# Patient Record
Sex: MALE | Race: WHITE | Employment: OTHER | URBAN - METROPOLITAN AREA
[De-identification: names, ages, dates, MRNs, and addresses within clinical notes are randomized per-mention and may not be internally consistent; named-entity substitution may affect disease eponyms.]

---

## 2017-01-03 ENCOUNTER — HOSPITAL ENCOUNTER (OUTPATIENT)
Dept: PREADMISSION TESTING | Age: 67
Discharge: HOME | End: 2017-01-03
Payer: COMMERCIAL

## 2017-01-03 VITALS
HEART RATE: 74 BPM | DIASTOLIC BLOOD PRESSURE: 88 MMHG | TEMPERATURE: 98.6 F | OXYGEN SATURATION: 97 % | SYSTOLIC BLOOD PRESSURE: 147 MMHG | BODY MASS INDEX: 32.34 KG/M2 | HEIGHT: 71 IN | WEIGHT: 231 LBS

## 2017-01-03 LAB
ATRIAL RATE: 75 BPM
CALCULATED P AXIS, ECG09: 10 DEGREES
CALCULATED R AXIS, ECG10: 18 DEGREES
CALCULATED T AXIS, ECG11: 38 DEGREES
DIAGNOSIS, 93000: NORMAL
P-R INTERVAL, ECG05: 154 MS
Q-T INTERVAL, ECG07: 346 MS
QRS DURATION, ECG06: 72 MS
QTC CALCULATION (BEZET), ECG08: 386 MS
VENTRICULAR RATE, ECG03: 75 BPM

## 2017-01-03 PROCEDURE — 93005 ELECTROCARDIOGRAM TRACING: CPT

## 2017-01-03 RX ORDER — NAPROXEN SODIUM 220 MG
220 TABLET ORAL 2 TIMES DAILY WITH MEALS
COMMUNITY
End: 2017-04-02

## 2017-01-03 RX ORDER — LORATADINE 10 MG/1
10 TABLET ORAL DAILY
COMMUNITY
End: 2018-05-22

## 2017-01-03 RX ORDER — PHENOL/SODIUM PHENOLATE
20 AEROSOL, SPRAY (ML) MUCOUS MEMBRANE DAILY
COMMUNITY
End: 2019-07-31

## 2017-01-03 NOTE — ADVANCED PRACTICE NURSE
Preoperative instructions reviewed with patient. Patient given 2-6 packs of CHG wipes. Instructions on use of CHG wipes. Patient given SSI infection FAQS sheet, as well as a  MRSA/MSSA treatment instruction sheet  With an explanation to patient that they will be notified if treatment instructions need to be initiated.

## 2017-01-04 PROBLEM — S83.231A COMPLEX TEAR OF MEDIAL MENISCUS OF RIGHT KNEE AS CURRENT INJURY: Status: ACTIVE | Noted: 2017-01-04

## 2017-01-05 ENCOUNTER — ANESTHESIA (OUTPATIENT)
Dept: SURGERY | Age: 67
End: 2017-01-05
Payer: COMMERCIAL

## 2017-01-05 ENCOUNTER — ANESTHESIA EVENT (OUTPATIENT)
Dept: SURGERY | Age: 67
End: 2017-01-05
Payer: COMMERCIAL

## 2017-01-05 PROCEDURE — 74011000250 HC RX REV CODE- 250

## 2017-01-05 PROCEDURE — 77030010509 HC AIRWY LMA MSK TELE -A: Performed by: ANESTHESIOLOGY

## 2017-01-05 PROCEDURE — 77030013079 HC BLNKT BAIR HGGR 3M -A: Performed by: ANESTHESIOLOGY

## 2017-01-05 PROCEDURE — 74011250636 HC RX REV CODE- 250/636: Performed by: PHYSICIAN ASSISTANT

## 2017-01-05 PROCEDURE — 74011250636 HC RX REV CODE- 250/636

## 2017-01-05 RX ORDER — DEXAMETHASONE SODIUM PHOSPHATE 4 MG/ML
INJECTION, SOLUTION INTRA-ARTICULAR; INTRALESIONAL; INTRAMUSCULAR; INTRAVENOUS; SOFT TISSUE AS NEEDED
Status: DISCONTINUED | OUTPATIENT
Start: 2017-01-05 | End: 2017-01-05 | Stop reason: HOSPADM

## 2017-01-05 RX ORDER — HYDROMORPHONE HYDROCHLORIDE 1 MG/ML
INJECTION, SOLUTION INTRAMUSCULAR; INTRAVENOUS; SUBCUTANEOUS AS NEEDED
Status: DISCONTINUED | OUTPATIENT
Start: 2017-01-05 | End: 2017-01-05 | Stop reason: HOSPADM

## 2017-01-05 RX ORDER — SODIUM CHLORIDE, SODIUM LACTATE, POTASSIUM CHLORIDE, CALCIUM CHLORIDE 600; 310; 30; 20 MG/100ML; MG/100ML; MG/100ML; MG/100ML
INJECTION, SOLUTION INTRAVENOUS
Status: DISCONTINUED | OUTPATIENT
Start: 2017-01-05 | End: 2017-01-05 | Stop reason: HOSPADM

## 2017-01-05 RX ORDER — FENTANYL CITRATE 50 UG/ML
INJECTION, SOLUTION INTRAMUSCULAR; INTRAVENOUS AS NEEDED
Status: DISCONTINUED | OUTPATIENT
Start: 2017-01-05 | End: 2017-01-05 | Stop reason: HOSPADM

## 2017-01-05 RX ORDER — KETOROLAC TROMETHAMINE 30 MG/ML
INJECTION, SOLUTION INTRAMUSCULAR; INTRAVENOUS AS NEEDED
Status: DISCONTINUED | OUTPATIENT
Start: 2017-01-05 | End: 2017-01-05 | Stop reason: HOSPADM

## 2017-01-05 RX ORDER — MIDAZOLAM HYDROCHLORIDE 1 MG/ML
INJECTION, SOLUTION INTRAMUSCULAR; INTRAVENOUS AS NEEDED
Status: DISCONTINUED | OUTPATIENT
Start: 2017-01-05 | End: 2017-01-05 | Stop reason: HOSPADM

## 2017-01-05 RX ORDER — ONDANSETRON 2 MG/ML
INJECTION INTRAMUSCULAR; INTRAVENOUS AS NEEDED
Status: DISCONTINUED | OUTPATIENT
Start: 2017-01-05 | End: 2017-01-05 | Stop reason: HOSPADM

## 2017-01-05 RX ORDER — PROPOFOL 10 MG/ML
INJECTION, EMULSION INTRAVENOUS AS NEEDED
Status: DISCONTINUED | OUTPATIENT
Start: 2017-01-05 | End: 2017-01-05 | Stop reason: HOSPADM

## 2017-01-05 RX ORDER — LIDOCAINE HYDROCHLORIDE 20 MG/ML
INJECTION, SOLUTION EPIDURAL; INFILTRATION; INTRACAUDAL; PERINEURAL AS NEEDED
Status: DISCONTINUED | OUTPATIENT
Start: 2017-01-05 | End: 2017-01-05 | Stop reason: HOSPADM

## 2017-01-05 RX ADMIN — FENTANYL CITRATE 25 MCG: 50 INJECTION, SOLUTION INTRAMUSCULAR; INTRAVENOUS at 10:03

## 2017-01-05 RX ADMIN — DEXAMETHASONE SODIUM PHOSPHATE 4 MG: 4 INJECTION, SOLUTION INTRA-ARTICULAR; INTRALESIONAL; INTRAMUSCULAR; INTRAVENOUS; SOFT TISSUE at 09:52

## 2017-01-05 RX ADMIN — FENTANYL CITRATE 25 MCG: 50 INJECTION, SOLUTION INTRAMUSCULAR; INTRAVENOUS at 09:52

## 2017-01-05 RX ADMIN — SODIUM CHLORIDE, SODIUM LACTATE, POTASSIUM CHLORIDE, CALCIUM CHLORIDE: 600; 310; 30; 20 INJECTION, SOLUTION INTRAVENOUS at 09:38

## 2017-01-05 RX ADMIN — PROPOFOL 150 MG: 10 INJECTION, EMULSION INTRAVENOUS at 09:42

## 2017-01-05 RX ADMIN — LIDOCAINE HYDROCHLORIDE 80 MG: 20 INJECTION, SOLUTION EPIDURAL; INFILTRATION; INTRACAUDAL; PERINEURAL at 09:42

## 2017-01-05 RX ADMIN — MIDAZOLAM HYDROCHLORIDE 2 MG: 1 INJECTION, SOLUTION INTRAMUSCULAR; INTRAVENOUS at 09:38

## 2017-01-05 RX ADMIN — KETOROLAC TROMETHAMINE 30 MG: 30 INJECTION, SOLUTION INTRAMUSCULAR; INTRAVENOUS at 10:27

## 2017-01-05 RX ADMIN — ONDANSETRON 4 MG: 2 INJECTION INTRAMUSCULAR; INTRAVENOUS at 10:26

## 2017-01-05 RX ADMIN — CEFAZOLIN 2 G: 1 INJECTION, POWDER, FOR SOLUTION INTRAMUSCULAR; INTRAVENOUS; PARENTERAL at 09:50

## 2017-01-05 RX ADMIN — HYDROMORPHONE HYDROCHLORIDE 0.25 MG: 1 INJECTION, SOLUTION INTRAMUSCULAR; INTRAVENOUS; SUBCUTANEOUS at 10:29

## 2017-01-05 NOTE — ANESTHESIA PREPROCEDURE EVALUATION
Anesthetic History   No history of anesthetic complications            Review of Systems / Medical History  Patient summary reviewed, nursing notes reviewed and pertinent labs reviewed    Pulmonary        Sleep apnea: CPAP    Asthma : well controlled       Neuro/Psych   Within defined limits           Cardiovascular    Hypertension        Dysrhythmias : PVC      Exercise tolerance: >4 METS     GI/Hepatic/Renal     GERD: well controlled      PUD     Endo/Other        Arthritis     Other Findings              Physical Exam    Airway  Mallampati: II  TM Distance: 4 - 6 cm  Neck ROM: normal range of motion   Mouth opening: Normal     Cardiovascular  Regular rate and rhythm,  S1 and S2 normal,  no murmur, click, rub, or gallop             Dental  No notable dental hx       Pulmonary  Breath sounds clear to auscultation               Abdominal  GI exam deferred       Other Findings            Anesthetic Plan    ASA: 2  Anesthesia type: general          Induction: Intravenous  Anesthetic plan and risks discussed with: Patient

## 2017-01-05 NOTE — ANESTHESIA POSTPROCEDURE EVALUATION
Post-Anesthesia Evaluation and Assessment    Patient: Marlen Reece MRN: [id number]  SSN: [id number]    YOB: 1950  Age: 61 y.o. Sex: male       Cardiovascular Function/Vital Signs  Visit Vitals    /83    Pulse 74    Temp 36.9 °C (98.5 °F)    Resp 16    Ht 5' 11\" (1.803 m)    Wt 104.8 kg (231 lb)    SpO2 96%    BMI 32.22 kg/m2       Patient is status post general anesthesia for Procedure(s):  RIGHT KNEE ARTHROSCOPY,MENISECTOMY. Nausea/Vomiting: None    Postoperative hydration reviewed and adequate. Pain:  Pain Scale 1: Numeric (0 - 10) (01/05/17 1120)  Pain Intensity 1: 0 (01/05/17 1120)   Managed    Neurological Status:   Neuro (WDL): Within Defined Limits (01/05/17 1120)  Neuro  LUE Motor Response: Purposeful (01/05/17 1051)  LLE Motor Response: Purposeful (01/05/17 1051)  RUE Motor Response: Purposeful (01/05/17 1051)  RLE Motor Response: Numbness; Purposeful (01/05/17 1051)   At baseline    Mental Status and Level of Consciousness: Arousable    Pulmonary Status:   O2 Device: Room air (01/05/17 1120)   Adequate oxygenation and airway patent    Complications related to anesthesia: None    Post-anesthesia assessment completed.  No concerns    Signed By: Juventino Lainez MD     January 5, 2017

## 2017-03-03 ENCOUNTER — OFFICE VISIT (OUTPATIENT)
Dept: FAMILY MEDICINE CLINIC | Age: 67
End: 2017-03-03

## 2017-03-03 VITALS
TEMPERATURE: 98.9 F | WEIGHT: 242 LBS | HEART RATE: 69 BPM | RESPIRATION RATE: 16 BRPM | DIASTOLIC BLOOD PRESSURE: 95 MMHG | HEIGHT: 71 IN | SYSTOLIC BLOOD PRESSURE: 156 MMHG | BODY MASS INDEX: 33.88 KG/M2 | OXYGEN SATURATION: 96 %

## 2017-03-03 DIAGNOSIS — K21.9 GASTROESOPHAGEAL REFLUX DISEASE WITHOUT ESOPHAGITIS: Chronic | ICD-10-CM

## 2017-03-03 DIAGNOSIS — M51.36 DDD (DEGENERATIVE DISC DISEASE), LUMBAR: Chronic | ICD-10-CM

## 2017-03-03 DIAGNOSIS — G47.33 OSA (OBSTRUCTIVE SLEEP APNEA): Chronic | ICD-10-CM

## 2017-03-03 DIAGNOSIS — M17.0 PRIMARY OSTEOARTHRITIS OF BOTH KNEES: Chronic | ICD-10-CM

## 2017-03-03 DIAGNOSIS — I10 HTN (HYPERTENSION), BENIGN: Primary | Chronic | ICD-10-CM

## 2017-03-03 DIAGNOSIS — E78.00 PURE HYPERCHOLESTEROLEMIA: Chronic | ICD-10-CM

## 2017-03-03 RX ORDER — PRAVASTATIN SODIUM 20 MG/1
20 TABLET ORAL
Qty: 30 TAB | Refills: 3 | Status: SHIPPED | OUTPATIENT
Start: 2017-03-03 | End: 2017-07-08 | Stop reason: SDUPTHER

## 2017-03-03 RX ORDER — METOPROLOL SUCCINATE 50 MG/1
50 TABLET, EXTENDED RELEASE ORAL DAILY
Qty: 30 TAB | Refills: 11 | Status: SHIPPED | OUTPATIENT
Start: 2017-03-03 | End: 2018-01-19 | Stop reason: SDUPTHER

## 2017-03-03 NOTE — MR AVS SNAPSHOT
Visit Information Date & Time Provider Department Dept. Phone Encounter #  
 3/3/2017  8:00 AM Leatha Benitez  [facility] [phone] [id number] Follow-up Instructions Return in about 6 months (around 9/3/2017) for fasting. Upcoming Health Maintenance Date Due DTaP/Tdap/Td series (2 - Td) 9/2/2021 COLONOSCOPY 12/24/2025 Allergies as of 3/3/2017  Review Complete On: 3/3/2017 By: Maki Draper LPN No Known Allergies Current Immunizations  Reviewed on 11/19/2015 Name Date Influenza Vaccine 10/4/2016, 11/7/2015, 9/13/2013 Influenza Vaccine (Quad) PF 10/17/2014 Influenza Vaccine Split 1/1/2006 Pneumococcal Polysaccharide (PPSV-23) 11/19/2015 TD Vaccine 6/30/1999 TDAP Vaccine 9/2/2011 Zoster Vaccine, Live 12/10/2013 Not reviewed this visit You Were Diagnosed With   
  
 Codes Comments HTN (hypertension), benign    -  Primary ICD-10-CM: I10 
ICD-9-CM: 401.1 Pure hypercholesterolemia     ICD-10-CM: E78.00 ICD-9-CM: 272.0 Gastroesophageal reflux disease without esophagitis     ICD-10-CM: K21.9 ICD-9-CM: 530.81 DDD (degenerative disc disease), lumbar     ICD-10-CM: M51.36 
ICD-9-CM: 722.52   
 RACHEL (obstructive sleep apnea)     ICD-10-CM: G47.33 
ICD-9-CM: 327.23 Primary osteoarthritis of both knees     ICD-10-CM: M17.0 ICD-9-CM: 715.16 Vitals BP  
  
  
  
  
  
 (!) 156/95 (BP 1 Location: Right arm, BP Patient Position: Sitting) Vitals History BMI and BSA Data Body Mass Index Body Surface Area 33.75 kg/m 2 2.35 m 2 [facility] [address] [phone] Your Updated Medication List  
  
   
This list is accurate as of: 3/3/17  8:25 AM.  Always use your most recent med list.  
  
  
  
  
 acyclovir 5 % ointment Commonly known as:  ZOVIRAX Apply  to affected area every four (4) hours (while awake). For future infections. * albuterol 90 mcg/actuation inhaler Commonly known as:  PROVENTIL HFA Take 2 Puffs by inhalation every six (6) hours as needed for Wheezing. * albuterol 2.5 mg /3 mL (0.083 %) nebulizer solution Commonly known as:  PROVENTIL VENTOLIN  
3 mL by Nebulization route every six (6) hours. As needed for wheezing. ALEVE 220 mg tablet Generic drug:  naproxen sodium Take 220 mg by mouth two (2) times daily (with meals). CLARITIN 10 mg tablet Generic drug:  loratadine Take 10 mg by mouth daily. fenofibrate 160 mg tablet Commonly known as:  LOFIBRA TAKE 1 TABLET BY MOUTH DAILY  
  
 fluticasone 50 mcg/actuation nasal spray Commonly known as:  Jose J Metro 2 Sprays by Both Nostrils route daily. Glucosamine HCl 1,500 mg Tab Take 1,500 mg by mouth two (2) times a day. meloxicam 15 mg tablet Commonly known as:  MOBIC  
TAKE ONE TABLET BY MOUTH DAILY WITH MEALS  Indications: OSTEOARTHRITIS METAMUCIL (SUGAR) Powd Generic drug:  psyllium seed-sucrose Take 1 Dose by mouth two (2) times a day. metoprolol succinate 50 mg XL tablet Commonly known as:  TOPROL-XL Take 1 Tab by mouth daily. Indications: hypertension MUCINEX 1,200 mg Ta12 ER tablet Generic drug:  guaiFENesin Take 1,200 mg by mouth two (2) times a day. Indications: COUGH Omeprazole delayed release 20 mg tablet Commonly known as:  PRILOSEC D/R Take 20 mg by mouth daily. oxyCODONE-acetaminophen 5-325 mg per tablet Commonly known as:  PERCOCET Take 1 Tab by mouth every four (4) hours as needed for Pain. Max Daily Amount: 6 Tabs. pravastatin 20 mg tablet Commonly known as:  PRAVACHOL Take 1 Tab by mouth nightly. Indications: hypercholesterolemia  
  
 ramipril 10 mg capsule Commonly known as:  ALTACE  
TAKE ONE CAPSULE BY MOUTH TWICE A DAY  
  
 TRIAMCINOLONE ACETONIDE (BULK)  
by Does Not Apply route. VITAMIN B-12 1,000 mcg tablet Generic drug:  cyanocobalamin Take 1 Tab by mouth daily. VITAMIN D 2,000 unit Cap capsule Generic drug:  Cholecalciferol (Vitamin D3) Take  by mouth. WELCHOL 625 mg tablet Generic drug:  colesevelam  
TAKE 3 TABLETS BY MOUTH TWICE A DAY WITH FOOD * Notice: This list has 2 medication(s) that are the same as other medications prescribed for you. Read the directions carefully, and ask your doctor or other care provider to review them with you. Prescriptions Sent to Pharmacy Refills  
 pravastatin (PRAVACHOL) 20 mg tablet 3 Sig: Take 1 Tab by mouth nightly. Indications: hypercholesterolemia Class: Normal  
 Pharmacy: [address] [place] [address] #: [phone] Route: Oral  
 metoprolol succinate (TOPROL-XL) 50 mg XL tablet 11 Sig: Take 1 Tab by mouth daily. Indications: hypertension Class: Normal  
 Pharmacy: [address] [place] [address] #: [phone] Route: Oral  
  
We Performed the Following HEMOGLOBIN [id number] CPT(R)] LIPID PANEL [55226 CPT(R)] METABOLIC PANEL, COMPREHENSIVE [47107 CPT(R)] Follow-up Instructions Return in about 6 months (around 9/3/2017) for fasting. Patient Instructions DASH Diet: Care Instructions Your Care Instructions The DASH diet is an eating plan that can help lower your blood pressure. DASH stands for Dietary Approaches to Stop Hypertension. Hypertension is high blood pressure. The DASH diet focuses on eating foods that are high in calcium, potassium, and magnesium. These nutrients can lower blood pressure. The foods that are highest in these nutrients are fruits, vegetables, low-fat dairy products, nuts, seeds, and legumes.  But taking calcium, potassium, and magnesium supplements instead of eating foods that are high in those nutrients does not have the same effect. The DASH diet also includes whole grains, fish, and poultry. The DASH diet is one of several lifestyle changes your doctor may recommend to lower your high blood pressure. Your doctor may also want you to decrease the amount of sodium in your diet. Lowering sodium while following the DASH diet can lower blood pressure even further than just the DASH diet alone. Follow-up care is a key part of your treatment and safety. Be sure to make and go to all appointments, and call your doctor if you are having problems. It's also a good idea to know your test results and keep a list of the medicines you take. How can you care for yourself at home? Following the DASH diet · Eat 4 to 5 servings of fruit each day. A serving is 1 medium-sized piece of fruit, ½ cup chopped or canned fruit, 1/4 cup dried fruit, or 4 ounces (½ cup) of fruit juice. Choose fruit more often than fruit juice. · Eat 4 to 5 servings of vegetables each day. A serving is 1 cup of lettuce or raw leafy vegetables, ½ cup of chopped or cooked vegetables, or 4 ounces (½ cup) of vegetable juice. Choose vegetables more often than vegetable juice. · Get 2 to 3 servings of low-fat and fat-free dairy each day. A serving is 8 ounces of milk, 1 cup of yogurt, or 1 ½ ounces of cheese. · Eat 6 to 8 servings of grains each day. A serving is 1 slice of bread, 1 ounce of dry cereal, or ½ cup of cooked rice, pasta, or cooked cereal. Try to choose whole-grain products as much as possible. · Limit lean meat, poultry, and fish to 2 servings each day. A serving is 3 ounces, about the size of a deck of cards. · Eat 4 to 5 servings of nuts, seeds, and legumes (cooked dried beans, lentils, and split peas) each week. A serving is 1/3 cup of nuts, 2 tablespoons of seeds, or ½ cup of cooked beans or peas. · Limit fats and oils to 2 to 3 servings each day. A serving is 1 teaspoon of vegetable oil or 2 tablespoons of salad dressing. · Limit sweets and added sugars to 5 servings or less a week. A serving is 1 tablespoon jelly or jam, ½ cup sorbet, or 1 cup of lemonade. · Eat less than 2,300 milligrams (mg) of sodium a day. If you limit your sodium to 1,500 mg a day, you can lower your blood pressure even more. Tips for success · Start small. Do not try to make dramatic changes to your diet all at once. You might feel that you are missing out on your favorite foods and then be more likely to not follow the plan. Make small changes, and stick with them. Once those changes become habit, add a few more changes. · Try some of the following: ¨ Make it a goal to eat a fruit or vegetable at every meal and at snacks. This will make it easy to get the recommended amount of fruits and vegetables each day. ¨ Try yogurt topped with fruit and nuts for a snack or healthy dessert. ¨ Add lettuce, tomato, cucumber, and onion to sandwiches. ¨ Combine a ready-made pizza crust with low-fat mozzarella cheese and lots of vegetable toppings. Try using tomatoes, squash, spinach, broccoli, carrots, cauliflower, and onions. ¨ Have a variety of cut-up vegetables with a low-fat dip as an appetizer instead of chips and dip. ¨ Sprinkle sunflower seeds or chopped almonds over salads. Or try adding chopped walnuts or almonds to cooked vegetables. ¨ Try some vegetarian meals using beans and peas. Add garbanzo or kidney beans to salads. Make burritos and tacos with mashed trent beans or black beans. Where can you learn more? Go to http://kurt-lin.info/. Enter N754 in the search box to learn more about \"DASH Diet: Care Instructions. \" Current as of: March 23, 2016 Content Version: 11.1 © 6006-3857 [employer]. Care instructions adapted under license by [employer] (which disclaims liability or warranty for this information).  If you have questions about a medical condition or this instruction, always ask your healthcare professional. [address] any warranty or liability for your use of this information. Introducing [facility]! Pamela Pineda introduces [employer] patient portal. Now you can access parts of your medical record, email your doctor's office, and request medication refills online. 1. In your internet browser, go to [email or web address] 2. Click on the First Time User? Click Here link in the Sign In box. You will see the New Member Sign Up page. 3. Enter your [employer] Access Code exactly as it appears below. You will not need to use this code after youve completed the sign-up process. If you do not sign up before the expiration date, you must request a new code. · [employer] Access Code: [email or web address] Expires: 6/1/2017  8:25 AM 
 
4. Enter the last four digits of your Social Security Number (xxxx) and Date of Birth (mm/dd/yyyy) as indicated and click Submit. You will be taken to the next sign-up page. 5. Create a [employer] ID. This will be your [employer] login ID and cannot be changed, so think of one that is secure and easy to remember. 6. Create a [employer] password. You can change your password at any time. 7. Enter your Password Reset Question and Answer. This can be used at a later time if you forget your password. 8. Enter your e-mail address. You will receive e-mail notification when new information is available in [address] 9. Click Sign Up. You can now view and download portions of your medical record. 10. Click the Download Summary menu link to download a portable copy of your medical information. If you have questions, please visit the Frequently Asked Questions section of the [employer] website. Remember, [employer] is NOT to be used for urgent needs. For medical emergencies, dial 911. Now available from your iPhone and Android! Please provide this summary of care documentation to your next provider. Your primary care clinician is listed as [address]. If you have any questions after today's visit, please call [phone].

## 2017-03-03 NOTE — PROGRESS NOTES
Progress Note    Patient: Edvin Ramachandran MRN: [id number]  SSN: [id number]    YOB: 1950  Age: 61 y.o. Sex: male        Chief Complaint   Patient presents with    Hypertension    Cholesterol Problem     Fasting    Medication Evaluation     Patient would like a generic form of Welchol         Subjective:     Encounter Diagnoses   Name Primary?  HTN (hypertension), benign: Elevated ×3 will have to increase metoprolol. BP Readings from Last 3 Encounters:   03/03/17 (!) 156/95   01/05/17 146/83   01/03/17 147/88     The patient reports:  taking medications as instructed, no medication side effects noted, no TIA's, no chest pain on exertion, no dyspnea on exertion, no swelling of ankles. Lab Results   Component Value Date/Time    Sodium 140 10/25/2016 09:29 AM    Potassium 4.8 10/25/2016 09:29 AM    Chloride 100 10/25/2016 09:29 AM    CO2 23 10/25/2016 09:29 AM    Anion gap 8 04/02/2010 09:09 AM    Glucose 89 10/25/2016 09:29 AM    BUN 20 10/25/2016 09:29 AM    Creatinine 0.84 10/25/2016 09:29 AM    BUN/Creatinine ratio 24 10/25/2016 09:29 AM    GFR est  10/25/2016 09:29 AM    GFR est non-AA 93 10/25/2016 09:29 AM    Calcium 9.2 10/25/2016 09:29 AM    Bilirubin, total 0.4 10/25/2016 09:29 AM    AST (SGOT) 25 10/25/2016 09:29 AM    Alk. phosphatase 43 10/25/2016 09:29 AM    Protein, total 7.0 10/25/2016 09:29 AM    Albumin 4.5 10/25/2016 09:29 AM    Globulin 3.4 04/02/2010 09:09 AM    A-G Ratio 1.8 10/25/2016 09:29 AM    ALT (SGPT) 37 10/25/2016 09:29 AM     Our goal is to normalize the blood pressure to decrease the risks of strokes and heart attacks. The patient is in agreement with the plan. Yes    Pure hypercholesterolemia:  Cardiovascular risks for him are: LDL goal is under 100  hypertension  hyperlipidemia. Currently he takes WelChol. He wants to switch to something cheaper. We will try him on low-dose pravastatin 20 mg daily.   He will need repeat cholesterol and liver tests 2 months after restarting the new medicine. Lab Results   Component Value Date/Time    Cholesterol, total 158 10/25/2016 09:29 AM    HDL Cholesterol 40 10/25/2016 09:29 AM    LDL, calculated 93 10/25/2016 09:29 AM    Triglyceride 123 10/25/2016 09:29 AM    CHOL/HDL Ratio 3.8 04/02/2010 09:09 AM     Lab Results   Component Value Date/Time    ALT (SGPT) 37 10/25/2016 09:29 AM    AST (SGOT) 25 10/25/2016 09:29 AM    Alk. phosphatase 43 10/25/2016 09:29 AM    Bilirubin, total 0.4 10/25/2016 09:29 AM      Myalgias: No   Fatigue: No   Other side effects: no  Wt Readings from Last 3 Encounters:   03/03/17 242 lb (109.8 kg)   01/05/17 231 lb (104.8 kg)   01/03/17 231 lb (104.8 kg)     The patient is aware of our goal to reduce or eliminate the long term problems (such as strokes and heart attacks) related to poorly controlled hyperlipidemia.  Gastroesophageal reflux disease without esophagitis:  Current control of Symptoms:good  Hiatal Hernia:no  Current Medications:omeprazole  The patient has no history melena or bright red blood in the stools. The patient avoids high dose aspirin tolerates meloxicam.  The patient is aware of diet changes needed, elevating the head of the bed and appropriate use of antacids.  DDD (degenerative disc disease), lumbar:stable sx.  RACHEL (obstructive sleep apnea): Controlled on CPAP. 100% compliant. Feels rested in AM.       Primary osteoarthritis of both knees: He has a complex tear to his right knee cartilage. This was repaired but he still may need a knee replacement because he is having difficulty stepping up with that knee and chronic medial joint space pain. Current and past medical information:    Current Medications after this visit[de-identified]     Current Outpatient Prescriptions   Medication Sig    TRIAMCINOLONE ACETONIDE, BULK, by Does Not Apply route.  pravastatin (PRAVACHOL) 20 mg tablet Take 1 Tab by mouth nightly.  Indications: hypercholesterolemia    metoprolol succinate (TOPROL-XL) 50 mg XL tablet Take 1 Tab by mouth daily. Indications: hypertension    loratadine (CLARITIN) 10 mg tablet Take 10 mg by mouth daily.  guaiFENesin (MUCINEX) 1,200 mg Ta12 ER tablet Take 1,200 mg by mouth two (2) times a day. Indications: COUGH    Omeprazole delayed release (PRILOSEC D/R) 20 mg tablet Take 20 mg by mouth daily.  albuterol (PROVENTIL VENTOLIN) 2.5 mg /3 mL (0.083 %) nebulizer solution 3 mL by Nebulization route every six (6) hours. As needed for wheezing.  WELCHOL 625 mg tablet TAKE 3 TABLETS BY MOUTH TWICE A DAY WITH FOOD    meloxicam (MOBIC) 15 mg tablet TAKE ONE TABLET BY MOUTH DAILY WITH MEALS  Indications: OSTEOARTHRITIS    albuterol (PROVENTIL HFA) 90 mcg/actuation inhaler Take 2 Puffs by inhalation every six (6) hours as needed for Wheezing.  ramipril (ALTACE) 10 mg capsule TAKE ONE CAPSULE BY MOUTH TWICE A DAY    fenofibrate (LOFIBRA) 160 mg tablet TAKE 1 TABLET BY MOUTH DAILY    cyanocobalamin (VITAMIN B-12) 1,000 mcg tablet Take 1 Tab by mouth daily.  acyclovir (ZOVIRAX) 5 % ointment Apply  to affected area every four (4) hours (while awake). For future infections.  fluticasone (FLONASE) 50 mcg/actuation nasal spray 2 Sprays by Both Nostrils route daily.  Glucosamine HCl 1,500 mg Tab Take 1,500 mg by mouth two (2) times a day.  Cholecalciferol, Vitamin D3, (VITAMIN D) 2,000 unit Cap Take  by mouth.  psyllium seed-sucrose (METAMUCIL) Powd Take 1 Dose by mouth two (2) times a day.  oxyCODONE-acetaminophen (PERCOCET) 5-325 mg per tablet Take 1 Tab by mouth every four (4) hours as needed for Pain. Max Daily Amount: 6 Tabs.  naproxen sodium (ALEVE) 220 mg tablet Take 220 mg by mouth two (2) times daily (with meals). No current facility-administered medications for this visit.         Patient Active Problem List    Diagnosis Date Noted    Positive PPD 07/01/2010     Priority: 1 - One    RCAHEL (obstructive sleep apnea) 07/01/2010     Priority: 1 - One    HTN (hypertension), benign 04/01/2010     Priority: 1 - One    High cholesterol 04/01/2010     Priority: 1 - One    GERD (gastroesophageal reflux disease) 04/01/2010     Priority: 1 - One    Complex tear of medial meniscus of right knee as current injury 01/04/2017    Rectus diastasis 06/03/2016    DDD (degenerative disc disease), lumbar 05/16/2016    PVC's (premature ventricular contractions)     B12 deficiency 10/17/2014    Knee pain 08/04/2014    HLD (hyperlipidemia) 03/26/2013    SOB (shortness of breath) 10/11/2012    Vitamin D deficiency 04/27/2012    Colon ulcer, aphthous 04/01/2010       Past Medical History:   Diagnosis Date    Adverse effect of anesthesia     went apneic post surgery at [facility] [place]    Anesthesia complication 3763    APNEA DURING HERNIA REPAIR, POST OPERATIVE INTUBATION AT [facility] [place]    Arrhythmia     FREQUENT PVCS    Arthritis     Asthma     Cancer ([facility] [address]     SKIN- UNKNOWN    Colon ulcer, aphthous 4/1/2010    Finger amputation, traumatic 2003    R 2ND DIGIT    GERD (gastroesophageal reflux disease) 4/1/2010    High cholesterol 4/1/2010    HTN (hypertension), benign 4/1/2010    Hx MRSA infection 2013    CYSTS BL LEGS    Sleep apnea     CPAP       No Known Allergies    Past Surgical History:   Procedure Laterality Date    ABDOMEN SURGERY PROC UNLISTED  2001    hernia    HX APPENDECTOMY  1959    HX ORTHOPAEDIC  1994    left knee    HX OTHER SURGICAL  1990S    CERVICAL- UNKNOWN    HX OTHER SURGICAL      R HAND 2ND DIGIT TRAUMATIC AMPUTATION REPAIR    HX TONSIL AND ADENOIDECTOMY         Social History     Social History    Marital status:      Spouse name: N/A    Number of children: N/A    Years of education: N/A     Social History Main Topics    Smoking status: Former Smoker     Packs/day: 3.00     Years: 35.00     Quit date: 1/3/2002    Smokeless tobacco: Never Used    Alcohol use 1.2 oz/week     2 Shots of liquor, 0 Standard drinks or equivalent per week    Drug use: No    Sexual activity: Yes     Partners: Female     Birth control/ protection: None     Other Topics Concern    None     Social History Narrative       Review of Systems   Constitutional: Negative. Negative for chills, fever, malaise/fatigue and weight loss. HENT: Negative. Negative for hearing loss. Eyes: Negative. Negative for blurred vision and double vision. Respiratory: Negative. Negative for cough, hemoptysis, sputum production and shortness of breath. Cardiovascular: Negative. Negative for chest pain, palpitations and orthopnea. Gastrointestinal: Negative. Negative for abdominal pain, blood in stool, heartburn, nausea and vomiting. Genitourinary: Negative. Negative for dysuria, frequency and urgency. Musculoskeletal: Positive for back pain and joint pain. Negative for myalgias and neck pain. Skin: Negative. Negative for rash. Neurological: Negative. Negative for dizziness, tingling, tremors, weakness and headaches. Endo/Heme/Allergies: Negative. Psychiatric/Behavioral: Negative. Negative for depression. Objective:     Vitals:    03/03/17 0804   BP: (!) 156/95   Pulse: 69   Resp: 16   Temp: 98.9 °F (37.2 °C)   TempSrc: Oral   SpO2: 96%   Weight: 242 lb (109.8 kg)   Height: 5' 11\" (1.803 m)      Body mass index is 33.75 kg/(m^2). Physical Exam   Constitutional: He is oriented to person, place, and time and well-developed, well-nourished, and in no distress. HENT:   Head: Normocephalic and atraumatic. Mouth/Throat: Oropharynx is clear and moist.   Eyes: Right eye exhibits no discharge. Left eye exhibits no discharge. No scleral icterus. Neck: No tracheal deviation present. No thyromegaly present. No bruit. Cardiovascular: Normal rate, regular rhythm and normal heart sounds. Pulmonary/Chest: Effort normal and breath sounds normal.   Abdominal: Soft.    Neurological: He is alert and oriented to person, place, and time. Skin: No rash noted. No erythema. Psychiatric: Mood and affect normal.   Nursing note and vitals reviewed. There are no preventive care reminders to display for this patient. Assessment and orders:       ICD-10-CM ICD-9-CM    1. HTN (hypertension), benign increase metoprolol to 50 mg daily, return 2 weeks for blood pressure check  I10 401.1 LIPID PANEL      METABOLIC PANEL, COMPREHENSIVE      HEMOGLOBIN   2. Pure hypercholesterolemia E78.00 272.0 LIPID PANEL      METABOLIC PANEL, COMPREHENSIVE   3. Gastroesophageal reflux disease without esophagitis   K21.9 530.81 HEMOGLOBIN   4. DDD (degenerative disc disease), lumbar   M51.36 722.52  stable symptoms    5. RACHEL (obstructive sleep apnea)   G47.33 327.23  well-controlled    6. Primary osteoarthritis of both knees M17.0 715.16  will have him OTC lidocaine patches since the Voltaren gel is so expensive          Plan of care:  Discussed diagnoses in detail with patient. Medication risks/benefits/side effects discussed with patient. All of the patient's questions were addressed. The patient understands and agrees with our plan of care. The patient knows to call back if they are unsure of or forget any changes we discussed today or if the symptoms change. The patient received an After-Visit Summary which contains VS, orders, medication list and allergy list. This can be used as a \"mini-medical record\" should they have to seek medical care while out of town. Patient Care Team:  Jovana Mcguire MD as PCP - Sharonda Fung MD (Cardiology)  Dada Knowles MD (Dermatology)  Trupti Centeno MD (Urology)  Heather Malone MD (Gastroenterology)  Samra Reynolds MD (Surgery)  Gabriel Rivers MD (Orthopedic Surgery)  Luis Alberto Kent MD (Orthopedic Surgery)    Follow-up Disposition:  Return in about 6 months (around 9/3/2017) for fasting. No future appointments.     Signed By: Jose R Ruth Moon Gerardo MD     March 3, 2017

## 2017-03-03 NOTE — PROGRESS NOTES
Chief Complaint   Patient presents with    Hypertension    Cholesterol Problem     Fasting     Body mass index is 33.75 kg/(m^2). Reviewed record in preparation for visit and have necessary documentation  Pt did not bring medication to office visit for review  Information was given to pt on Advanced Directives, Living Will  Information was given on Shingles Vaccine  Opportunity was given for questions  Goals that were addressed and/or need to be completed after this appointment include: There are no preventive care reminders to display for this patient.

## 2017-03-03 NOTE — PATIENT INSTRUCTIONS

## 2017-03-04 LAB
ALBUMIN SERPL-MCNC: 4.4 G/DL (ref 3.6–4.8)
ALBUMIN/GLOB SERPL: 1.9 {RATIO} (ref 1.1–2.5)
ALP SERPL-CCNC: 41 IU/L (ref 39–117)
ALT SERPL-CCNC: 29 IU/L (ref 0–44)
AST SERPL-CCNC: 22 IU/L (ref 0–40)
BILIRUB SERPL-MCNC: 0.3 MG/DL (ref 0–1.2)
BUN SERPL-MCNC: 24 MG/DL (ref 8–27)
BUN/CREAT SERPL: 22 (ref 10–22)
CALCIUM SERPL-MCNC: 9.2 MG/DL (ref 8.6–10.2)
CHLORIDE SERPL-SCNC: 99 MMOL/L (ref 96–106)
CHOLEST SERPL-MCNC: 172 MG/DL (ref 100–199)
CO2 SERPL-SCNC: 24 MMOL/L (ref 18–29)
CREAT SERPL-MCNC: 1.08 MG/DL (ref 0.76–1.27)
GLOBULIN SER CALC-MCNC: 2.3 G/DL (ref 1.5–4.5)
GLUCOSE SERPL-MCNC: 87 MG/DL (ref 65–99)
HDLC SERPL-MCNC: 51 MG/DL
HGB BLD-MCNC: 15 G/DL (ref 12.6–17.7)
LDLC SERPL CALC-MCNC: 98 MG/DL (ref 0–99)
POTASSIUM SERPL-SCNC: 4.4 MMOL/L (ref 3.5–5.2)
PROT SERPL-MCNC: 6.7 G/DL (ref 6–8.5)
SODIUM SERPL-SCNC: 141 MMOL/L (ref 134–144)
TRIGL SERPL-MCNC: 114 MG/DL (ref 0–149)
VLDLC SERPL CALC-MCNC: 23 MG/DL (ref 5–40)

## 2017-03-16 ENCOUNTER — TELEPHONE (OUTPATIENT)
Dept: FAMILY MEDICINE CLINIC | Age: 67
End: 2017-03-16

## 2017-03-16 NOTE — TELEPHONE ENCOUNTER
Phone call to patient, no answer. Changed appointment from a nurse visit at [address] [place] [address] to routine check up for bronchitis.

## 2017-03-16 NOTE — TELEPHONE ENCOUNTER
Pt has a nursing appt tomorrow about his BP. He wants to know can he be worked in for his bronchitis or given a Z-Pac for it.

## 2017-03-17 ENCOUNTER — OFFICE VISIT (OUTPATIENT)
Dept: FAMILY MEDICINE CLINIC | Age: 67
End: 2017-03-17

## 2017-03-17 VITALS
WEIGHT: 244.6 LBS | SYSTOLIC BLOOD PRESSURE: 133 MMHG | HEART RATE: 60 BPM | DIASTOLIC BLOOD PRESSURE: 77 MMHG | RESPIRATION RATE: 20 BRPM | HEIGHT: 71 IN | TEMPERATURE: 97.8 F | BODY MASS INDEX: 34.24 KG/M2 | OXYGEN SATURATION: 98 %

## 2017-03-17 DIAGNOSIS — J01.00 ACUTE NON-RECURRENT MAXILLARY SINUSITIS: ICD-10-CM

## 2017-03-17 DIAGNOSIS — R06.2 WHEEZING: ICD-10-CM

## 2017-03-17 DIAGNOSIS — I10 HTN (HYPERTENSION), BENIGN: Primary | Chronic | ICD-10-CM

## 2017-03-17 DIAGNOSIS — J20.9 ACUTE BRONCHITIS, UNSPECIFIED ORGANISM: ICD-10-CM

## 2017-03-17 RX ORDER — CEFPROZIL 500 MG/1
500 TABLET, FILM COATED ORAL 2 TIMES DAILY
Qty: 20 TAB | Refills: 0 | Status: SHIPPED | OUTPATIENT
Start: 2017-03-17 | End: 2017-03-27

## 2017-03-17 NOTE — PATIENT INSTRUCTIONS
Bronchitis: Care Instructions  Your Care Instructions    Bronchitis is inflammation of the bronchial tubes, which carry air to the lungs. The tubes swell and produce mucus, or phlegm. The mucus and inflamed bronchial tubes make you cough. You may have trouble breathing. Most cases of bronchitis are caused by viruses like those that cause colds. Antibiotics usually do not help and they may be harmful. Bronchitis usually develops rapidly and lasts about 2 to 3 weeks in otherwise healthy people. Follow-up care is a key part of your treatment and safety. Be sure to make and go to all appointments, and call your doctor if you are having problems. It's also a good idea to know your test results and keep a list of the medicines you take. How can you care for yourself at home? · Take all medicines exactly as prescribed. Call your doctor if you think you are having a problem with your medicine. · Get some extra rest.  · Take an over-the-counter pain medicine, such as acetaminophen (Tylenol), ibuprofen (Advil, Motrin), or naproxen (Aleve) to reduce fever and relieve body aches. Read and follow all instructions on the label. · Do not take two or more pain medicines at the same time unless the doctor told you to. Many pain medicines have acetaminophen, which is Tylenol. Too much acetaminophen (Tylenol) can be harmful. · Take an over-the-counter cough medicine that contains dextromethorphan to help quiet a dry, hacking cough so that you can sleep. Avoid cough medicines that have more than one active ingredient. Read and follow all instructions on the label. · Breathe moist air from a humidifier, hot shower, or sink filled with hot water. The heat and moisture will thin mucus so you can cough it out. · Do not smoke. Smoking can make bronchitis worse. If you need help quitting, talk to your doctor about stop-smoking programs and medicines. These can increase your chances of quitting for good.   When should you call for help? Call 911 anytime you think you may need emergency care. For example, call if:  · You have severe trouble breathing. Call your doctor now or seek immediate medical care if:  · You have new or worse trouble breathing. · You cough up dark brown or bloody mucus (sputum). · You have a new or higher fever. · You have a new rash. Watch closely for changes in your health, and be sure to contact your doctor if:  · You cough more deeply or more often, especially if you notice more mucus or a change in the color of your mucus. · You are not getting better as expected. Where can you learn more? Go to http://kurt-lin.info/. Enter H333 in the search box to learn more about \"Bronchitis: Care Instructions. \"  Current as of: May 23, 2016  Content Version: 11.1  © 8010-1486 [employer]. Care instructions adapted under license by [employer] (which disclaims liability or warranty for this information). If you have questions about a medical condition or this instruction, always ask your healthcare professional. [place] [address] any warranty or liability for your use of this information. Bronchitis: Care Instructions  Your Care Instructions    Bronchitis is inflammation of the bronchial tubes, which carry air to the lungs. The tubes swell and produce mucus, or phlegm. The mucus and inflamed bronchial tubes make you cough. You may have trouble breathing. Most cases of bronchitis are caused by viruses like those that cause colds. Antibiotics usually do not help and they may be harmful. Bronchitis usually develops rapidly and lasts about 2 to 3 weeks in otherwise healthy people. Follow-up care is a key part of your treatment and safety. Be sure to make and go to all appointments, and call your doctor if you are having problems. It's also a good idea to know your test results and keep a list of the medicines you take.   How can you care for yourself at home? · Take all medicines exactly as prescribed. Call your doctor if you think you are having a problem with your medicine. · Get some extra rest.  · Take an over-the-counter pain medicine, such as acetaminophen (Tylenol), ibuprofen (Advil, Motrin), or naproxen (Aleve) to reduce fever and relieve body aches. Read and follow all instructions on the label. · Do not take two or more pain medicines at the same time unless the doctor told you to. Many pain medicines have acetaminophen, which is Tylenol. Too much acetaminophen (Tylenol) can be harmful. · Take an over-the-counter cough medicine that contains dextromethorphan to help quiet a dry, hacking cough so that you can sleep. Avoid cough medicines that have more than one active ingredient. Read and follow all instructions on the label. · Breathe moist air from a humidifier, hot shower, or sink filled with hot water. The heat and moisture will thin mucus so you can cough it out. · Do not smoke. Smoking can make bronchitis worse. If you need help quitting, talk to your doctor about stop-smoking programs and medicines. These can increase your chances of quitting for good. When should you call for help? Call 911 anytime you think you may need emergency care. For example, call if:  · You have severe trouble breathing. Call your doctor now or seek immediate medical care if:  · You have new or worse trouble breathing. · You cough up dark brown or bloody mucus (sputum). · You have a new or higher fever. · You have a new rash. Watch closely for changes in your health, and be sure to contact your doctor if:  · You cough more deeply or more often, especially if you notice more mucus or a change in the color of your mucus. · You are not getting better as expected. Where can you learn more? Go to http://kurt-lin.info/. Enter H333 in the search box to learn more about \"Bronchitis: Care Instructions. \"  Current as of: May 23, 2016  Content Version: 11.1  © 4578-2487 [employer]. Care instructions adapted under license by [employer] (which disclaims liability or warranty for this information). If you have questions about a medical condition or this instruction, always ask your healthcare professional. [address] any warranty or liability for your use of this information.

## 2017-03-17 NOTE — MR AVS SNAPSHOT
Visit Information Date & Time Provider Department Dept. Phone Encounter #  
 3/17/2017  8:00 AM Aleyda No MD [address] [facility] [id number] Follow-up Instructions Return if symptoms worsen or fail to improve. Your Appointments 9/5/2017  8:00 AM  
ROUTINE CARE with Aleyda No MD  
[facility] Appt Note: 6 mo [id number]  
 [address] [facility] [address] [address]  
[place] [address] [place] [id number] Upcoming Health Maintenance Date Due DTaP/Tdap/Td series (2 - Td) 9/2/2021 COLONOSCOPY 12/24/2025 Allergies as of 3/17/2017  Review Complete On: 3/17/2017 By: Herb Asher LPN No Known Allergies Current Immunizations  Reviewed on 11/19/2015 Name Date Influenza Vaccine 10/4/2016, 11/7/2015, 9/13/2013 Influenza Vaccine (Quad) PF 10/17/2014 Influenza Vaccine Split 1/1/2006 Pneumococcal Polysaccharide (PPSV-23) 11/19/2015 TD Vaccine 6/30/1999 TDAP Vaccine 9/2/2011 Zoster Vaccine, Live 12/10/2013 Not reviewed this visit You Were Diagnosed With   
  
 Codes Comments HTN (hypertension), benign    -  Primary ICD-10-CM: I10 
ICD-9-CM: 401.1 Acute non-recurrent maxillary sinusitis     ICD-10-CM: J01.00 ICD-9-CM: 461.0 Acute bronchitis, unspecified organism     ICD-10-CM: J20.9 ICD-9-CM: 466.0 Wheezing     ICD-10-CM: R06.2 ICD-9-CM: 786.07 Vitals BP Pulse Temp Resp Height(growth percentile) Weight(growth percentile) 133/77 (BP 1 Location: Left arm, BP Patient Position: Sitting) 60 97.8 °F (36.6 °C) (Oral) 20 5' 11\" (1.803 m) 244 lb 9.6 oz (110.9 kg) SpO2 BMI Smoking Status 98% 34.11 kg/m2 Former Smoker Vitals History BMI and BSA Data Body Mass Index Body Surface Area  
 34.11 kg/m 2 2.36 m 2 Preferred Pharmacy Pharmacy Name Phone [facility] [place] [address] [facility] [phone] Your Updated Medication List  
  
   
This list is accurate as of: 3/17/17  8:20 AM.  Always use your most recent med list.  
  
  
  
  
 acyclovir 5 % ointment Commonly known as:  ZOVIRAX Apply  to affected area every four (4) hours (while awake). For future infections. * albuterol 90 mcg/actuation inhaler Commonly known as:  PROVENTIL HFA Take 2 Puffs by inhalation every six (6) hours as needed for Wheezing. * albuterol 2.5 mg /3 mL (0.083 %) nebulizer solution Commonly known as:  PROVENTIL VENTOLIN  
3 mL by Nebulization route every six (6) hours. As needed for wheezing. ALEVE 220 mg tablet Generic drug:  naproxen sodium Take 220 mg by mouth two (2) times daily (with meals). cefPROZIL 500 mg tablet Commonly known as:  CEFZIL Take 1 Tab by mouth two (2) times a day for 10 days. CLARITIN 10 mg tablet Generic drug:  loratadine Take 10 mg by mouth daily. fenofibrate 160 mg tablet Commonly known as:  LOFIBRA TAKE 1 TABLET BY MOUTH DAILY  
  
 fluticasone 50 mcg/actuation nasal spray Commonly known as:  [place] 2 Sprays by Both Nostrils route daily. Glucosamine HCl 1,500 mg Tab Take 1,500 mg by mouth two (2) times a day. meloxicam 15 mg tablet Commonly known as:  MOBIC  
TAKE ONE TABLET BY MOUTH DAILY WITH MEALS  Indications: OSTEOARTHRITIS METAMUCIL (SUGAR) Powd Generic drug:  psyllium seed-sucrose Take 1 Dose by mouth two (2) times a day. metoprolol succinate 50 mg XL tablet Commonly known as:  TOPROL-XL Take 1 Tab by mouth daily. Indications: hypertension MUCINEX 1,200 mg Ta12 ER tablet Generic drug:  guaiFENesin Take 1,200 mg by mouth two (2) times a day. Indications: COUGH Omeprazole delayed release 20 mg tablet Commonly known as:  PRILOSEC D/R Take 20 mg by mouth daily. oxyCODONE-acetaminophen 5-325 mg per tablet Commonly known as:  PERCOCET Take 1 Tab by mouth every four (4) hours as needed for Pain. Max Daily Amount: 6 Tabs. pravastatin 20 mg tablet Commonly known as:  PRAVACHOL Take 1 Tab by mouth nightly. Indications: hypercholesterolemia  
  
 ramipril 10 mg capsule Commonly known as:  ALTACE  
TAKE ONE CAPSULE BY MOUTH TWICE A DAY * TRIAMCINOLONE ACETONIDE (BULK)  
by Does Not Apply route. * triamcinolone acetonide 0.1 % topical cream  
Commonly known as:  KENALOG  
APPLY THIN LAYER TO AFFECTED AREA 2 TIMES A DAY  
  
 VITAMIN B-12 1,000 mcg tablet Generic drug:  cyanocobalamin Take 1 Tab by mouth daily. VITAMIN D 2,000 unit Cap capsule Generic drug:  Cholecalciferol (Vitamin D3) Take  by mouth. WELCHOL 625 mg tablet Generic drug:  colesevelam  
TAKE 3 TABLETS BY MOUTH TWICE A DAY WITH FOOD * Notice: This list has 4 medication(s) that are the same as other medications prescribed for you. Read the directions carefully, and ask your doctor or other care provider to review them with you. Prescriptions Sent to Pharmacy Refills  
 cefPROZIL (CEFZIL) 500 mg tablet 0 Sig: Take 1 Tab by mouth two (2) times a day for 10 days. Class: Normal  
 Pharmacy: [address] [place] [address] #: [phone] Route: Oral  
  
Follow-up Instructions Return if symptoms worsen or fail to improve. Patient Instructions Bronchitis: Care Instructions Your Care Instructions Bronchitis is inflammation of the bronchial tubes, which carry air to the lungs. The tubes swell and produce mucus, or phlegm. The mucus and inflamed bronchial tubes make you cough. You may have trouble breathing. Most cases of bronchitis are caused by viruses like those that cause colds. Antibiotics usually do not help and they may be harmful. Bronchitis usually develops rapidly and lasts about 2 to 3 weeks in otherwise healthy people. Follow-up care is a key part of your treatment and safety. Be sure to make and go to all appointments, and call your doctor if you are having problems. It's also a good idea to know your test results and keep a list of the medicines you take. How can you care for yourself at home? · Take all medicines exactly as prescribed. Call your doctor if you think you are having a problem with your medicine. · Get some extra rest. 
· Take an over-the-counter pain medicine, such as acetaminophen (Tylenol), ibuprofen (Advil, Motrin), or naproxen (Aleve) to reduce fever and relieve body aches. Read and follow all instructions on the label. · Do not take two or more pain medicines at the same time unless the doctor told you to. Many pain medicines have acetaminophen, which is Tylenol. Too much acetaminophen (Tylenol) can be harmful. · Take an over-the-counter cough medicine that contains dextromethorphan to help quiet a dry, hacking cough so that you can sleep. Avoid cough medicines that have more than one active ingredient. Read and follow all instructions on the label. · Breathe moist air from a humidifier, hot shower, or sink filled with hot water. The heat and moisture will thin mucus so you can cough it out. · Do not smoke. Smoking can make bronchitis worse. If you need help quitting, talk to your doctor about stop-smoking programs and medicines. These can increase your chances of quitting for good. When should you call for help? Call 911 anytime you think you may need emergency care. For example, call if: 
· You have severe trouble breathing. Call your doctor now or seek immediate medical care if: 
· You have new or worse trouble breathing. · You cough up dark brown or bloody mucus (sputum). · You have a new or higher fever. · You have a new rash.  
Watch closely for changes in your health, and be sure to contact your doctor if: 
· You cough more deeply or more often, especially if you notice more mucus or a change in the color of your mucus. · You are not getting better as expected. Where can you learn more? Go to http://kurt-lin.info/. Enter H333 in the search box to learn more about \"Bronchitis: Care Instructions. \" Current as of: May 23, 2016 Content Version: 11.1 © 2359-2171 [employer]. Care instructions adapted under license by [employer] (which disclaims liability or warranty for this information). If you have questions about a medical condition or this instruction, always ask your healthcare professional. [address] any warranty or liability for your use of this information. Bronchitis: Care Instructions Your Care Instructions Bronchitis is inflammation of the bronchial tubes, which carry air to the lungs. The tubes swell and produce mucus, or phlegm. The mucus and inflamed bronchial tubes make you cough. You may have trouble breathing. Most cases of bronchitis are caused by viruses like those that cause colds. Antibiotics usually do not help and they may be harmful. Bronchitis usually develops rapidly and lasts about 2 to 3 weeks in otherwise healthy people. Follow-up care is a key part of your treatment and safety. Be sure to make and go to all appointments, and call your doctor if you are having problems. It's also a good idea to know your test results and keep a list of the medicines you take. How can you care for yourself at home? · Take all medicines exactly as prescribed. Call your doctor if you think you are having a problem with your medicine. · Get some extra rest. 
· Take an over-the-counter pain medicine, such as acetaminophen (Tylenol), ibuprofen (Advil, Motrin), or naproxen (Aleve) to reduce fever and relieve body aches. Read and follow all instructions on the label. · Do not take two or more pain medicines at the same time unless the doctor told you to. Many pain medicines have acetaminophen, which is Tylenol. Too much acetaminophen (Tylenol) can be harmful. · Take an over-the-counter cough medicine that contains dextromethorphan to help quiet a dry, hacking cough so that you can sleep. Avoid cough medicines that have more than one active ingredient. Read and follow all instructions on the label. · Breathe moist air from a humidifier, hot shower, or sink filled with hot water. The heat and moisture will thin mucus so you can cough it out. · Do not smoke. Smoking can make bronchitis worse. If you need help quitting, talk to your doctor about stop-smoking programs and medicines. These can increase your chances of quitting for good. When should you call for help? Call 911 anytime you think you may need emergency care. For example, call if: 
· You have severe trouble breathing. Call your doctor now or seek immediate medical care if: 
· You have new or worse trouble breathing. · You cough up dark brown or bloody mucus (sputum). · You have a new or higher fever. · You have a new rash. Watch closely for changes in your health, and be sure to contact your doctor if: 
· You cough more deeply or more often, especially if you notice more mucus or a change in the color of your mucus. · You are not getting better as expected. Where can you learn more? Go to http://kurt-lin.info/. Enter H333 in the search box to learn more about \"Bronchitis: Care Instructions. \" Current as of: May 23, 2016 Content Version: 11.1 © 8418-1064 [employer]. Care instructions adapted under license by [employer] (which disclaims liability or warranty for this information).  If you have questions about a medical condition or this instruction, always ask your healthcare professional. [employer] disclaims any warranty or liability for your use of this information. Introducing \[facility]\"" & HEALTH SERVICES! Lynn White introduces Breezy patient portal. Now you can access parts of your medical record, email your doctor's office, and request medication refills online. 1. In your internet browser, go to [email or web address] 2. Click on the First Time User? Click Here link in the Sign In box. You will see the New Member Sign Up page. 3. Enter your Breezy Access Code exactly as it appears below. You will not need to use this code after youve completed the sign-up process. If you do not sign up before the expiration date, you must request a new code. · Breezy Access Code: [email or web address] Expires: 6/1/2017  9:25 AM 
 
4. Enter the last four digits of your Social Security Number (xxxx) and Date of Birth (mm/dd/yyyy) as indicated and click Submit. You will be taken to the next sign-up page. 5. Create a Breezy ID. This will be your Breezy login ID and cannot be changed, so think of one that is secure and easy to remember. 6. Create a Breezy password. You can change your password at any time. 7. Enter your Password Reset Question and Answer. This can be used at a later time if you forget your password. 8. Enter your e-mail address. You will receive e-mail notification when new information is available in [address] 9. Click Sign Up. You can now view and download portions of your medical record. 10. Click the Download Summary menu link to download a portable copy of your medical information. If you have questions, please visit the Frequently Asked Questions section of the Breezy website. Remember, Breezy is NOT to be used for urgent needs. For medical emergencies, dial 911. Now available from your iPhone and Android! Please provide this summary of care documentation to your next provider. Your primary care clinician is listed as [address]. If you have any questions after today's visit, please call [phone].

## 2017-03-17 NOTE — PROGRESS NOTES
Chief Complaint   Patient presents with    Cough     Body mass index is 34.11 kg/(m^2). Reviewed record in preparation for visit and have necessary documentation  Pt did not bring medication to office visit for review  Information was given to pt on Advanced Directives, Living Will  Information was given on Shingles Vaccine  Opportunity was given for questions  Goals that were addressed and/or need to be completed after this appointment include: There are no preventive care reminders to display for this patient.

## 2017-04-17 RX ORDER — FENOFIBRATE 160 MG/1
TABLET ORAL
Qty: 30 TAB | Refills: 0 | Status: SHIPPED | OUTPATIENT
Start: 2017-04-17 | End: 2017-05-19 | Stop reason: SDUPTHER

## 2017-05-19 RX ORDER — FENOFIBRATE 160 MG/1
TABLET ORAL
Qty: 30 TAB | Refills: 5 | Status: SHIPPED | OUTPATIENT
Start: 2017-05-19 | End: 2018-02-02

## 2017-07-10 RX ORDER — PRAVASTATIN SODIUM 20 MG/1
TABLET ORAL
Qty: 30 TAB | Refills: 0 | Status: SHIPPED | OUTPATIENT
Start: 2017-07-10 | End: 2017-09-05 | Stop reason: SDUPTHER

## 2017-08-23 ENCOUNTER — OFFICE VISIT (OUTPATIENT)
Dept: FAMILY MEDICINE CLINIC | Age: 67
End: 2017-08-23

## 2017-08-23 VITALS
WEIGHT: 235.2 LBS | SYSTOLIC BLOOD PRESSURE: 153 MMHG | RESPIRATION RATE: 20 BRPM | OXYGEN SATURATION: 96 % | TEMPERATURE: 99.2 F | HEIGHT: 71 IN | BODY MASS INDEX: 32.93 KG/M2 | DIASTOLIC BLOOD PRESSURE: 86 MMHG | HEART RATE: 70 BPM

## 2017-08-23 DIAGNOSIS — J45.909 ACUTE ASTHMATIC BRONCHITIS: Primary | ICD-10-CM

## 2017-08-23 RX ORDER — AZITHROMYCIN 250 MG/1
TABLET, FILM COATED ORAL
Qty: 6 TAB | Refills: 0 | Status: SHIPPED | OUTPATIENT
Start: 2017-08-23 | End: 2017-08-28

## 2017-08-23 RX ORDER — PREDNISONE 5 MG/1
TABLET ORAL
Qty: 21 TAB | Refills: 0 | Status: SHIPPED | OUTPATIENT
Start: 2017-08-23 | End: 2017-10-09

## 2017-08-23 NOTE — PATIENT INSTRUCTIONS
Bronchitis: Care Instructions  Your Care Instructions    Bronchitis is inflammation of the bronchial tubes, which carry air to the lungs. The tubes swell and produce mucus, or phlegm. The mucus and inflamed bronchial tubes make you cough. You may have trouble breathing. Most cases of bronchitis are caused by viruses like those that cause colds. Antibiotics usually do not help and they may be harmful. Bronchitis usually develops rapidly and lasts about 2 to 3 weeks in otherwise healthy people. Follow-up care is a key part of your treatment and safety. Be sure to make and go to all appointments, and call your doctor if you are having problems. It's also a good idea to know your test results and keep a list of the medicines you take. How can you care for yourself at home? · Take all medicines exactly as prescribed. Call your doctor if you think you are having a problem with your medicine. · Get some extra rest.  · Take an over-the-counter pain medicine, such as acetaminophen (Tylenol), ibuprofen (Advil, Motrin), or naproxen (Aleve) to reduce fever and relieve body aches. Read and follow all instructions on the label. · Do not take two or more pain medicines at the same time unless the doctor told you to. Many pain medicines have acetaminophen, which is Tylenol. Too much acetaminophen (Tylenol) can be harmful. · Take an over-the-counter cough medicine that contains dextromethorphan to help quiet a dry, hacking cough so that you can sleep. Avoid cough medicines that have more than one active ingredient. Read and follow all instructions on the label. · Breathe moist air from a humidifier, hot shower, or sink filled with hot water. The heat and moisture will thin mucus so you can cough it out. · Do not smoke. Smoking can make bronchitis worse. If you need help quitting, talk to your doctor about stop-smoking programs and medicines. These can increase your chances of quitting for good.   When should you call for help? Call 911 anytime you think you may need emergency care. For example, call if:  · You have severe trouble breathing. Call your doctor now or seek immediate medical care if:  · You have new or worse trouble breathing. · You cough up dark brown or bloody mucus (sputum). · You have a new or higher fever. · You have a new rash. Watch closely for changes in your health, and be sure to contact your doctor if:  · You cough more deeply or more often, especially if you notice more mucus or a change in the color of your mucus. · You are not getting better as expected. Where can you learn more? Go to http://kurt-lin.info/. Enter H333 in the search box to learn more about \"Bronchitis: Care Instructions. \"  Current as of: March 25, 2017  Content Version: 11.3  © 1637-9375 [employer]. Care instructions adapted under license by [employer] (which disclaims liability or warranty for this information). If you have questions about a medical condition or this instruction, always ask your healthcare professional. [place] [address] any warranty or liability for your use of this information. Bronchitis: Care Instructions  Your Care Instructions    Bronchitis is inflammation of the bronchial tubes, which carry air to the lungs. The tubes swell and produce mucus, or phlegm. The mucus and inflamed bronchial tubes make you cough. You may have trouble breathing. Most cases of bronchitis are caused by viruses like those that cause colds. Antibiotics usually do not help and they may be harmful. Bronchitis usually develops rapidly and lasts about 2 to 3 weeks in otherwise healthy people. Follow-up care is a key part of your treatment and safety. Be sure to make and go to all appointments, and call your doctor if you are having problems. It's also a good idea to know your test results and keep a list of the medicines you take.   How can you care for yourself at home? · Take all medicines exactly as prescribed. Call your doctor if you think you are having a problem with your medicine. · Get some extra rest.  · Take an over-the-counter pain medicine, such as acetaminophen (Tylenol), ibuprofen (Advil, Motrin), or naproxen (Aleve) to reduce fever and relieve body aches. Read and follow all instructions on the label. · Do not take two or more pain medicines at the same time unless the doctor told you to. Many pain medicines have acetaminophen, which is Tylenol. Too much acetaminophen (Tylenol) can be harmful. · Take an over-the-counter cough medicine that contains dextromethorphan to help quiet a dry, hacking cough so that you can sleep. Avoid cough medicines that have more than one active ingredient. Read and follow all instructions on the label. · Breathe moist air from a humidifier, hot shower, or sink filled with hot water. The heat and moisture will thin mucus so you can cough it out. · Do not smoke. Smoking can make bronchitis worse. If you need help quitting, talk to your doctor about stop-smoking programs and medicines. These can increase your chances of quitting for good. When should you call for help? Call 911 anytime you think you may need emergency care. For example, call if:  · You have severe trouble breathing. Call your doctor now or seek immediate medical care if:  · You have new or worse trouble breathing. · You cough up dark brown or bloody mucus (sputum). · You have a new or higher fever. · You have a new rash. Watch closely for changes in your health, and be sure to contact your doctor if:  · You cough more deeply or more often, especially if you notice more mucus or a change in the color of your mucus. · You are not getting better as expected. Where can you learn more? Go to http://kurt-lin.info/. Enter H333 in the search box to learn more about \"Bronchitis: Care Instructions. \"  Current as of: March 25, 2017  Content Version: 11.3  © 1378-8754 [employer]. Care instructions adapted under license by [employer] (which disclaims liability or warranty for this information). If you have questions about a medical condition or this instruction, always ask your healthcare professional. [address] any warranty or liability for your use of this information.

## 2017-08-23 NOTE — PROGRESS NOTES
Progress Note    Patient: Shiv Khan MRN: [id number]  SSN: [id number]    YOB: 1950  Age: 61 y.o. Sex: male        Chief Complaint   Patient presents with    Cough     Productive Cough -- yellow phlegm         Subjective:     Encounter Diagnoses   Name Primary? 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 He now has fevers cough productive of yellow sputum and wheeze yes that is not controlled with nebulizer treatments. He has had no nausea vomiting diarrhea. Yes       Current and past medical information:    Current Medications after this visit[de-identified]   Current Outpatient Prescriptions   Medication Sig    azithromycin (ZITHROMAX) 250 mg tablet Take 2 tablets today, then take 1 tablet daily    predniSONE (STERAPRED) 5 mg dose pack See administration instruction per 5mg dose pack    pravastatin (PRAVACHOL) 20 mg tablet TAKE 1 TABLET BY MOUTH NIGHTLY    fenofibrate (LOFIBRA) 160 mg tablet TAKE 1 TABLET BY MOUTH DAILY    meloxicam (MOBIC) 15 mg tablet TAKE ONE TABLET BY MOUTH DAILY WITH MEALS    triamcinolone acetonide (KENALOG) 0.1 % topical cream APPLY THIN LAYER TO AFFECTED AREA 2 TIMES A DAY    TRIAMCINOLONE ACETONIDE, BULK, by Does Not Apply route.  metoprolol succinate (TOPROL-XL) 50 mg XL tablet Take 1 Tab by mouth daily. Indications: hypertension    oxyCODONE-acetaminophen (PERCOCET) 5-325 mg per tablet Take 1 Tab by mouth every four (4) hours as needed for Pain. Max Daily Amount: 6 Tabs.  loratadine (CLARITIN) 10 mg tablet Take 10 mg by mouth daily.  guaiFENesin (MUCINEX) 1,200 mg Ta12 ER tablet Take 1,200 mg by mouth two (2) times a day. Indications: COUGH    Omeprazole delayed release (PRILOSEC D/R) 20 mg tablet Take 20 mg by mouth daily.  albuterol (PROVENTIL VENTOLIN) 2.5 mg /3 mL (0.083 %) nebulizer solution 3 mL by Nebulization route every six (6) hours. As needed for wheezing.     WELCHOL 625 mg tablet TAKE 3 TABLETS BY MOUTH TWICE A DAY WITH FOOD    albuterol (PROVENTIL HFA) 90 mcg/actuation inhaler Take 2 Puffs by inhalation every six (6) hours as needed for Wheezing.  ramipril (ALTACE) 10 mg capsule TAKE ONE CAPSULE BY MOUTH TWICE A DAY    cyanocobalamin (VITAMIN B-12) 1,000 mcg tablet Take 1 Tab by mouth daily.  acyclovir (ZOVIRAX) 5 % ointment Apply  to affected area every four (4) hours (while awake). For future infections.  fluticasone (FLONASE) 50 mcg/actuation nasal spray 2 Sprays by Both Nostrils route daily.  Glucosamine HCl 1,500 mg Tab Take 1,500 mg by mouth two (2) times a day.  Cholecalciferol, Vitamin D3, (VITAMIN D) 2,000 unit Cap Take  by mouth.  psyllium seed-sucrose (METAMUCIL) Powd Take 1 Dose by mouth two (2) times a day. No current facility-administered medications for this visit.         Patient Active Problem List    Diagnosis Date Noted    Positive PPD 07/01/2010     Priority: 1 - One    RACHEL (obstructive sleep apnea) 07/01/2010     Priority: 1 - One    HTN (hypertension), benign 04/01/2010     Priority: 1 - One    High cholesterol 04/01/2010     Priority: 1 - One    GERD (gastroesophageal reflux disease) 04/01/2010     Priority: 1 - One    Complex tear of medial meniscus of right knee as current injury 01/04/2017    Rectus diastasis 06/03/2016    DDD (degenerative disc disease), lumbar 05/16/2016    PVC's (premature ventricular contractions)     B12 deficiency 10/17/2014    Knee pain 08/04/2014    HLD (hyperlipidemia) 03/26/2013    SOB (shortness of breath) 10/11/2012    Vitamin D deficiency 04/27/2012    Colon ulcer, aphthous 04/01/2010       Past Medical History:   Diagnosis Date    Adverse effect of anesthesia     [place] [facility] [place]    Anesthesia complication 0706    APNEA DURING HERNIA REPAIR, POST OPERATIVE INTUBATION AT [facility] [place]    Arrhythmia     FREQUENT PVCS    Arthritis     Asthma     Cancer ([facility] [address]     SKIN- UNKNOWN    Colon ulcer, aphthous 4/1/2010    Finger amputation, traumatic 2003    R 2ND DIGIT    GERD (gastroesophageal reflux disease) 4/1/2010    High cholesterol 4/1/2010    HTN (hypertension), benign 4/1/2010    Hx MRSA infection 2013    CYSTS BL LEGS    Sleep apnea     CPAP       No Known Allergies    Past Surgical History:   Procedure Laterality Date    ABDOMEN SURGERY PROC UNLISTED  2001    hernia    HX APPENDECTOMY  1959    HX ORTHOPAEDIC  1994    left knee    HX OTHER SURGICAL  1990S    CERVICAL- UNKNOWN    HX OTHER SURGICAL      R HAND 2ND DIGIT TRAUMATIC AMPUTATION REPAIR    HX TONSIL AND ADENOIDECTOMY         Social History     Social History    Marital status:      Spouse name: N/A    Number of children: N/A    Years of education: N/A     Social History Main Topics    Smoking status: Former Smoker     Packs/day: 3.00     Years: 35.00     Quit date: 1/3/2002    Smokeless tobacco: Never Used    Alcohol use 1.2 oz/week     2 Shots of liquor, 0 Standard drinks or equivalent per week    Drug use: No    Sexual activity: Yes     Partners: Female     Birth control/ protection: None     Other Topics Concern    None     Social History Narrative       Review of Systems   Constitutional: Positive for fever and malaise/fatigue. Negative for chills and weight loss. HENT: Positive for congestion. Negative for hearing loss. Eyes: Negative. Negative for blurred vision and double vision. Respiratory: Positive for cough, sputum production and wheezing. Negative for hemoptysis and shortness of breath. Cardiovascular: Negative. Negative for chest pain, palpitations and orthopnea. Gastrointestinal: Negative. Negative for abdominal pain, blood in stool, heartburn, nausea and vomiting. Genitourinary: Negative. Negative for dysuria, frequency and urgency. Musculoskeletal: Negative. Negative for back pain, myalgias and neck pain. Skin: Negative. Negative for rash.    Neurological: Negative. Negative for dizziness, tingling, tremors, weakness and headaches. Endo/Heme/Allergies: Negative. Psychiatric/Behavioral: Negative. Negative for depression. Objective:     Vitals:    08/23/17 0853   BP: 153/86   Pulse: 70   Resp: 20   Temp: 99.2 °F (37.3 °C)   TempSrc: Oral   SpO2: 96%   Weight: 235 lb 3.2 oz (106.7 kg)   Height: 5' 11\" (1.803 m)      Body mass index is 32.8 kg/(m^2). Physical Exam   Constitutional: He is oriented to person, place, and time and well-developed, well-nourished, and in no distress. HENT:   Head: Normocephalic and atraumatic. Mouth/Throat: Oropharynx is clear and moist.   Eyes: Right eye exhibits no discharge. Left eye exhibits no discharge. No scleral icterus. Neck: No tracheal deviation present. No thyromegaly present. No bruit. Cardiovascular: Normal rate, regular rhythm and normal heart sounds. Pulmonary/Chest:   Respiratory rate 20, wheezing, wet rhonchi, no rales. Abdominal: Soft. Neurological: He is alert and oriented to person, place, and time. Skin: No rash noted. No erythema. Psychiatric: Mood and affect normal.   Nursing note and vitals reviewed. Health Maintenance Due   Topic Date Due    INFLUENZA AGE 9 TO ADULT  08/01/2017         Assessment and orders:       ICD-10-CM ICD-9-CM    1. Acute asthmatic bronchitis J45.909 493.90 Z pack and prednisone 5 mg dose pack were both sent in for him while the computer system was down. He will continue to use his nebulizer at home. I advise no work until Monday. Plan of care:  Discussed diagnoses in detail with patient. Medication risks/benefits/side effects discussed with patient. All of the patient's questions were addressed. The patient understands and agrees with our plan of care. The patient knows to call back if they are unsure of or forget any changes we discussed today or if the symptoms change.      The patient received an After-Visit Summary which contains VS, orders, medication list and allergy list. This can be used as a \"mini-medical record\" should they have to seek medical care while out of town.     Patient Care Team:  Bina Mike MD as PCP - Kenney Yoo MD (Cardiology)  Elton Oglesby MD (Dermatology)  Leah Sheldon MD (Urology)  Janee Benites MD (Gastroenterology)  Roque Amos MD (Surgery)  Lambert Galicia MD (Orthopedic Surgery)  April Waggoner MD (Orthopedic Surgery)    Follow-up Disposition: Not on File    Future Appointments  Date Time Provider Erin Corona   9/5/2017 8:00 AM Bina Mike MD [facility] [address]       Signed By: Bina Mike MD     August 23, 2017

## 2017-08-23 NOTE — PROGRESS NOTES
Chief Complaint   Patient presents with    Cough     Productive Cough -- yellow phlegm     Body mass index is 32.8 kg/(m^2).     Reviewed record in preparation for visit and have necessary documentation  Pt did not bring medication to office visit for review  Information was given to pt on Advanced Directives, Living Will  Information was given on Shingles Vaccine  Opportunity was given for questions  Goals that were addressed and/or need to be completed after this appointment include:     Health Maintenance Due   Topic Date Due    INFLUENZA AGE 9 TO ADULT  08/01/2017

## 2017-09-05 ENCOUNTER — OFFICE VISIT (OUTPATIENT)
Dept: FAMILY MEDICINE CLINIC | Age: 67
End: 2017-09-05

## 2017-09-05 VITALS
DIASTOLIC BLOOD PRESSURE: 82 MMHG | HEIGHT: 71 IN | WEIGHT: 238.2 LBS | BODY MASS INDEX: 33.35 KG/M2 | OXYGEN SATURATION: 96 % | HEART RATE: 67 BPM | RESPIRATION RATE: 16 BRPM | TEMPERATURE: 98.7 F | SYSTOLIC BLOOD PRESSURE: 144 MMHG

## 2017-09-05 DIAGNOSIS — E78.00 HIGH CHOLESTEROL: Chronic | ICD-10-CM

## 2017-09-05 DIAGNOSIS — I49.3 PVC'S (PREMATURE VENTRICULAR CONTRACTIONS): Chronic | ICD-10-CM

## 2017-09-05 DIAGNOSIS — J45.20 ASTHMATIC BRONCHITIS, MILD INTERMITTENT, UNCOMPLICATED: Chronic | ICD-10-CM

## 2017-09-05 DIAGNOSIS — J20.9 ACUTE BRONCHITIS, UNSPECIFIED ORGANISM: ICD-10-CM

## 2017-09-05 DIAGNOSIS — I10 HTN (HYPERTENSION), BENIGN: Primary | Chronic | ICD-10-CM

## 2017-09-05 DIAGNOSIS — G47.33 OSA (OBSTRUCTIVE SLEEP APNEA): Chronic | ICD-10-CM

## 2017-09-05 DIAGNOSIS — E78.00 PURE HYPERCHOLESTEROLEMIA: Chronic | ICD-10-CM

## 2017-09-05 DIAGNOSIS — K21.9 GASTROESOPHAGEAL REFLUX DISEASE WITHOUT ESOPHAGITIS: Chronic | ICD-10-CM

## 2017-09-05 RX ORDER — PRAVASTATIN SODIUM 20 MG/1
TABLET ORAL
Qty: 30 TAB | Refills: 0 | Status: SHIPPED | OUTPATIENT
Start: 2017-09-05 | End: 2017-10-07 | Stop reason: SDUPTHER

## 2017-09-05 RX ORDER — ALBUTEROL SULFATE 0.83 MG/ML
2.5 SOLUTION RESPIRATORY (INHALATION) EVERY 6 HOURS
Qty: 50 EACH | Refills: 3 | Status: SHIPPED | OUTPATIENT
Start: 2017-09-05 | End: 2017-12-29 | Stop reason: SDUPTHER

## 2017-09-05 RX ORDER — RAMIPRIL 10 MG/1
CAPSULE ORAL
Qty: 60 CAP | Refills: 5 | Status: SHIPPED | OUTPATIENT
Start: 2017-09-05 | End: 2018-01-19 | Stop reason: SDUPTHER

## 2017-09-05 NOTE — PROGRESS NOTES
Chief Complaint   Patient presents with    Labs     fasting    Hypertension    Cold Symptoms     Chest congestion     Body mass index is 33.22 kg/(m^2).   Reviewed record in preparation for visit and have necessary documentation  Pt did not bring medication to office visit for review  Information was given to pt on Advanced Directives, Living Will  Information was given on Shingles Vaccine  Opportunity was given for questions  Goals that were addressed and/or need to be completed after this appointment include:     Health Maintenance Due   Topic Date Due    INFLUENZA AGE 9 TO ADULT  08/01/2017

## 2017-09-05 NOTE — PATIENT INSTRUCTIONS
Controlling Your Asthma: Care Instructions  Your Care Instructions    Asthma is a long-term condition that affects your breathing. It causes the airways that lead to the lungs to swell. During an asthma attack, the airways swell and narrow. This makes it hard to breathe. You may wheeze or cough. If you have a bad attack, you may need emergency care. There are two things to do to treat asthma. · Control asthma over the long term. · Treat attacks when they occur. You and your doctor can make an asthma action plan. It tells you what medicines you need to take every day to control asthma symptoms and what to do if you have an asthma attack. Your asthma action plan can help prevent and treat attacks. When you keep your asthma under control, you can prevent severe attacks and lasting damage to your airways. You need to treat your asthma even when you are not having symptoms. Although asthma is a lifelong disease, treatment can help control it and help you stay healthy. Follow-up care is a key part of your treatment and safety. Be sure to make and go to all appointments, and call your doctor if you are having problems. It's also a good idea to know your test results and keep a list of the medicines you take. How can you care for yourself at home? To control asthma over the long term  Medicines  Controller medicines reduce swelling in your lungs. They also prevent asthma attacks. Take your controller medicine exactly as prescribed. Talk to your doctor if you have any problems with your medicine. · Inhaled corticosteroid is a common and effective controller medicine. Using it the right way can prevent or reduce most side effects. · Take your controller medicine every day, not just when you have symptoms. It helps prevent problems before they occur. · Your doctor may prescribe another medicine that you use along with the corticosteroid. This is often a long-acting bronchodilator.  Do not take this medicine by itself. Using a long-acting bronchodilator by itself can increase your risk of a severe or fatal asthma attack. · Do not take inhaled corticosteroids or long-acting bronchodilators to stop an asthma attack that has already started. They don't work fast enough to help. · Talk to your doctor before you use other medicines. Some medicines, such as aspirin, can cause asthma attacks in some people. Education  · Learn what triggers an asthma attack. Avoid these triggers when you can. Common triggers include colds, smoke, air pollution, dust, pollen, mold, pets, cockroaches, stress, and cold air. · Check yourself for asthma symptoms to know which step to follow in your action plan. Watch for things like being short of breath, having chest tightness, coughing, and wheezing. Also notice if symptoms wake you up at night or if you get tired quickly when you exercise. · If you have a peak flow meter, use it to check how well you are breathing. It can help you know when an asthma attack is going to occur. Then you can take medicine to prevent the asthma attack or make it less severe. · Do not smoke or allow others to smoke around you. Avoid smoky places. Smoking makes asthma worse. If you need help quitting, talk to your doctor about stop-smoking programs and medicines. These can increase your chances of quitting for good. · Avoid colds and the flu. Get a pneumococcal vaccine shot. If you have had one before, ask your doctor whether you need a second dose. Get a flu vaccine every year, as soon as it's available. If you must be around people with colds or the flu, wash your hands often. To treat attacks when they occur  Use your asthma action plan when you have an attack. Your quick-relief medicine will stop an asthma attack. It relaxes the muscles that get tight around the airways. If your doctor prescribed corticosteroid pills to use during an attack, take them as directed.  They may take hours to work, but they may shorten the attack and help you breathe better. · Albuterol is an effective quick-relief inhaler. · Take your quick-relief medicine exactly as prescribed. · Always bring your asthma medicine with you when you travel. · You may need to use quick-relief medicine before you exercise. · Call your doctor if you think you are having a problem with your medicine. When should you call for help? Call 911 anytime you think you may need emergency care. For example, call if:  · You are having severe trouble breathing. Call your doctor now or seek immediate medical care if:  · Your symptoms do not get better after you have followed your asthma action plan. · You cough up yellow, dark brown, or bloody mucus (sputum). Watch closely for changes in your health, and be sure to contact your doctor if:  · Your coughing and wheezing get worse. · You need to use your quick-relief medicine on more than 2 days a week (unless it is just for exercise). · You need help figuring out what is triggering your asthma attacks. Where can you learn more? Go to http://kurt-lin.info/. Enter Z218 in the search box to learn more about \"Controlling Your Asthma: Care Instructions. \"  Current as of: March 25, 2017  Content Version: 11.3  © 4039-1856 [employer]. Care instructions adapted under license by "[employer] (which disclaims liability or warranty for this information). If you have questions about a medical condition or this instruction, always ask your healthcare professional. [place] [address] any warranty or liability for your use of this information.

## 2017-09-05 NOTE — PROGRESS NOTES
Progress Note    Patient: Rachael Huizar MRN: [id number]  SSN: [id number]    YOB: 1950  Age: 61 y.o. Sex: male        Chief Complaint   Patient presents with    Labs     fasting    Hypertension    Cold Symptoms     Chest congestion         Subjective:     Encounter Diagnoses   Name Primary?  HTN (hypertension), benign: Immediately after using albuterol nebulizer treatment  BP Readings from Last 3 Encounters:   09/05/17 144/82   08/23/17 153/86   03/17/17 133/77     The patient reports:  taking medications as instructed, no medication side effects noted, no TIA's, no chest pain on exertion, no dyspnea on exertion, no swelling of ankles. Lab Results   Component Value Date/Time    Sodium 141 03/03/2017 09:07 AM    Potassium 4.4 03/03/2017 09:07 AM    Chloride 99 03/03/2017 09:07 AM    CO2 24 03/03/2017 09:07 AM    Anion gap 8 04/02/2010 09:09 AM    Glucose 87 03/03/2017 09:07 AM    BUN 24 03/03/2017 09:07 AM    Creatinine 1.08 03/03/2017 09:07 AM    BUN/Creatinine ratio 22 03/03/2017 09:07 AM    GFR est AA 83 03/03/2017 09:07 AM    GFR est non-AA 72 03/03/2017 09:07 AM    Calcium 9.2 03/03/2017 09:07 AM    Bilirubin, total 0.3 03/03/2017 09:07 AM    AST (SGOT) 22 03/03/2017 09:07 AM    Alk. phosphatase 41 03/03/2017 09:07 AM    Protein, total 6.7 03/03/2017 09:07 AM    Albumin 4.4 03/03/2017 09:07 AM    Globulin 3.4 04/02/2010 09:09 AM    A-G Ratio 1.9 03/03/2017 09:07 AM    ALT (SGPT) 29 03/03/2017 09:07 AM     Our goal is to normalize the blood pressure to decrease the risks of strokes and heart attacks. The patient is in agreement with the plan. Yes    Asthmatic bronchitis, mild intermittent, uncomplicated: He still has a chronic cough productive of clear sputum. I think he has a mixture of asthma and COPD. He has about a 30-pack-year smoking history so he was offered a low-dose CT scan. He wants to get this done after he retires.   Nonetheless he will need an inhaled steroid and his insurance covers [employer].  Acute bronchitis, unspecified organism: The infectious portion of his illness has resolved. He is still using albuterol nebulizer 3 times daily. For this reason he will need inhaled steroid as above.  High cholesterol: He has had extensive cardiac workup. He has PVCs but his stress test was normal.  Cardiovascular risks for him are: LDL goal is under 100  hypertension  hyperlipidemia. Currently he takes Pravachol (pravastatin) , 20 mg  Lab Results   Component Value Date/Time    Cholesterol, total 172 03/03/2017 09:07 AM    HDL Cholesterol 51 03/03/2017 09:07 AM    LDL, calculated 98 03/03/2017 09:07 AM    Triglyceride 114 03/03/2017 09:07 AM    CHOL/HDL Ratio 3.8 04/02/2010 09:09 AM     Lab Results   Component Value Date/Time    ALT (SGPT) 29 03/03/2017 09:07 AM    AST (SGOT) 22 03/03/2017 09:07 AM    Alk. phosphatase 41 03/03/2017 09:07 AM    Bilirubin, total 0.3 03/03/2017 09:07 AM      Myalgias: No   Fatigue: No   Other side effects: no  Wt Readings from Last 3 Encounters:   09/05/17 238 lb 3.2 oz (108 kg)   08/23/17 235 lb 3.2 oz (106.7 kg)   03/17/17 244 lb 9.6 oz (110.9 kg)     The patient is aware of our goal to reduce or eliminate the long term problems (such as strokes and heart attacks) related to poorly controlled hyperlipidemia.  Gastroesophageal reflux disease without esophagitis:  Current control of Symptoms:good  Hiatal Hernia:no  Current Medications:omeprazole  The patient has no history melena or bright red blood in the stools. The patient avoids high dose aspirin and NSAID therapy. The patient is aware of diet changes needed, elevating the head of the bed and appropriate use of antacids.  RACHEL (obstructive sleep apnea): Controlled on CPAP. 100% compliant. Feels rested in AM.         Pure hypercholesterolemia:  Cardiovascular risks for him are: LDL goal is under 100.    Currently he takes Pravachol (pravastatin) , 20 mg  Lab Results   Component Value Date/Time    Cholesterol, total 172 03/03/2017 09:07 AM    HDL Cholesterol 51 03/03/2017 09:07 AM    LDL, calculated 98 03/03/2017 09:07 AM    Triglyceride 114 03/03/2017 09:07 AM    CHOL/HDL Ratio 3.8 04/02/2010 09:09 AM     Lab Results   Component Value Date/Time    ALT (SGPT) 29 03/03/2017 09:07 AM    AST (SGOT) 22 03/03/2017 09:07 AM    Alk. phosphatase 41 03/03/2017 09:07 AM    Bilirubin, total 0.3 03/03/2017 09:07 AM      Myalgias: No   Fatigue: No   Other side effects: no  Wt Readings from Last 3 Encounters:   09/05/17 238 lb 3.2 oz (108 kg)   08/23/17 235 lb 3.2 oz (106.7 kg)   03/17/17 244 lb 9.6 oz (110.9 kg)     The patient is aware of our goal to reduce or eliminate the long term problems (such as strokes and heart attacks) related to poorly controlled hyperlipidemia.  PVC's (premature ventricular contractions): He is noticing his PVCs again. I assume this is coming from his albuterol usage. Current and past medical information:    Current Medications after this visit[de-identified]     Current Outpatient Prescriptions   Medication Sig    pravastatin (PRAVACHOL) 20 mg tablet TAKE 1 TABLET BY MOUTH NIGHTLY    ramipril (ALTACE) 10 mg capsule TAKE ONE CAPSULE BY MOUTH TWICE A DAY    albuterol (PROVENTIL VENTOLIN) 2.5 mg /3 mL (0.083 %) nebulizer solution 3 mL by Nebulization route every six (6) hours. As needed for wheezing.  mometasone (ASMANEX TWISTHALER) 220 mcg (60 doses) inhaler Take 1 Puff by inhalation two (2) times a day.  fenofibrate (LOFIBRA) 160 mg tablet TAKE 1 TABLET BY MOUTH DAILY    meloxicam (MOBIC) 15 mg tablet TAKE ONE TABLET BY MOUTH DAILY WITH MEALS    triamcinolone acetonide (KENALOG) 0.1 % topical cream APPLY THIN LAYER TO AFFECTED AREA 2 TIMES A DAY    TRIAMCINOLONE ACETONIDE, BULK, by Does Not Apply route.  metoprolol succinate (TOPROL-XL) 50 mg XL tablet Take 1 Tab by mouth daily.  Indications: hypertension    oxyCODONE-acetaminophen (PERCOCET) 5-325 mg per tablet Take 1 Tab by mouth every four (4) hours as needed for Pain. Max Daily Amount: 6 Tabs.  loratadine (CLARITIN) 10 mg tablet Take 10 mg by mouth daily.  guaiFENesin (MUCINEX) 1,200 mg Ta12 ER tablet Take 1,200 mg by mouth two (2) times a day. Indications: COUGH    Omeprazole delayed release (PRILOSEC D/R) 20 mg tablet Take 20 mg by mouth daily.  WELCHOL 625 mg tablet TAKE 3 TABLETS BY MOUTH TWICE A DAY WITH FOOD    albuterol (PROVENTIL HFA) 90 mcg/actuation inhaler Take 2 Puffs by inhalation every six (6) hours as needed for Wheezing.  cyanocobalamin (VITAMIN B-12) 1,000 mcg tablet Take 1 Tab by mouth daily.  acyclovir (ZOVIRAX) 5 % ointment Apply  to affected area every four (4) hours (while awake). For future infections.  fluticasone (FLONASE) 50 mcg/actuation nasal spray 2 Sprays by Both Nostrils route daily.  Glucosamine HCl 1,500 mg Tab Take 1,500 mg by mouth two (2) times a day.  Cholecalciferol, Vitamin D3, (VITAMIN D) 2,000 unit Cap Take  by mouth.  psyllium seed-sucrose (METAMUCIL) Powd Take 1 Dose by mouth two (2) times a day.  predniSONE (STERAPRED) 5 mg dose pack See administration instruction per 5mg dose pack     No current facility-administered medications for this visit.         Patient Active Problem List    Diagnosis Date Noted    Positive PPD 07/01/2010     Priority: 1 - One    RACHEL (obstructive sleep apnea) 07/01/2010     Priority: 1 - One    HTN (hypertension), benign 04/01/2010     Priority: 1 - One    High cholesterol 04/01/2010     Priority: 1 - One    GERD (gastroesophageal reflux disease) 04/01/2010     Priority: 1 - One    Complex tear of medial meniscus of right knee as current injury 01/04/2017    Rectus diastasis 06/03/2016    DDD (degenerative disc disease), lumbar 05/16/2016    PVC's (premature ventricular contractions)     B12 deficiency 10/17/2014    Knee pain 08/04/2014    HLD (hyperlipidemia) 03/26/2013    SOB (shortness of breath) 10/11/2012    Vitamin D deficiency 04/27/2012    Colon ulcer, aphthous 04/01/2010       Past Medical History:   Diagnosis Date    Adverse effect of anesthesia     went apneic post surgery at [facility] [place]    Anesthesia complication 4146    APNEA DURING HERNIA REPAIR, POST OPERATIVE INTUBATION AT [facility] [place]    Arrhythmia     FREQUENT PVCS    Arthritis     Asthma     Cancer ([address]     SKIN- UNKNOWN    Colon ulcer, aphthous 4/1/2010    Finger amputation, traumatic 2003    R 2ND DIGIT    GERD (gastroesophageal reflux disease) 4/1/2010    High cholesterol 4/1/2010    HTN (hypertension), benign 4/1/2010    Hx MRSA infection 2013    CYSTS BL LEGS    Sleep apnea     CPAP       No Known Allergies    Past Surgical History:   Procedure Laterality Date    ABDOMEN SURGERY PROC UNLISTED  2001    hernia    HX APPENDECTOMY  1959    HX ORTHOPAEDIC  1994    left knee    HX OTHER SURGICAL  1990S    CERVICAL- UNKNOWN    HX OTHER SURGICAL      R HAND 2ND DIGIT TRAUMATIC AMPUTATION REPAIR    HX TONSIL AND ADENOIDECTOMY         Social History     Social History    Marital status:      Spouse name: N/A    Number of children: N/A    Years of education: N/A     Social History Main Topics    Smoking status: Former Smoker     Packs/day: 3.00     Years: 35.00     Quit date: 1/3/2002    Smokeless tobacco: Never Used    Alcohol use 1.2 oz/week     2 Shots of liquor, 0 Standard drinks or equivalent per week    Drug use: No    Sexual activity: Yes     Partners: Female     Birth control/ protection: None     Other Topics Concern    None     Social History Narrative       Review of Systems   Constitutional: Negative. Negative for chills, fever, malaise/fatigue and weight loss. HENT: Negative. Negative for hearing loss. Eyes: Negative. Negative for blurred vision and double vision. Respiratory: Positive for cough, sputum production and wheezing.  Negative for hemoptysis and shortness of breath. Cardiovascular: Negative. Negative for chest pain, palpitations and orthopnea. Gastrointestinal: Negative. Negative for abdominal pain, blood in stool, heartburn, nausea and vomiting. Genitourinary: Negative. Negative for dysuria, frequency and urgency. Musculoskeletal: Negative. Negative for back pain, myalgias and neck pain. Skin: Negative. Negative for rash. Neurological: Negative. Negative for dizziness, tingling, tremors, weakness and headaches. Endo/Heme/Allergies: Negative. Psychiatric/Behavioral: Negative. Negative for depression. Objective:     Vitals:    09/05/17 0800   BP: 144/82   Pulse: 67   Resp: 16   Temp: 98.7 °F (37.1 °C)   TempSrc: Oral   SpO2: 96%   Weight: 238 lb 3.2 oz (108 kg)   Height: 5' 11\" (1.803 m)      Body mass index is 33.22 kg/(m^2). Physical Exam   Constitutional: He is oriented to person, place, and time and well-developed, well-nourished, and in no distress. HENT:   Head: Normocephalic and atraumatic. Eyes: Right eye exhibits no discharge. Left eye exhibits no discharge. No scleral icterus. Neck: No tracheal deviation present. No thyromegaly present. No bruit. Cardiovascular: Normal rate, regular rhythm and normal heart sounds. Pulmonary/Chest: Effort normal and breath sounds normal.   He has a decreased breath sounds with some end expiratory wheezing. Musculoskeletal: He exhibits tenderness. His knee is always tender. Neurological: He is alert and oriented to person, place, and time. Skin: No rash noted. No erythema. Psychiatric: Mood and affect normal.   Nursing note and vitals reviewed. Health Maintenance Due   Topic Date Due    INFLUENZA AGE 9 TO ADULT  08/01/2017         Assessment and orders:       ICD-10-CM ICD-9-CM    1.  HTN (hypertension), benign-I expect this to get better as his albuterol usage decreases I10 401.1 LIPID PANEL      HEMOGLOBIN      METABOLIC PANEL, COMPREHENSIVE      TSH 3RD GENERATION   2. Asthmatic bronchitis, mild intermittent, uncomplicated  -improved, if he does not continue to improve plan chest x-ray next week. We will postpone flu vaccine until he is feeling better. J45.20 493.90 albuterol (PROVENTIL VENTOLIN) 2.5 mg /3 mL (0.083 %) nebulizer solution      mometasone (ASMANEX TWISTHALER) 220 mcg (60 doses) inhaler   3. Acute bronchitis, unspecified organism J20.9 466.0 albuterol (PROVENTIL VENTOLIN) 2.5 mg /3 mL (0.083 %) nebulizer solution   4. High cholesterol-recheck E78.00 272.0 LIPID PANEL      METABOLIC PANEL, COMPREHENSIVE   5. Gastroesophageal reflux disease without esophagitis-controlled   K21.9 530.81 HEMOGLOBIN   6. RACHEL (obstructive sleep apnea)  -treated   G47.33 327.23    7. Pure hypercholesterolemia  -recheck   E78.00 272.0 LIPID PANEL   8. PVC's (premature ventricular contractions)-no change in symptoms. No syncope or presyncope. No shortness of breath or sweating with the thumps in his chest that he gets. I49.3 427.69          Plan of care:  Discussed diagnoses in detail with patient. Medication risks/benefits/side effects discussed with patient. All of the patient's questions were addressed. The patient understands and agrees with our plan of care. The patient knows to call back if they are unsure of or forget any changes we discussed today or if the symptoms change. The patient received an After-Visit Summary which contains VS, orders, medication list and allergy list. This can be used as a \"mini-medical record\" should they have to seek medical care while out of town.     Patient Care Team:  Oksana Sharif MD as PCP - Giles Acosta MD (Cardiology)  Tatyana Whiting MD (Dermatology)  Lawrence Anne MD (Urology)  Mauricio Murguia MD (Gastroenterology)  Ana Atkinson MD (Surgery)  Jessica Feliciano MD (Orthopedic Surgery)  Candy Esteves MD (Orthopedic Surgery)    Follow-up Disposition:  Return in about 4 months (around 1/5/2018). No future appointments.     Signed By: Andi Wong MD     September 5, 2017

## 2017-09-05 NOTE — MR AVS SNAPSHOT
Visit Information Date & Time Provider Department Dept. Phone Encounter #  
 9/5/2017  8:00 AM Cony Mike MD [address] [place] [address] [id number] Follow-up Instructions Return in about 4 months (around 1/5/2018). Upcoming Health Maintenance Date Due INFLUENZA AGE 9 TO ADULT 8/1/2017 DTaP/Tdap/Td series (2 - Td) 9/2/2021 COLONOSCOPY 12/24/2025 Allergies as of 9/5/2017  Review Complete On: 9/5/2017 By: Lucian Alvarado LPN No Known Allergies Current Immunizations  Reviewed on 11/19/2015 Name Date Influenza Vaccine 10/4/2016, 11/7/2015, 9/13/2013 Influenza Vaccine (Quad) PF 10/17/2014 Influenza Vaccine Split 1/1/2006 Pneumococcal Polysaccharide (PPSV-23) 11/19/2015 TD Vaccine 6/30/1999 TDAP Vaccine 9/2/2011 Zoster Vaccine, Live 12/10/2013 Not reviewed this visit You Were Diagnosed With   
  
 Codes Comments HTN (hypertension), benign    -  Primary ICD-10-CM: I10 
ICD-9-CM: 401.1 Asthmatic bronchitis, mild intermittent, uncomplicated     BUR-79-FT: J45.20 ICD-9-CM: 493.90 Acute bronchitis, unspecified organism     ICD-10-CM: J20.9 ICD-9-CM: 466.0 High cholesterol     ICD-10-CM: E78.00 ICD-9-CM: 272.0 Gastroesophageal reflux disease without esophagitis     ICD-10-CM: K21.9 ICD-9-CM: 530.81   
 RACHEL (obstructive sleep apnea)     ICD-10-CM: G47.33 
ICD-9-CM: 327.23 Pure hypercholesterolemia     ICD-10-CM: E78.00 ICD-9-CM: 272.0 PVC's (premature ventricular contractions)     ICD-10-CM: I49.3 ICD-9-CM: 427.69 Vitals BP Pulse Temp Resp Height(growth percentile) Weight(growth percentile) 144/82 (BP 1 Location: Left arm, BP Patient Position: Sitting) 67 98.7 °F (37.1 °C) (Oral) 16 5' 11\" (1.803 m) 238 lb 3.2 oz (108 kg) SpO2 BMI Smoking Status 96% 33.22 kg/m2 Former Smoker Vitals History BMI and BSA Data Body Mass Index Body Surface Area  
 33.22 kg/m 2 2.33 m 2 Preferred Pharmacy Pharmacy Name Phone [facility] [place] [address] [phone] Your Updated Medication List  
  
   
This list is accurate as of: 9/5/17  8:22 AM.  Always use your most recent med list.  
  
  
  
  
 acyclovir 5 % ointment Commonly known as:  ZOVIRAX Apply  to affected area every four (4) hours (while awake). For future infections. * albuterol 90 mcg/actuation inhaler Commonly known as:  PROVENTIL HFA Take 2 Puffs by inhalation every six (6) hours as needed for Wheezing. * albuterol 2.5 mg /3 mL (0.083 %) nebulizer solution Commonly known as:  PROVENTIL VENTOLIN  
3 mL by Nebulization route every six (6) hours. As needed for wheezing. CLARITIN 10 mg tablet Generic drug:  loratadine Take 10 mg by mouth daily. fenofibrate 160 mg tablet Commonly known as:  LOFIBRA TAKE 1 TABLET BY MOUTH DAILY  
  
 fluticasone 50 mcg/actuation nasal spray Commonly known as:  Beverzelalemn Maker 2 Sprays by Both Nostrils route daily. Glucosamine HCl 1,500 mg Tab Take 1,500 mg by mouth two (2) times a day. meloxicam 15 mg tablet Commonly known as:  MOBIC  
TAKE ONE TABLET BY MOUTH DAILY WITH MEALS  
  
 METAMUCIL (SUGAR) Powd Generic drug:  psyllium seed-sucrose Take 1 Dose by mouth two (2) times a day. metoprolol succinate 50 mg XL tablet Commonly known as:  TOPROL-XL Take 1 Tab by mouth daily. Indications: hypertension  
  
 mometasone 220 mcg (60 doses) inhaler Commonly known as:  Stephenie Locket Take 1 Puff by inhalation two (2) times a day. MUCINEX 1,200 mg Ta12 ER tablet Generic drug:  guaiFENesin Take 1,200 mg by mouth two (2) times a day. Indications: COUGH Omeprazole delayed release 20 mg tablet Commonly known as:  PRILOSEC D/R Take 20 mg by mouth daily. oxyCODONE-acetaminophen 5-325 mg per tablet Commonly known as:  PERCOCET Take 1 Tab by mouth every four (4) hours as needed for Pain. Max Daily Amount: 6 Tabs. pravastatin 20 mg tablet Commonly known as:  PRAVACHOL  
TAKE 1 TABLET BY MOUTH NIGHTLY  
  
 predniSONE 5 mg dose pack Commonly known as:  STERAPRED See administration instruction per 5mg dose pack  
  
 ramipril 10 mg capsule Commonly known as:  ALTACE  
TAKE ONE CAPSULE BY MOUTH TWICE A DAY * TRIAMCINOLONE ACETONIDE (BULK)  
by Does Not Apply route. * triamcinolone acetonide 0.1 % topical cream  
Commonly known as:  KENALOG  
APPLY THIN LAYER TO AFFECTED AREA 2 TIMES A DAY  
  
 VITAMIN B-12 1,000 mcg tablet Generic drug:  cyanocobalamin Take 1 Tab by mouth daily. VITAMIN D 2,000 unit Cap capsule Generic drug:  Cholecalciferol (Vitamin D3) Take  by mouth. WELCHOL 625 mg tablet Generic drug:  colesevelam  
TAKE 3 TABLETS BY MOUTH TWICE A DAY WITH FOOD * Notice: This list has 4 medication(s) that are the same as other medications prescribed for you. Read the directions carefully, and ask your doctor or other care provider to review them with you. Prescriptions Sent to Pharmacy Refills  
 pravastatin (PRAVACHOL) 20 mg tablet 0 Sig: TAKE 1 TABLET BY MOUTH NIGHTLY Class: Normal  
 Pharmacy: [address] [place] [address] Ph #: [phone]  
 ramipril (ALTACE) 10 mg capsule 5 Sig: TAKE ONE CAPSULE BY MOUTH TWICE A DAY Class: Normal  
 Pharmacy: [address] [place] [address] Ph #: [phone]  
 albuterol (PROVENTIL VENTOLIN) 2.5 mg /3 mL (0.083 %) nebulizer solution 3 Sig: 3 mL by Nebulization route every six (6) hours. As needed for wheezing. Class: Normal  
 Pharmacy: [address] [place] [address] Ph #: [phone]  Route: Nebulization  
 mometasone (ASMANEX TWISTHALER) 220 mcg (60 doses) inhaler 3  
 Sig: Take 1 Puff by inhalation two (2) times a day. Class: Normal  
 Pharmacy: [facility] #: [phone] Route: Inhalation We Performed the Following HEMOGLOBIN [id number] CPT(R)] LIPID PANEL [65801 CPT(R)] METABOLIC PANEL, COMPREHENSIVE [96793 CPT(R)] TSH 3RD GENERATION [73592 CPT(R)] Follow-up Instructions Return in about 4 months (around 1/5/2018). Patient Instructions Controlling Your Asthma: Care Instructions Your Care Instructions Asthma is a long-term condition that affects your breathing. It causes the airways that lead to the lungs to swell. During an asthma attack, the airways swell and narrow. This makes it hard to breathe. You may wheeze or cough. If you have a bad attack, you may need emergency care. There are two things to do to treat asthma. · Control asthma over the long term. · Treat attacks when they occur. You and your doctor can make an asthma action plan. It tells you what medicines you need to take every day to control asthma symptoms and what to do if you have an asthma attack. Your asthma action plan can help prevent and treat attacks. When you keep your asthma under control, you can prevent severe attacks and lasting damage to your airways. You need to treat your asthma even when you are not having symptoms. Although asthma is a lifelong disease, treatment can help control it and help you stay healthy. Follow-up care is a key part of your treatment and safety. Be sure to make and go to all appointments, and call your doctor if you are having problems. It's also a good idea to know your test results and keep a list of the medicines you take. How can you care for yourself at home? To control asthma over the long term Medicines Controller medicines reduce swelling in your lungs. They also prevent asthma attacks. Take your controller medicine exactly as prescribed.  Talk to your doctor if you have any problems with your medicine. · Inhaled corticosteroid is a common and effective controller medicine. Using it the right way can prevent or reduce most side effects. · Take your controller medicine every day, not just when you have symptoms. It helps prevent problems before they occur. · Your doctor may prescribe another medicine that you use along with the corticosteroid. This is often a long-acting bronchodilator. Do not take this medicine by itself. Using a long-acting bronchodilator by itself can increase your risk of a severe or fatal asthma attack. · Do not take inhaled corticosteroids or long-acting bronchodilators to stop an asthma attack that has already started. They don't work fast enough to help. · Talk to your doctor before you use other medicines. Some medicines, such as aspirin, can cause asthma attacks in some people. Education · Learn what triggers an asthma attack. Avoid these triggers when you can. Common triggers include colds, smoke, air pollution, dust, pollen, mold, pets, cockroaches, stress, and cold air. · Check yourself for asthma symptoms to know which step to follow in your action plan. Watch for things like being short of breath, having chest tightness, coughing, and wheezing. Also notice if symptoms wake you up at night or if you get tired quickly when you exercise. · If you have a peak flow meter, use it to check how well you are breathing. It can help you know when an asthma attack is going to occur. Then you can take medicine to prevent the asthma attack or make it less severe. · Do not smoke or allow others to smoke around you. Avoid smoky places. Smoking makes asthma worse. If you need help quitting, talk to your doctor about stop-smoking programs and medicines. These can increase your chances of quitting for good. · Avoid colds and the flu. Get a pneumococcal vaccine shot.  If you have had one before, ask your doctor whether you need a second dose. Get a flu vaccine every year, as soon as it's available. If you must be around people with colds or the flu, wash your hands often. To treat attacks when they occur Use your asthma action plan when you have an attack. Your quick-relief medicine will stop an asthma attack. It relaxes the muscles that get tight around the airways. If your doctor prescribed corticosteroid pills to use during an attack, take them as directed. They may take hours to work, but they may shorten the attack and help you breathe better. · Albuterol is an effective quick-relief inhaler. · Take your quick-relief medicine exactly as prescribed. · Always bring your asthma medicine with you when you travel. · You may need to use quick-relief medicine before you exercise. · Call your doctor if you think you are having a problem with your medicine. When should you call for help? Call 911 anytime you think you may need emergency care. For example, call if: 
· You are having severe trouble breathing. Call your doctor now or seek immediate medical care if: 
· Your symptoms do not get better after you have followed your asthma action plan. · You cough up yellow, dark brown, or bloody mucus (sputum). Watch closely for changes in your health, and be sure to contact your doctor if: 
· Your coughing and wheezing get worse. · You need to use your quick-relief medicine on more than 2 days a week (unless it is just for exercise). · You need help figuring out what is triggering your asthma attacks. Where can you learn more? Go to http://kurt-lin.info/. Enter V813 in the search box to learn more about \"Controlling Your Asthma: Care Instructions. \" Current as of: March 25, 2017 Content Version: 11.3 © 1832-9203 [employer].  Care instructions adapted under license by [employer] (which disclaims liability or warranty for this information). If you have questions about a medical condition or this instruction, always ask your healthcare professional. [address] any warranty or liability for your use of this information. Introducing [facility] SERVICES! Amada Zaragoza introduces [employer] patient portal. Now you can access parts of your medical record, email your doctor's office, and request medication refills online. 1. In your internet browser, go to [email or web address] 2. Click on the First Time User? Click Here link in the Sign In box. You will see the New Member Sign Up page. 3. Enter your K[employer]ur Access Code exactly as it appears below. You will not need to use this code after youve completed the sign-up process. If you do not sign up before the expiration date, you must request a new code. · [employer] Access Code: [email or web address] Expires: 12/4/2017  8:22 AM 
 
4. Enter the last four digits of your Social Security Number (xxxx) and Date of Birth (mm/dd/yyyy) as indicated and click Submit. You will be taken to the next sign-up page. 5. Create a Kaptur ID. This will be your K[employer]ur login ID and cannot be changed, so think of one that is secure and easy to remember. 6. Create a K[employer]ur password. You can change your password at any time. 7. Enter your Password Reset Question and Answer. This can be used at a later time if you forget your password. 8. Enter your e-mail address. You will receive e-mail notification when new information is available in [address] 9. Click Sign Up. You can now view and download portions of your medical record. 10. Click the Download Summary menu link to download a portable copy of your medical information. If you have questions, please visit the Frequently Asked Questions section of the [employer] website. Remember, [employer]ur is NOT to be used for urgent needs. For medical emergencies, dial 911. Now available from your iPhone and Android! Please provide this summary of care documentation to your next provider. Your primary care clinician is listed as [address]. If you have any questions after today's visit, please call [phone].

## 2017-09-06 LAB
ALBUMIN SERPL-MCNC: 4.2 G/DL (ref 3.6–4.8)
ALBUMIN/GLOB SERPL: 1.8 {RATIO} (ref 1.2–2.2)
ALP SERPL-CCNC: 38 IU/L (ref 39–117)
ALT SERPL-CCNC: 30 IU/L (ref 0–44)
AST SERPL-CCNC: 19 IU/L (ref 0–40)
BILIRUB SERPL-MCNC: 0.3 MG/DL (ref 0–1.2)
BUN SERPL-MCNC: 16 MG/DL (ref 8–27)
BUN/CREAT SERPL: 15 (ref 10–24)
CALCIUM SERPL-MCNC: 9.4 MG/DL (ref 8.6–10.2)
CHLORIDE SERPL-SCNC: 102 MMOL/L (ref 96–106)
CHOLEST SERPL-MCNC: 177 MG/DL (ref 100–199)
CO2 SERPL-SCNC: 25 MMOL/L (ref 18–29)
CREAT SERPL-MCNC: 1.06 MG/DL (ref 0.76–1.27)
GLOBULIN SER CALC-MCNC: 2.4 G/DL (ref 1.5–4.5)
GLUCOSE SERPL-MCNC: 85 MG/DL (ref 65–99)
HDLC SERPL-MCNC: 53 MG/DL
HGB BLD-MCNC: 14.2 G/DL (ref 12.6–17.7)
LDLC SERPL CALC-MCNC: 102 MG/DL (ref 0–99)
POTASSIUM SERPL-SCNC: 4.7 MMOL/L (ref 3.5–5.2)
PROT SERPL-MCNC: 6.6 G/DL (ref 6–8.5)
SODIUM SERPL-SCNC: 143 MMOL/L (ref 134–144)
TRIGL SERPL-MCNC: 108 MG/DL (ref 0–149)
TSH SERPL DL<=0.005 MIU/L-ACNC: 1.47 UIU/ML (ref 0.45–4.5)
VLDLC SERPL CALC-MCNC: 22 MG/DL (ref 5–40)

## 2017-09-29 ENCOUNTER — TELEPHONE (OUTPATIENT)
Dept: FAMILY MEDICINE CLINIC | Age: 67
End: 2017-09-29

## 2017-09-29 DIAGNOSIS — F17.200 SMOKER: Primary | ICD-10-CM

## 2017-09-29 DIAGNOSIS — R06.02 SOB (SHORTNESS OF BREATH): ICD-10-CM

## 2017-09-29 NOTE — TELEPHONE ENCOUNTER
Patient is calling to get more information on having his lungs checked for the early stages on Cancer. He would like a phone call back.   Thank you,  Rhiannon Wray

## 2017-09-29 NOTE — TELEPHONE ENCOUNTER
Phone call to patient. Per Dr. Elma Duenas: If he would like to have a low dose CT scan I can scheduled that for him at [facility], just let me know if he wants it done. Its about $150 out of pocket. Patient verbalized understanding and states that he would like to have the CT scan on 10/9/17 in the afternoon if possible.

## 2017-10-07 DIAGNOSIS — M51.36 DDD (DEGENERATIVE DISC DISEASE), LUMBAR: ICD-10-CM

## 2017-10-07 RX ORDER — PRAVASTATIN SODIUM 20 MG/1
TABLET ORAL
Qty: 30 TAB | Refills: 0 | Status: SHIPPED | OUTPATIENT
Start: 2017-10-07 | End: 2017-11-15 | Stop reason: SDUPTHER

## 2017-10-07 RX ORDER — MELOXICAM 15 MG/1
TABLET ORAL
Qty: 30 TAB | Refills: 0 | Status: SHIPPED | OUTPATIENT
Start: 2017-10-07 | End: 2017-10-21

## 2017-10-09 ENCOUNTER — HOSPITAL ENCOUNTER (OUTPATIENT)
Dept: CT IMAGING | Age: 67
Discharge: HOME | End: 2017-10-09
Attending: FAMILY MEDICINE
Payer: SELF-PAY

## 2017-10-09 ENCOUNTER — HOSPITAL ENCOUNTER (OUTPATIENT)
Dept: PREADMISSION TESTING | Age: 67
End: 2017-10-09
Payer: COMMERCIAL

## 2017-10-09 ENCOUNTER — HOSPITAL ENCOUNTER (OUTPATIENT)
Dept: PREADMISSION TESTING | Age: 67
Discharge: HOME | End: 2017-10-09
Payer: COMMERCIAL

## 2017-10-09 VITALS
WEIGHT: 240 LBS | HEART RATE: 58 BPM | BODY MASS INDEX: 34.36 KG/M2 | OXYGEN SATURATION: 98 % | RESPIRATION RATE: 16 BRPM | HEIGHT: 70 IN | DIASTOLIC BLOOD PRESSURE: 82 MMHG | SYSTOLIC BLOOD PRESSURE: 145 MMHG

## 2017-10-09 DIAGNOSIS — F17.200 SMOKER: ICD-10-CM

## 2017-10-09 DIAGNOSIS — R06.02 SOB (SHORTNESS OF BREATH): ICD-10-CM

## 2017-10-09 LAB
ABO + RH BLD: NORMAL
ANION GAP SERPL CALC-SCNC: 9 MMOL/L (ref 5–15)
APPEARANCE UR: CLEAR
BACTERIA URNS QL MICRO: NEGATIVE /HPF
BILIRUB UR QL: NEGATIVE
BLOOD GROUP ANTIBODIES SERPL: NORMAL
BUN SERPL-MCNC: 15 MG/DL (ref 6–20)
BUN/CREAT SERPL: 14 (ref 12–20)
CALCIUM SERPL-MCNC: 8.8 MG/DL (ref 8.5–10.1)
CHLORIDE SERPL-SCNC: 106 MMOL/L (ref 97–108)
CO2 SERPL-SCNC: 29 MMOL/L (ref 21–32)
COLOR UR: NORMAL
CREAT SERPL-MCNC: 1.04 MG/DL (ref 0.7–1.3)
EPITH CASTS URNS QL MICRO: NORMAL /LPF
ERYTHROCYTE [DISTWIDTH] IN BLOOD BY AUTOMATED COUNT: 13.4 % (ref 11.5–14.5)
EST. AVERAGE GLUCOSE BLD GHB EST-MCNC: 108 MG/DL
GLUCOSE SERPL-MCNC: 91 MG/DL (ref 65–100)
GLUCOSE UR STRIP.AUTO-MCNC: NEGATIVE MG/DL
HBA1C MFR BLD: 5.4 % (ref 4.2–6.3)
HCT VFR BLD AUTO: 43.6 % (ref 36.6–50.3)
HGB BLD-MCNC: 14.5 G/DL (ref 12.1–17)
HGB UR QL STRIP: NEGATIVE
HYALINE CASTS URNS QL MICRO: NORMAL /LPF (ref 0–5)
INR PPP: 1 (ref 0.9–1.1)
KETONES UR QL STRIP.AUTO: NEGATIVE MG/DL
LEUKOCYTE ESTERASE UR QL STRIP.AUTO: NEGATIVE
MCH RBC QN AUTO: 30.8 PG (ref 26–34)
MCHC RBC AUTO-ENTMCNC: 33.3 G/DL (ref 30–36.5)
MCV RBC AUTO: 92.6 FL (ref 80–99)
NITRITE UR QL STRIP.AUTO: NEGATIVE
PH UR STRIP: 6 [PH] (ref 5–8)
PLATELET # BLD AUTO: 201 K/UL (ref 150–400)
POTASSIUM SERPL-SCNC: 4.9 MMOL/L (ref 3.5–5.1)
PROT UR STRIP-MCNC: NEGATIVE MG/DL
PROTHROMBIN TIME: 10.4 SEC (ref 9–11.1)
RBC # BLD AUTO: 4.71 M/UL (ref 4.1–5.7)
RBC #/AREA URNS HPF: NORMAL /HPF (ref 0–5)
SODIUM SERPL-SCNC: 144 MMOL/L (ref 136–145)
SP GR UR REFRACTOMETRY: 1.01 (ref 1–1.03)
SPECIMEN EXP DATE BLD: NORMAL
UA: UC IF INDICATED,UAUC: NORMAL
UROBILINOGEN UR QL STRIP.AUTO: 0.2 EU/DL (ref 0.2–1)
WBC # BLD AUTO: 6.2 K/UL (ref 4.1–11.1)
WBC URNS QL MICRO: NORMAL /HPF (ref 0–4)

## 2017-10-09 PROCEDURE — 81001 URINALYSIS AUTO W/SCOPE: CPT | Performed by: ORTHOPAEDIC SURGERY

## 2017-10-09 PROCEDURE — 86900 BLOOD TYPING SEROLOGIC ABO: CPT | Performed by: ORTHOPAEDIC SURGERY

## 2017-10-09 PROCEDURE — 85027 COMPLETE CBC AUTOMATED: CPT | Performed by: ORTHOPAEDIC SURGERY

## 2017-10-09 PROCEDURE — 80048 BASIC METABOLIC PNL TOTAL CA: CPT | Performed by: ORTHOPAEDIC SURGERY

## 2017-10-09 PROCEDURE — 85610 PROTHROMBIN TIME: CPT | Performed by: ORTHOPAEDIC SURGERY

## 2017-10-09 PROCEDURE — 93005 ELECTROCARDIOGRAM TRACING: CPT

## 2017-10-09 PROCEDURE — 36415 COLL VENOUS BLD VENIPUNCTURE: CPT | Performed by: ORTHOPAEDIC SURGERY

## 2017-10-09 PROCEDURE — 83036 HEMOGLOBIN GLYCOSYLATED A1C: CPT | Performed by: ORTHOPAEDIC SURGERY

## 2017-10-09 PROCEDURE — G0297 LDCT FOR LUNG CA SCREEN: HCPCS

## 2017-10-09 RX ORDER — NAPROXEN 250 MG/1
250 TABLET ORAL AS NEEDED
COMMUNITY
End: 2017-10-21

## 2017-10-09 NOTE — PROGRESS NOTES
IMPRESSION: No lung nodule identified.     Lung-RADS Category: 1  Management recommendation: Continue annual screening with low dose CT scan in 12  Months  Dr. Quynh Felipe

## 2017-10-10 LAB
ATRIAL RATE: 60 BPM
BACTERIA SPEC CULT: ABNORMAL
BACTERIA SPEC CULT: ABNORMAL
CALCULATED R AXIS, ECG10: 26 DEGREES
CALCULATED T AXIS, ECG11: 41 DEGREES
DIAGNOSIS, 93000: NORMAL
P-R INTERVAL, ECG05: 174 MS
Q-T INTERVAL, ECG07: 386 MS
QRS DURATION, ECG06: 74 MS
QTC CALCULATION (BEZET), ECG08: 386 MS
SERVICE CMNT-IMP: ABNORMAL
VENTRICULAR RATE, ECG03: 60 BPM

## 2017-10-11 RX ORDER — MUPIROCIN 20 MG/G
OINTMENT TOPICAL 2 TIMES DAILY
Qty: 22 G | Refills: 0 | Status: SHIPPED | OUTPATIENT
Start: 2017-10-12 | End: 2017-10-19

## 2017-10-11 NOTE — PERIOP NOTES
FOLLOW UP:    EKG AND MRSA RESULTS FAXED TO DR. HEREDIA'S OFFICE;  LEFT FOR TASIA ABOUT PTS MRSA RESULTS; PAT PHONE NUMBER GIVEN FOR RETURN CALL IF NEEDED.     CHART GIVEN TO NAHOMY MENDOZA NP FOR FOLLOW UP.

## 2017-10-13 PROBLEM — Z22.321 MSSA (METHICILLIN-SUSCEPTIBLE STAPH AUREUS) CARRIER: Status: ACTIVE | Noted: 2017-10-13

## 2017-10-17 PROBLEM — M17.11 PRIMARY OSTEOARTHRITIS OF RIGHT KNEE: Status: ACTIVE | Noted: 2017-10-17

## 2017-10-17 NOTE — H&P
Иван King comes in for evaluation of the right knee. He continues to have severe right knee pain. I have previously discussed that he has advanced degenerative arthritis of the right knee with essentially complete loss of medial joint space and we have previously discussed total knee replacement. He presents today with continued right knee pain and wants to discuss total knee replacement. Objective:   Constitutional:  No acute distress. Well nourished. Well developed. Eyes:  Sclera are nonicteric. Respiratory:  No labored breathing. Cardiovascular:  No marked edema. Skin:  No marked skin ulcers. Neurological:  No marked sensory loss noted. Psychiatric: Alert and oriented x3. Musculoskeletal      The cruciate and collateral ligaments of the right knee are stable. No sign of infection. No effusion. No cellulitis or rash. No evidence of calf pain, no sign of DVT. The extensor mechanism is intact. The neurovascular status is intact. Radiographs:        X-ray Knee Right 4+ Views ([id number])     Result Date: 9/19/2017  Weight Bearing. Views: Standing AP, Kamron Bonner. Notes  Indication(s): Right knee pain. Impression: I ordered and interpreted X-rays of the right knee. The X-rays reveal complete loss of medial joint space with significant lateral and patellofemoral joint space narrowing. On the left there is significant joint space narrowing. No fractures, no sign of metastatic disease.          Assessment:      1. Primary osteoarthritis of right knee    2. Chronic pain of right knee            Plan:   I reminded Mr. Hernandez Courser that he has severe degenerative changes in the right knee. We have previously discussed that he will need total knee replacement and today he would like to discuss the surgery. I explained the hospitalization, post-op and rehabilitation for the procedure. The patient is aware of all complications associated with the surgery, including the chance of infection and blood clots. He understands that he would be on antibiotics and anti-coagulants following surgery to prevent infection and DVT and that he would undergo a course of post-op physical therapy for rehabilitation. PROCEDURE: Right total knee replacement. Date of Surgery Update:  Anthony Richmond was seen and examined. Past Medical History:   Diagnosis Date    Adverse effect of anesthesia     went apneic post surgery at [facility]    Anesthesia complication 3555    APNEA DURING HERNIA REPAIR, [place] O[place]ATIVE INTUB[place] [facility] [place]    Arrhythmia     FREQUENT PVCS    Arthritis     Asthma     HAS BRONCHITIS OFTEN    Cancer ([address]     SKIN- UNKNOWN    Colon ulcer, aphthous 2010    Finger amputation, traumatic 2003    R 2ND DIGIT    GERD (gastroesophageal reflux disease) 2010    High cholesterol 2010    HTN (hypertension), benign 2010    CONTROLLED BY MEDS    Hx MRSA infection     CYSTS BL LEGS    Sleep apnea     CPAP     Prior to Admission Medications   Prescriptions Last Dose Informant Patient Reported? Taking? Omeprazole delayed release (PRILOSEC D/R) 20 mg tablet   Yes No   Sig: Take 20 mg by mouth daily. POTASSIUM CHLORIDE PO   Yes No   Sig: Take  by mouth. TRIAMCINOLONE ACETONIDE, BULK,   Yes No   Sig: by Does Not Apply route. AS NEEDED   albuterol (PROVENTIL HFA) 90 mcg/actuation inhaler   No No   Sig: Take 2 Puffs by inhalation every six (6) hours as needed for Wheezing. albuterol (PROVENTIL VENTOLIN) 2.5 mg /3 mL (0.083 %) nebulizer solution   No No   Sig: 3 mL by Nebulization route every six (6) hours. As needed for wheezing. cyanocobalamin (VITAMIN B-12) 1,000 mcg tablet   Yes No   Sig: Take 1 Tab by mouth daily. fenofibrate (LOFIBRA) 160 mg tablet   No No   Sig: TAKE 1 TABLET BY MOUTH DAILY   fluticasone (FLONASE) 50 mcg/actuation nasal spray   No No   Si Sprays by Both Nostrils route daily. loratadine (CLARITIN) 10 mg tablet   Yes No   Sig: Take 10 mg by mouth daily.    meloxicam (MOBIC) 15 mg tablet   No No   Sig: TAKE ONE TABLET BY MOUTH DAILY WITH A MEAL   metoprolol succinate (TOPROL-XL) 50 mg XL tablet   No No   Sig: Take 1 Tab by mouth daily. Indications: hypertension   mometasone (ASMANEX TWISTHALER) 220 mcg (60 doses) inhaler   No No   Sig: Take 1 Puff by inhalation two (2) times a day. mupirocin (BACTROBAN) 2 % ointment   No No   Sig: by Both Nostrils route two (2) times a day for 7 days. naproxen (NAPROSYN) 250 mg tablet   Yes No   Sig: Take 250 mg by mouth as needed. pravastatin (PRAVACHOL) 20 mg tablet   No No   Sig: TAKE 1 TABLET BY MOUTH NIGHTLY   psyllium seed-sucrose (METAMUCIL) Powd   Yes No   Sig: Take 1 Dose by mouth two (2) times a day. ramipril (ALTACE) 10 mg capsule   No No   Sig: TAKE ONE CAPSULE BY MOUTH TWICE A DAY      Facility-Administered Medications: None      Allergy to:Review of patient's allergies indicates no known allergies. Physical Examination: General appearance - alert, well appearing, and in no distress  History and physical has been reviewed. The patient has been examined.  There have been no significant clinical changes since the completion of the originally dated History and Physical.    Signed By: Alexx Lindsey PA-C     October 19, 2017 7:31 AM

## 2017-10-18 ENCOUNTER — ANESTHESIA EVENT (OUTPATIENT)
Dept: SURGERY | Age: 67
DRG: 470 | End: 2017-10-18
Payer: COMMERCIAL

## 2017-10-19 ENCOUNTER — HOSPITAL ENCOUNTER (INPATIENT)
Age: 67
LOS: 2 days | Discharge: HOME HEALTH | DRG: 470 | End: 2017-10-21
Attending: ORTHOPAEDIC SURGERY | Admitting: ORTHOPAEDIC SURGERY
Payer: COMMERCIAL

## 2017-10-19 ENCOUNTER — ANESTHESIA (OUTPATIENT)
Dept: SURGERY | Age: 67
DRG: 470 | End: 2017-10-19
Payer: COMMERCIAL

## 2017-10-19 PROBLEM — E66.9 OBESITY, CLASS II, BMI 35-39.9: Status: ACTIVE | Noted: 2017-10-19

## 2017-10-19 PROBLEM — J45.909 ASTHMA: Chronic | Status: ACTIVE | Noted: 2017-10-19

## 2017-10-19 PROBLEM — E66.9 OBESITY (BMI 30.0-34.9): Chronic | Status: ACTIVE | Noted: 2017-10-19

## 2017-10-19 PROBLEM — G47.30 SLEEP APNEA: Chronic | Status: ACTIVE | Noted: 2017-10-19

## 2017-10-19 LAB
GLUCOSE BLD STRIP.AUTO-MCNC: 98 MG/DL (ref 65–100)
SERVICE CMNT-IMP: NORMAL

## 2017-10-19 PROCEDURE — 77030020782 HC GWN BAIR PAWS FLX 3M -B

## 2017-10-19 PROCEDURE — 77030034850: Performed by: ORTHOPAEDIC SURGERY

## 2017-10-19 PROCEDURE — 77030000032 HC CUF TRNQT ZIMM -B: Performed by: ORTHOPAEDIC SURGERY

## 2017-10-19 PROCEDURE — 65270000029 HC RM PRIVATE

## 2017-10-19 PROCEDURE — 76010000171 HC OR TIME 2 TO 2.5 HR INTENSV-TIER 1: Performed by: ORTHOPAEDIC SURGERY

## 2017-10-19 PROCEDURE — 74011250637 HC RX REV CODE- 250/637: Performed by: PHYSICIAN ASSISTANT

## 2017-10-19 PROCEDURE — 0SRC0J9 REPLACEMENT OF RIGHT KNEE JOINT WITH SYNTHETIC SUBSTITUTE, CEMENTED, OPEN APPROACH: ICD-10-PCS | Performed by: ORTHOPAEDIC SURGERY

## 2017-10-19 PROCEDURE — 74011000250 HC RX REV CODE- 250: Performed by: PHYSICIAN ASSISTANT

## 2017-10-19 PROCEDURE — 77030011640 HC PAD GRND REM COVD -A: Performed by: ORTHOPAEDIC SURGERY

## 2017-10-19 PROCEDURE — C1713 ANCHOR/SCREW BN/BN,TIS/BN: HCPCS | Performed by: ORTHOPAEDIC SURGERY

## 2017-10-19 PROCEDURE — 74011250636 HC RX REV CODE- 250/636

## 2017-10-19 PROCEDURE — 77030029684 HC NEB SM VOL KT MONA -A

## 2017-10-19 PROCEDURE — 94640 AIRWAY INHALATION TREATMENT: CPT

## 2017-10-19 PROCEDURE — 77030007866 HC KT SPN ANES BBMI -B: Performed by: ANESTHESIOLOGY

## 2017-10-19 PROCEDURE — 74011000258 HC RX REV CODE- 258: Performed by: PHYSICIAN ASSISTANT

## 2017-10-19 PROCEDURE — 74011000250 HC RX REV CODE- 250

## 2017-10-19 PROCEDURE — 74011250636 HC RX REV CODE- 250/636: Performed by: PHYSICIAN ASSISTANT

## 2017-10-19 PROCEDURE — 77030016547 HC BLD SAW SAG1 STRY -B: Performed by: ORTHOPAEDIC SURGERY

## 2017-10-19 PROCEDURE — 77030018846 HC SOL IRR STRL H20 ICUM -A: Performed by: ORTHOPAEDIC SURGERY

## 2017-10-19 PROCEDURE — 77030018836 HC SOL IRR NACL ICUM -A: Performed by: ORTHOPAEDIC SURGERY

## 2017-10-19 PROCEDURE — 76210000016 HC OR PH I REC 1 TO 1.5 HR: Performed by: ORTHOPAEDIC SURGERY

## 2017-10-19 PROCEDURE — 3E0T3BZ INTRODUCTION OF ANESTHETIC AGENT INTO PERIPHERAL NERVES AND PLEXI, PERCUTANEOUS APPROACH: ICD-10-PCS | Performed by: ANESTHESIOLOGY

## 2017-10-19 PROCEDURE — C9290 INJ, BUPIVACAINE LIPOSOME: HCPCS | Performed by: PHYSICIAN ASSISTANT

## 2017-10-19 PROCEDURE — 74011250636 HC RX REV CODE- 250/636: Performed by: ANESTHESIOLOGY

## 2017-10-19 PROCEDURE — 82962 GLUCOSE BLOOD TEST: CPT

## 2017-10-19 PROCEDURE — 74011250636 HC RX REV CODE- 250/636: Performed by: ORTHOPAEDIC SURGERY

## 2017-10-19 PROCEDURE — 77030031139 HC SUT VCRL2 J&J -A: Performed by: ORTHOPAEDIC SURGERY

## 2017-10-19 PROCEDURE — 77030003601 HC NDL NRV BLK BBMI -A

## 2017-10-19 PROCEDURE — C1776 JOINT DEVICE (IMPLANTABLE): HCPCS | Performed by: ORTHOPAEDIC SURGERY

## 2017-10-19 PROCEDURE — 77030012935 HC DRSG AQUACEL BMS -B: Performed by: ORTHOPAEDIC SURGERY

## 2017-10-19 PROCEDURE — 74011250637 HC RX REV CODE- 250/637: Performed by: NURSE PRACTITIONER

## 2017-10-19 PROCEDURE — 77030008467 HC STPLR SKN COVD -B: Performed by: ORTHOPAEDIC SURGERY

## 2017-10-19 PROCEDURE — 94664 DEMO&/EVAL PT USE INHALER: CPT

## 2017-10-19 PROCEDURE — 77030012893

## 2017-10-19 PROCEDURE — 77030014077 HC TOWER MX CEM J&J -C: Performed by: ORTHOPAEDIC SURGERY

## 2017-10-19 PROCEDURE — 64450 NJX AA&/STRD OTHER PN/BRANCH: CPT

## 2017-10-19 PROCEDURE — 76060000035 HC ANESTHESIA 2 TO 2.5 HR: Performed by: ORTHOPAEDIC SURGERY

## 2017-10-19 PROCEDURE — 77030032490 HC SLV COMPR SCD KNE COVD -B: Performed by: ORTHOPAEDIC SURGERY

## 2017-10-19 PROCEDURE — 77030020788: Performed by: ORTHOPAEDIC SURGERY

## 2017-10-19 DEVICE — INSERT TIB RP FEM KNEE CEM: Type: IMPLANTABLE DEVICE | Status: FUNCTIONAL

## 2017-10-19 DEVICE — COMPONENT PAT DIA38MM POLYETH DOME CEM MEDIALIZED ATTUNE: Type: IMPLANTABLE DEVICE | Site: KNEE | Status: FUNCTIONAL

## 2017-10-19 DEVICE — COMPONENT FEM SZ 7 R KNEE POST STBL CEM ATTUNE: Type: IMPLANTABLE DEVICE | Site: KNEE | Status: FUNCTIONAL

## 2017-10-19 DEVICE — CEMENT BNE 40GM FULL DOSE PMMA W/O ANTIBIO HI VISC N RADPQ: Type: IMPLANTABLE DEVICE | Site: KNEE | Status: FUNCTIONAL

## 2017-10-19 DEVICE — IMPLANTABLE DEVICE: Type: IMPLANTABLE DEVICE | Site: KNEE | Status: FUNCTIONAL

## 2017-10-19 RX ORDER — BUDESONIDE 0.5 MG/2ML
500 INHALANT ORAL
Status: DISCONTINUED | OUTPATIENT
Start: 2017-10-19 | End: 2017-10-21 | Stop reason: HOSPADM

## 2017-10-19 RX ORDER — FLUTICASONE PROPIONATE 50 MCG
2 SPRAY, SUSPENSION (ML) NASAL DAILY
Status: DISCONTINUED | OUTPATIENT
Start: 2017-10-20 | End: 2017-10-21 | Stop reason: HOSPADM

## 2017-10-19 RX ORDER — SODIUM CHLORIDE 0.9 % (FLUSH) 0.9 %
5-10 SYRINGE (ML) INJECTION AS NEEDED
Status: DISCONTINUED | OUTPATIENT
Start: 2017-10-19 | End: 2017-10-19 | Stop reason: HOSPADM

## 2017-10-19 RX ORDER — RAMIPRIL 2.5 MG/1
10 CAPSULE ORAL EVERY 12 HOURS
Status: DISCONTINUED | OUTPATIENT
Start: 2017-10-20 | End: 2017-10-19

## 2017-10-19 RX ORDER — METOPROLOL SUCCINATE 50 MG/1
50 TABLET, EXTENDED RELEASE ORAL DAILY
Status: DISCONTINUED | OUTPATIENT
Start: 2017-10-20 | End: 2017-10-21 | Stop reason: HOSPADM

## 2017-10-19 RX ORDER — MORPHINE SULFATE 10 MG/ML
2 INJECTION, SOLUTION INTRAMUSCULAR; INTRAVENOUS
Status: DISCONTINUED | OUTPATIENT
Start: 2017-10-19 | End: 2017-10-19 | Stop reason: HOSPADM

## 2017-10-19 RX ORDER — CELECOXIB 200 MG/1
200 CAPSULE ORAL 2 TIMES DAILY
Status: DISCONTINUED | OUTPATIENT
Start: 2017-10-19 | End: 2017-10-21 | Stop reason: HOSPADM

## 2017-10-19 RX ORDER — POLYETHYLENE GLYCOL 3350 17 G/17G
17 POWDER, FOR SOLUTION ORAL DAILY
Status: DISCONTINUED | OUTPATIENT
Start: 2017-10-20 | End: 2017-10-21 | Stop reason: HOSPADM

## 2017-10-19 RX ORDER — METOPROLOL TARTRATE 5 MG/5ML
INJECTION INTRAVENOUS AS NEEDED
Status: DISCONTINUED | OUTPATIENT
Start: 2017-10-19 | End: 2017-10-19 | Stop reason: HOSPADM

## 2017-10-19 RX ORDER — SODIUM CHLORIDE 0.9 % (FLUSH) 0.9 %
5-10 SYRINGE (ML) INJECTION EVERY 8 HOURS
Status: DISCONTINUED | OUTPATIENT
Start: 2017-10-20 | End: 2017-10-21 | Stop reason: HOSPADM

## 2017-10-19 RX ORDER — FAMOTIDINE 20 MG/1
20 TABLET, FILM COATED ORAL
Status: DISCONTINUED | OUTPATIENT
Start: 2017-10-19 | End: 2017-10-21 | Stop reason: HOSPADM

## 2017-10-19 RX ORDER — SODIUM CHLORIDE 0.9 % (FLUSH) 0.9 %
5-10 SYRINGE (ML) INJECTION AS NEEDED
Status: DISCONTINUED | OUTPATIENT
Start: 2017-10-19 | End: 2017-10-21 | Stop reason: HOSPADM

## 2017-10-19 RX ORDER — FENTANYL CITRATE 50 UG/ML
50 INJECTION, SOLUTION INTRAMUSCULAR; INTRAVENOUS AS NEEDED
Status: DISCONTINUED | OUTPATIENT
Start: 2017-10-19 | End: 2017-10-19 | Stop reason: HOSPADM

## 2017-10-19 RX ORDER — FENTANYL CITRATE 50 UG/ML
25 INJECTION, SOLUTION INTRAMUSCULAR; INTRAVENOUS
Status: DISCONTINUED | OUTPATIENT
Start: 2017-10-19 | End: 2017-10-19 | Stop reason: HOSPADM

## 2017-10-19 RX ORDER — LORATADINE 10 MG/1
10 TABLET ORAL DAILY
Status: DISCONTINUED | OUTPATIENT
Start: 2017-10-20 | End: 2017-10-21 | Stop reason: HOSPADM

## 2017-10-19 RX ORDER — ALBUTEROL SULFATE 90 UG/1
2 AEROSOL, METERED RESPIRATORY (INHALATION)
Status: DISCONTINUED | OUTPATIENT
Start: 2017-10-19 | End: 2017-10-19 | Stop reason: CLARIF

## 2017-10-19 RX ORDER — CEFAZOLIN SODIUM IN 0.9 % NACL 2 G/50 ML
2 INTRAVENOUS SOLUTION, PIGGYBACK (ML) INTRAVENOUS EVERY 8 HOURS
Status: DISCONTINUED | OUTPATIENT
Start: 2017-10-19 | End: 2017-10-19 | Stop reason: HOSPADM

## 2017-10-19 RX ORDER — POTASSIUM CHLORIDE 750 MG/1
10 TABLET, FILM COATED, EXTENDED RELEASE ORAL DAILY
Status: DISCONTINUED | OUTPATIENT
Start: 2017-10-20 | End: 2017-10-19

## 2017-10-19 RX ORDER — HYDROMORPHONE HYDROCHLORIDE 1 MG/ML
0.5 INJECTION, SOLUTION INTRAMUSCULAR; INTRAVENOUS; SUBCUTANEOUS
Status: ACTIVE | OUTPATIENT
Start: 2017-10-19 | End: 2017-10-20

## 2017-10-19 RX ORDER — PROPOFOL 10 MG/ML
INJECTION, EMULSION INTRAVENOUS AS NEEDED
Status: DISCONTINUED | OUTPATIENT
Start: 2017-10-19 | End: 2017-10-19 | Stop reason: HOSPADM

## 2017-10-19 RX ORDER — SODIUM CHLORIDE, SODIUM LACTATE, POTASSIUM CHLORIDE, CALCIUM CHLORIDE 600; 310; 30; 20 MG/100ML; MG/100ML; MG/100ML; MG/100ML
125 INJECTION, SOLUTION INTRAVENOUS CONTINUOUS
Status: DISCONTINUED | OUTPATIENT
Start: 2017-10-19 | End: 2017-10-19 | Stop reason: SDUPTHER

## 2017-10-19 RX ORDER — OXYCODONE AND ACETAMINOPHEN 5; 325 MG/1; MG/1
1 TABLET ORAL AS NEEDED
Status: DISCONTINUED | OUTPATIENT
Start: 2017-10-19 | End: 2017-10-19 | Stop reason: HOSPADM

## 2017-10-19 RX ORDER — DIPHENHYDRAMINE HYDROCHLORIDE 50 MG/ML
12.5 INJECTION, SOLUTION INTRAMUSCULAR; INTRAVENOUS AS NEEDED
Status: DISCONTINUED | OUTPATIENT
Start: 2017-10-19 | End: 2017-10-19 | Stop reason: HOSPADM

## 2017-10-19 RX ORDER — MIDAZOLAM HYDROCHLORIDE 1 MG/ML
0.5 INJECTION, SOLUTION INTRAMUSCULAR; INTRAVENOUS
Status: DISCONTINUED | OUTPATIENT
Start: 2017-10-19 | End: 2017-10-19 | Stop reason: HOSPADM

## 2017-10-19 RX ORDER — DEXAMETHASONE SODIUM PHOSPHATE 10 MG/ML
10 INJECTION INTRAMUSCULAR; INTRAVENOUS ONCE
Status: ACTIVE | OUTPATIENT
Start: 2017-10-20 | End: 2017-10-20

## 2017-10-19 RX ORDER — PANTOPRAZOLE SODIUM 40 MG/1
40 TABLET, DELAYED RELEASE ORAL
Status: DISCONTINUED | OUTPATIENT
Start: 2017-10-20 | End: 2017-10-21 | Stop reason: HOSPADM

## 2017-10-19 RX ORDER — MIDAZOLAM HYDROCHLORIDE 1 MG/ML
1 INJECTION, SOLUTION INTRAMUSCULAR; INTRAVENOUS AS NEEDED
Status: DISCONTINUED | OUTPATIENT
Start: 2017-10-19 | End: 2017-10-19 | Stop reason: HOSPADM

## 2017-10-19 RX ORDER — LIDOCAINE HYDROCHLORIDE 10 MG/ML
0.1 INJECTION, SOLUTION EPIDURAL; INFILTRATION; INTRACAUDAL; PERINEURAL AS NEEDED
Status: DISCONTINUED | OUTPATIENT
Start: 2017-10-19 | End: 2017-10-19 | Stop reason: HOSPADM

## 2017-10-19 RX ORDER — ALBUTEROL SULFATE 0.83 MG/ML
2.5 SOLUTION RESPIRATORY (INHALATION) EVERY 6 HOURS
Status: DISCONTINUED | OUTPATIENT
Start: 2017-10-19 | End: 2017-10-19 | Stop reason: SDUPTHER

## 2017-10-19 RX ORDER — FACIAL-BODY WIPES
10 EACH TOPICAL DAILY PRN
Status: DISCONTINUED | OUTPATIENT
Start: 2017-10-21 | End: 2017-10-21 | Stop reason: HOSPADM

## 2017-10-19 RX ORDER — OXYCODONE HYDROCHLORIDE 5 MG/1
5 TABLET ORAL
Status: DISCONTINUED | OUTPATIENT
Start: 2017-10-19 | End: 2017-10-21 | Stop reason: HOSPADM

## 2017-10-19 RX ORDER — CELECOXIB 200 MG/1
200 CAPSULE ORAL ONCE
Status: COMPLETED | OUTPATIENT
Start: 2017-10-19 | End: 2017-10-19

## 2017-10-19 RX ORDER — ALBUTEROL SULFATE 0.83 MG/ML
2.5 SOLUTION RESPIRATORY (INHALATION)
Status: DISCONTINUED | OUTPATIENT
Start: 2017-10-19 | End: 2017-10-21 | Stop reason: HOSPADM

## 2017-10-19 RX ORDER — WARFARIN 10 MG/1
10 TABLET ORAL ONCE
Status: COMPLETED | OUTPATIENT
Start: 2017-10-19 | End: 2017-10-19

## 2017-10-19 RX ORDER — HYDROMORPHONE HYDROCHLORIDE 1 MG/ML
0.2 INJECTION, SOLUTION INTRAMUSCULAR; INTRAVENOUS; SUBCUTANEOUS
Status: DISCONTINUED | OUTPATIENT
Start: 2017-10-19 | End: 2017-10-19 | Stop reason: HOSPADM

## 2017-10-19 RX ORDER — LISINOPRIL 20 MG/1
20 TABLET ORAL 2 TIMES DAILY
Status: DISCONTINUED | OUTPATIENT
Start: 2017-10-19 | End: 2017-10-21 | Stop reason: HOSPADM

## 2017-10-19 RX ORDER — VANCOMYCIN 2 GRAM/500 ML IN 0.9 % SODIUM CHLORIDE INTRAVENOUS
2000 ONCE
Status: COMPLETED | OUTPATIENT
Start: 2017-10-19 | End: 2017-10-19

## 2017-10-19 RX ORDER — ONDANSETRON 2 MG/ML
INJECTION INTRAMUSCULAR; INTRAVENOUS AS NEEDED
Status: DISCONTINUED | OUTPATIENT
Start: 2017-10-19 | End: 2017-10-19 | Stop reason: HOSPADM

## 2017-10-19 RX ORDER — MIDAZOLAM HYDROCHLORIDE 1 MG/ML
INJECTION, SOLUTION INTRAMUSCULAR; INTRAVENOUS AS NEEDED
Status: DISCONTINUED | OUTPATIENT
Start: 2017-10-19 | End: 2017-10-19 | Stop reason: HOSPADM

## 2017-10-19 RX ORDER — DEXAMETHASONE SODIUM PHOSPHATE 10 MG/ML
10 INJECTION INTRAMUSCULAR; INTRAVENOUS ONCE
Status: COMPLETED | OUTPATIENT
Start: 2017-10-19 | End: 2017-10-19

## 2017-10-19 RX ORDER — ACETAMINOPHEN 325 MG/1
650 TABLET ORAL EVERY 6 HOURS
Status: DISCONTINUED | OUTPATIENT
Start: 2017-10-19 | End: 2017-10-21 | Stop reason: HOSPADM

## 2017-10-19 RX ORDER — ONDANSETRON 2 MG/ML
4 INJECTION INTRAMUSCULAR; INTRAVENOUS AS NEEDED
Status: DISCONTINUED | OUTPATIENT
Start: 2017-10-19 | End: 2017-10-19 | Stop reason: HOSPADM

## 2017-10-19 RX ORDER — PRAVASTATIN SODIUM 20 MG/1
20 TABLET ORAL
Status: DISCONTINUED | OUTPATIENT
Start: 2017-10-20 | End: 2017-10-21 | Stop reason: HOSPADM

## 2017-10-19 RX ORDER — SODIUM CHLORIDE 0.9 % (FLUSH) 0.9 %
5-10 SYRINGE (ML) INJECTION EVERY 8 HOURS
Status: DISCONTINUED | OUTPATIENT
Start: 2017-10-19 | End: 2017-10-19 | Stop reason: HOSPADM

## 2017-10-19 RX ORDER — CEFAZOLIN SODIUM IN 0.9 % NACL 2 G/50 ML
2 INTRAVENOUS SOLUTION, PIGGYBACK (ML) INTRAVENOUS EVERY 8 HOURS
Status: COMPLETED | OUTPATIENT
Start: 2017-10-19 | End: 2017-10-20

## 2017-10-19 RX ORDER — LIDOCAINE HYDROCHLORIDE 20 MG/ML
INJECTION, SOLUTION EPIDURAL; INFILTRATION; INTRACAUDAL; PERINEURAL AS NEEDED
Status: DISCONTINUED | OUTPATIENT
Start: 2017-10-19 | End: 2017-10-19 | Stop reason: HOSPADM

## 2017-10-19 RX ORDER — HYDROXYZINE HYDROCHLORIDE 10 MG/1
10 TABLET, FILM COATED ORAL
Status: DISCONTINUED | OUTPATIENT
Start: 2017-10-19 | End: 2017-10-21 | Stop reason: HOSPADM

## 2017-10-19 RX ORDER — LISINOPRIL 20 MG/1
20 TABLET ORAL 2 TIMES DAILY
Status: DISCONTINUED | OUTPATIENT
Start: 2017-10-19 | End: 2017-10-19

## 2017-10-19 RX ORDER — OXYCODONE HYDROCHLORIDE 5 MG/1
10 TABLET ORAL
Status: DISCONTINUED | OUTPATIENT
Start: 2017-10-19 | End: 2017-10-21 | Stop reason: HOSPADM

## 2017-10-19 RX ORDER — SODIUM CHLORIDE, SODIUM LACTATE, POTASSIUM CHLORIDE, CALCIUM CHLORIDE 600; 310; 30; 20 MG/100ML; MG/100ML; MG/100ML; MG/100ML
125 INJECTION, SOLUTION INTRAVENOUS CONTINUOUS
Status: DISCONTINUED | OUTPATIENT
Start: 2017-10-19 | End: 2017-10-19 | Stop reason: HOSPADM

## 2017-10-19 RX ORDER — SODIUM CHLORIDE 9 MG/ML
125 INJECTION, SOLUTION INTRAVENOUS CONTINUOUS
Status: DISPENSED | OUTPATIENT
Start: 2017-10-19 | End: 2017-10-20

## 2017-10-19 RX ORDER — ONDANSETRON 2 MG/ML
4 INJECTION INTRAMUSCULAR; INTRAVENOUS
Status: ACTIVE | OUTPATIENT
Start: 2017-10-19 | End: 2017-10-20

## 2017-10-19 RX ORDER — SODIUM CHLORIDE 9 MG/ML
25 INJECTION, SOLUTION INTRAVENOUS CONTINUOUS
Status: DISCONTINUED | OUTPATIENT
Start: 2017-10-19 | End: 2017-10-19 | Stop reason: HOSPADM

## 2017-10-19 RX ORDER — AMOXICILLIN 250 MG
1 CAPSULE ORAL 2 TIMES DAILY
Status: DISCONTINUED | OUTPATIENT
Start: 2017-10-19 | End: 2017-10-21 | Stop reason: HOSPADM

## 2017-10-19 RX ORDER — PROPOFOL 10 MG/ML
INJECTION, EMULSION INTRAVENOUS
Status: DISCONTINUED | OUTPATIENT
Start: 2017-10-19 | End: 2017-10-19 | Stop reason: HOSPADM

## 2017-10-19 RX ORDER — BUPIVACAINE HYDROCHLORIDE 7.5 MG/ML
INJECTION, SOLUTION EPIDURAL; RETROBULBAR AS NEEDED
Status: DISCONTINUED | OUTPATIENT
Start: 2017-10-19 | End: 2017-10-19 | Stop reason: HOSPADM

## 2017-10-19 RX ORDER — NALOXONE HYDROCHLORIDE 0.4 MG/ML
0.4 INJECTION, SOLUTION INTRAMUSCULAR; INTRAVENOUS; SUBCUTANEOUS AS NEEDED
Status: DISCONTINUED | OUTPATIENT
Start: 2017-10-19 | End: 2017-10-21 | Stop reason: HOSPADM

## 2017-10-19 RX ADMIN — ACETAMINOPHEN 650 MG: 325 TABLET ORAL at 16:35

## 2017-10-19 RX ADMIN — MIDAZOLAM HYDROCHLORIDE 2 MG: 1 INJECTION, SOLUTION INTRAMUSCULAR; INTRAVENOUS at 09:50

## 2017-10-19 RX ADMIN — BUDESONIDE 500 MCG: 0.5 INHALANT RESPIRATORY (INHALATION) at 21:17

## 2017-10-19 RX ADMIN — SODIUM CHLORIDE, SODIUM LACTATE, POTASSIUM CHLORIDE, AND CALCIUM CHLORIDE 125 ML/HR: 600; 310; 30; 20 INJECTION, SOLUTION INTRAVENOUS at 09:50

## 2017-10-19 RX ADMIN — ACETAMINOPHEN 650 MG: 325 TABLET ORAL at 22:48

## 2017-10-19 RX ADMIN — BUPIVACAINE HYDROCHLORIDE 15 MG: 7.5 INJECTION, SOLUTION EPIDURAL; RETROBULBAR at 11:09

## 2017-10-19 RX ADMIN — CELECOXIB 200 MG: 200 CAPSULE ORAL at 18:17

## 2017-10-19 RX ADMIN — METOPROLOL TARTRATE 1 MG: 5 INJECTION INTRAVENOUS at 10:59

## 2017-10-19 RX ADMIN — CELECOXIB 200 MG: 200 CAPSULE ORAL at 09:20

## 2017-10-19 RX ADMIN — MIDAZOLAM HYDROCHLORIDE 1 MG: 1 INJECTION, SOLUTION INTRAMUSCULAR; INTRAVENOUS at 12:13

## 2017-10-19 RX ADMIN — VANCOMYCIN HYDROCHLORIDE 2000 MG: 10 INJECTION, POWDER, LYOPHILIZED, FOR SOLUTION INTRAVENOUS at 09:57

## 2017-10-19 RX ADMIN — LISINOPRIL 20 MG: 20 TABLET ORAL at 18:17

## 2017-10-19 RX ADMIN — STANDARDIZED SENNA CONCENTRATE AND DOCUSATE SODIUM 1 TABLET: 8.6; 5 TABLET, FILM COATED ORAL at 18:17

## 2017-10-19 RX ADMIN — PROPOFOL 25 MG: 10 INJECTION, EMULSION INTRAVENOUS at 11:05

## 2017-10-19 RX ADMIN — LIDOCAINE HYDROCHLORIDE 40 MG: 20 INJECTION, SOLUTION EPIDURAL; INFILTRATION; INTRACAUDAL; PERINEURAL at 11:05

## 2017-10-19 RX ADMIN — PROPOFOL: 10 INJECTION, EMULSION INTRAVENOUS at 12:31

## 2017-10-19 RX ADMIN — WARFARIN SODIUM 10 MG: 10 TABLET ORAL at 16:35

## 2017-10-19 RX ADMIN — DEXAMETHASONE SODIUM PHOSPHATE 10 MG: 10 INJECTION, SOLUTION INTRAMUSCULAR; INTRAVENOUS at 11:13

## 2017-10-19 RX ADMIN — SODIUM CHLORIDE 125 ML/HR: 900 INJECTION, SOLUTION INTRAVENOUS at 14:14

## 2017-10-19 RX ADMIN — ONDANSETRON 4 MG: 2 INJECTION INTRAMUSCULAR; INTRAVENOUS at 13:08

## 2017-10-19 RX ADMIN — MIDAZOLAM HYDROCHLORIDE 1 MG: 1 INJECTION, SOLUTION INTRAMUSCULAR; INTRAVENOUS at 11:28

## 2017-10-19 RX ADMIN — PROPOFOL 50 MCG/KG/MIN: 10 INJECTION, EMULSION INTRAVENOUS at 11:05

## 2017-10-19 RX ADMIN — SODIUM CHLORIDE, SODIUM LACTATE, POTASSIUM CHLORIDE, AND CALCIUM CHLORIDE: 600; 310; 30; 20 INJECTION, SOLUTION INTRAVENOUS at 11:42

## 2017-10-19 RX ADMIN — TRANEXAMIC ACID 1 G: 100 INJECTION, SOLUTION INTRAVENOUS at 11:22

## 2017-10-19 RX ADMIN — PROPOFOL 25 MG: 10 INJECTION, EMULSION INTRAVENOUS at 11:11

## 2017-10-19 RX ADMIN — CEFAZOLIN 2 G: 1 INJECTION, POWDER, FOR SOLUTION INTRAMUSCULAR; INTRAVENOUS; PARENTERAL at 11:07

## 2017-10-19 RX ADMIN — CEFAZOLIN 2 G: 1 INJECTION, POWDER, FOR SOLUTION INTRAMUSCULAR; INTRAVENOUS; PARENTERAL at 19:35

## 2017-10-19 RX ADMIN — PROPOFOL 25 MG: 10 INJECTION, EMULSION INTRAVENOUS at 11:16

## 2017-10-19 RX ADMIN — SODIUM CHLORIDE, SODIUM LACTATE, POTASSIUM CHLORIDE, AND CALCIUM CHLORIDE: 600; 310; 30; 20 INJECTION, SOLUTION INTRAVENOUS at 13:13

## 2017-10-19 NOTE — BRIEF OP NOTE
BRIEF OPERATIVE NOTE    Date of Procedure: 10/19/2017   Preoperative Diagnosis: DJD RIGHT KNEE   Postoperative Diagnosis: DJD RIGHT KNEE     Procedure(s):  RIGHT TOTAL KNEE REPLACEMENT  Surgeon(s) and Role:     * Gonzalez Ramirez MD - Primary         Assistant Staff:  Physician Assistant: Gabriela Fox PA-C    Surgical Staff:  Circ-1: Lilian Quintero  Circ-Relief: Toby Jc RN  Physician Assistant: Gabriela Fox PA-C  Scrub Tech-1: Cristina Dacosta  Scrub RN-Relief: Toby Jc RN  Surg Asst-1: Eliz Kevin  Event Time In   Incision Start 1134   Incision Close 1325     Anesthesia: Spinal   Estimated Blood Loss: 200 mL  Specimens: * No specimens in log *   Findings: DJD Right knee   Complications: None  Implants:   Implant Name Type Inv.  Item Serial No.  Lot No. LRB No. Used Action   CEMENT BNE SMARTSET HV 40GM -- SMARTSET - SN/A  CEMENT BNE SMARTSET HV 40GM -- SMARTSET N/A [facility] [employer] [id number] Right 2 Implanted   Tibial based fixed bearing   NA [employer] [id number] Right 1 Implanted   PAT JUAN DOME MEDIAL 38MM -- ATTUNE - SNA  PAT JUAN DOME MEDIAL 38MM -- ATTUNE NA [facility] [employer] [id number] Right 1 Implanted   FEM PS SZ 7 RT JUAN -- ATTUNE - SN A  FEM PS SZ 7 RT JUAN -- ATTUNE N A [facility] [employer] [id number] Right 1 Implanted   INSERT TIB FB PS SZ 7 8MM -- ATTUNE - SNA   INSERT TIB FB PS SZ 7 8MM -- ATTUNE NA [facility] [employer] [id number] Right 1 Implanted

## 2017-10-19 NOTE — IP AVS SNAPSHOT
[address] 
[phone] Patient: Andrew Miguel MRN: [id number] LTP:[id number]/[id number] You are allergic to the following No active allergies Recent Documentation Height Weight BMI Smoking Status 1.778 m 108.9 kg 34.44 kg/m2 Former Smoker Emergency Contacts Name Discharge Info Relation Home Work Mobile [place] [facility] DISCHARGE CAREGIVER [3] Spouse [3] [phone] About your hospitalization You were admitted on:  October 19, 2017 [facility] You [facility] discharged on:  October 21, 2017 Unit phone number:  [phone] Why you were hospitalized Your primary diagnosis was:  Primary Osteoarthritis Of Right Knee Your diagnoses also included:  Sleep Apnea, Obesity (Bmi 30.0-34.9), Htn (Hypertension), Benign, Asthma Providers Seen During Your Hospitalizations Provider Role Specialty Primary office phone Judith Roque MD Attending Provider Orthopedic Surgery [phone] Your Primary Care Physician (PCP) Primary Care Physician Office Phone Office Fax True Nigel [phone] Follow-up Information Follow up With Details Comments Contact Info Frankie Hamilton MD   [address] [place] [id number] 
[phone] [facility]  [address], please call office if you ahve not heard from staff member by 12 noon first full day after dischar ge [address] 
[phone] Current Discharge Medication List  
  
START taking these medications Dose & Instructions Dispensing Information Comments Morning Noon Evening Bedtime  
 celecoxib 200 mg capsule Commonly known as:  CeleBREX Your last dose was:     
   
Your next dose is:    
   
   
 Dose:  200 mg  
 Take 1 Cap by mouth daily for 30 days. Quantity:  30 Cap Refills:  0  
     
   
   
   
  
 oxyCODONE IR 5 mg immediate release tablet Commonly known as:  Luisa Rodarte Your last dose was: Your next dose is:    
   
   
 Dose:  5 mg Take 1 Tab by mouth every four (4) hours as needed for Pain. Max Daily Amount: 30 mg.  
 Quantity:  70 Tab Refills:  0  
     
   
   
   
  
 warfarin 2.5 mg tablet Commonly known as:  COUMADIN Your last dose was: Your next dose is:    
   
   
 Dose:  2.5 mg Take 1 Tab by mouth daily. Quantity:  90 Tab Refills:  1 CONTINUE these medications which have NOT CHANGED Dose & Instructions Dispensing Information Comments Morning Noon Evening Bedtime * albuterol 90 mcg/actuation inhaler Commonly known as:  PROVENTIL HFA Your last dose was: Your next dose is:    
   
   
 Dose:  2 Puff Take 2 Puffs by inhalation every six (6) hours as needed for Wheezing. Quantity:  2 Inhaler Refills:  5  
     
   
   
   
  
 * albuterol 2.5 mg /3 mL (0.083 %) nebulizer solution Commonly known as:  PROVENTIL VENTOLIN Your last dose was: Your next dose is:    
   
   
 Dose:  2.5 mg  
3 mL by Nebulization route every six (6) hours. As needed for wheezing. Quantity:  50 Each Refills:  3 CLARITIN 10 mg tablet Generic drug:  loratadine Your last dose was: Your next dose is:    
   
   
 Dose:  10 mg Take 10 mg by mouth daily. Refills:  0  
     
   
   
   
  
 fenofibrate 160 mg tablet Commonly known as:  LOFIBRA Your last dose was: Your next dose is: TAKE 1 TABLET BY MOUTH DAILY Quantity:  30 Tab Refills:  5  
     
   
   
   
  
 fluticasone 50 mcg/actuation nasal spray Commonly known as:  Olinda Allen Your last dose was: Your next dose is:    
   
   
 Dose:  2 Spray 2 Sprays by Both Nostrils route daily. Quantity:  1 Bottle Refills:  11  
     
   
   
   
  
 METAMUCIL (SUGAR) Powd Generic drug:  psyllium seed-sucrose Your last dose was: Your next dose is:    
   
   
 Dose:  1 Dose Take 1 Dose by mouth two (2) times a day. Refills:  0  
     
   
   
   
  
 metoprolol succinate 50 mg XL tablet Commonly known as:  TOPROL-XL Your last dose was: Your next dose is:    
   
   
 Dose:  50 mg Take 1 Tab by mouth daily. Indications: hypertension Quantity:  30 Tab Refills:  11  
     
   
   
   
  
 mometasone 220 mcg (60 doses) inhaler Commonly known as:  Prudencyuridia Zhang Your last dose was: Your next dose is:    
   
   
 Dose:  1 Puff Take 1 Puff by inhalation two (2) times a day. Quantity:  60 Cap Refills:  3 Omeprazole delayed release 20 mg tablet Commonly known as:  PRILOSEC D/R Your last dose was: Your next dose is:    
   
   
 Dose:  20 mg Take 20 mg by mouth daily. Refills:  0 POTASSIUM CHLORIDE PO Your last dose was: Your next dose is: Take  by mouth. Refills:  0  
     
   
   
   
  
 pravastatin 20 mg tablet Commonly known as:  PRAVACHOL Your last dose was: Your next dose is: TAKE 1 TABLET BY MOUTH NIGHTLY Quantity:  30 Tab Refills:  0  
     
   
   
   
  
 ramipril 10 mg capsule Commonly known as:  ALTACE Your last dose was: Your next dose is: TAKE ONE CAPSULE BY MOUTH TWICE A DAY Quantity:  60 Cap Refills:  5  
     
   
   
   
  
 TRIAMCINOLONE ACETONIDE (BULK) Your last dose was: Your next dose is:    
   
   
 by Does Not Apply route. AS NEEDED Refills:  0  
     
   
   
   
  
 VITAMIN B-12 1,000 mcg tablet Generic drug:  cyanocobalamin Your last dose was: Your next dose is: Dose:  1000 mcg Take 1 Tab by mouth daily. Refills:  0  
     
   
   
   
  
 * Notice: This list has 2 medication(s) that are the same as other medications prescribed for you. Read the directions carefully, and ask your doctor or other care provider to review them with you. STOP taking these medications   
 meloxicam 15 mg tablet Commonly known as:  MOBIC  
   
  
 NAPROSYN 250 mg tablet Generic drug:  naproxen ASK your doctor about these medications Dose & Instructions Dispensing Information Comments Morning Noon Evening Bedtime  
 mupirocin 2 % ointment Commonly known as:  BackupAgent [facility] Ask about: Should I take this medication? Your last dose was: Your next dose is:    
   
   
 by Both Nostrils route two (2) times a day for 7 days. Quantity:  22 g Refills:  0 Where to Get Your Medications Information on where to get these meds will be given to you by the nurse or doctor. ! Ask your nurse or doctor about these medications  
  celecoxib 200 mg capsule  
 oxyCODONE IR 5 mg immediate release tablet  
 warfarin 2.5 mg tablet Discharge Instructions DC Orders All Total Knees Case Management for DC planning to Home HC . - PT 3 times a week for 2 weeks; WBAT. - PT/INR Every Monday/Thursday for 2 weeks, Call Dr. Gino Ceja with results ([phone]). - Remove staples at 2 weeks post-op; 11/2/17 . - Follow up in Office in 2 1/2 weeks. After Hospital Care Plan:  Discharge Instructions Knee Replacement-Dr. Gino Ceja Patient Name: Edvin Ramachandran Date of procedure: 10/19/2017 Procedure: Procedure(s): RIGHT TOTAL KNEE REPLACEMENT Surgeon: Randall Bhardwaj) and Role: 
   * Kavon Freeman MD - Primary PCP: Dominick Jenkins MD 
Date of discharge: No discharge date for patient encounter. Follow up appointments ? Follow up with Dr. Gino Ceja in 2 ½ weeks.   Call [phone] to make an appointment. ? If home health has been arranged for you the agency will contact you to arrange dates/times for visits. Please call them if you do not hear from them within 24 hours after you are discharged When to call your Orthopaedic Surgeon: Call [phone]. If you call after 5pm or on a weekend, the on call physician will be contacted ? Unrelieved pain ? Signs of infection-if your incision is red; continues to have drainage; drainage has a foul odor or if you have a persistent fever over 101 degrees ? Signs of a blood clot in your leg-calf pain, tenderness, redness, swelling of lower leg When to call your Primary Care Physician: 
? Concerns about medical conditions such as diabetes, high blood pressure, asthma, congestive heart failure ? Call if blood sugars are elevated, persistent headache or dizziness, coughing or congestion, constipation or diarrhea, burning with urination, abnormal heart rate When to call 060hnd go to the nearest emergency room ? Acute onset of chest pain, shortness of breath, difficulty breathing Activity ? Weight bearing as tolerated with walker or crutches. Refer to pages 23-31 of your handbook for instructions and pictures ? Complete your Home Exercise Program daily as instructed by your therapist.  Refer to pages 33-41 of your handbook for instructions and pictures ? Get up every one hour and walk (except at night when sleeping) ? Do not drive or operate heavy machinery Incision Care ? The Aquacel (brown, waterproof) surgical dressing is to remain on your knee for 7 days. On the 7th day have someone gently peel the dressing off by carefully lifting the edge and stretching it slightly to break the adhesive seal 
? You will have staples in your knee incision. They will be removed by the home health agency staff ? If your Aquacel dressing comes loose/off before the 7th day, you may replace it with a dry sterile gauze dressing; change it daily.   Once your incision is not draining, you may leave it open to air ? You may take a shower with the Aquacel dressing in place. Once the Aquacel is removed, you may shower and get your incision wet but do not submerge your incision under water in a bath tub, hot tub or swimming pool for 6 weeks after surgery. Preventing blood clots ? Take Coumadin as prescribed by Dr. Hitesh Sosa for one month following surgery ? Wear elastic stockings (TEDS) for 4 weeks. You should remove them for approximately 1 hour daily for showering/sponge bathing Pain management ? Take pain medication as prescribed; decrease the amount you use as your pain lessens ? Avoid alcoholic beverages while taking pain medication ? Please be aware that many medications contain Tylenol. We do not want you to over medicate so please read the information below as a guide. Do not take more than 4 Grams of Tylenol in a 24 hour period. (There are 1000 milligrams in one Gram) o Percocet contains 325 mg of Tylenol per tablet (do not take more than 12 tablets in 24 hours) 
o Lortab contains 500 mg of Tylenol per tablet (do not take more than 8 tablets in 24 hours) o Norco contains 325 mg of Tylenol per tablet (do not take more than 12 tablets in 24 hours). ? You may place an ice bag on your hip for 15-20 minutes after exercising Diet ? Resume usual diet; drink plenty of fluids; eat foods high in fiber ? You may want to take a stool softener (such as Senokot-S or Colace) to prevent constipation while you are taking pain medication. If constipation occurs, take a laxative (such as Dulcolax tablets, Milk of Magnesia, or a suppository) Home Health Care Protocol (to be followed by [address] [place] [address]) Nursing ? Draw a PT/INR per physicians orders. Call results to Dr. Hitesh Sosa at [phone] ? Remove staples per physicians order ? Complete head to toe assessment, vital signs ? Medication reconciliation ? Review pain management ? Manage chronic medical conditions Physical Therapy-per physicians orders Weight bearing status: 
  
  
  
 
Mobility Status: 
  
  
  
  
 
Gait: 
  
  
  
  
 
ADL status overall composite: 
  
  
  
  
  
 
Physical Therapy ? Assessment and evaluation-bed mobility; functional transfers (bed, chair, bathroom, stairs); ambulation with equipment, car transfers, shower transfers, safety and ability to get out of house in the event of an emergency ? Review weight bearing as tolerated, wean from walker or crutches as tolerated ? Discuss pain management ? Review how to do ADLs. Refer to page 42 of patient handbook Home Exercise Program-refer to pages 33-41 of patient handbook for exercises Discharge Instructions Attachments/References MEFS - WARFARIN (COUMADIN, JANTOVEN) - (BY MOUTH) (ENGLISH) MEFS - OXYCODONE, RAPID RELEASE (ETH-OXYDOSE, OXY IR, ROXICODONE) - (BY MOUTH) (ENGLISH) MEFS - CELECOXIB (CELEBREX) - (BY MOUTH) (ENGLISH) Discharge Orders None Introducing [facility] [place] [facility]! Angela Iyer introduces "[employer] patient portal. Now you can access parts of your medical record, email your doctor's office, and request medication refills online. 1. In your internet browser, go to [email or web address] 2. Click on the First Time User? Click Here link in the Sign In box. You will see the New Member Sign Up page. 3. Enter your "[employer] Access Code exactly as it appears below. You will not need to use this code after youve completed the sign-up process. If you do not sign up before the expiration date, you must request a new code. · "[employer] Access Code: [email or web address] Expires: 12/4/2017  8:22 AM 
 
4. Enter the last four digits of your Social Security Number (xxxx) and Date of Birth (mm/dd/yyyy) as indicated and click Submit. You will be taken to the next sign-up page. 5. Create a "[employer] ID.  This will be your "[employer] login ID and cannot be changed, so think of one that is secure and easy to remember. 6. Create a [employer] password. You can change your password at any time. 7. Enter your Password Reset Question and Answer. This can be used at a later time if you forget your password. 8. Enter your e-mail address. You will receive e-mail notification when new information is available in [address] 9. Click Sign Up. You can now view and download portions of your medical record. 10. Click the Download Summary menu link to download a portable copy of your medical information. If you have questions, please visit the Frequently Asked Questions section of the [employer] website. Remember, [employer] is NOT to be used for urgent needs. For medical emergencies, dial 911. Now available from your iPhone and Android! General Information Please provide this summary of care documentation to your next provider. Patient Signature:  ____________________________________________________________ Date:  ____________________________________________________________  
  
Brea Connor Provider Signature:  ____________________________________________________________ Date:  ____________________________________________________________ More Information Warfarin (Coumadin, Jantoven) - (By mouth) Why this medicine is used:  
Prevents and treats blood clots. Contact a nurse or doctor right away if you have: 
· Sudden or severe headache · Red or dark brown urine, or red or black, tarry stools · Bloody vomit or vomit that looks like coffee grounds · Bleeding that does not stop or bruises that do not heal 
· Purplish red, net-like, blotchy spots on the skin Common side effects: · Minor bleeding or bruising © 2017 [address] Information is for End User's use only and may not be sold, redistributed or otherwise used for commercial purposes. Oxycodone, Rapid Release (ETH-Oxydose, Oxy IR, Roxicodone) - (By mouth) Why this medicine is used:  
Treats moderate to severe pain. This medicine is a narcotic pain reliever. Contact a nurse or doctor right away if you have: 
· Fast or slow heart beat, shallow breathing, blue lips, skin or fingernails · Anxiety, restlessness, fever, sweating, muscle spasms, twitching, seeing or hearing things that are not there · Extreme weakness, shallow breathing, slow heartbeat · Severe confusion, lightheadedness, dizziness, fainting · Sweating or cold, clammy skin, seizures · Severe constipation, stomach pain, nausea, vomiting Common side effects: · Mild constipation · Sleepiness, tiredness © 2017 [address] Information is for End User's use only and may not be sold, redistributed or otherwise used for commercial purposes. Celecoxib (CeleBREX) - (By mouth) Why this medicine is used:  
Treats pain. Contact a nurse or doctor right away if you have: · Chest pain, shortness of breath · Numbness or weakness in your arm or leg, or on one side of your body · Severe stomach pain, vomiting blood, bloody or black tarry stools · Swelling in your hands, ankles, or feet; rapid weight gain · Change in how much or how often you urinate Common side effects: 
· Nausea, diarrhea · Mild skin rash · Headache © 2017 [address]  Information is for End User's use only and may not be sold, redistributed or otherwise used for commercial purposes.

## 2017-10-19 NOTE — ANESTHESIA PROCEDURE NOTES
Peripheral Block    Performed by: Laurie Traylor  Authorized by: Laurie Traylor       Pre-procedure: Indications: at surgeon's request and post-op pain management    Preanesthetic Checklist: patient identified, risks and benefits discussed, site marked, timeout performed, anesthesia consent given and patient being monitored      Block Type:   Block Type:   Adductor canal  Laterality:  Right  Monitoring:  Standard ASA monitoring, heart rate, responsive to questions, continuous pulse ox, frequent vital sign checks and oxygen  Injection Technique:  Single shot  Procedures: ultrasound guided    Patient Position: supine  Prep: chlorhexidine    Location:  Mid thigh  Needle Type:  Stimuplex  Needle Localization:  Ultrasound guidance  Medication Injected:  0.5%  ropivacaine  Volume (mL):  20    Assessment:  Number of attempts:  1  Injection Assessment:  Incremental injection every 5 mL, no paresthesia, no intravascular symptoms, negative aspiration for blood and local visualized surrounding nerve on ultrasound  Patient tolerance:  Patient tolerated the procedure well with no immediate complications

## 2017-10-19 NOTE — ANESTHESIA POSTPROCEDURE EVALUATION
Post-Anesthesia Evaluation and Assessment    Patient: Yesi Rosas MRN: [id number]  SSN: [id number]    YOB: 1950  Age: 61 y.o. Sex: male       Cardiovascular Function/Vital Signs  Visit Vitals    /82    Pulse 72    Temp 36.4 °C (97.6 °F)    Resp 18    Ht 5' 10\" (1.778 m)    Wt 108.9 kg (240 lb)    SpO2 99%    BMI 34.44 kg/m2       Patient is status post MAC, regional, spinal anesthesia for Procedure(s):  RIGHT TOTAL KNEE REPLACEMENT. Nausea/Vomiting: None    Postoperative hydration reviewed and adequate. Pain:  Pain Scale 1: Numeric (0 - 10) (10/19/17 1333)  Pain Intensity 1: 0 (10/19/17 1333)   Managed    Neurological Status:   Neuro (WDL):  (spinal) (10/19/17 1333)   At baseline    Mental Status and Level of Consciousness: Arousable    Pulmonary Status:   O2 Device: Nasal cannula (10/19/17 1338)   Adequate oxygenation and airway patent    Complications related to anesthesia: None    Post-anesthesia assessment completed.  No concerns    Signed By: Bina Faye DO     October 19, 2017

## 2017-10-19 NOTE — ANESTHESIA PROCEDURE NOTES
Spinal Block    Performed by: Zen Quiros  Authorized by: Zen Quiros     Pre-procedure:   Indications: at surgeon's request and primary anesthetic  Preanesthetic Checklist: patient identified, risks and benefits discussed, anesthesia consent, site marked, patient being monitored and timeout performed      Spinal Block:   Patient Position:  Seated        Location:  L3-4  Technique:  Single shot  Local:  Lidocaine 1%      Needle:   Needle Type:  Pencan  Needle Gauge:  25 G  Attempts:  1      Events: CSF confirmed, no blood with aspiration and no paresthesia        Assessment:  Insertion:  Uncomplicated  Patient tolerance:  Patient tolerated the procedure well with no immediate complications

## 2017-10-19 NOTE — PERIOP NOTES
TRANSFER - OUT REPORT:    Verbal report given to Liza(name) on Kamilah Ferreira  being transferred to 56(unit) for routine post - op       Report consisted of patients Situation, Background, Assessment and   Recommendations(SBAR). Time Pre op antibiotic WYWHR:2008, 1107  Anesthesia Stop time: 6621  Bradford Present on Transfer to floor:yes  Order for Bradford on Chart:yes    Information from the following report(s) Procedure Summary and Accordion was reviewed with the receiving nurse. Opportunity for questions and clarification was provided. Is the patient on 02? NO    Is the patient on a monitor? NO    Is the nurse transporting with the patient? NO    Surgical Waiting Area notified of patient's transfer from PACU? YES      The following personal items collected during your admission accompanied patient upon transfer:   Dental Appliance:    Vision: Visual Aid: Glasses  Hearing Aid:    Jewelry: Jewelry: None  Clothing: Clothing:  (clothing to PACU)  Other Valuables:  Other Valuables: Eyeglasses  Valuables sent to safe:    1 bag of clothing and glasses returned to pt

## 2017-10-19 NOTE — PROGRESS NOTES
Primary Nurse Neelam 43, RN performed a dual skin assessment on this patient No impairment noted  Caio score is 17

## 2017-10-19 NOTE — IP AVS SNAPSHOT
[address] 
[phone] Patient: Dru Mayo MRN: [id number] Current Discharge Medication List  
  
START taking these medications Dose & Instructions Dispensing Information Comments Morning Noon Evening Bedtime  
 celecoxib 200 mg capsule Commonly known as:  CeleBREX Your last dose was: Your next dose is:    
   
   
 Dose:  200 mg Take 1 Cap by mouth daily for 30 days. Quantity:  30 Cap Refills:  0  
     
   
   
   
  
 oxyCODONE IR 5 mg immediate release tablet Commonly known as:  Ever Ivans Your last dose was: Your next dose is:    
   
   
 Dose:  5 mg Take 1 Tab by mouth every four (4) hours as needed for Pain. Max Daily Amount: 30 mg.  
 Quantity:  70 Tab Refills:  0  
     
   
   
   
  
 warfarin 2.5 mg tablet Commonly known as:  COUMADIN Your last dose was: Your next dose is:    
   
   
 Dose:  2.5 mg Take 1 Tab by mouth daily. Quantity:  90 Tab Refills:  1 CONTINUE these medications which have NOT CHANGED Dose & Instructions Dispensing Information Comments Morning Noon Evening Bedtime * albuterol 90 mcg/actuation inhaler Commonly known as:  PROVENTIL HFA Your last dose was: Your next dose is:    
   
   
 Dose:  2 Puff Take 2 Puffs by inhalation every six (6) hours as needed for Wheezing. Quantity:  2 Inhaler Refills:  5  
     
   
   
   
  
 * albuterol 2.5 mg /3 mL (0.083 %) nebulizer solution Commonly known as:  PROVENTIL VENTOLIN Your last dose was: Your next dose is:    
   
   
 Dose:  2.5 mg  
3 mL by Nebulization route every six (6) hours. As needed for wheezing. Quantity:  50 Each Refills:  3 CLARITIN 10 mg tablet Generic drug:  loratadine Your last dose was: Your next dose is: Dose:  10 mg Take 10 mg by mouth daily. Refills:  0  
     
   
   
   
  
 fenofibrate 160 mg tablet Commonly known as:  LOFIBRA Your last dose was: Your next dose is: TAKE 1 TABLET BY MOUTH DAILY Quantity:  30 Tab Refills:  5  
     
   
   
   
  
 fluticasone 50 mcg/actuation nasal spray Commonly known as:  Anthony Clearwater Your last dose was: Your next dose is:    
   
   
 Dose:  2 Spray 2 Sprays by Both Nostrils route daily. Quantity:  1 Bottle Refills:  11  
     
   
   
   
  
 METAMUCIL (SUGAR) Powd Generic drug:  psyllium seed-sucrose Your last dose was: Your next dose is:    
   
   
 Dose:  1 Dose Take 1 Dose by mouth two (2) times a day. Refills:  0  
     
   
   
   
  
 metoprolol succinate 50 mg XL tablet Commonly known as:  TOPROL-XL Your last dose was: Your next dose is:    
   
   
 Dose:  50 mg Take 1 Tab by mouth daily. Indications: hypertension Quantity:  30 Tab Refills:  11  
     
   
   
   
  
 mometasone 220 mcg (60 doses) inhaler Commonly known as:  Cliffton Norlander Your last dose was: Your next dose is:    
   
   
 Dose:  1 Puff Take 1 Puff by inhalation two (2) times a day. Quantity:  60 Cap Refills:  3 Omeprazole delayed release 20 mg tablet Commonly known as:  PRILOSEC D/R Your last dose was: Your next dose is:    
   
   
 Dose:  20 mg Take 20 mg by mouth daily. Refills:  0 POTASSIUM CHLORIDE PO Your last dose was: Your next dose is: Take  by mouth. Refills:  0  
     
   
   
   
  
 pravastatin 20 mg tablet Commonly known as:  PRAVACHOL Your last dose was: Your next dose is: TAKE 1 TABLET BY MOUTH NIGHTLY Quantity:  30 Tab Refills:  0  
     
   
   
   
  
 ramipril 10 mg capsule Commonly known as:  ALTACE  
   
 Your last dose was: Your next dose is: TAKE ONE CAPSULE BY MOUTH TWICE A DAY Quantity:  60 Cap Refills:  5  
     
   
   
   
  
 TRIAMCINOLONE ACETONIDE (BULK) Your last dose was: Your next dose is:    
   
   
 by Does Not Apply route. AS NEEDED Refills:  0  
     
   
   
   
  
 VITAMIN B-12 1,000 mcg tablet Generic drug:  cyanocobalamin Your last dose was: Your next dose is:    
   
   
 Dose:  1000 mcg Take 1 Tab by mouth daily. Refills:  0  
     
   
   
   
  
 * Notice: This list has 2 medication(s) that are the same as other medications prescribed for you. Read the directions carefully, and ask your doctor or other care provider to review them with you. STOP taking these medications   
 meloxicam 15 mg tablet Commonly known as:  MOBIC  
   
  
 NAPROSYN 250 mg tablet Generic drug:  naproxen ASK your doctor about these medications Dose & Instructions Dispensing Information Comments Morning Noon Evening Bedtime  
 mupirocin 2 % ointment Commonly known as:  Count includes the [facility] Ask about: Should I take this medication? Your last dose was: Your next dose is:    
   
   
 by Both Nostrils route two (2) times a day for 7 days. Quantity:  22 g Refills:  0 Where to Get Your Medications Information on where to get these meds will be given to you by the nurse or doctor. ! Ask your nurse or doctor about these medications  
  celecoxib 200 mg capsule  
 oxyCODONE IR 5 mg immediate release tablet  
 warfarin 2.5 mg tablet

## 2017-10-19 NOTE — ROUTINE PROCESS
Patient: Sherice Eden MRN: [id number]  SSN: [id number]   YOB: 1950  Age: 61 y.o. Sex: male     Patient is status post Procedure(s):  RIGHT TOTAL KNEE REPLACEMENT. Surgeon(s) and Role:     * Norma Baltazar MD - Primary    Local/Dose/Irrigation:  See [facility]                  Peripheral IV 10/19/17 Left Wrist (Active)   Site Assessment Clean, dry, & intact 10/19/2017  9:25 AM   Phlebitis Assessment 0 10/19/2017  9:25 AM   Infiltration Assessment 0 10/19/2017  9:25 AM   Dressing Status Clean, dry, & intact; New 10/19/2017  9:25 AM   Dressing Type Tape;Transparent 10/19/2017  9:25 AM   Hub Color/Line Status Green; Infusing 10/19/2017  9:25 AM   Action Taken Open ports on tubing capped 10/19/2017  9:25 AM   Alcohol Cap Used Yes 10/19/2017  9:25 AM                           Dressing/Packing:  Wound Knee Anterior;Mid;Right-DRESSING TYPE: Cast padding;Elastic bandage (Aquacel) (10/19/17 1100)

## 2017-10-19 NOTE — PROGRESS NOTES
NP ORTHO PROGRESS NOTE  Post Op day: Day of Surgery    Admit date: 10/19/2017  Date of Surgery: 10/19/2017   Procedures: Procedure(s):  RIGHT TOTAL KNEE REPLACEMENT  Admitting Physician: Cristo Hammer MD   Surgeon: Luis Armando Huizar) and Role:     Parul Garrido MD - Primary    Chart/Meds/Labs Reviewed  Current Facility-Administered Medications   Medication Dose Route Frequency    0.9% sodium chloride infusion  125 mL/hr IntraVENous CONTINUOUS    sodium chloride 0.9 % bolus infusion 500 mL  500 mL IntraVENous ONCE PRN    [START ON 10/20/2017] sodium chloride (NS) flush 5-10 mL  5-10 mL IntraVENous Q8H    sodium chloride (NS) flush 5-10 mL  5-10 mL IntraVENous PRN    acetaminophen (TYLENOL) tablet 650 mg  650 mg Oral Q6H    oxyCODONE IR (ROXICODONE) tablet 5 mg  5 mg Oral Q3H PRN    oxyCODONE IR (ROXICODONE) tablet 10 mg  10 mg Oral Q3H PRN    celecoxib (CELEBREX) capsule 200 mg  200 mg Oral BID    HYDROmorphone (PF) (DILAUDID) injection 0.5 mg  0.5 mg IntraVENous Q4H PRN    naloxone (NARCAN) injection 0.4 mg  0.4 mg IntraVENous PRN    ceFAZolin in 0.9% NS (ANCEF) IVPB soln 2 g  2 g IntraVENous Q8H    [START ON 10/20/2017] dexamethasone (PF) (DECADRON) injection 10 mg  10 mg IntraVENous ONCE    ondansetron (ZOFRAN) injection 4 mg  4 mg IntraVENous Q4H PRN    hydrOXYzine HCl (ATARAX) tablet 10 mg  10 mg Oral Q8H PRN    famotidine (PEPCID) tablet 20 mg  20 mg Oral QHS PRN    senna-docusate (PERICOLACE) 8.6-50 mg per tablet 1 Tab  1 Tab Oral BID    [START ON 10/20/2017] polyethylene glycol (MIRALAX) packet 17 g  17 g Oral DAILY    [START ON 10/21/2017] bisacodyl (DULCOLAX) suppository 10 mg  10 mg Rectal DAILY PRN    [START ON 10/20/2017] fluticasone (FLONASE) 50 mcg/actuation nasal spray 2 Spray  2 Spray Both Nostrils DAILY    [START ON 10/20/2017] loratadine (CLARITIN) tablet 10 mg  10 mg Oral DAILY    [START ON 10/20/2017] metoprolol succinate (TOPROL-XL) XL tablet 50 mg  50 mg Oral DAILY    [START ON 10/20/2017] pantoprazole (PROTONIX) tablet 40 mg  40 mg Oral ACB    [START ON 10/20/2017] pravastatin (PRAVACHOL) tablet 20 mg  20 mg Oral QHS    albuterol (PROVENTIL VENTOLIN) nebulizer solution 2.5 mg  2.5 mg Nebulization Q6H PRN    Warfarin - MD/PA to Dose   Other Rx Dosing/Monitoring    budesonide (PULMICORT) 500 mcg/2 ml nebulizer suspension  500 mcg Nebulization BID RT    lisinopril (PRINIVIL, ZESTRIL) tablet 20 mg  20 mg Oral BID       Subjective:    Complaints: Lower legs still a bit numb--getting better, unable to wiggle toes initially  Denies Dizziness, CP, SOB, N/V, Abdominal pain. Able to perform ankle pumps easily. Incisional pain control: well controlled thus far  Pain Control:   Pain Assessment  Pain Scale 1: Numeric (0 - 10)  Pain Intensity 1: 3  Pain Onset 1: post op  Pain Location 1: Knee  Pain Orientation 1: Right  Pain Description 1: Aching  Pain Intervention(s) 1: Medication (see MAR), Cold pack, Distraction    Oral diet: Tolerating diet well    Objective:  General: Alert,Ox4, cooperative, NAD  HEENT: Atraumatic, PERRL, anicteric sclerae  Lungs: Bilateral expansion. Equal excursion. No accessory muscle use. Gastrointestinal:  Soft, non-tender, non-distended, obese  Extremities:  Neurovasc exam WDL. + DP pulses. Sensation intact to light touch. Motor: + DF/PF          Calves non-tender upon palpation or with passive stretch. No significant erythema or swelling of undressed extremities. Ace-wrapped Dressing: clean, dry and intact.   Bradford draining clear guicho urine qs  NS infusing via IV peripherally  Vital Signs:   Visit Vitals    BP (!) 165/102 (BP 1 Location: Right arm, BP Patient Position: At rest)    Pulse 82    Temp 96.5 °F (35.8 °C)    Resp 22    Ht 5' 10\" (1.778 m)    Wt 108.9 kg (240 lb)    SpO2 97%    BMI 34.44 kg/m2    O2 Flow Rate (L/min): 3 l/min O2 Device: Room air   Patient Vitals for the past 24 hrs:   BP Temp Pulse Resp SpO2 Height Weight   10/19/17 1626 (!) 165/102 96.5 °F (35.8 °C) 82 22 97 % - -   10/19/17 1515 (!) 146/96 95.9 °F (35.5 °C) 74 20 92 % - -   10/19/17 1440 - - 73 20 96 % - -   10/19/17 1435 - - 71 17 99 % - -   10/19/17 1430 135/78 - - - - - -   10/19/17 1425 - - 66 11 100 % - -   10/19/17 1420 - - 70 17 96 % - -   10/19/17 1415 (!) 145/91 - 72 18 99 % - -   10/19/17 1410 - - 68 20 98 % - -   10/19/17 1405 - - 72 20 99 % - -   10/19/17 1400 141/82 - 73 19 92 % - -   10/19/17 1355 - - 70 19 98 % - -   10/19/17 1350 - - 71 17 99 % - -   10/19/17 1345 (!) 128/95 - 75 21 97 % - -   10/19/17 1340 135/79 - 75 19 96 % - -   10/19/17 1338 136/78 97.6 °F (36.4 °C) 81 20 98 % - -   10/19/17 1337 146/87 - 73 18 96 % - -   10/19/17 1335 - - 83 23 96 % - -   10/19/17 1330 136/78 - - - - - -   10/19/17 0850 162/74 97.8 °F (36.6 °C) 63 16 99 % 5' 10\" (1.778 m) 108.9 kg (240 lb)     Temp (24hrs), Av °F (36.1 °C), Min:95.9 °F (35.5 °C), Max:97.8 °F (36.6 °C)      Intake/output-last 8 hours:   10/19 07 - 10/19 190  In: 2250 [I.V.:2250]  Out: 770 [Urine:750]    Intake/output- 24 hours:       LAB:   Recent Results (from the past 24 hour(s))   GLUCOSE, POC    Collection Time: 10/19/17  9:14 AM   Result Value Ref Range    Glucose (POC) 98 65 - 100 mg/dL    Performed by Steffany Mancia       Lab Results   Component Value Date/Time    INR 1.0 10/09/2017 12:18 PM     Lab Results   Component Value Date/Time    HGB 14.5 10/09/2017 12:18 PM    HGB 14.2 2017 11:55 AM    HGB 15.0 2017 09:07 AM    HGB 14.8 2016 08:51 AM     No results for input(s): NA, K, CL, BUN, CREA, GLU, CA, MG, PHOS, ALB, TBIL, TBILI, SGOT in the last 72 hours.     No lab exists for component: ALT;3    PT/OT:   Gait:                    PATIENT MOBILITY                         Assessment and Plan    Principal Problem:    Primary osteoarthritis of right knee (10/17/2017)    Active Problems:    Sleep apnea (10/19/2017)      Overview: CPAP      Obesity (BMI 30.0-34.9) (10/19/2017)      HTN (hypertension), benign (4/1/2010)      Overview: CONTROLLED BY MEDS      Asthma (10/19/2017)      Overview: HAS BRONCHITIS OFTEN. On Inhalers. POD#0 Procedure(s):  RIGHT TOTAL KNEE REPLACEMENT  Stable postop. VTE prophylaxis: see above for medication, Mobilization, Ankle pumping exercises, SCDs per order if not able to exercise or mobilize well. Weight bearing:  WBAT  Pain management:  Multi-modal pain plan, see above for medication,  Ice packs & elevation of extremity per orders, active gentle ROM & mobilize frequently for short periods of time. PT or RN to mobilize later tonight. OT evaluate tomorrow. Wound benign. Neurovascularly within expected range postop. Continue to monitor & Continue present plans per orthopedic attending surgeon & interdisciplinary team for joint replacement.         Signed By: Juan Antonio Bustamante NP    RN, MSN, MA, Adult NP-BC

## 2017-10-19 NOTE — ANESTHESIA PREPROCEDURE EVALUATION
Anesthetic History   No history of anesthetic complications       Comments: Hx of respiratory insuffiencey after anesthesia with reintubation     Review of Systems / Medical History  Patient summary reviewed, nursing notes reviewed and pertinent labs reviewed    Pulmonary        Sleep apnea: CPAP    Asthma : well controlled       Neuro/Psych   Within defined limits           Cardiovascular    Hypertension        Dysrhythmias : PVC      Exercise tolerance: >4 METS     GI/Hepatic/Renal     GERD: well controlled      PUD     Endo/Other        Arthritis     Other Findings              Physical Exam    Airway  Mallampati: II  TM Distance: > 6 cm  Neck ROM: normal range of motion   Mouth opening: Normal     Cardiovascular  Regular rate and rhythm,  S1 and S2 normal,  no murmur, click, rub, or gallop             Dental  No notable dental hx       Pulmonary  Breath sounds clear to auscultation               Abdominal  GI exam deferred       Other Findings            Anesthetic Plan    ASA: 2  Anesthesia type: MAC, regional and spinal          Induction: Intravenous  Anesthetic plan and risks discussed with: Patient

## 2017-10-20 LAB
ANION GAP SERPL CALC-SCNC: 7 MMOL/L (ref 5–15)
BUN SERPL-MCNC: 16 MG/DL (ref 6–20)
BUN/CREAT SERPL: 16 (ref 12–20)
CALCIUM SERPL-MCNC: 8.4 MG/DL (ref 8.5–10.1)
CHLORIDE SERPL-SCNC: 105 MMOL/L (ref 97–108)
CO2 SERPL-SCNC: 26 MMOL/L (ref 21–32)
CREAT SERPL-MCNC: 1 MG/DL (ref 0.7–1.3)
GLUCOSE SERPL-MCNC: 125 MG/DL (ref 65–100)
HGB BLD-MCNC: 12.9 G/DL (ref 12.1–17)
INR PPP: 1.3 (ref 0.9–1.1)
POTASSIUM SERPL-SCNC: 4 MMOL/L (ref 3.5–5.1)
PROTHROMBIN TIME: 13.4 SEC (ref 9–11.1)
SODIUM SERPL-SCNC: 138 MMOL/L (ref 136–145)

## 2017-10-20 PROCEDURE — 97165 OT EVAL LOW COMPLEX 30 MIN: CPT

## 2017-10-20 PROCEDURE — 94640 AIRWAY INHALATION TREATMENT: CPT

## 2017-10-20 PROCEDURE — G8987 SELF CARE CURRENT STATUS: HCPCS

## 2017-10-20 PROCEDURE — 74011000250 HC RX REV CODE- 250: Performed by: PHYSICIAN ASSISTANT

## 2017-10-20 PROCEDURE — G8989 SELF CARE D/C STATUS: HCPCS

## 2017-10-20 PROCEDURE — 85018 HEMOGLOBIN: CPT | Performed by: PHYSICIAN ASSISTANT

## 2017-10-20 PROCEDURE — 85610 PROTHROMBIN TIME: CPT | Performed by: PHYSICIAN ASSISTANT

## 2017-10-20 PROCEDURE — 74011250637 HC RX REV CODE- 250/637: Performed by: NURSE PRACTITIONER

## 2017-10-20 PROCEDURE — 74011250637 HC RX REV CODE- 250/637: Performed by: PHYSICIAN ASSISTANT

## 2017-10-20 PROCEDURE — 74011250636 HC RX REV CODE- 250/636: Performed by: PHYSICIAN ASSISTANT

## 2017-10-20 PROCEDURE — G8979 MOBILITY GOAL STATUS: HCPCS

## 2017-10-20 PROCEDURE — 65270000029 HC RM PRIVATE

## 2017-10-20 PROCEDURE — 97110 THERAPEUTIC EXERCISES: CPT

## 2017-10-20 PROCEDURE — 36415 COLL VENOUS BLD VENIPUNCTURE: CPT | Performed by: PHYSICIAN ASSISTANT

## 2017-10-20 PROCEDURE — 80048 BASIC METABOLIC PNL TOTAL CA: CPT | Performed by: PHYSICIAN ASSISTANT

## 2017-10-20 PROCEDURE — 97535 SELF CARE MNGMENT TRAINING: CPT

## 2017-10-20 PROCEDURE — G8988 SELF CARE GOAL STATUS: HCPCS

## 2017-10-20 PROCEDURE — 97161 PT EVAL LOW COMPLEX 20 MIN: CPT

## 2017-10-20 PROCEDURE — G8978 MOBILITY CURRENT STATUS: HCPCS

## 2017-10-20 PROCEDURE — 97116 GAIT TRAINING THERAPY: CPT

## 2017-10-20 RX ORDER — WARFARIN SODIUM 5 MG/1
5 TABLET ORAL
Status: COMPLETED | OUTPATIENT
Start: 2017-10-20 | End: 2017-10-20

## 2017-10-20 RX ORDER — OXYCODONE HYDROCHLORIDE 5 MG/1
5 TABLET ORAL
Qty: 70 TAB | Refills: 0 | Status: SHIPPED | OUTPATIENT
Start: 2017-10-20 | End: 2018-02-02

## 2017-10-20 RX ORDER — CELECOXIB 200 MG/1
200 CAPSULE ORAL DAILY
Qty: 30 CAP | Refills: 0 | Status: SHIPPED | OUTPATIENT
Start: 2017-10-20 | End: 2017-11-19

## 2017-10-20 RX ORDER — WARFARIN 2.5 MG/1
2.5 TABLET ORAL DAILY
Qty: 90 TAB | Refills: 1 | Status: SHIPPED | OUTPATIENT
Start: 2017-10-20 | End: 2018-02-02

## 2017-10-20 RX ADMIN — CEFAZOLIN 2 G: 1 INJECTION, POWDER, FOR SOLUTION INTRAMUSCULAR; INTRAVENOUS; PARENTERAL at 03:22

## 2017-10-20 RX ADMIN — OXYCODONE HYDROCHLORIDE 5 MG: 5 TABLET ORAL at 07:15

## 2017-10-20 RX ADMIN — WARFARIN SODIUM 5 MG: 5 TABLET ORAL at 09:21

## 2017-10-20 RX ADMIN — Medication 10 ML: at 22:32

## 2017-10-20 RX ADMIN — OXYCODONE HYDROCHLORIDE 10 MG: 5 TABLET ORAL at 22:32

## 2017-10-20 RX ADMIN — LISINOPRIL 20 MG: 20 TABLET ORAL at 09:22

## 2017-10-20 RX ADMIN — Medication 10 ML: at 07:15

## 2017-10-20 RX ADMIN — BUDESONIDE 500 MCG: 0.5 INHALANT RESPIRATORY (INHALATION) at 07:53

## 2017-10-20 RX ADMIN — OXYCODONE HYDROCHLORIDE 10 MG: 5 TABLET ORAL at 13:21

## 2017-10-20 RX ADMIN — PRAVASTATIN SODIUM 20 MG: 20 TABLET ORAL at 22:32

## 2017-10-20 RX ADMIN — PANTOPRAZOLE SODIUM 40 MG: 40 TABLET, DELAYED RELEASE ORAL at 07:15

## 2017-10-20 RX ADMIN — STANDARDIZED SENNA CONCENTRATE AND DOCUSATE SODIUM 1 TABLET: 8.6; 5 TABLET, FILM COATED ORAL at 17:29

## 2017-10-20 RX ADMIN — CELECOXIB 200 MG: 200 CAPSULE ORAL at 09:21

## 2017-10-20 RX ADMIN — ACETAMINOPHEN 650 MG: 325 TABLET ORAL at 17:29

## 2017-10-20 RX ADMIN — OXYCODONE HYDROCHLORIDE 10 MG: 5 TABLET ORAL at 17:36

## 2017-10-20 RX ADMIN — OXYCODONE HYDROCHLORIDE 5 MG: 5 TABLET ORAL at 09:21

## 2017-10-20 RX ADMIN — METOPROLOL SUCCINATE 50 MG: 50 TABLET, EXTENDED RELEASE ORAL at 09:23

## 2017-10-20 RX ADMIN — POLYETHYLENE GLYCOL 3350 17 G: 17 POWDER, FOR SOLUTION ORAL at 09:25

## 2017-10-20 RX ADMIN — ACETAMINOPHEN 650 MG: 325 TABLET ORAL at 22:32

## 2017-10-20 RX ADMIN — STANDARDIZED SENNA CONCENTRATE AND DOCUSATE SODIUM 1 TABLET: 8.6; 5 TABLET, FILM COATED ORAL at 09:22

## 2017-10-20 RX ADMIN — CELECOXIB 200 MG: 200 CAPSULE ORAL at 17:29

## 2017-10-20 RX ADMIN — LORATADINE 10 MG: 10 TABLET ORAL at 09:22

## 2017-10-20 RX ADMIN — ACETAMINOPHEN 650 MG: 325 TABLET ORAL at 03:22

## 2017-10-20 RX ADMIN — ACETAMINOPHEN 650 MG: 325 TABLET ORAL at 09:22

## 2017-10-20 RX ADMIN — LISINOPRIL 20 MG: 20 TABLET ORAL at 17:29

## 2017-10-20 NOTE — PROGRESS NOTES
Problem: Mobility Impaired (Adult and Pediatric)  Goal: *Acute Goals and Plan of Care (Insert Text)  Physical Therapy Goals  Initiated 10/20/2017    1. Patient will move from supine to sit and sit to supine , scoot up and down and roll side to side in bed with modified independence within 4 days. 2. Patient will perform sit to stand with modified independence within 4 days. 3. Patient will ambulate with modified independence for 150 feet with the least restrictive device within 4 days. 4. Patient will ascend/descend 4 stairs with bilateral handrail(s) with supervision/set-up within 4 days. 5. Patient will perform home exercise program per protocol with independence within 4 days. 6. Patient will demonstrate AROM 0-90 degrees in operative joint within 4 days. physical Therapy TREATMENT  Patient: Catalina Giles (57 y.o. male)  Date: 10/20/2017  Diagnosis: DJD RIGHT KNEE   Primary osteoarthritis of right knee Primary osteoarthritis of right knee  Procedure(s) (LRB):  RIGHT TOTAL KNEE REPLACEMENT (Right) 1 Day Post-Op  Precautions: Fall, WBAT    ASSESSMENT:  Patient received supine in bed with ice to R knee. Motivated for therapy and sometimes impulsive with walker but open to cues. He needed moderate cues for hand placement with sit<>stand transfers. Patient progressed with ambulation, to 300ft with step through pattern and good use of functional knee flexion throughout gait cycle. Patient cleared 4 stairs with SPC and one railing. He returned to room and completed supine exercises and then transferred to chair. Most limited in flexion. Recommend HHPT. Is clear and safe for d/c home. Education provided regarding need for frequent movement at home (1x/waking hour), compliance with HEP, need for ice and time propped in extension to encourage full ROM, need to seek emergency medical attention with SOB at rest/chest pain, and signs/symptoms of DVT.      May benefit from crutch training in morning if patient prefers this over RW. Progression toward goals:  [x]      Improving appropriately and progressing toward goals  []      Improving slowly and progressing toward goals  []      Not making progress toward goals and plan of care will be adjusted     PLAN:  Patient continues to benefit from skilled intervention to address the above impairments. Continue treatment per established plan of care. Discharge Recommendations:  Home Health  Further Equipment Recommendations for Discharge: Owns RW     SUBJECTIVE:   Patient stated I think I could get up by myself.  when asked to have someone assist him to chair for all meals    OBJECTIVE DATA SUMMARY:   Critical Behavior:  Neurologic State: Alert  Orientation Level: Oriented X4  Cognition: Impulsive, Appropriate for age attention/concentration  Safety/Judgement: Decreased awareness of need for safety, Awareness of environment  Range of Motion:  RLE AROM  R Knee Flexion: 70  R Knee Extension: 5 (lacking)  RLE PROM  R Knee Flexion: 75  R Knee Extension: 0  Functional Mobility Training:  Bed Mobility:  Supine to Sit: Modified independent  Sit to Supine: Modified independent  Scooting: Modified independent  Transfers:  Sit to Stand: Supervision (verbal cues)  Stand to Sit: Supervision (verbal cues for hand placement)  Bed to Chair: Supervision  Balance:  Sitting: Intact  Standing: Intact; With support  Ambulation/Gait Training:  Distance (ft): 300 Feet (ft)  Assistive Device: Walker, rolling;Gait belt  Ambulation - Level of Assistance: Supervision  Gait Abnormalities: Antalgic;Decreased step clearance  Base of Support: Widened  Stance: Right decreased  Speed/Janette: Pace decreased (<100 feet/min)  Step Length: Right shortened;Left shortened  Swing Pattern: Right asymmetrical  Stairs:  Number of Stairs Trained: 4 (x2)  Stairs - Level of Assistance: Contact guard assistance  Rail Use: Right  (cane use in L)  Therapeutic Exercises:     EXERCISE   Sets   Reps   Active Active Assist Passive Self ROM   Comments   Ankle Pumps 1 10 [x]                                        []                                        []                                        []                                           Quad Sets 1 10 [x]                                        []                                        []                                        []                                           Hamstring Sets 1 10 [x]                                        []                                        []                                        []                                           Short Arc Quads 1 10 [x]                                        []                                        []                                        []                                           Knee Extension Stretch 1 2 min   []                                          []                                          [x]                                          []                                           Heel Slides 1 10 [x]                                        []                                        []                                        []                                           Knee Flexion Stretch   []                                        []                                        []                                        []                                        Sitting in knee flexion stretch at end of session     Pain:  Pain Scale 1: Numeric (0 - 10)  Pain Intensity 1: 5  Pain Location 1: Knee  Pain Orientation 1: Right  Pain Description 1: Aching  Pain Intervention(s) 1: Medication (see MAR)  Activity Tolerance:   Good  Please refer to the flowsheet for vital signs taken during this treatment.   After treatment:   [x] Patient left in no apparent distress sitting up in chair  [] Patient left in no apparent distress in bed  [x] Call bell left within reach  [x] Nursing notified  [] Caregiver present  [] Bed alarm activated    COMMUNICATION/COLLABORATION:   The patients plan of care was discussed with: Registered Nurse    Haroldo Summers PT, DPT   Time Calculation: 38 mins

## 2017-10-20 NOTE — OP NOTES
[address] [facility] [place] [address]   OP NOTE       Name:  Dmitry Bansal   MR#:  [id number]   :  1950   Account #:  [de-identified]    Surgery Date:  10/19/2017   Date of Adm:  10/19/2017       PREOPERATIVE DIAGNOSIS: Advanced degenerative arthritis of   right knee. POSTOPERATIVE DIAGNOSIS: Advanced degenerative arthritis of   right knee. PROCEDURES PERFORMED: Right total knee replacement. ANESTHESIA: Spinal.    ESTIMATED BLOOD LOSS: 200 mL. DRAINS: None. COMPLICATIONS: None. SPECIMENS REMOVED: Tibial and femoral bone fragments. ASSISTANT: Randi Barahona PA-C.    INDICATIONS: The patient has a long history of pain in his right knee. He has advanced degenerative changes and has failed conservative   treatment including a number of injections as well as arthroscopic   debridement. He now presents for the above procedure. DESCRIPTION OF PROCEDURE: On the day of operation, the patient   was taken to the operating room, spinal anesthesia administered. He   was placed in supine position. Right lower extremity was prepped and   draped in the usual fashion. After exsanguination with an Esmarch,   tourniquet inflated to 200 mmHg. A midline longitudinal incision was made over the knee, it was carried   through subcutaneous tissue. A medial parapatellar capsular incision   made. Advanced degenerative changes were noted throughout the   knee, especially of the medial compartment with bone-on-bone   contact. The knee was debrided of osteophytes and soft tissue. A drill   was used to gain access to the femoral canal. Distal femoral cutting   guide was assembled with a 5-degree valgus cut. Distal femoral cut   was made with an oscillating saw. The femur was sized and felt to be a   #7 in the Attune system. Anterior and posterior cuts were made,   chamfer cuts were made, box cut for the posterior stabilized prosthesis   made.  External tibial alignment guide assembled. Tibial plateau cut   was made with an oscillating saw. The flexion and extension gaps   were evaluated and felt to be appropriate. The tibia was sized and felt   to be a #6 in the [employer] system. Drill and punch were used to prepare   the tibial plateau. The patella was prepared with an oscillating saw and   sized for 38 mm. Trial components were put into place. The 8-mm   polyethylene insert was felt to provide the best fit, range of motion, with   proper alignment and proper tracking of the patella. The soft tissues   were infiltrated with Exparel local anesthetic. After the cement   matured, the 8-mm insert was put into place, tourniquet released,   coagulation achieved with electrocautery. The capsule repaired with #1   Vicryl sutures and supplemented with #2 PDS, 2-0 Vicryl was used to   close subcutaneous tissue, staples used to close the skin. Sterile   dressings were applied. The patient was taken to the recovery room in   satisfactory condition. The assistant, Vladimir Howard PA-C, assisted me with positioning,   retraction, implant installation and incision closure. MD NEW Smith / RICKEY   D:  10/19/2017   17:09   T:  10/19/2017   22:56   Job #:  [id number]

## 2017-10-20 NOTE — PROGRESS NOTES
CM noted pt had right total knee replacement with history of arrhythmia, arthritis, asthma, cancer, GERD, high cholesterol, HTN, and sleep apnea. CM met with pt to introduce self and role and offer support. Bedside assessment completed. CURRENT LIVING SITUATION-  Pt lives with his wife in a private residence. Pt was driving prior to this hospital stay and has assistance with transportation. Pt denies use of assistive devices and was independent with ADL's and IADL\"s. Pt's PCP is Dr Gustavo Rajan phone # [phone]. Pt last saw his PCP one month ago. Pt gets his prescriptions filled at [facility]. CM verified with pt his insurance is [employer]. Pt does not have an AMD and declines information. DISCHARGE PLANNING-  Pt denies need for assistance with medications, transportation, and follow up appointments. Disposition plan is to discharge home in care of family, home health, and self. CM offered choice of HH and referral sent to [facility]. Referral sent via [employer]. [facility] accepted pt for [facility].   Information added to AVS.  Case Silva RN CRM

## 2017-10-20 NOTE — PROGRESS NOTES
Bedside and Verbal shift change report given to Armond Lock RN (oncoming nurse) by Jazmin Lundberg RN (offgoing nurse). Report included the following information SBAR, Kardex, OR Summary, Procedure Summary, MAR and Recent Results.

## 2017-10-20 NOTE — PROGRESS NOTES
Care Management Interventions  PCP Verified by CM:  Yes  Palliative Care Criteria Met (RRAT>21 & CHF Dx)?: No  Mode of Transport at Discharge: Self  Transition of Care Consult (CM Consult): [address]: No  MyChart Signup: No  Physical Therapy Consult: Yes  Occupational Therapy Consult: Yes  Speech Therapy Consult: No  Current Support Network: Lives with Spouse, Own Home  Confirm Follow Up Transport: Family  Plan discussed with Pt/Family/Caregiver: Yes  Freedom of Choice Offered: Yes  Discharge Location  Discharge Placement: Home with home health

## 2017-10-20 NOTE — PROGRESS NOTES
Problem: Mobility Impaired (Adult and Pediatric)  Goal: *Acute Goals and Plan of Care (Insert Text)  Physical Therapy Goals  Initiated 10/20/2017    1. Patient will move from supine to sit and sit to supine , scoot up and down and roll side to side in bed with modified independence within 4 days. 2. Patient will perform sit to stand with modified independence within 4 days. 3. Patient will ambulate with modified independence for 150 feet with the least restrictive device within 4 days. 4. Patient will ascend/descend 4 stairs with bilateral handrail(s) with supervision/set-up within 4 days. 5. Patient will perform home exercise program per protocol with independence within 4 days. 6. Patient will demonstrate AROM 0-90 degrees in operative joint within 4 days. physical Therapy knee EVALUATION  Patient: Mary Arora (13 y.o. male)  Date: 10/20/2017  Primary Diagnosis: DJD RIGHT KNEE   Primary osteoarthritis of right knee  Procedure(s) (LRB):  RIGHT TOTAL KNEE REPLACEMENT (Right) 1 Day Post-Op   Precautions:   Fall, WBAT    ASSESSMENT :  Based on the objective data described below, the patient presents with impaired ROM and weakness following R TKR POD 1. Patient received supine in bed, agreeable to therapy. Moving well, requiring no physical assist for mobility, only cues and CGA. Drop in BP sitting to standing (157/78 to 123/97) patient asymptomatic. Immediately with step through gait pattern and mild c/o pain. Limited gait distance due to BP. Returned to chair and performed seated heel slides with increased pain. Anticipate good progress and d/c home with HHPT. Patient will benefit from skilled intervention to address the above impairments.   Patients rehabilitation potential is considered to be Good  Factors which may influence rehabilitation potential include:   []         None noted  []         Mental ability/status  []         Medical condition  []         Home/family situation and support systems  []         Safety awareness  [x]         Pain tolerance/management  []         Other:      PLAN :  Recommendations and Planned Interventions:  [x]           Bed Mobility Training             [x]    Neuromuscular Re-Education  [x]           Transfer Training                   []    Orthotic/Prosthetic Training  [x]           Gait Training                         []    Modalities  [x]           Therapeutic Exercises           [x]    Edema Management/Control  [x]           Therapeutic Activities            [x]    Patient and Family Training/Education  []           Other (comment):    Frequency/Duration: Patient will be followed by physical therapy twice daily to address goals. Discharge Recommendations: Home Health  Further Equipment Recommendations for Discharge: owns RW     SUBJECTIVE:   Patient stated I wanted to get up but they kept saying 'later'.     OBJECTIVE DATA SUMMARY:   HISTORY:    Past Medical History:   Diagnosis Date    Adverse effect of anesthesia     went apneic post surgery at [facility]    Anesthesia complication 0961    APNEA DURING HERNIA REPAIR, POST OPERATIVE INTUBATION AT [facility]    Arrhythmia     FREQUENT PVCS    Arthritis     Asthma     HAS BRONCHITIS OFTEN    Cancer ([facility] [address]     SKIN- UNKNOWN    Colon ulcer, aphthous 4/1/2010    Finger amputation, traumatic 2003    R 2ND DIGIT    GERD (gastroesophageal reflux disease) 4/1/2010    High cholesterol 4/1/2010    HTN (hypertension), benign 4/1/2010    CONTROLLED BY MEDS    Hx MRSA infection 2013    CYSTS BL LEGS    Sleep apnea     CPAP     Past Surgical History:   Procedure Laterality Date    ABDOMEN SURGERY PROC UNLISTED  2001    hernia    HX APPENDECTOMY  1959    HX HEENT      T&A    HX ORTHOPAEDIC  1994    left knee    HX ORTHOPAEDIC  01/06/2017    RT KNEE ARTHROSCOPY WITH MINESCECTOMY    HX OTHER SURGICAL  1990S    CERVICAL- UNKNOWN    HX OTHER SURGICAL      R HAND 2ND DIGIT TRAUMATIC AMPUTATION REPAIR    HX TONSIL AND ADENOIDECTOMY       Prior Level of Function/Home Situation: Patient independent PTA. Personal factors and/or comorbidities impacting plan of care: PMH    Home Situation  Home Environment: Private residence  # Steps to Enter: 4  Rails to Enter: Yes  Hand Rails : Bilateral  One/Two Story Residence: One story  Living Alone: No  Support Systems: Spouse/Significant Other/Partner  Patient Expects to be Discharged to[de-identified] Private residence  Current DME Used/Available at Home: Cane, straight  Tub or Shower Type: Tub/Shower combination    EXAMINATION/PRESENTATION/DECISION MAKING:   Critical Behavior:  Neurologic State: Alert  Orientation Level: Oriented X4  Cognition: Impulsive, Appropriate for age attention/concentration  Safety/Judgement: Decreased awareness of need for safety, Awareness of environment  Hearing: Auditory  Auditory Impairment: None  Range Of Motion:  AROM: Within functional limits  PROM: Within functional limits  Strength:    Strength: Within functional limits  Tone & Sensation:   Tone: Normal  Sensation: Intact  Coordination:  Coordination: Within functional limits  Vision:   Acuity: Within Defined Limits  Corrective Lenses: Glasses  Functional Mobility:  Bed Mobility:  Supine to Sit: Modified independent  Scooting: Modified independent  Transfers:  Sit to Stand: Contact guard assistance  Stand to Sit: Contact guard assistance  Bed to Chair: Supervision  Balance:   Sitting: Intact  Standing: Intact; With support  Ambulation/Gait Training:  Distance (ft): 40 Feet (ft)  Assistive Device: Walker, rolling;Gait belt  Ambulation - Level of Assistance: Contact guard assistance  Gait Abnormalities: Antalgic;Decreased step clearance  Base of Support: Widened  Stance: Right decreased  Speed/Janette: Pace decreased (<100 feet/min)  Step Length: Right shortened;Left shortened  Swing Pattern: Right asymmetrical  Functional Measure:  Tinetti test:    Sitting Balance: 1  Arises: 1  Attempts to Rise: 2  Immediate Standing Balance: 1  Standing Balance: 1  Nudged: 1  Eyes Closed: 0  Turn 360 Degrees - Continuous/Discontinuous: 1  Turn 360 Degrees - Steady/Unsteady: 1  Sitting Down: 1  Balance Score: 10  Indication of Gait: 1  R Step Length/Height: 1  L Step Length/Height: 1  R Foot Clearance: 1  L Foot Clearance: 1  Step Symmetry: 1  Step Continuity: 1  Path: 1  Trunk: 0  Walking Time: 0  Gait Score: 8  Total Score: 18       Tinetti Test and G-code impairment scale:  Percentage of Impairment CH    0%   CI    1-19% CJ    20-39% CK    40-59% CL    60-79% CM    80-99% CN     100%   Tinetti  Score 0-28 28 23-27 17-22 12-16 6-11 1-5 0       Tinetti Tool Score Risk of Falls  <19 = High Fall Risk  19-24 = Moderate Fall Risk  25-28 = Low Fall Risk  Tinetti ME. Performance-Oriented Assessment of Mobility Problems in Elderly Patients. [facility] 66; [id number]. (Scoring Description: PT Bulletin Feb. 10, 1993)    Older adults: Verner Nevin et al, 2009; n = 1000 [facility] elderly evaluated with ABC, SADAF, ADL, and IADL)  · Mean SADAF score for males aged 69-68 years = 26.21(3.40)  · Mean SADAF score for females age 69-68 years = 25.16(4.30)  · Mean SADAF score for males over 80 years = 23.29(6.02)  · Mean SADAF score for females over 80 years = 17.20(8.32)         G codes: In compliance with CMSs Claims Based Outcome Reporting, the following G-code set was chosen for this patient based on their primary functional limitation being treated: The outcome measure chosen to determine the severity of the functional limitation was the Tinetti with a score of 18/28 which was correlated with the impairment scale.     ? Mobility - Walking and Moving Around:     - CURRENT STATUS: CJ - 20%-39% impaired, limited or restricted    - GOAL STATUS: CI - 1%-19% impaired, limited or restricted    - D/C STATUS:  ---------------To be determined---------------        Physical Therapy Evaluation Charge Determination   History Examination Presentation Decision-Making   HIGH Complexity :3+ comorbidities / personal factors will impact the outcome/ POC  MEDIUM Complexity : 3 Standardized tests and measures addressing body structure, function, activity limitation and / or participation in recreation  LOW Complexity : Stable, uncomplicated  Other outcome measures Tinetti 18/28  MEDIUM      Based on the above components, the patient evaluation is determined to be of the following complexity level: LOW     Pain:  Pain Scale 1: Numeric (0 - 10)  Pain Intensity 1: 5  Pain Location 1: Knee  Pain Orientation 1: Right  Pain Description 1: Aching  Pain Intervention(s) 1: Medication (see MAR)  Activity Tolerance:   Good  Please refer to the flowsheet for vital signs taken during this treatment. After treatment:   [x]         Patient left in no apparent distress sitting up in chair  []         Patient left in no apparent distress in bed  [x]         Call bell left within reach  [x]         Nursing notified  []         Caregiver present  []         Bed alarm activated    COMMUNICATION/EDUCATION:   The patients plan of care was discussed with: Registered Nurse. [x]         Fall prevention education was provided and the patient/caregiver indicated understanding. [x]         Patient/family have participated as able in goal setting and plan of care. [x]         Patient/family agree to work toward stated goals and plan of care. []         Patient understands intent and goals of therapy, but is neutral about his/her participation. []         Patient is unable to participate in goal setting and plan of care.     Thank you for this referral.  Nadege Russell, PT, DPT   Time Calculation: 21 mins

## 2017-10-20 NOTE — PROGRESS NOTES
Bedside and Verbal shift change report given to Ngoc Monge (oncoming nurse) by Ivania Huerta (offgoing nurse). Report included the following information Kardex, Intake/Output, MAR and [employer].

## 2017-10-20 NOTE — PROGRESS NOTES
TOTAL KNEE PROGRESS NOTE      Subjective:     Post-Operative Day: 1 Status Post right Total Knee Arthroplasty  Systemic or Specific Complaints:No Complaints    Objective:     Patient Vitals for the past 24 hrs:   BP Temp Pulse Resp SpO2 Height Weight   10/20/17 0754 - - - - 94 % - -   10/20/17 0324 132/74 97.5 °F (36.4 °C) 89 17 94 % - -   10/19/17 2119 - - - - 95 % - -   10/19/17 2020 123/72 98.9 °F (37.2 °C) 91 18 95 % - -   10/19/17 1940 144/83 97.4 °F (36.3 °C) 92 20 95 % - -   10/19/17 1820 (!) 161/94 97.9 °F (36.6 °C) (!) 101 22 96 % - -   10/19/17 1626 (!) 165/102 96.5 °F (35.8 °C) 82 22 97 % - -   10/19/17 1515 (!) 146/96 95.9 °F (35.5 °C) 74 20 92 % - -   10/19/17 1440 - - 73 20 96 % - -   10/19/17 1435 - - 71 17 99 % - -   10/19/17 1430 135/78 - - - - - -   10/19/17 1425 - - 66 11 100 % - -   10/19/17 1420 - - 70 17 96 % - -   10/19/17 1415 (!) 145/91 - 72 18 99 % - -   10/19/17 1410 - - 68 20 98 % - -   10/19/17 1405 - - 72 20 99 % - -   10/19/17 1400 141/82 - 73 19 92 % - -   10/19/17 1355 - - 70 19 98 % - -   10/19/17 1350 - - 71 17 99 % - -   10/19/17 1345 (!) 128/95 - 75 21 97 % - -   10/19/17 1340 135/79 - 75 19 96 % - -   10/19/17 1338 136/78 97.6 °F (36.4 °C) 81 20 98 % - -   10/19/17 1337 146/87 - 73 18 96 % - -   10/19/17 1335 - - 83 23 96 % - -   10/19/17 1330 136/78 - - - - - -   10/19/17 0850 162/74 97.8 °F (36.6 °C) 63 16 99 % 5' 10\" (1.778 m) 108.9 kg (240 lb)       General: alert, cooperative, no distress, appears stated age   Wound: Wound clean and dry no evidence of infection. , No Erythema, No Edema, No Drainage and Wound Intact   Motion: Extension: Full Extension   DVT Exam: No evidence of DVT seen on physical exam.  Negative Vaughn's sign. No cords or calf tenderness. No significant calf/ankle edema.      Data Review:    Recent Results (from the past 24 hour(s))   GLUCOSE, POC    Collection Time: 10/19/17  9:14 AM   Result Value Ref Range    Glucose (POC) 98 65 - 100 mg/dL    [facility]    METABOLIC PANEL, BASIC    Collection Time: 10/20/17  3:28 AM   Result Value Ref Range    Sodium 138 136 - 145 mmol/L    Potassium 4.0 3.5 - 5.1 mmol/L    Chloride 105 97 - 108 mmol/L    CO2 26 21 - 32 mmol/L    Anion gap 7 5 - 15 mmol/L    Glucose 125 (H) 65 - 100 mg/dL    BUN 16 6 - 20 MG/DL    Creatinine 1.00 0.70 - 1.30 MG/DL    BUN/Creatinine ratio 16 12 - 20      GFR est AA >60 >60 ml/min/1.73m2    GFR est non-AA >60 >60 ml/min/1.73m2    Calcium 8.4 (L) 8.5 - 10.1 MG/DL   HEMOGLOBIN    Collection Time: 10/20/17  3:28 AM   Result Value Ref Range    HGB 12.9 12.1 - 17.0 g/dL   PROTHROMBIN TIME + INR    Collection Time: 10/20/17  3:28 AM   Result Value Ref Range    INR 1.3 (H) 0.9 - 1.1      Prothrombin time 13.4 (H) 9.0 - 11.1 sec         Assessment:     Status Post right Total Knee Arthroplasty. Doing well postoperatively. . Bandage in post-op ACE. Labs stable. PT progressing. Pain control WNL.      Plan:     Remove post-op ACE  Continues current post-op course  Continue PT per protocol  Plan to discharge to [facility] tomorrow

## 2017-10-20 NOTE — PROGRESS NOTES
Occupational Therapy EVALUATION/discharge  Patient: Edvin Ramachandran (15 y.o. male)  Date: 10/20/2017  Primary Diagnosis: DJD RIGHT KNEE   Primary osteoarthritis of right knee  Procedure(s) (LRB):  RIGHT TOTAL KNEE REPLACEMENT (Right) 1 Day Post-Op   Precautions:   Fall, WBAT    ASSESSMENT:   Pt was cooperative and motivated to participate in therapy session. Pt was impulsive with transitioning to sitting at EOB without assistance, but responded well to cues for safety. Pt demonstrated good balance while getting up from EOB and performing functional mobility task to bathroom. Pt showed decreased awareness of safety precautions while performing toileting and dressing tasks, but was receptive to feedback and cues for ways to complete activities more safely. Pt required additional assistance with donning socks and shorts, with pt stating that reaching down to feet was difficult at baseline. Pt reported that assistance will be available for a week from spouse at home, and that pt would acquire a reacher to help with LB dressing after being educated on use of AE for dressing. Despite decreased safety awareness, pt required no more than CGA for functional mobility tasks without loss of balance or signs of decreased endurance. Pt does not require additional acute OT services at this time for safety and independence with ADLs and functional mobility tasks. Recommendation for discharge to home with family support based upon supports that will be available per pt report. Further skilled acute occupational therapy is not indicated at this time. Discharge Recommendations: None  Further Equipment Recommendations for Discharge: may obtain reacher      SUBJECTIVE:   Patient stated I'm a  but I hope to retire soon.     OBJECTIVE DATA SUMMARY:   HISTORY:   Past Medical History:   Diagnosis Date    Adverse effect of anesthesia     went apneic post surgery at [facility]    Anesthesia complication 0950    APNEA DURING HERNIA REPAIR, POST OPERATIVE INTUBATION AT [facility]    Arrhythmia     FREQUENT PVCS    Arthritis     Asthma     HAS BRONCHITIS OFTEN    Cancer ([facility] [address]     SKIN- UNKNOWN    Colon ulcer, aphthous 4/1/2010    Finger amputation, traumatic 2003    R 2ND DIGIT    GERD (gastroesophageal reflux disease) 4/1/2010    High cholesterol 4/1/2010    HTN (hypertension), benign 4/1/2010    CONTROLLED BY MEDS    Hx MRSA infection 2013    CYSTS BL LEGS    Sleep apnea     CPAP     Past Surgical History:   Procedure Laterality Date    ABDOMEN SURGERY PROC UNLISTED  2001    hernia    HX APPENDECTOMY  1959    HX HEENT      T&A    HX ORTHOPAEDIC  1994    left knee    HX ORTHOPAEDIC  01/06/2017    RT KNEE ARTHROSCOPY WITH MINESCECTOMY    HX OTHER SURGICAL  1990S    CERVICAL- UNKNOWN    HX OTHER SURGICAL      R HAND 2ND DIGIT TRAUMATIC AMPUTATION REPAIR    HX TONSIL AND ADENOIDECTOMY         Prior Level of Function/Home Situation: Pt was independent at home prior to surgery. Expanded or extensive additional review of patient history:     Home Situation  Home Environment: Private residence  # Steps to Enter: 4  Rails to Enter: Yes  Hand Rails : Bilateral  One/Two Story Residence: One story  Living Alone: No  Support Systems: Spouse/Significant Other/Partner  Patient Expects to be Discharged to[de-identified] Private residence  Current DME Used/Available at Home: Cane, straight  Tub or Shower Type: Tub/Shower combination  [x]  Right hand dominant   []  Left hand dominant    EXAMINATION OF PERFORMANCE DEFICITS:  Cognitive/Behavioral Status:  Neurologic State: Alert  Orientation Level: Oriented X4  Cognition: Impulsive; Appropriate for age attention/concentration  Perception: Appears intact  Perseveration: No perseveration noted  Safety/Judgement: Decreased awareness of need for safety; Awareness of environment    Skin: dressing intact over knee    Edema: none noted    Hearing:   Auditory  Auditory Impairment: None    Vision/Perceptual: Acuity: Within Defined Limits    Corrective Lenses: Glasses    Range of Motion:    AROM: Within functional limits  PROM: Within functional limits                      Strength:    Strength: Within functional limits                Coordination:  Coordination: Within functional limits  Fine Motor Skills-Upper: Right Intact; Left Intact    Gross Motor Skills-Upper: Right Intact; Left Intact    Tone & Sensation:    Tone: Normal  Sensation: Intact                      Balance:  Sitting: Intact  Standing: Intact; With support    Functional Mobility and Transfers for ADLs:  Bed Mobility:  Supine to Sit: Stand-by asssistance  Scooting: Stand-by asssistance    Transfers:  Sit to Stand: Supervision  Stand to Sit: Supervision  Bed to Chair: Supervision  Toilet Transfer : Supervision    ADL Assessment:  Feeding: Supervision    Oral Facial Hygiene/Grooming: Supervision    Bathing: Minimum assistance    Upper Body Dressing: Supervision    Lower Body Dressing: Supervision    Toileting: Supervision                ADL Intervention and task modifications:    Pt was encountered supine in bed with HOB elevated. Pt transitioned from supine to sitting EOB with SBA. Pt performed sit to stand with CGA and verbal cues for hand placement. Pt performed functional mobility task to bathroom with RW and CGA. Pt completed toileting task while standing with CGA and use of grab bars and RW. Pt sat on toilet to complete LB dressing with CGA and use of grab bar, RW, and elevated toilet seat. Pt required verbal cues for hand placement away from handles of RW. Pt completed LB dressing with CGA, with pt being instructed on placement of affected LE when completing dressing task. Pt stood up from toilet with RW and CGA and stood to complete grooming task with CGA and RW. Pt returned to room and sat in chair with CGA and verbal cues for hand placement. Pt was left seated in chair with call bell within reach.      Grooming  Grooming Assistance: Supervision/set up  Washing Hands: Supervision/set-up              Upper Body Dressing Assistance  Dressing Assistance: Supervision/set-up  Pullover Shirt: Supervision/set-up    Lower Body Dressing Assistance  Dressing Assistance: Minimum assistance  Underpants: Minimum assistance  Socks: Minimum assistance  Position Performed:  (Sitting on toilet)  Cues: Verbal cues provided  Adaptive Equipment Used: Grab bar;Walker    Toileting  Toileting Assistance: Contact guard assistance  Bladder Hygiene: Supervision/set-up  Clothing Management: Contact guard assistance  Cues: Verbal cues provided  Adaptive Equipment: Walker;Grab bars    Cognitive Retraining  Safety/Judgement: Decreased awareness of need for safety; Awareness of environment    Functional Measure:  Barthel Index:    Bathin  Bladder: 10  Bowels: 10  Groomin  Dressin  Feeding: 10  Mobility: 10  Stairs: 5  Toilet Use: 10  Transfer (Bed to Chair and Back): 10  Total: 80       Barthel and G-code impairment scale:  Percentage of impairment CH  0% CI  1-19% CJ  20-39% CK  40-59% CL  60-79% CM  80-99% CN  100%   Barthel Score 0-100 100 99-80 79-60 59-40 20-39 1-19   0   Barthel Score 0-20 20 17-19 13-16 9-12 5-8 1-4 0      The Barthel ADL Index: Guidelines  1. The index should be used as a record of what a patient does, not as a record of what a patient could do. 2. The main aim is to establish degree of independence from any help, physical or verbal, however minor and for whatever reason. 3. The need for supervision renders the patient not independent. 4. A patient's performance should be established using the best available evidence. Asking the patient, friends/relatives and nurses are the usual sources, but direct observation and common sense are also important. However direct testing is not needed. 5. Usually the patient's performance over the preceding 24-48 hours is important, but occasionally longer periods will be relevant.   6. Middle categories imply that the patient supplies over 50 per cent of the effort. 7. Use of aids to be independent is allowed. Queta Ceballos., Barthel, D.W. (4964). Functional evaluation: the Barthel Index. [address] (14)2. Tammy Buckner jessica DEBRA Barney, Smith Ramos., Carol Ann Yousif., Alessia Dueñass, [facility] (1999). Measuring the change indisability after inpatient rehabilitation; comparison of the responsiveness of the Barthel Index and Functional [place] Measure. Journal of Neurology, Neurosurgery, and Psychiatry, 66(4), 796-877. David Gallagher, NGloriaJ.A, CLARISA Mcmanus, & Ad Pardo M.A. (2004.) Assessment of post-stroke quality of life in cost-effectiveness studies: The usefulness of the Barthel Index and the EuroQoL-5D. Quality of Life Research, 13, 527-96     G codes: In compliance with CMSs Claims Based Outcome Reporting, the following G-code set was chosen for this patient based on their primary functional limitation being treated: The outcome measure chosen to determine the severity of the functional limitation was the Barthel Index with a score of 80/100 which was correlated with the impairment scale. ?  Self Care:     - CURRENT STATUS: CI - 1%-19% impaired, limited or restricted    - GOAL STATUS: CI - 1%-19% impaired, limited or restricted    - D/C STATUS:  CI - 1%-19% impaired, limited or restricted       Occupational Therapy Evaluation Charge Determination   History Examination Decision-Making   LOW Complexity : Brief history review  LOW Complexity : 1-3 performance deficits relating to physical, cognitive , or psychosocial skils that result in activity limitations and / or participation restrictions  LOW Complexity : No comorbidities that affect functional and no verbal or physical assistance needed to complete eval tasks       Based on the above components, the patient evaluation is determined to be of the following complexity level: LOW   Pain:Pain Scale 1: Numeric (0 - 10)  Pain Intensity 1: 5  Pain Location 1: Knee  Pain Orientation 1: Right  Pain Description 1: Aching  Pain Intervention(s) 1: Medication (see MAR)  Activity Tolerance:   Pt tolerated all activities well, with pt taking seated rest break after completing LB dressing before completing next task. Pt reported \"feeling hot\" but denied nausea, lightheadedness, or other symptoms. Please refer to the flowsheet for vital signs taken during this treatment. After treatment:   []  Patient left in no apparent distress sitting up in chair  []  Patient left in no apparent distress in bed  []  Call bell left within reach  []  Nursing notified  []  Caregiver present  []  Bed alarm activated    COMMUNICATION/EDUCATION:   Communication/Collaboration:  []      Home safety education was provided and the patient/caregiver indicated understanding. []      Patient/family have participated as able and agree with findings and recommendations. []      Patient is unable to participate in plan of care at this time. Findings and recommendations were discussed with: Physical Therapist and Registered Nurse    RADHA Smith    Regarding student involvement in patient care:  A student participated in this treatment session. Per CMS Medicare statements and AOTA guidelines I certify that the following was true:  1. I was present and directly observed the entire session. 2. I made all skilled judgments and clinical decisions regarding care. 3. I am the practitioner responsible for assessment, treatment, and documentation.       Delma Garcia OT  Time Calculation: 30 mins

## 2017-10-20 NOTE — DISCHARGE INSTRUCTIONS
DC Orders All Total Knees    Case Management for DC planning to Home HC .   - PT 3 times a week for 2 weeks; WBAT. - PT/INR Every Monday/Thursday for 2 weeks, Call Dr. Franck Davidson with results ([phone]). - Remove staples at 2 weeks post-op; 11/2/17 .   - Follow up in Office in 2 1/2 weeks. After Hospital Care Plan:  Discharge Instructions Knee Replacement-Dr. Franck Davidson    Patient Name: Karyle Schwartz  Date of procedure: 10/19/2017   Procedure: Procedure(s):  RIGHT TOTAL KNEE REPLACEMENT  Surgeon: Georjean Olszewski) and Role:     * Aristides Suarez MD - Primary    PCP: Harmony Amor MD  Date of discharge: No discharge date for patient encounter. Follow up appointments   Follow up with Dr. Franck Davidson in 2 ½ weeks. Call [phone] to make an appointment.  If home health has been arranged for you the agency will contact you to arrange dates/times for visits. Please call them if you do not hear from them within 24 hours after you are discharged    When to call your Orthopaedic Surgeon: Call [phone]. If you call after 5pm or on a weekend, the on call physician will be contacted   Unrelieved pain   Signs of infection-if your incision is red; continues to have drainage; drainage has a foul odor or if you have a persistent fever over 101 degrees   Signs of a blood clot in your leg-calf pain, tenderness, redness, swelling of lower leg    When to call your Primary Care Physician:   Concerns about medical conditions such as diabetes, high blood pressure, asthma, congestive heart failure   Call if blood sugars are elevated, persistent headache or dizziness, coughing or congestion, constipation or diarrhea, burning with urination, abnormal heart rate    When to call [phone] go to the nearest emergency room   Acute onset of chest pain, shortness of breath, difficulty breathing      Activity   Weight bearing as tolerated with walker or crutches.  Refer to pages 23-31 of your handbook for instructions and pictures   Complete your Home Exercise Program daily as instructed by your therapist.  Refer to pages 33-41 of your handbook for instructions and pictures   Get up every one hour and walk (except at night when sleeping)   Do not drive or operate heavy machinery    Incision Care   The Aquacel (brown, waterproof) surgical dressing is to remain on your knee for 7 days. On the 7th day have someone gently peel the dressing off by carefully lifting the edge and stretching it slightly to break the adhesive seal   You will have staples in your knee incision. They will be removed by the home health agency staff   If your Aquacel dressing comes loose/off before the 7th day, you may replace it with a dry sterile gauze dressing; change it daily. Once your incision is not draining, you may leave it open to air   You may take a shower with the Aquacel dressing in place. Once the Aquacel is removed, you may shower and get your incision wet but do not submerge your incision under water in a bath tub, hot tub or swimming pool for 6 weeks after surgery. Preventing blood clots    Take Coumadin as prescribed by Dr. Breanne Matute for one month following surgery   Wear elastic stockings (TEDS) for 4 weeks. You should remove them for approximately 1 hour daily for showering/sponge bathing    Pain management   Take pain medication as prescribed; decrease the amount you use as your pain lessens   Avoid alcoholic beverages while taking pain medication   Please be aware that many medications contain Tylenol. We do not want you to over medicate so please read the information below as a guide. Do not take more than 4 Grams of Tylenol in a 24 hour period.   (There are 1000 milligrams in one Gram)  o Percocet contains 325 mg of Tylenol per tablet (do not take more than 12 tablets in 24 hours)  o Lortab contains 500 mg of Tylenol per tablet (do not take more than 8 tablets in 24 hours)  o Norco contains 325 mg of Tylenol per tablet (do not take more than 12 tablets in 24 hours).  You may place an ice bag on your hip for 15-20 minutes after exercising    Diet   Resume usual diet; drink plenty of fluids; eat foods high in fiber   You may want to take a stool softener (such as Senokot-S or Colace) to prevent constipation while you are taking pain medication. If constipation occurs, take a laxative (such as Dulcolax tablets, Milk of Magnesia, or a suppository)    [facility] Protocol (to be followed by [address] [place] [address])  Nursing   Draw a PT/INR per physicians orders. Call results to Dr. Silver Rutledge at [phone]   Remove staples per physicians order   Complete head to toe assessment, vital signs   Medication reconciliation   Review pain management   Manage chronic medical conditions    Physical Therapy-per physicians orders     Weight bearing status:             Mobility Status:                Gait:                ADL status overall composite:                   Physical Therapy   Assessment and evaluation-bed mobility; functional transfers (bed, chair, bathroom, stairs); ambulation with equipment, car transfers, shower transfers, safety and ability to get out of house in the event of an emergency   Review weight bearing as tolerated, wean from walker or crutches as tolerated   Discuss pain management   Review how to do ADLs.  Refer to page 42 of patient handbook    Home Exercise Program-refer to pages 33-41 of patient handbook for exercises

## 2017-10-21 VITALS
HEART RATE: 66 BPM | OXYGEN SATURATION: 95 % | BODY MASS INDEX: 34.36 KG/M2 | RESPIRATION RATE: 14 BRPM | WEIGHT: 240 LBS | TEMPERATURE: 98.3 F | DIASTOLIC BLOOD PRESSURE: 65 MMHG | SYSTOLIC BLOOD PRESSURE: 111 MMHG | HEIGHT: 70 IN

## 2017-10-21 LAB
HGB BLD-MCNC: 12.9 G/DL (ref 12.1–17)
INR PPP: 2.4 (ref 0.9–1.1)
PROTHROMBIN TIME: 25.2 SEC (ref 9–11.1)

## 2017-10-21 PROCEDURE — 36415 COLL VENOUS BLD VENIPUNCTURE: CPT | Performed by: PHYSICIAN ASSISTANT

## 2017-10-21 PROCEDURE — 74011000250 HC RX REV CODE- 250: Performed by: PHYSICIAN ASSISTANT

## 2017-10-21 PROCEDURE — 94640 AIRWAY INHALATION TREATMENT: CPT

## 2017-10-21 PROCEDURE — 77030012893

## 2017-10-21 PROCEDURE — 85018 HEMOGLOBIN: CPT | Performed by: PHYSICIAN ASSISTANT

## 2017-10-21 PROCEDURE — 85610 PROTHROMBIN TIME: CPT | Performed by: PHYSICIAN ASSISTANT

## 2017-10-21 PROCEDURE — 74011250637 HC RX REV CODE- 250/637: Performed by: PHYSICIAN ASSISTANT

## 2017-10-21 PROCEDURE — 97116 GAIT TRAINING THERAPY: CPT

## 2017-10-21 PROCEDURE — 74011250637 HC RX REV CODE- 250/637: Performed by: NURSE PRACTITIONER

## 2017-10-21 RX ADMIN — PANTOPRAZOLE SODIUM 40 MG: 40 TABLET, DELAYED RELEASE ORAL at 06:34

## 2017-10-21 RX ADMIN — Medication 10 ML: at 06:35

## 2017-10-21 RX ADMIN — OXYCODONE HYDROCHLORIDE 10 MG: 5 TABLET ORAL at 06:34

## 2017-10-21 RX ADMIN — ACETAMINOPHEN 650 MG: 325 TABLET ORAL at 13:18

## 2017-10-21 RX ADMIN — LORATADINE 10 MG: 10 TABLET ORAL at 09:21

## 2017-10-21 RX ADMIN — BUDESONIDE 500 MCG: 0.5 INHALANT RESPIRATORY (INHALATION) at 07:51

## 2017-10-21 RX ADMIN — CELECOXIB 200 MG: 200 CAPSULE ORAL at 09:21

## 2017-10-21 RX ADMIN — OXYCODONE HYDROCHLORIDE 10 MG: 5 TABLET ORAL at 13:18

## 2017-10-21 RX ADMIN — ACETAMINOPHEN 650 MG: 325 TABLET ORAL at 04:00

## 2017-10-21 RX ADMIN — METOPROLOL SUCCINATE 50 MG: 50 TABLET, EXTENDED RELEASE ORAL at 09:21

## 2017-10-21 RX ADMIN — STANDARDIZED SENNA CONCENTRATE AND DOCUSATE SODIUM 1 TABLET: 8.6; 5 TABLET, FILM COATED ORAL at 09:21

## 2017-10-21 RX ADMIN — LISINOPRIL 20 MG: 20 TABLET ORAL at 09:21

## 2017-10-21 NOTE — PROGRESS NOTES
Bedside and Verbal shift change report given to Mike Childress (oncoming nurse) by Nino De Oliveira (offgoing nurse). Report included the following information Kardex, Intake/Output, MAR and Accordion.

## 2017-10-21 NOTE — PROGRESS NOTES
Problem: Mobility Impaired (Adult and Pediatric)  Goal: *Acute Goals and Plan of Care (Insert Text)  Physical Therapy Goals  Initiated 10/20/2017    1. Patient will move from supine to sit and sit to supine , scoot up and down and roll side to side in bed with modified independence within 4 days. 2. Patient will perform sit to stand with modified independence within 4 days. 3. Patient will ambulate with modified independence for 150 feet with the least restrictive device within 4 days. 4. Patient will ascend/descend 4 stairs with bilateral handrail(s) with supervision/set-up within 4 days. 5. Patient will perform home exercise program per protocol with independence within 4 days. 6. Patient will demonstrate AROM 0-90 degrees in operative joint within 4 days. physical Therapy TREATMENT  Patient: Anjelica Alfaro (89 y.o. male)  Date: 10/21/2017  Diagnosis: DJD RIGHT KNEE   Primary osteoarthritis of right knee Primary osteoarthritis of right knee  Procedure(s) (LRB):  RIGHT TOTAL KNEE REPLACEMENT (Right) 2 Days Post-Op  Precautions: Fall, WBAT    ASSESSMENT: Patient willing and motivated to participate. He reports that he is fine with the rolling walker and does not need to try the crutches. Is demonstrating and managing bed mobility and gait well. Nursing present upon return to room for discharge paperwork. Progression toward goals:  [x]      Improving appropriately and progressing toward goals  []      Improving slowly and progressing toward goals  []      Not making progress toward goals and plan of care will be adjusted     PLAN:  Patient continues to benefit from skilled intervention to address the above impairments. Continue treatment per established plan of care. Discharge Recommendations:  Home Health  Further Equipment Recommendations for Discharge:  Has rolling walker. SUBJECTIVE:   Patient stated I've been waiting for you to go home! Libertad Gray    OBJECTIVE DATA SUMMARY:   Critical Behavior:  Neurologic State: Alert  Orientation Level: Oriented X4  Cognition: Appropriate decision making  Safety/Judgement: Insight into deficits                            Functional Mobility Training:     Refer to flow chart (information did not get pulled over into document). Bed Mobility:                    Transfers:                                   Balance:     Ambulation/Gait Training:                                                    Stairs:               Pain:  Pain Scale 1: Numeric (0 - 10)  Pain Intensity 1: 1  Pain Location 1: Knee  Pain Orientation 1: Right  Pain Description 1: Sore  Pain Intervention(s) 1: Ice  Activity Tolerance:   Good    Please refer to the flowsheet for vital signs taken during this treatment.   After treatment:   [x] Patient left in no apparent distress sitting up in chair  [] Patient left in no apparent distress in bed  [x] Call bell left within reach  [x] Nursing notified  [x] Caregiver present  [] Bed alarm activated    COMMUNICATION/COLLABORATION:   The patients plan of care was discussed with: Registered Nurse    Skylar Irvin PT   Time Calculation: 15 mins

## 2017-10-21 NOTE — PROGRESS NOTES
Bedside shift change report given to ERNA Recio (oncoming nurse) by Bonny Ballesteros (offgoing nurse). Report included the following information SBAR.

## 2017-10-21 NOTE — PROGRESS NOTES
[place] [facility] [place]  I have reviewed discharge instructions with the patient. The patient verbalized understanding. Patient was discharged with an adult friend who was to drive him to his preferred pharmacy in [facility] and then to his home.

## 2017-10-23 ENCOUNTER — PATIENT OUTREACH (OUTPATIENT)
Dept: FAMILY MEDICINE CLINIC | Age: 67
End: 2017-10-23

## 2017-10-23 RX ORDER — ACETAMINOPHEN 325 MG/1
650 TABLET ORAL
COMMUNITY
End: 2018-05-22

## 2017-10-23 NOTE — PROGRESS NOTES
[facility] 10/19-10/21/2017 Scheduled total right knee replacement    TRANSITIONS OF CARE NOTE     Please note functional assessment. Patient stated \"Oh, I'm doing just fine\". Patient denies fever, chills, nausea, vomiting, chest pain, sob or inability to take medications. NN discussed red flags with patient who verbalized understanding of when to seek immediate medical attention. Red flags/sepsis: fever or below normal temperature (97f), chills, nausea, vomiting, diarrhea, chest pain, shortness of breath, unable to take medications, unusual sensations, and BFAST. Patient reports he is getting around inside and walking outside with only a cane. Reports some drainage from incision site. States [facility] nurse assessed today and said \"It looked fine\". Follow up appointment scheduled. Patient agrees to call and schedule PCP appointment if needed. HH in placed with [facility]. Med rec completed with patient and is up to date. PT/INR to be checked by [facility]. RRAT score: 3 Low    Past Hospitalizations and/or ED visits last 12 months? 0    Advance Medical Directive on file in EMR? no none    Barriers to care?  none    Support System consists of: spouse    [address], if so what agency? Yes.  [facility]    Medical History:     Past Medical History:   Diagnosis Date    Adverse effect of anesthesia     went apneic post surgery at [facility]    Anesthesia complication 4147    APNEA DURING HERNIA REPAIR[place] [facility]   [place] Arrhythmia     FREQUENT PVCS    Arthritis     Asthma     HAS BRONCHITIS OFTEN    Cancer ([facility] [address]     SKIN- UNKNOWN    Colon ulcer, aphthous 4/1/2010    Finger amputation, traumatic 2003    R 2ND DIGIT    GERD (gastroesophageal reflux disease) 4/1/2010    High cholesterol 4/1/2010    HTN (hypertension), benign 4/1/2010    CONTROLLED BY MEDS    Hx MRSA infection 2013    CYSTS BL LEGS    Sleep apnea     CPAP       This represents Transitions of Care b/c NN spoke with patient and/or caregiver within 1 business days of discharge. Called patient on 10/23/2017 and verified patient with 2 identifiers. Fall Risk Assessment:    Fall Risk Assessment, last 12 mths 3/5/2015   Able to walk? Yes   Fall in past 12 months? Yes   Fall with injury? No   Number of falls in past 12 months 2   Fall Risk Score 2     Patient presenting symptoms (referenced by Terri Castaneda MD): The patient has a long-standing history of pain within the right knee . The patient has severe degenerative arthritis of the right knee and has exhausted conservative therapy at this time including prior intra-articular injections, activity modifications, OTC NSAIDS. The patient has been limited in their ability to perform ADLs, walk short distances or climb steps appropriately. The patient presents for the above-mentioned procedure. The patient has been cleared from a medical and cardiac standpoint. Course of current Hospitalization (referenced by Terri Castaneda MD note dated 10/21/2017): The patient was admitted to the hospital.  The Pt. Underwent a right total knee replacement . Postoperatively, the pt. did well. The pt. Remained stable from a medical standpoint. The patient ambulated, WBAT weightbearing within the hospital with Physical Therapy. The patient continued with this therapy at [facility] with PT. The patient began taking Coumadin pre-operatively for DVT prophylaxis and will continue on this for one month. At the time of discharge, there was no evidence of any DVT noted. The patient had negative Homans signs bilateral lower extremities. At the time of discharge, the patient's right knee incision appeared with staples intact. No signs of infection or inflammation noted.  The patient will follow up in our office in 2-1/2 weeks.     DC med list:        Discharge Medication List as of 10/21/2017  1:09 PM             START taking these medications     Details   oxyCODONE IR (ROXICODONE) 5 mg immediate release tablet Take 1 Tab by mouth every four (4) hours as needed for Pain. Max Daily Amount: 30 mg., Print, Disp-70 Tab, R-0       warfarin (COUMADIN) 2.5 mg tablet Take 1 Tab by mouth daily. , Print, Disp-90 Tab, R-1       celecoxib (CELEBREX) 200 mg capsule Take 1 Cap by mouth daily for 30 days. , Print, Disp-30 Cap, R-0                 CONTINUE these medications which have NOT CHANGED     Details   POTASSIUM CHLORIDE PO Take  by mouth., Historical Med       pravastatin (PRAVACHOL) 20 mg tablet TAKE 1 TABLET BY MOUTH NIGHTLY, Normal, Disp-30 Tab, R-0       ramipril (ALTACE) 10 mg capsule TAKE ONE CAPSULE BY MOUTH TWICE A DAY, Normal, Disp-60 Cap, R-5       fenofibrate (LOFIBRA) 160 mg tablet TAKE 1 TABLET BY MOUTH DAILY, Normal, Disp-30 Tab, R-5       TRIAMCINOLONE ACETONIDE, BULK, by Does Not Apply route. AS NEEDED, Historical Med       metoprolol succinate (TOPROL-XL) 50 mg XL tablet Take 1 Tab by mouth daily. Indications: hypertension, Normal, Disp-30 Tab, R-11       loratadine (CLARITIN) 10 mg tablet Take 10 mg by mouth daily. , Historical Med       Omeprazole delayed release (PRILOSEC D/R) 20 mg tablet Take 20 mg by mouth daily. , Historical Med       cyanocobalamin (VITAMIN B-12) 1,000 mcg tablet Take 1 Tab by mouth daily. , Historical Med       fluticasone (FLONASE) 50 mcg/actuation nasal spray 2 Sprays by Both Nostrils route daily. , Normal, Disp-1 Bottle, R-11       psyllium seed-sucrose (METAMUCIL) Powd 1 Dose, Oral, 2 TIMES DAILY, Until Discontinued, Historical Med       albuterol (PROVENTIL VENTOLIN) 2.5 mg /3 mL (0.083 %) nebulizer solution 3 mL by Nebulization route every six (6) hours.  As needed for wheezing., Normal, Disp-50 Each, R-3       mometasone (ASMANEX TWISTHALER) 220 mcg (60 doses) inhaler Take 1 Puff by inhalation two (2) times a day., Normal, Disp-60 Cap, R-3       albuterol (PROVENTIL HFA) 90 mcg/actuation inhaler Take 2 Puffs by inhalation every six (6) hours as needed for Wheezing., Normal, Disp-2 Inhaler, R-5                STOP taking these medications         mupirocin (BACTROBAN) 2 % ointment Comments:   Reason for Stopping:            meloxicam (MOBIC) 15 mg tablet Comments:   Reason for Stopping:            naproxen (NAPROSYN) 250 mg tablet Comments:   Reason for Stopping:                    Patient Disposition: Home  Followup Care:  Discharge Condition: good  PT/OT per Home Health  Regular Diet  As directed    Lab/Diagnostics at time of Discharge:   Results for Nevin Angeles (MRN [id number]) as of 10/23/2017 16:14   Ref. Range 10/20/2017 03:28 10/21/2017 06:34   HGB Latest Ref Range: 12.1 - 17.0 g/dL 12.9 12.9   INR Latest Ref Range: 0.9 - 1.1   1.3 (H) 2.4 (H)   Prothrombin time Latest Ref Range: 9.0 - 11.1 sec 13.4 (H) 25.2 (H)   Sodium Latest Ref Range: 136 - 145 mmol/L 138    Potassium Latest Ref Range: 3.5 - 5.1 mmol/L 4.0    Chloride Latest Ref Range: 97 - 108 mmol/L 105    CO2 Latest Ref Range: 21 - 32 mmol/L 26    Anion gap Latest Ref Range: 5 - 15 mmol/L 7    Glucose Latest Ref Range: 65 - 100 mg/dL 125 (H)    BUN Latest Ref Range: 6 - 20 MG/DL 16    Creatinine Latest Ref Range: 0.70 - 1.30 MG/DL 1.00    BUN/Creatinine ratio Latest Ref Range: 12 - 20   16    Calcium Latest Ref Range: 8.5 - 10.1 MG/DL 8.4 (L)    GFR est non-AA Latest Ref Range: >60 ml/min/1.73m2 >60    GFR est AA Latest Ref Range: >60 ml/min/1.73m2 >60        Medication Reconciliation completed: yes    Patient Goals:  Goals      Attends follow-up appointments as directed. Surgeon 11/6/2017       Prevent complications post hospitalization.  Supportive resources in place to maintain patient in the community (ie. Home Health, DME equipment, refer to, medication assistant plan, etc.)            Home health set up with [facility]       Understands red flags post discharge.             Red flags/sepsis: fever or below normal temperature (97f), chills, nausea, vomiting, diarrhea, chest pain, shortness of breath, unable to take medications, unusual sensations, and BFAST. NN remains available to patient.

## 2017-10-23 NOTE — DISCHARGE SUMMARY
Discharge Summary    Physician: Valentino Vasquez MD    Physician Assistant: Brent Brunner, PA-C    Admit Diagnosis:  DJD RIGHT KNEE   Primary osteoarthritis of right knee    Final Diagnosis:   1. Advanced degenerative arthritis of right knee . Procedure: Right total knee replacement    Complications: None. History of Present Illness: The patient has a long-standing history of pain within the right knee . The patient has severe degenerative arthritis of the right knee and has exhausted conservative therapy at this time including prior intra-articular injections, activity modifications, OTC NSAIDS. The patient has been limited in their ability to perform ADLs, walk short distances or climb steps appropriately. The patient presents for the above-mentioned procedure. The patient has been cleared from a medical and cardiac standpoint. Past Medical History:  Recorded in the chart. Physical Examination: Also recorded in the chart. The patient is noted for ambulating with an antalgic gait favoring the right side. Examination of the right knee reveals cruciate and collateral ligaments of the right knee are stable. No sign of infection. No effusion. No cellulitis or rash. No evidence of calf pain, no sign of DVT. The extensor mechanism is intact. The neurovascular status is intact . X-rays reveal complete loss of medial joint space with significant lateral and patellofemoral joint space narrowing. On the left there is significant joint space narrowing. No fractures, no sign of metastatic disease. Course in the Hospital:  The patient was admitted to the hospital.  The Pt. Underwent a right total knee replacement . Postoperatively, the pt. did well. The pt. Remained stable from a medical standpoint. The patient ambulated, WBAT weightbearing within the hospital with Physical Therapy. The patient continued with this therapy at [facility] with PT.   The patient began taking Coumadin pre-operatively for DVT prophylaxis and will continue on this for one month. At the time of discharge, there was no evidence of any DVT noted. The patient had negative Homans signs bilateral lower extremities. At the time of discharge, the patient's right knee incision appeared with staples intact. No signs of infection or inflammation noted. The patient will follow up in our office in 2-1/2 weeks. DC med list:  Discharge Medication List as of 10/21/2017  1:09 PM      START taking these medications    Details   oxyCODONE IR (ROXICODONE) 5 mg immediate release tablet Take 1 Tab by mouth every four (4) hours as needed for Pain. Max Daily Amount: 30 mg., Print, Disp-70 Tab, R-0      warfarin (COUMADIN) 2.5 mg tablet Take 1 Tab by mouth daily. , Print, Disp-90 Tab, R-1      celecoxib (CELEBREX) 200 mg capsule Take 1 Cap by mouth daily for 30 days. , Print, Disp-30 Cap, R-0         CONTINUE these medications which have NOT CHANGED    Details   POTASSIUM CHLORIDE PO Take  by mouth., Historical Med      pravastatin (PRAVACHOL) 20 mg tablet TAKE 1 TABLET BY MOUTH NIGHTLY, Normal, Disp-30 Tab, R-0      ramipril (ALTACE) 10 mg capsule TAKE ONE CAPSULE BY MOUTH TWICE A DAY, Normal, Disp-60 Cap, R-5      fenofibrate (LOFIBRA) 160 mg tablet TAKE 1 TABLET BY MOUTH DAILY, Normal, Disp-30 Tab, R-5      TRIAMCINOLONE ACETONIDE, BULK, by Does Not Apply route. AS NEEDED, Historical Med      metoprolol succinate (TOPROL-XL) 50 mg XL tablet Take 1 Tab by mouth daily. Indications: hypertension, Normal, Disp-30 Tab, R-11      loratadine (CLARITIN) 10 mg tablet Take 10 mg by mouth daily. , Historical Med      Omeprazole delayed release (PRILOSEC D/R) 20 mg tablet Take 20 mg by mouth daily. , Historical Med      cyanocobalamin (VITAMIN B-12) 1,000 mcg tablet Take 1 Tab by mouth daily. , Historical Med      fluticasone (FLONASE) 50 mcg/actuation nasal spray 2 Sprays by Both Nostrils route daily. , Normal, Disp-1 Bottle, R-11      psyllium seed-sucrose (METAMUCIL) Powd 1 Dose, Oral, 2 TIMES DAILY, Until Discontinued, Historical Med      albuterol (PROVENTIL VENTOLIN) 2.5 mg /3 mL (0.083 %) nebulizer solution 3 mL by Nebulization route every six (6) hours.  As needed for wheezing., Normal, Disp-50 Each, R-3      mometasone (ASMANEX TWISTHALER) 220 mcg (60 doses) inhaler Take 1 Puff by inhalation two (2) times a day., Normal, Disp-60 Cap, R-3      albuterol (PROVENTIL HFA) 90 mcg/actuation inhaler Take 2 Puffs by inhalation every six (6) hours as needed for Wheezing., Normal, Disp-2 Inhaler, R-5         STOP taking these medications       mupirocin (BACTROBAN) 2 % ointment Comments:   Reason for Stopping:         meloxicam (MOBIC) 15 mg tablet Comments:   Reason for Stopping:         naproxen (NAPROSYN) 250 mg tablet Comments:   Reason for Stopping:               Patient Disposition: Home  Followup Care:  Discharge Condition: good  PT/OT per Home Health  Regular Diet  As directed    Follow-up Information     Follow up With Details Comments [facility], [place]   [address] [place] [address]  [phone]      [facility] [place] [facility]- [facility], please call office if you ahve not heard from staff member by 12 noon first full day after dischar art Amaya PA-C

## 2017-11-15 RX ORDER — PRAVASTATIN SODIUM 20 MG/1
TABLET ORAL
Qty: 30 TAB | Refills: 11 | Status: SHIPPED | OUTPATIENT
Start: 2017-11-15 | End: 2018-01-19 | Stop reason: SDUPTHER

## 2017-12-29 ENCOUNTER — OFFICE VISIT (OUTPATIENT)
Dept: FAMILY MEDICINE CLINIC | Age: 67
End: 2017-12-29

## 2017-12-29 VITALS
RESPIRATION RATE: 20 BRPM | WEIGHT: 247.6 LBS | TEMPERATURE: 98.3 F | HEART RATE: 66 BPM | HEIGHT: 70 IN | OXYGEN SATURATION: 96 % | SYSTOLIC BLOOD PRESSURE: 155 MMHG | DIASTOLIC BLOOD PRESSURE: 94 MMHG | BODY MASS INDEX: 35.45 KG/M2

## 2017-12-29 DIAGNOSIS — E78.00 HIGH CHOLESTEROL: Chronic | ICD-10-CM

## 2017-12-29 DIAGNOSIS — J45.20 MILD INTERMITTENT ASTHMA WITHOUT COMPLICATION: Chronic | ICD-10-CM

## 2017-12-29 DIAGNOSIS — I10 ESSENTIAL HYPERTENSION: Primary | Chronic | ICD-10-CM

## 2017-12-29 DIAGNOSIS — E55.9 VITAMIN D DEFICIENCY: Chronic | ICD-10-CM

## 2017-12-29 DIAGNOSIS — J20.9 ACUTE BRONCHITIS, UNSPECIFIED ORGANISM: ICD-10-CM

## 2017-12-29 DIAGNOSIS — J30.89 CHRONIC NON-SEASONAL ALLERGIC RHINITIS, UNSPECIFIED TRIGGER: ICD-10-CM

## 2017-12-29 DIAGNOSIS — J45.20 ASTHMATIC BRONCHITIS, MILD INTERMITTENT, UNCOMPLICATED: Chronic | ICD-10-CM

## 2017-12-29 DIAGNOSIS — Z96.651 STATUS POST RIGHT KNEE REPLACEMENT: ICD-10-CM

## 2017-12-29 DIAGNOSIS — K21.9 GASTROESOPHAGEAL REFLUX DISEASE WITHOUT ESOPHAGITIS: Chronic | ICD-10-CM

## 2017-12-29 DIAGNOSIS — G47.33 OBSTRUCTIVE SLEEP APNEA SYNDROME: Chronic | ICD-10-CM

## 2017-12-29 RX ORDER — ASPIRIN 325 MG
325 TABLET ORAL DAILY
COMMUNITY
End: 2018-02-09

## 2017-12-29 RX ORDER — FLUTICASONE PROPIONATE 50 MCG
2 SPRAY, SUSPENSION (ML) NASAL DAILY
Qty: 1 BOTTLE | Refills: 11 | Status: SHIPPED | OUTPATIENT
Start: 2017-12-29 | End: 2018-05-22

## 2017-12-29 RX ORDER — ALBUTEROL SULFATE 90 UG/1
2 AEROSOL, METERED RESPIRATORY (INHALATION)
Qty: 2 INHALER | Refills: 5 | Status: SHIPPED | OUTPATIENT
Start: 2017-12-29 | End: 2018-05-22

## 2017-12-29 RX ORDER — ALBUTEROL SULFATE 0.83 MG/ML
2.5 SOLUTION RESPIRATORY (INHALATION) EVERY 6 HOURS
Qty: 50 EACH | Refills: 3 | Status: SHIPPED | OUTPATIENT
Start: 2017-12-29 | End: 2018-05-22

## 2017-12-29 NOTE — PATIENT INSTRUCTIONS
DASH Diet: Care Instructions  Your Care Instructions    The DASH diet is an eating plan that can help lower your blood pressure. DASH stands for Dietary Approaches to Stop Hypertension. Hypertension is high blood pressure. The DASH diet focuses on eating foods that are high in calcium, potassium, and magnesium. These nutrients can lower blood pressure. The foods that are highest in these nutrients are fruits, vegetables, low-fat dairy products, nuts, seeds, and legumes. But taking calcium, potassium, and magnesium supplements instead of eating foods that are high in those nutrients does not have the same effect. The DASH diet also includes whole grains, fish, and poultry. The DASH diet is one of several lifestyle changes your doctor may recommend to lower your high blood pressure. Your doctor may also want you to decrease the amount of sodium in your diet. Lowering sodium while following the DASH diet can lower blood pressure even further than just the DASH diet alone. Follow-up care is a key part of your treatment and safety. Be sure to make and go to all appointments, and call your doctor if you are having problems. It's also a good idea to know your test results and keep a list of the medicines you take. How can you care for yourself at home? Following the DASH diet  · Eat 4 to 5 servings of fruit each day. A serving is 1 medium-sized piece of fruit, ½ cup chopped or canned fruit, 1/4 cup dried fruit, or 4 ounces (½ cup) of fruit juice. Choose fruit more often than fruit juice. · Eat 4 to 5 servings of vegetables each day. A serving is 1 cup of lettuce or raw leafy vegetables, ½ cup of chopped or cooked vegetables, or 4 ounces (½ cup) of vegetable juice. Choose vegetables more often than vegetable juice. · Get 2 to 3 servings of low-fat and fat-free dairy each day. A serving is 8 ounces of milk, 1 cup of yogurt, or 1 ½ ounces of cheese. · Eat 6 to 8 servings of grains each day.  A serving is 1 slice of bread, 1 ounce of dry cereal, or ½ cup of cooked rice, pasta, or cooked cereal. Try to choose whole-grain products as much as possible. · Limit lean meat, poultry, and fish to 2 servings each day. A serving is 3 ounces, about the size of a deck of cards. · Eat 4 to 5 servings of nuts, seeds, and legumes (cooked dried beans, lentils, and split peas) each week. A serving is 1/3 cup of nuts, 2 tablespoons of seeds, or ½ cup of cooked beans or peas. · Limit fats and oils to 2 to 3 servings each day. A serving is 1 teaspoon of vegetable oil or 2 tablespoons of salad dressing. · Limit sweets and added sugars to 5 servings or less a week. A serving is 1 tablespoon jelly or jam, ½ cup sorbet, or 1 cup of lemonade. · Eat less than 2,300 milligrams (mg) of sodium a day. If you limit your sodium to 1,500 mg a day, you can lower your blood pressure even more. Tips for success  · Start small. Do not try to make dramatic changes to your diet all at once. You might feel that you are missing out on your favorite foods and then be more likely to not follow the plan. Make small changes, and stick with them. Once those changes become habit, add a few more changes. · Try some of the following:  ¨ Make it a goal to eat a fruit or vegetable at every meal and at snacks. This will make it easy to get the recommended amount of fruits and vegetables each day. ¨ Try yogurt topped with fruit and nuts for a snack or healthy dessert. ¨ Add lettuce, tomato, cucumber, and onion to sandwiches. ¨ Combine a ready-made pizza crust with low-fat mozzarella cheese and lots of vegetable toppings. Try using tomatoes, squash, spinach, broccoli, carrots, cauliflower, and onions. ¨ Have a variety of cut-up vegetables with a low-fat dip as an appetizer instead of chips and dip. ¨ Sprinkle sunflower seeds or chopped almonds over salads. Or try adding chopped walnuts or almonds to cooked vegetables.   ¨ Try some vegetarian meals using beans and peas. Add garbanzo or kidney beans to salads. Make burritos and tacos with mashed trent beans or black beans. Where can you learn more? Go to http://kurt-lin.info/. Enter Y063 in the search box to learn more about \"DASH Diet: Care Instructions. \"  Current as of: 2016  Content Version: 11.4  © 4571-6131 [employer]. Care instructions adapted under license by [employer] (which disclaims liability or warranty for this information). If you have questions about a medical condition or this instruction, always ask your healthcare professional. [place] [address] any warranty or liability for your use of this information. Venu Ferrari with [facility]  [address] [place] [address], [place] [address]., [address]  [phone]    Monitor blood pressure outside the office several times weekly at different times during the day and evening. Bring the record to me in 3 weeks for review.     Blood Pressure Record     Patient Name:  ______________________ :  ______________________    Date/Time BP Reading Pulse

## 2017-12-29 NOTE — MR AVS SNAPSHOT
Visit Information Date & Time Provider Department Dept. Phone Encounter #  
 12/29/2017  8:00 AM Jovana Mcguire MD [address] [place] [address] [phone] [id number] Follow-up Instructions Return in about 4 months (around 4/29/2018). Upcoming Health Maintenance Date Due  
 GLAUCOMA SCREENING Q2Y 11/7/2017 Pneumococcal 65+ Low/Medium Risk (1 of 2 - PCV13) 11/7/2017 MEDICARE YEARLY EXAM 12/27/2018 DTaP/Tdap/Td series (2 - Td) 9/2/2021 COLONOSCOPY 12/24/2025 Allergies as of 12/29/2017  Review Complete On: 12/29/2017 By: Jodi Broderick No Known Allergies Current Immunizations  Reviewed on 11/19/2015 Name Date Influenza Vaccine 10/4/2016, 11/7/2015, 9/13/2013 Influenza Vaccine (Quad) PF 10/17/2014 Influenza Vaccine Split 1/1/2006 Pneumococcal Polysaccharide (PPSV-23) 11/19/2015 TD Vaccine 6/30/1999 TDAP Vaccine 9/2/2011 Zoster Vaccine, Live 12/10/2013 Not reviewed this visit You Were Diagnosed With   
  
 Codes Comments Essential hypertension    -  Primary ICD-10-CM: I10 
ICD-9-CM: 401.9 High cholesterol     ICD-10-CM: E78.00 ICD-9-CM: 272.0 Gastroesophageal reflux disease without esophagitis     ICD-10-CM: K21.9 ICD-9-CM: 530.81 Obstructive sleep apnea syndrome     ICD-10-CM: G47.33 
ICD-9-CM: 327.23 Mild intermittent asthma without complication     VZL-79-TC: J45.20 ICD-9-CM: 493.90 Vitamin D deficiency     ICD-10-CM: E55.9 ICD-9-CM: 268.9 Asthmatic bronchitis, mild intermittent, uncomplicated     NXK-37-MO: J45.20 ICD-9-CM: 493.90 Chronic non-seasonal allergic rhinitis, unspecified trigger     ICD-10-CM: J30.89 ICD-9-CM: 477.9 Acute bronchitis, unspecified organism     ICD-10-CM: J20.9 ICD-9-CM: 466.0 Vitals BP Pulse Temp Resp Height(growth percentile) Weight(growth percentile)  (!) 155/94 66 98.3 °F (36.8 °C) (Oral) 20 5' 10\" (1.778 m) 247 lb 9.6 oz (112.3 kg) SpO2 BMI Smoking Status 96% 35.53 kg/m2 Former Smoker Vitals History BMI and BSA Data Body Mass Index Body Surface Area 35.53 kg/m 2 2.36 m 2 Preferred Pharmacy Pharmacy Name Phone [address] [place], [place] - [address]  [phone] Your Updated Medication List  
  
   
This list is accurate as of: 12/29/17  8:28 AM.  Always use your most recent med list.  
  
  
  
  
 * albuterol 90 mcg/actuation inhaler Commonly known as:  PROVENTIL HFA Take 2 Puffs by inhalation every six (6) hours as needed for Wheezing. * albuterol 2.5 mg /3 mL (0.083 %) nebulizer solution Commonly known as:  PROVENTIL VENTOLIN  
3 mL by Nebulization route every six (6) hours. As needed for wheezing. aspirin 325 mg tablet Commonly known as:  ASPIRIN Take 325 mg by mouth daily. Take 2 tabs in am  
  
 CLARITIN 10 mg tablet Generic drug:  loratadine Take 10 mg by mouth daily. fenofibrate 160 mg tablet Commonly known as:  LOFIBRA TAKE 1 TABLET BY MOUTH DAILY  
  
 fluticasone 50 mcg/actuation nasal spray Commonly known as:  Xochilt Jumper 2 Sprays by Both Nostrils route daily. METAMUCIL (SUGAR) Powd Generic drug:  psyllium seed-sucrose Take 1 Dose by mouth two (2) times a day. metoprolol succinate 50 mg XL tablet Commonly known as:  TOPROL-XL Take 1 Tab by mouth daily. Indications: hypertension  
  
 mometasone 220 mcg (60 doses) inhaler Commonly known as:  Marchia Brochure Take 1 Puff by inhalation two (2) times a day. mupirocin calcium 2 % nasal ointment Commonly known as:  BACTROBAN  
by Both Nostrils route two (2) times a day. Omeprazole delayed release 20 mg tablet Commonly known as:  PRILOSEC D/R Take 20 mg by mouth daily. oxyCODONE IR 5 mg immediate release tablet Commonly known as:  Mabeline Chapito Take 1 Tab by mouth every four (4) hours as needed for Pain.  Max Daily Amount: 30 mg.  
  
 pneumococcal 13 ricky conj dip 0.5 mL Syrg injection Commonly known as:  PREVNAR 13 (PF)  
0.5 mL by IntraMUSCular route once for 1 dose. To be administered at Pharmacy. Fax confirmation to me at [phone]. Thank You. Indications: PREVENTION OF STREPTOCOCCUS PNEUMONIAE INFECTION  
  
 POTASSIUM CHLORIDE PO Take 595 mg by mouth daily. pravastatin 20 mg tablet Commonly known as:  PRAVACHOL  
TAKE 1 TABLET BY MOUTH NIGHTLY  
  
 ramipril 10 mg capsule Commonly known as:  ALTACE  
TAKE ONE CAPSULE BY MOUTH TWICE A DAY  
  
 TRIAMCINOLONE ACETONIDE (BULK)  
by Does Not Apply route. AS NEEDED  
  
 TYLENOL 325 mg tablet Generic drug:  acetaminophen Take 650 mg by mouth every four (4) hours as needed for Pain. VITAMIN B-12 1,000 mcg tablet Generic drug:  cyanocobalamin Take 1 Tab by mouth daily. warfarin 2.5 mg tablet Commonly known as:  COUMADIN Take 1 Tab by mouth daily. * Notice: This list has 2 medication(s) that are the same as other medications prescribed for you. Read the directions carefully, and ask your doctor or other care provider to review them with you. Prescriptions Printed Refills  
 pneumococcal 13 ricky conj dip (PREVNAR 13, PF,) 0.5 mL syrg injection 0 Si.5 mL by IntraMUSCular route once for 1 dose. To be administered at Pharmacy. Fax confirmation to me at [phone]. Thank You. Indications: PREVENTION OF STREPTOCOCCUS PNEUMONIAE INFECTION Class: Print Route: IntraMUSCular Prescriptions Sent to Pharmacy Refills  
 albuterol (PROVENTIL HFA) 90 mcg/actuation inhaler 5 Sig: Take 2 Puffs by inhalation every six (6) hours as needed for Wheezing. Class: Normal  
 Pharmacy: [address] [place] [address] #: [phone] Route: Inhalation  
 fluticasone (FLONASE) 50 mcg/actuation nasal spray 11 Si Sprays by Both Nostrils route daily.   
 Class: Normal  
 Pharmacy: [address] [facility] [place], [address] [facility] Ph #: [phone] Route: Both Nostrils  
 albuterol (PROVENTIL VENTOLIN) 2.5 mg /3 mL (0.083 %) nebulizer solution 3 Sig: 3 mL by Nebulization route every six (6) hours. As needed for wheezing. Class: Normal  
 Pharmacy: [facility] Ph #: [phone] Route: Nebulization We Performed the Following HEMOGLOBIN V0611226 CPT(R)] LIPID PANEL [13946 CPT(R)] METABOLIC PANEL, COMPREHENSIVE [57747 CPT(R)] T4, FREE [id number] CPT(R)] TSH 3RD GENERATION [52047 CPT(R)] VITAMIN D, 25 HYDROXY [id number] CPT(R)] Follow-up Instructions Return in about 4 months (around 4/29/2018). Patient Instructions DASH Diet: Care Instructions Your Care Instructions The DASH diet is an eating plan that can help lower your blood pressure. DASH stands for Dietary Approaches to Stop Hypertension. Hypertension is high blood pressure. The DASH diet focuses on eating foods that are high in calcium, potassium, and magnesium. These nutrients can lower blood pressure. The foods that are highest in these nutrients are fruits, vegetables, low-fat dairy products, nuts, seeds, and legumes. But taking calcium, potassium, and magnesium supplements instead of eating foods that are high in those nutrients does not have the same effect. The DASH diet also includes whole grains, fish, and poultry. The DASH diet is one of several lifestyle changes your doctor may recommend to lower your high blood pressure. Your doctor may also want you to decrease the amount of sodium in your diet. Lowering sodium while following the DASH diet can lower blood pressure even further than just the DASH diet alone. Follow-up care is a key part of your treatment and safety.  Be sure to make and go to all appointments, and call your doctor if you are having problems. It's also a good idea to know your test results and keep a list of the medicines you take. How can you care for yourself at home? Following the DASH diet · Eat 4 to 5 servings of fruit each day. A serving is 1 medium-sized piece of fruit, ½ cup chopped or canned fruit, 1/4 cup dried fruit, or 4 ounces (½ cup) of fruit juice. Choose fruit more often than fruit juice. · Eat 4 to 5 servings of vegetables each day. A serving is 1 cup of lettuce or raw leafy vegetables, ½ cup of chopped or cooked vegetables, or 4 ounces (½ cup) of vegetable juice. Choose vegetables more often than vegetable juice. · Get 2 to 3 servings of low-fat and fat-free dairy each day. A serving is 8 ounces of milk, 1 cup of yogurt, or 1 ½ ounces of cheese. · Eat 6 to 8 servings of grains each day. A serving is 1 slice of bread, 1 ounce of dry cereal, or ½ cup of cooked rice, pasta, or cooked cereal. Try to choose whole-grain products as much as possible. · Limit lean meat, poultry, and fish to 2 servings each day. A serving is 3 ounces, about the size of a deck of cards. · Eat 4 to 5 servings of nuts, seeds, and legumes (cooked dried beans, lentils, and split peas) each week. A serving is 1/3 cup of nuts, 2 tablespoons of seeds, or ½ cup of cooked beans or peas. · Limit fats and oils to 2 to 3 servings each day. A serving is 1 teaspoon of vegetable oil or 2 tablespoons of salad dressing. · Limit sweets and added sugars to 5 servings or less a week. A serving is 1 tablespoon jelly or jam, ½ cup sorbet, or 1 cup of lemonade. · Eat less than 2,300 milligrams (mg) of sodium a day. If you limit your sodium to 1,500 mg a day, you can lower your blood pressure even more. Tips for success · Start small. Do not try to make dramatic changes to your diet all at once. You might feel that you are missing out on your favorite foods and then be more likely to not follow the plan.  Make small changes, and stick with them. Once those changes become habit, add a few more changes. · Try some of the following: ¨ Make it a goal to eat a fruit or vegetable at every meal and at snacks. This will make it easy to get the recommended amount of fruits and vegetables each day. ¨ Try yogurt topped with fruit and nuts for a snack or healthy dessert. ¨ Add lettuce, tomato, cucumber, and onion to sandwiches. ¨ Combine a ready-made pizza crust with low-fat mozzarella cheese and lots of vegetable toppings. Try using tomatoes, squash, spinach, broccoli, carrots, cauliflower, and onions. ¨ Have a variety of cut-up vegetables with a low-fat dip as an appetizer instead of chips and dip. ¨ Sprinkle sunflower seeds or chopped almonds over salads. Or try adding chopped walnuts or almonds to cooked vegetables. ¨ Try some vegetarian meals using beans and peas. Add garbanzo or kidney beans to salads. Make burritos and tacos with mashed trent beans or black beans. Where can you learn more? Go to http://kurt-lin.info/. Enter B158 in the search box to learn more about \"DASH Diet: Care Instructions. \" Current as of: 2016 Content Version: 11.4 © 3601-8249 [employer]. Care instructions adapted under license by [employer] (which disclaims liability or warranty for this information). If you have questions about a medical condition or this instruction, always ask your healthcare professional. [place] [address] any warranty or liability for your use of this information. [address] Affiliated with [address] [place] [address], [place] [address]., [facility], [address] [place] [address] 
[phone] Monitor blood pressure outside the office several times weekly at different times during the day and evening. Bring the record to me in 3 weeks for review. Blood Pressure Record Patient Name:  ______________________ :  ______________________ Date/Time BP Reading Pulse Introducing [facility]! [employer] introduces [employer] patient portal. Now you can access parts of your medical record, email your doctor's office, and request medication refills online. 1. In your internet browser, go to [email or web address] 2. Click on the First Time User? Click Here link in the Sign In box. You will see the New Member Sign Up page. 3. Enter your [employer] Access Code exactly as it appears below. You will not need to use this code after youve completed the sign-up process. If you do not sign up before the expiration date, you must request a new code. · [employer] Access Code: [email or web address] Expires: 3/29/2018  8:28 AM 
 
4. Enter the last four digits of your Social Security Number (xxxx) and Date of Birth (mm/dd/yyyy) as indicated and click Submit. You will be taken to the next sign-up page. 5. Create a [employer] ID. This will be your [employer] login ID and cannot be changed, so think of one that is secure and easy to remember. 6. Create a [employer] password. You can change your password at any time. 7. Enter your Password Reset Question and Answer. This can be used at a later time if you forget your password. 8. Enter your e-mail address. You will receive e-mail notification when new information is available in [address] 9. Click Sign Up. You can now view and download portions of your medical record. 10. Click the Download Summary menu link to download a portable copy of your medical information. If you have questions, please visit the Frequently Asked Questions section of the [employer] website. Remember, [employer] is NOT to be used for urgent needs. For medical emergencies, dial 911. Now available from your iPhone and Android! Please provide this summary of care documentation to your next provider. Your primary care clinician is listed as [address]. If you have any questions after today's visit, please call [phone].

## 2017-12-29 NOTE — PROGRESS NOTES
Health Maintenance Due   Topic Date Due    Influenza Age 5 to Adult  08/01/2017    GLAUCOMA SCREENING Q2Y  11/07/2017    Pneumococcal 65+ Low/Medium Risk (1 of 2 - PCV13) 11/07/2017

## 2017-12-30 LAB
25(OH)D3+25(OH)D2 SERPL-MCNC: 33.8 NG/ML (ref 30–100)
ALBUMIN SERPL-MCNC: 4.1 G/DL (ref 3.6–4.8)
ALBUMIN/GLOB SERPL: 1.9 {RATIO} (ref 1.2–2.2)
ALP SERPL-CCNC: 65 IU/L (ref 39–117)
ALT SERPL-CCNC: 24 IU/L (ref 0–44)
AST SERPL-CCNC: 17 IU/L (ref 0–40)
BILIRUB SERPL-MCNC: <0.2 MG/DL (ref 0–1.2)
BUN SERPL-MCNC: 14 MG/DL (ref 8–27)
BUN/CREAT SERPL: 17 (ref 10–24)
CALCIUM SERPL-MCNC: 8.8 MG/DL (ref 8.6–10.2)
CHLORIDE SERPL-SCNC: 106 MMOL/L (ref 96–106)
CHOLEST SERPL-MCNC: 129 MG/DL (ref 100–199)
CO2 SERPL-SCNC: 27 MMOL/L (ref 18–29)
CREAT SERPL-MCNC: 0.84 MG/DL (ref 0.76–1.27)
GLOBULIN SER CALC-MCNC: 2.2 G/DL (ref 1.5–4.5)
GLUCOSE SERPL-MCNC: 86 MG/DL (ref 65–99)
HDLC SERPL-MCNC: 37 MG/DL
HGB BLD-MCNC: 14.1 G/DL (ref 13–17.7)
LDLC SERPL CALC-MCNC: 64 MG/DL (ref 0–99)
POTASSIUM SERPL-SCNC: 4.5 MMOL/L (ref 3.5–5.2)
PROT SERPL-MCNC: 6.3 G/DL (ref 6–8.5)
SODIUM SERPL-SCNC: 146 MMOL/L (ref 134–144)
T4 FREE SERPL-MCNC: 1.32 NG/DL (ref 0.82–1.77)
TRIGL SERPL-MCNC: 140 MG/DL (ref 0–149)
TSH SERPL DL<=0.005 MIU/L-ACNC: 1.49 UIU/ML (ref 0.45–4.5)
VLDLC SERPL CALC-MCNC: 28 MG/DL (ref 5–40)

## 2017-12-30 NOTE — PROGRESS NOTES
Progress Note    Patient: Zahra Harden MRN: [id number]  SSN: [id number]    YOB: 1950  Age: 74 y.o. Sex: male        Chief Complaint   Patient presents with    Hypertension    Cholesterol Problem    Labs         Subjective:     Encounter Diagnoses   Name Primary?  Essential hypertension:Elevated today after having been normal last office visit. BP Readings from Last 3 Encounters:   12/29/17 (!) 155/94   10/21/17 111/65   10/09/17 145/82     The patient reports:  taking medications as instructed, no medication side effects noted, no TIA's, no chest pain on exertion, no dyspnea on exertion, no swelling of ankles. Lab Results   Component Value Date/Time    Sodium 138 10/20/2017 03:28 AM    Potassium 4.0 10/20/2017 03:28 AM    Chloride 105 10/20/2017 03:28 AM    CO2 26 10/20/2017 03:28 AM    Anion gap 7 10/20/2017 03:28 AM    Glucose 125 10/20/2017 03:28 AM    BUN 16 10/20/2017 03:28 AM    Creatinine 1.00 10/20/2017 03:28 AM    BUN/Creatinine ratio 16 10/20/2017 03:28 AM    GFR est AA >60 10/20/2017 03:28 AM    GFR est non-AA >60 10/20/2017 03:28 AM    Calcium 8.4 10/20/2017 03:28 AM    Bilirubin, total 0.3 09/05/2017 11:55 AM    AST (SGOT) 19 09/05/2017 11:55 AM    Alk. phosphatase 38 09/05/2017 11:55 AM    Protein, total 6.6 09/05/2017 11:55 AM    Albumin 4.2 09/05/2017 11:55 AM    Globulin 3.4 04/02/2010 09:09 AM    A-G Ratio 1.8 09/05/2017 11:55 AM    ALT (SGPT) 30 09/05/2017 11:55 AM     Our goal is to normalize the blood pressure to decrease the risks of strokes and heart attacks. The patient is in agreement with the plan. Yes    High cholesterol:  Cardiovascular risks for him are: LDL goal is under 100  hypertension  hyperlipidemia.    Currently he takes Pravachol (pravastatin) , 20 mg  Lab Results   Component Value Date/Time    Cholesterol, total 177 09/05/2017 11:55 AM    HDL Cholesterol 53 09/05/2017 11:55 AM    LDL, calculated 102 09/05/2017 11:55 AM    Triglyceride 108 09/05/2017 11:55 AM    CHOL/HDL Ratio 3.8 04/02/2010 09:09 AM     Lab Results   Component Value Date/Time    ALT (SGPT) 30 09/05/2017 11:55 AM    AST (SGOT) 19 09/05/2017 11:55 AM    Alk. phosphatase 38 09/05/2017 11:55 AM    Bilirubin, total 0.3 09/05/2017 11:55 AM      Myalgias: No   Fatigue: No   Other side effects: no  Wt Readings from Last 3 Encounters:   12/29/17 247 lb 9.6 oz (112.3 kg)   10/19/17 240 lb (108.9 kg)   10/09/17 240 lb (108.9 kg)     The patient is aware of our goal to reduce or eliminate the long term problems (such as strokes and heart attacks) related to poorly controlled hyperlipidemia.  Gastroesophageal reflux disease without esophagitis:  Current control of Symptoms:good  Hiatal Hernia:no  Current Medications:omeprazole  The patient has no history melena or bright red blood in the stools. The patient avoids high dose aspirin and NSAID therapy. The patient is aware of diet changes needed, elevating the head of the bed and appropriate use of antacids.  Obstructive sleep apnea syndrome:  Controlled on CPAP. 100% compliant. Feels rested in AM.         Mild intermittent asthma without complication:  asthma, not currently in exacerbation. Asthma symptoms occur: infrequently. Wheezing when present is described as mild and easily relieved with rescue bronchodilator. Current limitations in activity from asthma: none. Frequency of use of quick-relief meds: rare. Medication compliance: Good  Patient does not smoke cigarettes. Monitors Peak Flow at home: no         Vitamin D deficiency:  No sx. Due for testing. Lab Results   Component Value Date/Time    Vitamin D 25-Hydroxy 19 04/02/2010 09:09 AM    VITAMIN D, 25-HYDROXY 35.0 10/25/2016 09:29 AM            Asthmatic bronchitis, mild intermittent, uncomplicated:See above. No recent infection.  Chronic non-seasonal allergic rhinitis, unspecified trigger: He has allergic symptoms pretty much year-round.          Acute bronchitis, unspecified organism: Nonpurulent and he needs renewal of his albuterol prescriptions         Status post right knee replacement: He is done quite well with his knee surgery. The class returned to work soon. His pain is all gone. Current and past medical information:    Current Medications after this visit[de-identified]   Current Outpatient Prescriptions   Medication Sig    aspirin (ASPIRIN) 325 mg tablet Take 325 mg by mouth daily. Take 2 tabs in am    pneumococcal 13 ricky conj dip (PREVNAR 13, PF,) 0.5 mL syrg injection 0.5 mL by IntraMUSCular route once for 1 dose. To be administered at Pharmacy. Fax confirmation to me at [phone]. Thank You. Indications: PREVENTION OF STREPTOCOCCUS PNEUMONIAE INFECTION    albuterol (PROVENTIL HFA) 90 mcg/actuation inhaler Take 2 Puffs by inhalation every six (6) hours as needed for Wheezing.  fluticasone (FLONASE) 50 mcg/actuation nasal spray 2 Sprays by Both Nostrils route daily.  albuterol (PROVENTIL VENTOLIN) 2.5 mg /3 mL (0.083 %) nebulizer solution 3 mL by Nebulization route every six (6) hours. As needed for wheezing.  pravastatin (PRAVACHOL) 20 mg tablet TAKE 1 TABLET BY MOUTH NIGHTLY    acetaminophen (TYLENOL) 325 mg tablet Take 650 mg by mouth every four (4) hours as needed for Pain.  mupirocin calcium (BACTROBAN) 2 % nasal ointment by Both Nostrils route two (2) times a day.  POTASSIUM CHLORIDE PO Take 595 mg by mouth daily.  ramipril (ALTACE) 10 mg capsule TAKE ONE CAPSULE BY MOUTH TWICE A DAY    mometasone (ASMANEX TWISTHALER) 220 mcg (60 doses) inhaler Take 1 Puff by inhalation two (2) times a day.  TRIAMCINOLONE ACETONIDE, BULK, by Does Not Apply route. AS NEEDED    metoprolol succinate (TOPROL-XL) 50 mg XL tablet Take 1 Tab by mouth daily. Indications: hypertension    loratadine (CLARITIN) 10 mg tablet Take 10 mg by mouth daily.  Omeprazole delayed release (PRILOSEC D/R) 20 mg tablet Take 20 mg by mouth daily.     cyanocobalamin (VITAMIN B-12) 1,000 mcg tablet Take 1 Tab by mouth daily.  psyllium seed-sucrose (METAMUCIL) Powd Take 1 Dose by mouth two (2) times a day.  oxyCODONE IR (ROXICODONE) 5 mg immediate release tablet Take 1 Tab by mouth every four (4) hours as needed for Pain. Max Daily Amount: 30 mg.  warfarin (COUMADIN) 2.5 mg tablet Take 1 Tab by mouth daily.  fenofibrate (LOFIBRA) 160 mg tablet TAKE 1 TABLET BY MOUTH DAILY     No current facility-administered medications for this visit.         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 10/13/2017    Complex tear of medial meniscus of right knee as current injury 01/04/2017    Rectus diastasis 06/03/2016    DDD (degenerative disc disease), lumbar 05/16/2016    PVC's (premature ventricular contractions)     B12 deficiency 10/17/2014    Knee pain 08/04/2014    HLD (hyperlipidemia) 03/26/2013    SOB (shortness of breath) 10/11/2012    Vitamin D deficiency 04/27/2012    Colon ulcer, aphthous 04/01/2010    HTN (hypertension), benign 04/01/2010       Past Medical History:   Diagnosis Date    Adverse effect of anesthesia     [place] [facility] [place]hesia complication 1917    AP[place] DURING HERNIA REPA[place] [facility] [place]    Arrhythmia     FREQUENT PVCS    Arthritis     Asthma     HAS BRONCHITIS OFTEN    Cancer ([address]     SKIN- UNKNOWN    Colon ulcer, aphthous 4/1/2010    Finger amputation, traumatic 2003    R 2ND DIGIT    GERD (gastroesophageal reflux disease) 4/1/2010    High cholesterol 4/1/2010    HTN (hypertension), benign 4/1/2010    CONTROLLED BY MEDS    Hx MRSA infection 2013    CYSTS BL LEGS    Sleep apnea     CPAP       No Known Allergies    Past Surgical History:   Procedure Laterality Date    ABDOMEN SURGERY PROC UNLISTED  2001    hernia    HX APPENDECTOMY  1959    HX HEENT      T&A    HX ORTHOPAEDIC  1994    left knee    HX ORTHOPAEDIC  01/06/2017    RT KNEE ARTHROSCOPY WITH MINESCECTOMY    HX OTHER SURGICAL  1990S    CERVICAL- UNKNOWN    HX OTHER SURGICAL      R HAND 2ND DIGIT TRAUMATIC AMPUTATION REPAIR    HX TONSIL AND ADENOIDECTOMY         Social History     Social History    Marital status:      Spouse name: N/A    Number of children: N/A    Years of education: N/A     Social History Main Topics    Smoking status: Former Smoker     Packs/day: 3.00     Years: 35.00     Quit date: 1/3/2002    Smokeless tobacco: Never Used    Alcohol use 1.2 oz/week     2 Shots of liquor, 0 Standard drinks or equivalent per week    Drug use: No    Sexual activity: Yes     Partners: Female     Birth control/ protection: None     Other Topics Concern    None     Social History Narrative       Review of Systems   Constitutional: Negative. Negative for chills, fever, malaise/fatigue and weight loss. HENT: Negative. Negative for hearing loss. Eyes: Negative. Negative for blurred vision and double vision. Respiratory: Positive for cough and wheezing. Negative for hemoptysis, sputum production and shortness of breath. Cardiovascular: Negative. Negative for chest pain, palpitations and orthopnea. Gastrointestinal: Negative. Negative for abdominal pain, blood in stool, heartburn, nausea and vomiting. Genitourinary: Negative. Negative for dysuria, frequency and urgency. Musculoskeletal: Negative. Negative for back pain, myalgias and neck pain. Skin: Negative. Negative for rash. Neurological: Negative. Negative for dizziness, tingling, tremors, weakness and headaches. Endo/Heme/Allergies: Negative. Psychiatric/Behavioral: Negative. Negative for depression. Objective:     Vitals:    12/29/17 0807   BP: (!) 155/94   Pulse: 66   Resp: 20   Temp: 98.3 °F (36.8 °C)   TempSrc: Oral   SpO2: 96%   Weight: 247 lb 9.6 oz (112.3 kg)   Height: 5' 10\" (1.778 m)      Body mass index is 35.53 kg/(m^2). Physical Exam   Constitutional: He is oriented to person, place, and time and well-developed, well-nourished, and in no distress. HENT:   Head: Normocephalic and atraumatic. Mouth/Throat: Oropharynx is clear and moist.   Eyes: Right eye exhibits no discharge. Left eye exhibits no discharge. No scleral icterus. Neck: No tracheal deviation present. No thyromegaly present. No bruit. Cardiovascular: Normal rate, regular rhythm and normal heart sounds. Pulmonary/Chest: Effort normal and breath sounds normal.   Abdominal: Soft. Musculoskeletal: He exhibits no tenderness. Well-healed right knee scar. Neurological: He is alert and oriented to person, place, and time. Skin: No rash noted. No erythema. Psychiatric: Mood and affect normal.   Nursing note and vitals reviewed. Health Maintenance Due   Topic Date Due    GLAUCOMA SCREENING Q2Y  11/07/2017    Pneumococcal 65+ Low/Medium Risk (1 of 2 - PCV13) 11/07/2017         Assessment and orders:   Diagnoses and all orders for this visit:    1. Essential hypertension-Return on his cuff to calibrate it and check at home.  -     LIPID PANEL  -     METABOLIC PANEL, COMPREHENSIVE  -     TSH 3RD GENERATION  -     T4, FREE    2. High cholesterol-Retest  -     LIPID PANEL  -     METABOLIC PANEL, COMPREHENSIVE    3. Gastroesophageal reflux disease without esophagitis-Retest HGB  -     HEMOGLOBIN    4. Obstructive sleep apnea syndrome-Treated    5. Mild intermittent asthma without complication-controlled    6.  Vitamin D deficiency-Retest  -     VITAMIN D, 25 HYDROXY    7. Asthmatic bronchitis, mild intermittent, uncomplicated  -     albuterol (PROVENTIL HFA) 90 mcg/actuation inhaler; Take 2 Puffs by inhalation every six (6) hours as needed for Wheezing.  -     albuterol (PROVENTIL VENTOLIN) 2.5 mg /3 mL (0.083 %) nebulizer solution; 3 mL by Nebulization route every six (6) hours. As needed for wheezing. 8. Chronic non-seasonal allergic rhinitis, unspecified trigger  -     fluticasone (FLONASE) 50 mcg/actuation nasal spray; 2 Sprays by Both Nostrils route daily. 9. Acute bronchitis, unspecified organism  -     albuterol (PROVENTIL VENTOLIN) 2.5 mg /3 mL (0.083 %) nebulizer solution; 3 mL by Nebulization route every six (6) hours. As needed for wheezing. 10. Status post right knee replacement    Other orders  -     pneumococcal 13 ricky conj dip (PREVNAR 13, PF,) 0.5 mL syrg injection; 0.5 mL by IntraMUSCular route once for 1 dose. To be administered at Pharmacy. Fax confirmation to me at [phone]. Thank You. Indications: PREVENTION OF STREPTOCOCCUS PNEUMONIAE INFECTION            Plan of care:  Discussed diagnoses in detail with patient. Medication risks/benefits/side effects discussed with patient. All of the patient's questions were addressed. The patient understands and agrees with our plan of care. The patient knows to call back if they are unsure of or forget any changes we discussed today or if the symptoms change. The patient received an After-Visit Summary which contains VS, orders, medication list and allergy list. This can be used as a \"mini-medical record\" should they have to seek medical care while out of town.     Patient Care Team:  Leatha Benitez MD as PCP - Florence Mcardle, MD (Cardiology)  Mela Bhardwaj MD (Dermatology)  Vin Williamson MD (Urology)  Jose Salcedo MD (Gastroenterology)  Luz Maria Jean MD (Surgery)  Duane Carranza MD (Orthopedic Surgery)  [facility] Albert Kirby MD (Orthopedic Surgery)  Ana Glass NP as Nurse Practitioner (Surgery)  Thea Ferrari RN as Ambulatory Care Navigator    Follow-up Disposition:  Return in about 4 months (around 4/29/2018).     Future Appointments  Date Time Provider Department Center   4/30/2018 8:00 AM Kinjal Alfonso MD [facility] SCHED       Signed By: Kinjal Alfonso MD     December 29, 2017

## 2018-01-19 DIAGNOSIS — M51.36 DDD (DEGENERATIVE DISC DISEASE), LUMBAR: ICD-10-CM

## 2018-01-19 DIAGNOSIS — I10 ESSENTIAL HYPERTENSION: Primary | Chronic | ICD-10-CM

## 2018-01-19 RX ORDER — PRAVASTATIN SODIUM 20 MG/1
TABLET ORAL
Qty: 30 TAB | Refills: 11 | Status: SHIPPED | OUTPATIENT
Start: 2018-01-19 | End: 2019-01-29 | Stop reason: SDUPTHER

## 2018-01-19 RX ORDER — MELOXICAM 15 MG/1
TABLET ORAL
Qty: 30 TAB | Refills: 0 | OUTPATIENT
Start: 2018-01-19

## 2018-01-19 RX ORDER — HYDROCHLOROTHIAZIDE 12.5 MG/1
12.5 TABLET ORAL DAILY
Qty: 30 TAB | Refills: 5 | Status: SHIPPED | OUTPATIENT
Start: 2018-01-19 | End: 2018-07-23 | Stop reason: SDUPTHER

## 2018-01-19 RX ORDER — METOPROLOL SUCCINATE 50 MG/1
50 TABLET, EXTENDED RELEASE ORAL DAILY
Qty: 30 TAB | Refills: 11 | Status: SHIPPED | OUTPATIENT
Start: 2018-01-19 | End: 2019-01-04 | Stop reason: SDUPTHER

## 2018-01-19 RX ORDER — RAMIPRIL 10 MG/1
CAPSULE ORAL
Qty: 60 CAP | Refills: 5 | Status: SHIPPED | OUTPATIENT
Start: 2018-01-19 | End: 2018-09-28 | Stop reason: SDUPTHER

## 2018-01-26 ENCOUNTER — OFFICE VISIT (OUTPATIENT)
Dept: FAMILY MEDICINE CLINIC | Age: 68
End: 2018-01-26

## 2018-01-26 VITALS
TEMPERATURE: 98 F | HEART RATE: 74 BPM | SYSTOLIC BLOOD PRESSURE: 138 MMHG | WEIGHT: 244 LBS | DIASTOLIC BLOOD PRESSURE: 89 MMHG | BODY MASS INDEX: 34.93 KG/M2 | HEIGHT: 70 IN | RESPIRATION RATE: 20 BRPM | OXYGEN SATURATION: 93 %

## 2018-01-26 DIAGNOSIS — I10 ESSENTIAL HYPERTENSION: Primary | Chronic | ICD-10-CM

## 2018-01-26 DIAGNOSIS — E78.00 HIGH CHOLESTEROL: Chronic | ICD-10-CM

## 2018-01-26 DIAGNOSIS — G47.33 OSA (OBSTRUCTIVE SLEEP APNEA): Chronic | ICD-10-CM

## 2018-01-26 NOTE — PROGRESS NOTES
Progress Note    Patient: Catherine Kan MRN: [id number]  SSN: [id number]    YOB: 1950  Age: 74 y.o. Sex: male        Chief Complaint   Patient presents with    Hypertension    Pre-op Exam     left knee replacement          Subjective:     Encounter Diagnoses   Name Primary?  Essential hypertension:his blood pressure is controlled. His cuff from home is reading high. He will have to buy a new cuff. BP Readings from Last 3 Encounters:   01/26/18 138/89   12/29/17 (!) 155/94   10/21/17 111/65     The patient reports:  taking medications as instructed, no medication side effects noted, no TIA's, no chest pain on exertion, no dyspnea on exertion, no swelling of ankles. Lab Results   Component Value Date/Time    Sodium 146 12/29/2017 11:37 AM    Potassium 4.5 12/29/2017 11:37 AM    Chloride 106 12/29/2017 11:37 AM    CO2 27 12/29/2017 11:37 AM    Anion gap 7 10/20/2017 03:28 AM    Glucose 86 12/29/2017 11:37 AM    BUN 14 12/29/2017 11:37 AM    Creatinine 0.84 12/29/2017 11:37 AM    BUN/Creatinine ratio 17 12/29/2017 11:37 AM    GFR est  12/29/2017 11:37 AM    GFR est non-AA 92 12/29/2017 11:37 AM    Calcium 8.8 12/29/2017 11:37 AM    Bilirubin, total <0.2 12/29/2017 11:37 AM    AST (SGOT) 17 12/29/2017 11:37 AM    Alk. phosphatase 65 12/29/2017 11:37 AM    Protein, total 6.3 12/29/2017 11:37 AM    Albumin 4.1 12/29/2017 11:37 AM    Globulin 3.4 04/02/2010 09:09 AM    A-G Ratio 1.9 12/29/2017 11:37 AM    ALT (SGPT) 24 12/29/2017 11:37 AM     Our goal is to normalize the blood pressure to decrease the risks of strokes and heart attacks. The patient is in agreement with the plan. Yes    RCAHEL (obstructive sleep apnea): Controlled on CPAP. 100% compliant. Feels rested in AM.         High cholesterol:  Cardiovascular risks for him are: LDL goal is under 80  hypertension  hyperlipidemia.    Currently he takes pravastatin 20 mg  Lab Results   Component Value Date/Time    Cholesterol, total 129 12/29/2017 11:37 AM    HDL Cholesterol 37 12/29/2017 11:37 AM    LDL, calculated 64 12/29/2017 11:37 AM    Triglyceride 140 12/29/2017 11:37 AM    CHOL/HDL Ratio 3.8 04/02/2010 09:09 AM     Lab Results   Component Value Date/Time    ALT (SGPT) 24 12/29/2017 11:37 AM    AST (SGOT) 17 12/29/2017 11:37 AM    Alk. phosphatase 65 12/29/2017 11:37 AM    Bilirubin, total <0.2 12/29/2017 11:37 AM      Myalgias: No   Fatigue: No   Other side effects: no  Wt Readings from Last 3 Encounters:   01/26/18 244 lb (110.7 kg)   12/29/17 247 lb 9.6 oz (112.3 kg)   10/19/17 240 lb (108.9 kg)     The patient is aware of our goal to reduce or eliminate the long term problems (such as strokes and heart attacks) related to poorly controlled hyperlipidemia. Current and past medical information:    Current Medications after this visit[de-identified]   Current Outpatient Prescriptions   Medication Sig    pravastatin (PRAVACHOL) 20 mg tablet TAKE 1 TABLET BY MOUTH NIGHTLY    metoprolol succinate (TOPROL-XL) 50 mg XL tablet Take 1 Tab by mouth daily. Indications: hypertension    ramipril (ALTACE) 10 mg capsule TAKE ONE CAPSULE BY MOUTH TWICE A DAY    hydroCHLOROthiazide (HYDRODIURIL) 12.5 mg tablet Take 1 Tab by mouth daily. Indications: hypertension    aspirin (ASPIRIN) 325 mg tablet Take 325 mg by mouth daily. Take 2 tabs in am    albuterol (PROVENTIL HFA) 90 mcg/actuation inhaler Take 2 Puffs by inhalation every six (6) hours as needed for Wheezing.  fluticasone (FLONASE) 50 mcg/actuation nasal spray 2 Sprays by Both Nostrils route daily.  albuterol (PROVENTIL VENTOLIN) 2.5 mg /3 mL (0.083 %) nebulizer solution 3 mL by Nebulization route every six (6) hours. As needed for wheezing.  POTASSIUM CHLORIDE PO Take 595 mg by mouth daily.  mometasone (ASMANEX TWISTHALER) 220 mcg (60 doses) inhaler Take 1 Puff by inhalation two (2) times a day.     Omeprazole delayed release (PRILOSEC D/R) 20 mg tablet Take 20 mg by mouth daily.    cyanocobalamin (VITAMIN B-12) 1,000 mcg tablet Take 1 Tab by mouth daily.  acetaminophen (TYLENOL) 325 mg tablet Take 650 mg by mouth every four (4) hours as needed for Pain.  mupirocin calcium (BACTROBAN) 2 % nasal ointment by Both Nostrils route two (2) times a day.  oxyCODONE IR (ROXICODONE) 5 mg immediate release tablet Take 1 Tab by mouth every four (4) hours as needed for Pain. Max Daily Amount: 30 mg.  warfarin (COUMADIN) 2.5 mg tablet Take 1 Tab by mouth daily.  fenofibrate (LOFIBRA) 160 mg tablet TAKE 1 TABLET BY MOUTH DAILY    TRIAMCINOLONE ACETONIDE, BULK, by Does Not Apply route. AS NEEDED    loratadine (CLARITIN) 10 mg tablet Take 10 mg by mouth daily.  psyllium seed-sucrose (METAMUCIL) Powd Take 1 Dose by mouth two (2) times a day. No current facility-administered medications for this visit.         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 10/13/2017    Complex tear of medial meniscus of right knee as current injury 01/04/2017    Rectus diastasis 06/03/2016    DDD (degenerative disc disease), lumbar 05/16/2016    PVC's (premature ventricular contractions)     B12 deficiency 10/17/2014    Knee pain 08/04/2014    HLD (hyperlipidemia) 03/26/2013    SOB (shortness of breath) 10/11/2012    Vitamin D deficiency 04/27/2012    Colon ulcer, aphthous 04/01/2010    HTN (hypertension), benign 04/01/2010       Past Medical History:   Diagnosis Date    Adverse effect of anesthesia     went apneic post surgery at [facility]    Anesthesia complication 9225    APNEA DURING HERNIA REPAIR, POST OPERATIVE INTUBATION AT [facility]    Arrhythmia     FREQUENT PVCS    Arthritis     Asthma     HAS BRONCHITIS OFTEN    Cancer ([address]     SKIN- UNKNOWN    Colon ulcer, aphthous 4/1/2010    Finger amputation, traumatic 2003    R 2ND DIGIT    GERD (gastroesophageal reflux disease) 4/1/2010    High cholesterol 4/1/2010    HTN (hypertension), benign 4/1/2010    CONTROLLED BY MEDS    Hx MRSA infection 2013    CYSTS BL LEGS    Sleep apnea     CPAP       No Known Allergies    Past Surgical History:   Procedure Laterality Date    ABDOMEN SURGERY PROC UNLISTED  2001    hernia    HX APPENDECTOMY  1959    HX HEENT      T&A    HX ORTHOPAEDIC  1994    left knee    HX ORTHOPAEDIC  01/06/2017    RT KNEE ARTHROSCOPY WITH MINESCECTOMY    HX OTHER SURGICAL  1990S    CERVICAL- UNKNOWN    HX OTHER SURGICAL      R HAND 2ND DIGIT TRAUMATIC AMPUTATION REPAIR    HX TONSIL AND ADENOIDECTOMY         Social History     Social History    Marital status:      Spouse name: N/A    Number of children: N/A    Years of education: N/A     Social History Main Topics    Smoking status: Former Smoker     Packs/day: 3.00     Years: 35.00     Quit date: 1/3/2002    Smokeless tobacco: Never Used    Alcohol use 1.2 oz/week     2 Shots of liquor, 0 Standard drinks or equivalent per week    Drug use: No    Sexual activity: Yes     Partners: Female     Birth control/ protection: None     Other Topics Concern    None     Social History Narrative       Review of Systems   Constitutional: Negative. Negative for chills, fever, malaise/fatigue and weight loss. HENT: Negative. Negative for hearing loss. Eyes: Negative. Negative for blurred vision and double vision. Respiratory: Negative. Negative for cough, hemoptysis, sputum production and shortness of breath. Cardiovascular: Negative.   Negative for chest pain, palpitations and orthopnea. Gastrointestinal: Negative. Negative for abdominal pain, blood in stool, heartburn, nausea and vomiting. Genitourinary: Negative. Negative for dysuria, frequency and urgency. Musculoskeletal: Positive for joint pain. Negative for back pain, myalgias and neck pain. He fell on his right knee replacement. He has a bruise and swelling right in the kneecap area. He has been getting it iced at physical therapy. He plans to have his left knee done on 2/7/18. Skin: Negative. Negative for rash. Neurological: Negative. Negative for dizziness, tingling, tremors, weakness and headaches. Endo/Heme/Allergies: Negative. Psychiatric/Behavioral: Negative. Negative for depression. Objective:     Vitals:    01/26/18 1303 01/26/18 1327 01/26/18 1328   BP: (!) 149/97 135/82 138/89   Pulse: 74     Resp: 20     Temp: 98 °F (36.7 °C)     TempSrc: Oral     SpO2: 93%     Weight: 244 lb (110.7 kg)     Height: 5' 10\" (1.778 m)        Body mass index is 35.01 kg/(m^2). Physical Exam   Constitutional: He is oriented to person, place, and time and well-developed, well-nourished, and in no distress. He has his pre-op bloodwork due on February 2. CBC after his last surgery sure hemoglobin to be 14.1. HENT:   Head: Normocephalic and atraumatic. Mouth/Throat: Oropharynx is clear and moist.   Eyes: Right eye exhibits no discharge. Left eye exhibits no discharge. No scleral icterus. Neck: No tracheal deviation present. No thyromegaly present. No bruit. Cardiovascular: Normal rate, regular rhythm and normal heart sounds. Pulmonary/Chest: Effort normal and breath sounds normal.   Abdominal: Soft. Musculoskeletal: He exhibits tenderness. He has mild tenderness, bruising and swelling over his right anterior knee replacement. His range of motion is normal he's walking without difficulty. Neurological: He is alert and oriented to person, place, and time.    Skin: No rash noted. No erythema. Psychiatric: Mood and affect normal.   Nursing note and vitals reviewed. Health Maintenance Due   Topic Date Due    GLAUCOMA SCREENING Q2Y  11/07/2017    Pneumococcal 65+ Low/Medium Risk (1 of 2 - PCV13) 11/07/2017         Assessment and orders:     Encounter Diagnoses     ICD-10-CM ICD-9-CM   1. Essential hypertension I10 401.9   2. RACHEL (obstructive sleep apnea) G47.33 327.23   3. High cholesterol E78.00 272.0     Diagnoses and all orders for this visit:    1. Essential hypertension-Stay on same medications and get a new blood pressure cuff. No changes before surgery. Both arms are reading about the same. 2. RACHEL (obstructive sleep apnea)-controlled    3. High cholesterol-Will need labs on next office visit. Plan of care:  Discussed diagnoses in detail with patient. Medication risks/benefits/side effects discussed with patient. All of the patient's questions were addressed. The patient understands and agrees with our plan of care. The patient knows to call back if they are unsure of or forget any changes we discussed today or if the symptoms change. The patient received an After-Visit Summary which contains VS, orders, medication list and allergy list. This can be used as a \"mini-medical record\" should they have to seek medical care while out of town. Patient Care Team:  Ken Mcmillan MD as PCP - Hannah Bethea MD (Cardiology)  Dahlia Samayoa MD (Dermatology)  Ashutosh Rucker MD (Urology)  Jeet Lou MD (Gastroenterology)  Mauri Burger MD (Surgery)  Kavitha Cantrell MD (Orthopedic Surgery)  Ines Scott MD (Orthopedic Surgery)  Hoy Lombard, NP as Nurse Practitioner (Surgery)  Shantel De León RN as Ambulatory Care Navigator    Follow-up Disposition:  Return in about 3 months (around 4/26/2018).     Future Appointments  Date Time Provider Erin Corona   2/2/2018 10:30 AM [address] [place] [address] EXAM RM 4 [facility] 4/30/2018 8:00 AM [facility]       Signed By: Todd Singh MD     January 26, 2018

## 2018-01-26 NOTE — PATIENT INSTRUCTIONS
Venu Ferrari with [facility]  [address] [place] [address], [facility] [address] [facility], [address] [place] [address]  [phone]    Monitor blood pressure outside the office several times weekly at different times during the day and evening. Bring the record to me in 3 weeks for review.     Blood Pressure Record     Patient Name:  ______________________ :  ______________________    Date/Time BP Reading Pulse

## 2018-01-26 NOTE — MR AVS SNAPSHOT
[address] [facility] [address] 
 
 
 [address] [address] [phone] Patient: Raguel Frankel MRN: [id number] KATIE:[id number] Visit Information Date & Time Provider Department Dept. Phone Encounter #  
 1/26/2018  1:05 PM Brayden Almendarez, [facility] [phone] [id number] Follow-up Instructions Return in about 3 months (around 4/26/2018). Your Appointments 4/30/2018  8:00 AM  
ROUTINE CARE with Brayden Almendarez MD  
[address] [facility]) Appt Note: 4 mnth fu est pt  
 [address] [facility] [address] [address]  
[place] [address] [place] [id number] Upcoming Health Maintenance Date Due  
 GLAUCOMA SCREENING Q2Y 11/7/2017 Pneumococcal 65+ Low/Medium Risk (1 of 2 - PCV13) 11/7/2017 MEDICARE YEARLY EXAM 12/27/2018 DTaP/Tdap/Td series (2 - Td) 9/2/2021 COLONOSCOPY 12/24/2025 Allergies as of 1/26/2018  Review Complete On: 1/26/2018 By: Dave Pichardo No Known Allergies Current Immunizations  Reviewed on 11/19/2015 Name Date Influenza Vaccine 10/4/2016, 11/7/2015, 9/13/2013 Influenza Vaccine (Quad) PF 10/17/2014 Influenza Vaccine Split 1/1/2006 Pneumococcal Polysaccharide (PPSV-23) 11/19/2015 TD Vaccine 6/30/1999 TDAP Vaccine 9/2/2011 Zoster Vaccine, Live 12/10/2013 Not reviewed this visit You Were Diagnosed With   
  
 Codes Comments Essential hypertension    -  Primary ICD-10-CM: I10 
ICD-9-CM: 401.9 RACHEL (obstructive sleep apnea)     ICD-10-CM: G47.33 
ICD-9-CM: 327.23 High cholesterol     ICD-10-CM: E78.00 ICD-9-CM: 272.0 Vitals BP Pulse Temp Resp Height(growth percentile) Weight(growth percentile) (!) 149/97 (BP 1 Location: Left arm, BP Patient Position: Sitting) 74 98 °F (36.7 °C) (Oral) 20 5' 10\" (1.778 m) 244 lb (110.7 kg) SpO2 BMI Smoking Status 93% 35.01 kg/m2 Former Smoker Vitals History BMI and BSA Data Body Mass Index Body Surface Area 35.01 kg/m 2 2.34 m 2 Preferred Pharmacy Pharmacy Name Phone [address] [place] [address] [phone] Your Updated Medication List  
  
   
This list is accurate as of: 1/26/18  1:27 PM.  Always use your most recent med list.  
  
  
  
  
 * albuterol 90 mcg/actuation inhaler Commonly known as:  PROVENTIL HFA Take 2 Puffs by inhalation every six (6) hours as needed for Wheezing. * albuterol 2.5 mg /3 mL (0.083 %) nebulizer solution Commonly known as:  PROVENTIL VENTOLIN  
3 mL by Nebulization route every six (6) hours. As needed for wheezing. aspirin 325 mg tablet Commonly known as:  ASPIRIN Take 325 mg by mouth daily. Take 2 tabs in am  
  
 CLARITIN 10 mg tablet Generic drug:  loratadine Take 10 mg by mouth daily. fenofibrate 160 mg tablet Commonly known as:  LOFIBRA TAKE 1 TABLET BY MOUTH DAILY  
  
 fluticasone 50 mcg/actuation nasal spray Commonly known as:  Marsa Albe 2 Sprays by Both Nostrils route daily. hydroCHLOROthiazide 12.5 mg tablet Commonly known as:  HYDRODIURIL Take 1 Tab by mouth daily. Indications: hypertension METAMUCIL (SUGAR) Powd Generic drug:  psyllium seed-sucrose Take 1 Dose by mouth two (2) times a day. metoprolol succinate 50 mg XL tablet Commonly known as:  TOPROL-XL Take 1 Tab by mouth daily. Indications: hypertension  
  
 mometasone 220 mcg (60 doses) inhaler Commonly known as:  Nia Dave Take 1 Puff by inhalation two (2) times a day. mupirocin calcium 2 % nasal ointment Commonly known as:  BACTROBAN  
by Both Nostrils route two (2) times a day. Omeprazole delayed release 20 mg tablet Commonly known as:  PRILOSEC D/R Take 20 mg by mouth daily. oxyCODONE IR 5 mg immediate release tablet Commonly known as:  Cliffton Boatman Take 1 Tab by mouth every four (4) hours as needed for Pain. Max Daily Amount: 30 mg. POTASSIUM CHLORIDE PO Take 595 mg by mouth daily. pravastatin 20 mg tablet Commonly known as:  PRAVACHOL  
TAKE 1 TABLET BY MOUTH NIGHTLY  
  
 ramipril 10 mg capsule Commonly known as:  ALTACE  
TAKE ONE CAPSULE BY MOUTH TWICE A DAY  
  
 TRIAMCINOLONE ACETONIDE (BULK)  
by Does Not Apply route. AS NEEDED  
  
 TYLENOL 325 mg tablet Generic drug:  acetaminophen Take 650 mg by mouth every four (4) hours as needed for Pain. VITAMIN B-12 1,000 mcg tablet Generic drug:  cyanocobalamin Take 1 Tab by mouth daily. warfarin 2.5 mg tablet Commonly known as:  COUMADIN Take 1 Tab by mouth daily. * Notice: This list has 2 medication(s) that are the same as other medications prescribed for you. Read the directions carefully, and ask your doctor or other care provider to review them with you. Follow-up Instructions Return in about 3 months (around 2018). To-Do List   
 2018 10:30 AM  
  Appointment with [facility] [place] PAT EXAM RM 4 at [address] [place] [address] ([phone]) Patient Instructions Mirza Amos Affiliated with [address] [place] [address], [place] [address]., [place], [address] [place] [address] 
[phone] Monitor blood pressure outside the office several times weekly at different times during the day and evening. Bring the record to me in 3 weeks for review. Blood Pressure Record Patient Name:  ______________________ :  ______________________ Date/Time BP Reading Pulse Introducing [facility]! Nadya Blankenship introduces [employer]anse patient portal. Now you can access parts of your medical record, email your doctor's office, and request medication refills online. 1. In your internet browser, go to [email or web address] [email or web address] 2. Click on the First Time User? Click Here link in the Sign In box. You will see the New Member Sign Up page. 3. Enter your Glanse Access Code exactly as it appears below. You will not need to use this code after youve completed the sign-up process. If you do not sign up before the expiration date, you must request a new code. · Glanse Access Code: [email or web address] Expires: 3/29/2018  8:28 AM 
 
4. Enter the last four digits of your Social Security Number (xxxx) and Date of Birth (mm/dd/yyyy) as indicated and click Submit. You will be taken to the next sign-up page. 5. Create a Glanse ID. This will be your Glanse login ID and cannot be changed, so think of one that is secure and easy to remember. 6. Create a Glanse password. You can change your password at any time. 7. Enter your Password Reset Question and Answer. This can be used at a later time if you forget your password. 8. Enter your e-mail address. You will receive e-mail notification when new information is available in [address] 9. Click Sign Up. You can now view and download portions of your medical record. 10. Click the Download Summary menu link to download a portable copy of your medical information. If you have questions, please visit the Frequently Asked Questions section of the Glanse website. Remember, Glanse is NOT to be used for urgent needs. For medical emergencies, dial 911. Now available from your iPhone and Android! Please provide this summary of care documentation to your next provider. Your primary care clinician is listed as [address]. If you have any questions after today's visit, please call [phone].

## 2018-01-26 NOTE — PROGRESS NOTES
Reviewed record in preparation for visit and have necessary documentation  Pt did not bring medication to office visit for review    Goals that were addressed and/or need to be completed during or after this appointment include   Health Maintenance Due   Topic Date Due    GLAUCOMA SCREENING Q2Y  11/07/2017    Pneumococcal 65+ Low/Medium Risk (1 of 2 - PCV13) 11/07/2017     Pt blood pressure cuff 157/89

## 2018-02-02 ENCOUNTER — HOSPITAL ENCOUNTER (OUTPATIENT)
Dept: PREADMISSION TESTING | Age: 68
Discharge: HOME | End: 2018-02-02
Payer: COMMERCIAL

## 2018-02-02 VITALS
HEART RATE: 71 BPM | HEIGHT: 70 IN | BODY MASS INDEX: 35.07 KG/M2 | DIASTOLIC BLOOD PRESSURE: 75 MMHG | RESPIRATION RATE: 18 BRPM | SYSTOLIC BLOOD PRESSURE: 154 MMHG | WEIGHT: 245 LBS | TEMPERATURE: 98 F

## 2018-02-02 PROBLEM — M17.12 PRIMARY OSTEOARTHRITIS OF LEFT KNEE: Status: ACTIVE | Noted: 2018-02-02

## 2018-02-02 LAB
ANION GAP SERPL CALC-SCNC: 6 MMOL/L (ref 5–15)
APPEARANCE UR: CLEAR
BACTERIA URNS QL MICRO: NEGATIVE /HPF
BILIRUB UR QL: NEGATIVE
BUN SERPL-MCNC: 16 MG/DL (ref 6–20)
BUN/CREAT SERPL: 16 (ref 12–20)
CALCIUM SERPL-MCNC: 8.6 MG/DL (ref 8.5–10.1)
CHLORIDE SERPL-SCNC: 104 MMOL/L (ref 97–108)
CO2 SERPL-SCNC: 31 MMOL/L (ref 21–32)
COLOR UR: NORMAL
CREAT SERPL-MCNC: 0.97 MG/DL (ref 0.7–1.3)
EPITH CASTS URNS QL MICRO: NORMAL /LPF
ERYTHROCYTE [DISTWIDTH] IN BLOOD BY AUTOMATED COUNT: 13.1 % (ref 11.5–14.5)
EST. AVERAGE GLUCOSE BLD GHB EST-MCNC: 103 MG/DL
GLUCOSE SERPL-MCNC: 95 MG/DL (ref 65–100)
GLUCOSE UR STRIP.AUTO-MCNC: NEGATIVE MG/DL
HBA1C MFR BLD: 5.2 % (ref 4.2–6.3)
HCT VFR BLD AUTO: 46.9 % (ref 36.6–50.3)
HGB BLD-MCNC: 15.7 G/DL (ref 12.1–17)
HGB UR QL STRIP: NEGATIVE
HYALINE CASTS URNS QL MICRO: NORMAL /LPF (ref 0–5)
INR PPP: 1 (ref 0.9–1.1)
KETONES UR QL STRIP.AUTO: NEGATIVE MG/DL
LEUKOCYTE ESTERASE UR QL STRIP.AUTO: NEGATIVE
MCH RBC QN AUTO: 30.1 PG (ref 26–34)
MCHC RBC AUTO-ENTMCNC: 33.5 G/DL (ref 30–36.5)
MCV RBC AUTO: 89.8 FL (ref 80–99)
NITRITE UR QL STRIP.AUTO: NEGATIVE
NRBC # BLD: 0 K/UL (ref 0–0.01)
NRBC BLD-RTO: 0 PER 100 WBC
PH UR STRIP: 6.5 [PH] (ref 5–8)
PLATELET # BLD AUTO: 198 K/UL (ref 150–400)
PMV BLD AUTO: 12 FL (ref 8.9–12.9)
POTASSIUM SERPL-SCNC: 4.1 MMOL/L (ref 3.5–5.1)
PROT UR STRIP-MCNC: NEGATIVE MG/DL
PROTHROMBIN TIME: 10.4 SEC (ref 9–11.1)
RBC # BLD AUTO: 5.22 M/UL (ref 4.1–5.7)
RBC #/AREA URNS HPF: NORMAL /HPF (ref 0–5)
SODIUM SERPL-SCNC: 141 MMOL/L (ref 136–145)
SP GR UR REFRACTOMETRY: 1.01 (ref 1–1.03)
UA: UC IF INDICATED,UAUC: NORMAL
UROBILINOGEN UR QL STRIP.AUTO: 0.2 EU/DL (ref 0.2–1)
WBC # BLD AUTO: 6 K/UL (ref 4.1–11.1)
WBC URNS QL MICRO: NORMAL /HPF (ref 0–4)

## 2018-02-02 PROCEDURE — 83036 HEMOGLOBIN GLYCOSYLATED A1C: CPT | Performed by: ORTHOPAEDIC SURGERY

## 2018-02-02 PROCEDURE — 81001 URINALYSIS AUTO W/SCOPE: CPT | Performed by: ORTHOPAEDIC SURGERY

## 2018-02-02 PROCEDURE — 85027 COMPLETE CBC AUTOMATED: CPT | Performed by: ORTHOPAEDIC SURGERY

## 2018-02-02 PROCEDURE — 36415 COLL VENOUS BLD VENIPUNCTURE: CPT | Performed by: ORTHOPAEDIC SURGERY

## 2018-02-02 PROCEDURE — 80048 BASIC METABOLIC PNL TOTAL CA: CPT | Performed by: ORTHOPAEDIC SURGERY

## 2018-02-02 PROCEDURE — 86900 BLOOD TYPING SEROLOGIC ABO: CPT | Performed by: ORTHOPAEDIC SURGERY

## 2018-02-02 PROCEDURE — 85610 PROTHROMBIN TIME: CPT | Performed by: ORTHOPAEDIC SURGERY

## 2018-02-02 NOTE — H&P
Chief Complaint: Follow-up of the Right Knee     Mohinder Canas comes in for follow-up of the right knee. He underwent right total knee replacement on 10/19 and he is doing well with the knee. He is going to physical therapy and is making steady progress.         Review of Systems   1/8/2018  Constitutional: Unexplained: Negative  Genitourinary: Frequent Urination: Negative  HEENT: Vision Loss: Negative  Neurological: Memory Loss: Negative  Integumentary: Rash: Negative  Cardiovascular: Palpatations: Negative  Hematologic: Bruises/Bleeds Easily: Negative  Gastrointestinal: Constipation: Negative  Immunological: Seasonal Allergies: Negative  Musculoskeletal: Joint Pain: Positive      Medical History         Past Medical History:   Diagnosis Date    Hyperlipidemia      Hypertension      Sleep apnea               Surgical History          Past Surgical History:   Procedure Laterality Date    KNEE SURGERY        NO RELEVANT SURGERIES        SPINE SURGERY                Objective:          Vitals:     01/08/18 1122   BP: 120/80         Constitutional:  No acute distress. Well nourished. Well developed. Eyes:  Sclera are nonicteric. Respiratory:  No labored breathing. Cardiovascular:  No marked edema. Skin:  No marked skin ulcers. Neurological:  No marked sensory loss noted. Psychiatric: Alert and oriented x3. Musculoskeletal      On exam the right knee has near full extension and flexes 112 degrees. The knee is stable. He has quite severe diffuse pain throughout the left knee. No effusion. No sign of infection. No ecchymosis or erythema. No cellulitis or rash. No calf pain, no evidence of DVT. I detect no obvious motor or sensory deficits in the lower extremities. The extensor mechanism is intact. The neurovascular status is intact. On the left He lacks 5 degrees full extension and flexes to 10 degrees. He has diffuse tenderness and crepitus to ROM of the knee.   He has only mild discomfort to rotation of the right hip. Imaging/Studies:    I ordered and interpreted x-rays of the bilateral knees. Right side shows total knee replacement in appropriate position without complication. On the left knee, he has essentially bone on bone contact at the medial compartment with significant lateral and patellofemoral changes.         Assessment:      1. History of total right knee replacement    2. Primary osteoarthritis of left knee             Plan:   The patient is progressing well with the right knee. I will have him continue physical therapy for strengthening twice a week for 3 weeks and we will see him for follow-up in 4 weeks.     He continues to have quite severe left knee pain. We have previously discussed left total knee replacement. He has severe pain to the point of it affecting his daily activities. I reviewed with him the procedure and all potential complications associated with total knee replacement. He is trying to decide when he would like to proceed with left knee replacement. I discussed with the patient expectations and potential complications in regards to above mentioned procedure including that of possible infection, DVT, PE.  We did discuss possible failure based on degree of injury as well as potential for extensive post-operative therapy.  Patient expressed understanding and would like to proceed with above mentioned procedure      PROCEDURE: Left total knee replacement. Date of Surgery Update:  Mendel Dick was seen and examined.   Past Medical History:   Diagnosis Date    Adverse effect of anesthesia     [facility]    Anesthesia complication 1819    APNEA DURING HERNIA REPAIR, POST OPERATIVE INTUBATION AT [facility]    Arrhythmia     FREQUENT PVCS    Arthritis     Asthma     HAS BRONCHITIS OFTEN    Cancer ([facility] [address]     SKIN- UNKNOWN    Colon ulcer, aphthous 4/1/2010    Finger amputation, traumatic 2003    R 2ND DIGIT    GERD (gastroesophageal reflux disease) 2010    High cholesterol 2010    HTN (hypertension), benign 2010    CONTROLLED BY MEDS    Hx MRSA infection     CYSTS BL LEGS    Sleep apnea     CPAP     Prior to Admission Medications   Prescriptions Last Dose Informant Patient Reported? Taking? Omeprazole delayed release (PRILOSEC D/R) 20 mg tablet 2018 at 6a  Yes Yes   Sig: Take 20 mg by mouth daily. POTASSIUM CHLORIDE PO   Yes No   Sig: Take 595 mg by mouth daily. TRIAMCINOLONE ACETONIDE, BULK,   Yes No   Sig: by Does Not Apply route. AS NEEDED   acetaminophen (TYLENOL) 325 mg tablet   Yes No   Sig: Take 650 mg by mouth every four (4) hours as needed for Pain. albuterol (PROVENTIL HFA) 90 mcg/actuation inhaler   No No   Sig: Take 2 Puffs by inhalation every six (6) hours as needed for Wheezing. albuterol (PROVENTIL VENTOLIN) 2.5 mg /3 mL (0.083 %) nebulizer solution   No No   Sig: 3 mL by Nebulization route every six (6) hours. As needed for wheezing. aspirin (ASPIRIN) 325 mg tablet   Yes No   Sig: Take 325 mg by mouth daily. Take 2 tabs in am   cyanocobalamin (VITAMIN B-12) 1,000 mcg tablet   Yes No   Sig: Take 1 Tab by mouth daily. fluticasone (FLONASE) 50 mcg/actuation nasal spray 2018 at Unknown time  No Yes   Si Sprays by Both Nostrils route daily. hydroCHLOROthiazide (HYDRODIURIL) 12.5 mg tablet 2018 at 6a  No Yes   Sig: Take 1 Tab by mouth daily. Indications: hypertension   loratadine (CLARITIN) 10 mg tablet   Yes No   Sig: Take 10 mg by mouth daily. metoprolol succinate (TOPROL-XL) 50 mg XL tablet 2018 at 6a  No Yes   Sig: Take 1 Tab by mouth daily. Indications: hypertension   mometasone (ASMANEX TWISTHALER) 220 mcg (60 doses) inhaler   No No   Sig: Take 1 Puff by inhalation two (2) times a day. Patient taking differently: Take 1 Puff by inhalation two (2) times daily as needed.    pravastatin (PRAVACHOL) 20 mg tablet   No No   Sig: TAKE 1 TABLET BY MOUTH NIGHTLY   psyllium seed-sucrose (METAMUCIL) Powd   Yes No   Sig: Take 1 Dose by mouth two (2) times a day. ramipril (ALTACE) 10 mg capsule 2/7/2018 at 6a  No Yes   Sig: TAKE ONE CAPSULE BY MOUTH TWICE A DAY      Facility-Administered Medications: None      Allergy to:Review of patient's allergies indicates no known allergies. Physical Examination: General appearance - alert, well appearing, and in no distress  History and physical has been reviewed. The patient has been examined.  There have been no significant clinical changes since the completion of the originally dated History and Physical.    Signed By: Tae Fair PA-C     February 7, 2018 10:04 AM

## 2018-02-02 NOTE — PERIOP NOTES
NOTICE OF SERVICES FOR DEAF AND HARD OF HEARING  FORM COMPLETED AND PLACED ON CHART. Preoperative instructions reviewed with patient. Patient given six packs of CHG wipes. Instructions reviewed on use of CHG wipes. Patient given SSI infection FAQS sheet, as well as a  MRSA/MSSA treatment instruction sheet  With an explanation to patient that they will be notified if treatment instructions need to be initiated. Patient was given the opportunity to ask questions on the information provided.

## 2018-02-03 LAB
ABO + RH BLD: NORMAL
BACTERIA SPEC CULT: ABNORMAL
BACTERIA SPEC CULT: ABNORMAL
BLOOD GROUP ANTIBODIES SERPL: NORMAL
SERVICE CMNT-IMP: ABNORMAL
SPECIMEN EXP DATE BLD: NORMAL

## 2018-02-05 NOTE — PERIOP NOTES
Left message on voicemail for Janel at 's office and reported + MSSA on nasal swab obtained in PAT and that PAT NP will treat pt.     Chart given to Alin Fields NP.

## 2018-02-05 NOTE — ADVANCED PRACTICE NURSE
Patient was called (identity confirmed with 2 patient identifiers) and informed of positive MSSA. Patient has mupirocin ointment and CHG 4% from 10/2017, at which time he was also MSSA positive, and will use as directed. Written instructions given at time of Preadmission Testing were reinforced with patient. Patient was given an opportunity to have questions answered and contact information if needed.

## 2018-02-07 ENCOUNTER — ANESTHESIA EVENT (OUTPATIENT)
Dept: SURGERY | Age: 68
DRG: 470 | End: 2018-02-07
Payer: COMMERCIAL

## 2018-02-07 ENCOUNTER — HOSPITAL ENCOUNTER (INPATIENT)
Age: 68
LOS: 2 days | Discharge: HOME HEALTH | DRG: 470 | End: 2018-02-09
Attending: ORTHOPAEDIC SURGERY | Admitting: ORTHOPAEDIC SURGERY
Payer: COMMERCIAL

## 2018-02-07 ENCOUNTER — ANESTHESIA (OUTPATIENT)
Dept: SURGERY | Age: 68
DRG: 470 | End: 2018-02-07
Payer: COMMERCIAL

## 2018-02-07 DIAGNOSIS — M17.12 PRIMARY OSTEOARTHRITIS OF LEFT KNEE: Primary | ICD-10-CM

## 2018-02-07 LAB
GLUCOSE BLD STRIP.AUTO-MCNC: 93 MG/DL (ref 65–100)
SERVICE CMNT-IMP: NORMAL

## 2018-02-07 PROCEDURE — 74011000250 HC RX REV CODE- 250

## 2018-02-07 PROCEDURE — 77030029684 HC NEB SM VOL KT MONA -A

## 2018-02-07 PROCEDURE — G8978 MOBILITY CURRENT STATUS: HCPCS

## 2018-02-07 PROCEDURE — 77030020788: Performed by: ORTHOPAEDIC SURGERY

## 2018-02-07 PROCEDURE — 97116 GAIT TRAINING THERAPY: CPT

## 2018-02-07 PROCEDURE — 74011250636 HC RX REV CODE- 250/636: Performed by: ANESTHESIOLOGY

## 2018-02-07 PROCEDURE — 77030012935 HC DRSG AQUACEL BMS -B: Performed by: ORTHOPAEDIC SURGERY

## 2018-02-07 PROCEDURE — 77030020782 HC GWN BAIR PAWS FLX 3M -B

## 2018-02-07 PROCEDURE — 0SRD0J9 REPLACEMENT OF LEFT KNEE JOINT WITH SYNTHETIC SUBSTITUTE, CEMENTED, OPEN APPROACH: ICD-10-PCS | Performed by: ORTHOPAEDIC SURGERY

## 2018-02-07 PROCEDURE — 82962 GLUCOSE BLOOD TEST: CPT

## 2018-02-07 PROCEDURE — 77030016547 HC BLD SAW SAG1 STRY -B: Performed by: ORTHOPAEDIC SURGERY

## 2018-02-07 PROCEDURE — 77030007866 HC KT SPN ANES BBMI -B: Performed by: ANESTHESIOLOGY

## 2018-02-07 PROCEDURE — 77030003601 HC NDL NRV BLK BBMI -A

## 2018-02-07 PROCEDURE — 76010000171 HC OR TIME 2 TO 2.5 HR INTENSV-TIER 1: Performed by: ORTHOPAEDIC SURGERY

## 2018-02-07 PROCEDURE — G8979 MOBILITY GOAL STATUS: HCPCS

## 2018-02-07 PROCEDURE — 74011250636 HC RX REV CODE- 250/636

## 2018-02-07 PROCEDURE — 94640 AIRWAY INHALATION TREATMENT: CPT

## 2018-02-07 PROCEDURE — 77010033678 HC OXYGEN DAILY

## 2018-02-07 PROCEDURE — 74011250637 HC RX REV CODE- 250/637: Performed by: ORTHOPAEDIC SURGERY

## 2018-02-07 PROCEDURE — 77030034850: Performed by: ORTHOPAEDIC SURGERY

## 2018-02-07 PROCEDURE — C9290 INJ, BUPIVACAINE LIPOSOME: HCPCS | Performed by: PHYSICIAN ASSISTANT

## 2018-02-07 PROCEDURE — 74011250636 HC RX REV CODE- 250/636: Performed by: PHYSICIAN ASSISTANT

## 2018-02-07 PROCEDURE — C1713 ANCHOR/SCREW BN/BN,TIS/BN: HCPCS | Performed by: ORTHOPAEDIC SURGERY

## 2018-02-07 PROCEDURE — 77030018822 HC SLV COMPR FT COVD -B

## 2018-02-07 PROCEDURE — 77030031139 HC SUT VCRL2 J&J -A: Performed by: ORTHOPAEDIC SURGERY

## 2018-02-07 PROCEDURE — 77030018836 HC SOL IRR NACL ICUM -A: Performed by: ORTHOPAEDIC SURGERY

## 2018-02-07 PROCEDURE — 76210000016 HC OR PH I REC 1 TO 1.5 HR: Performed by: ORTHOPAEDIC SURGERY

## 2018-02-07 PROCEDURE — 77030000032 HC CUF TRNQT ZIMM -B: Performed by: ORTHOPAEDIC SURGERY

## 2018-02-07 PROCEDURE — 74011000258 HC RX REV CODE- 258: Performed by: PHYSICIAN ASSISTANT

## 2018-02-07 PROCEDURE — 77030008467 HC STPLR SKN COVD -B: Performed by: ORTHOPAEDIC SURGERY

## 2018-02-07 PROCEDURE — 77030032490 HC SLV COMPR SCD KNE COVD -B: Performed by: ORTHOPAEDIC SURGERY

## 2018-02-07 PROCEDURE — C1776 JOINT DEVICE (IMPLANTABLE): HCPCS | Performed by: ORTHOPAEDIC SURGERY

## 2018-02-07 PROCEDURE — 97161 PT EVAL LOW COMPLEX 20 MIN: CPT

## 2018-02-07 PROCEDURE — 74011250637 HC RX REV CODE- 250/637: Performed by: PHYSICIAN ASSISTANT

## 2018-02-07 PROCEDURE — 77030018846 HC SOL IRR STRL H20 ICUM -A: Performed by: ORTHOPAEDIC SURGERY

## 2018-02-07 PROCEDURE — 94664 DEMO&/EVAL PT USE INHALER: CPT

## 2018-02-07 PROCEDURE — 74011000250 HC RX REV CODE- 250: Performed by: PHYSICIAN ASSISTANT

## 2018-02-07 PROCEDURE — 74011000258 HC RX REV CODE- 258

## 2018-02-07 PROCEDURE — 76060000035 HC ANESTHESIA 2 TO 2.5 HR: Performed by: ORTHOPAEDIC SURGERY

## 2018-02-07 PROCEDURE — 77030014077 HC TOWER MX CEM J&J -C: Performed by: ORTHOPAEDIC SURGERY

## 2018-02-07 PROCEDURE — 65270000029 HC RM PRIVATE

## 2018-02-07 PROCEDURE — 77030012893

## 2018-02-07 PROCEDURE — 77030035236 HC SUT PDS STRATFX BARB J&J -B: Performed by: ORTHOPAEDIC SURGERY

## 2018-02-07 PROCEDURE — 77030011640 HC PAD GRND REM COVD -A: Performed by: ORTHOPAEDIC SURGERY

## 2018-02-07 DEVICE — ATTUNE PATELLA MEDIALIZED DOME 38MM CEMENTED AOX
Type: IMPLANTABLE DEVICE | Site: KNEE | Status: FUNCTIONAL
Brand: ATTUNE

## 2018-02-07 DEVICE — ATTUNE KNEE SYSTEM TIBIAL BASE FIXED BEARING SIZE 6 CEMENTED
Type: IMPLANTABLE DEVICE | Site: KNEE | Status: FUNCTIONAL
Brand: ATTUNE

## 2018-02-07 DEVICE — INSERT TIB SZ 7 THK5MM KNEE POST STBL FIX BEAR ATTUNE: Type: IMPLANTABLE DEVICE | Site: KNEE | Status: FUNCTIONAL

## 2018-02-07 DEVICE — CEMENT BNE 40GM FULL DOSE PMMA W/O ANTIBIO HI VISC N RADPQ: Type: IMPLANTABLE DEVICE | Site: KNEE | Status: FUNCTIONAL

## 2018-02-07 DEVICE — ATTUNE KNEE SYSTEM FEMORAL POSTERIOR STABILIZED SIZE 7 LEFT CEMENTED
Type: IMPLANTABLE DEVICE | Site: KNEE | Status: FUNCTIONAL
Brand: ATTUNE

## 2018-02-07 DEVICE — INSERT TIB RP FEM KNEE CEM: Type: IMPLANTABLE DEVICE | Status: FUNCTIONAL

## 2018-02-07 RX ORDER — SODIUM CHLORIDE, SODIUM LACTATE, POTASSIUM CHLORIDE, CALCIUM CHLORIDE 600; 310; 30; 20 MG/100ML; MG/100ML; MG/100ML; MG/100ML
125 INJECTION, SOLUTION INTRAVENOUS CONTINUOUS
Status: DISCONTINUED | OUTPATIENT
Start: 2018-02-07 | End: 2018-02-07 | Stop reason: HOSPADM

## 2018-02-07 RX ORDER — FLUTICASONE PROPIONATE 50 MCG
2 SPRAY, SUSPENSION (ML) NASAL DAILY
Status: DISCONTINUED | OUTPATIENT
Start: 2018-02-08 | End: 2018-02-09 | Stop reason: HOSPADM

## 2018-02-07 RX ORDER — CELECOXIB 200 MG/1
200 CAPSULE ORAL 2 TIMES DAILY
Status: DISCONTINUED | OUTPATIENT
Start: 2018-02-07 | End: 2018-02-09 | Stop reason: HOSPADM

## 2018-02-07 RX ORDER — NALOXONE HYDROCHLORIDE 0.4 MG/ML
0.4 INJECTION, SOLUTION INTRAMUSCULAR; INTRAVENOUS; SUBCUTANEOUS AS NEEDED
Status: DISCONTINUED | OUTPATIENT
Start: 2018-02-07 | End: 2018-02-09 | Stop reason: HOSPADM

## 2018-02-07 RX ORDER — SODIUM CHLORIDE 0.9 % (FLUSH) 0.9 %
5-10 SYRINGE (ML) INJECTION EVERY 8 HOURS
Status: DISCONTINUED | OUTPATIENT
Start: 2018-02-07 | End: 2018-02-07 | Stop reason: HOSPADM

## 2018-02-07 RX ORDER — FENTANYL CITRATE 50 UG/ML
50 INJECTION, SOLUTION INTRAMUSCULAR; INTRAVENOUS AS NEEDED
Status: DISCONTINUED | OUTPATIENT
Start: 2018-02-07 | End: 2018-02-07 | Stop reason: HOSPADM

## 2018-02-07 RX ORDER — FACIAL-BODY WIPES
10 EACH TOPICAL DAILY PRN
Status: DISCONTINUED | OUTPATIENT
Start: 2018-02-09 | End: 2018-02-09 | Stop reason: HOSPADM

## 2018-02-07 RX ORDER — ALBUTEROL SULFATE 0.83 MG/ML
2.5 SOLUTION RESPIRATORY (INHALATION)
Status: DISCONTINUED | OUTPATIENT
Start: 2018-02-07 | End: 2018-02-09 | Stop reason: HOSPADM

## 2018-02-07 RX ORDER — SODIUM CHLORIDE 9 MG/ML
50 INJECTION, SOLUTION INTRAVENOUS CONTINUOUS
Status: DISCONTINUED | OUTPATIENT
Start: 2018-02-07 | End: 2018-02-07 | Stop reason: HOSPADM

## 2018-02-07 RX ORDER — LIDOCAINE HYDROCHLORIDE 20 MG/ML
INJECTION, SOLUTION EPIDURAL; INFILTRATION; INTRACAUDAL; PERINEURAL AS NEEDED
Status: DISCONTINUED | OUTPATIENT
Start: 2018-02-07 | End: 2018-02-07 | Stop reason: HOSPADM

## 2018-02-07 RX ORDER — POLYETHYLENE GLYCOL 3350 17 G/17G
17 POWDER, FOR SOLUTION ORAL DAILY
Status: DISCONTINUED | OUTPATIENT
Start: 2018-02-08 | End: 2018-02-09 | Stop reason: HOSPADM

## 2018-02-07 RX ORDER — BUDESONIDE 0.5 MG/2ML
500 INHALANT ORAL
Status: DISCONTINUED | OUTPATIENT
Start: 2018-02-07 | End: 2018-02-09 | Stop reason: HOSPADM

## 2018-02-07 RX ORDER — MIDAZOLAM HYDROCHLORIDE 1 MG/ML
0.5 INJECTION, SOLUTION INTRAMUSCULAR; INTRAVENOUS
Status: DISCONTINUED | OUTPATIENT
Start: 2018-02-07 | End: 2018-02-07 | Stop reason: HOSPADM

## 2018-02-07 RX ORDER — PHENYLEPHRINE HCL IN 0.9% NACL 0.4MG/10ML
SYRINGE (ML) INTRAVENOUS AS NEEDED
Status: DISCONTINUED | OUTPATIENT
Start: 2018-02-07 | End: 2018-02-07 | Stop reason: HOSPADM

## 2018-02-07 RX ORDER — SODIUM CHLORIDE 0.9 % (FLUSH) 0.9 %
5-10 SYRINGE (ML) INJECTION EVERY 8 HOURS
Status: DISCONTINUED | OUTPATIENT
Start: 2018-02-08 | End: 2018-02-09 | Stop reason: HOSPADM

## 2018-02-07 RX ORDER — AMOXICILLIN 250 MG
1 CAPSULE ORAL 2 TIMES DAILY
Status: DISCONTINUED | OUTPATIENT
Start: 2018-02-07 | End: 2018-02-09 | Stop reason: HOSPADM

## 2018-02-07 RX ORDER — PRAVASTATIN SODIUM 10 MG/1
10 TABLET ORAL
Status: DISCONTINUED | OUTPATIENT
Start: 2018-02-08 | End: 2018-02-09 | Stop reason: HOSPADM

## 2018-02-07 RX ORDER — MIDAZOLAM HYDROCHLORIDE 1 MG/ML
1 INJECTION, SOLUTION INTRAMUSCULAR; INTRAVENOUS AS NEEDED
Status: DISCONTINUED | OUTPATIENT
Start: 2018-02-07 | End: 2018-02-07 | Stop reason: HOSPADM

## 2018-02-07 RX ORDER — CELECOXIB 200 MG/1
200 CAPSULE ORAL ONCE
Status: COMPLETED | OUTPATIENT
Start: 2018-02-07 | End: 2018-02-07

## 2018-02-07 RX ORDER — EPHEDRINE SULFATE 50 MG/ML
INJECTION, SOLUTION INTRAVENOUS AS NEEDED
Status: DISCONTINUED | OUTPATIENT
Start: 2018-02-07 | End: 2018-02-07 | Stop reason: HOSPADM

## 2018-02-07 RX ORDER — SODIUM CHLORIDE 9 MG/ML
125 INJECTION, SOLUTION INTRAVENOUS CONTINUOUS
Status: DISPENSED | OUTPATIENT
Start: 2018-02-07 | End: 2018-02-08

## 2018-02-07 RX ORDER — LORATADINE 10 MG/1
10 TABLET ORAL DAILY
Status: DISCONTINUED | OUTPATIENT
Start: 2018-02-08 | End: 2018-02-07

## 2018-02-07 RX ORDER — PANTOPRAZOLE SODIUM 40 MG/1
40 TABLET, DELAYED RELEASE ORAL DAILY
Status: DISCONTINUED | OUTPATIENT
Start: 2018-02-08 | End: 2018-02-07

## 2018-02-07 RX ORDER — OXYCODONE HYDROCHLORIDE 5 MG/1
10 TABLET ORAL
Status: DISCONTINUED | OUTPATIENT
Start: 2018-02-07 | End: 2018-02-09 | Stop reason: HOSPADM

## 2018-02-07 RX ORDER — MORPHINE SULFATE 10 MG/ML
2 INJECTION, SOLUTION INTRAMUSCULAR; INTRAVENOUS
Status: DISCONTINUED | OUTPATIENT
Start: 2018-02-07 | End: 2018-02-07 | Stop reason: HOSPADM

## 2018-02-07 RX ORDER — HYDROXYZINE HYDROCHLORIDE 10 MG/1
10 TABLET, FILM COATED ORAL
Status: DISCONTINUED | OUTPATIENT
Start: 2018-02-07 | End: 2018-02-09 | Stop reason: HOSPADM

## 2018-02-07 RX ORDER — ONDANSETRON 2 MG/ML
4 INJECTION INTRAMUSCULAR; INTRAVENOUS
Status: ACTIVE | OUTPATIENT
Start: 2018-02-07 | End: 2018-02-08

## 2018-02-07 RX ORDER — PANTOPRAZOLE SODIUM 40 MG/1
40 TABLET, DELAYED RELEASE ORAL
Status: DISCONTINUED | OUTPATIENT
Start: 2018-02-08 | End: 2018-02-09 | Stop reason: HOSPADM

## 2018-02-07 RX ORDER — OXYCODONE HYDROCHLORIDE 5 MG/1
5 TABLET ORAL
Status: DISCONTINUED | OUTPATIENT
Start: 2018-02-07 | End: 2018-02-09 | Stop reason: HOSPADM

## 2018-02-07 RX ORDER — FAMOTIDINE 20 MG/1
20 TABLET, FILM COATED ORAL
Status: DISCONTINUED | OUTPATIENT
Start: 2018-02-07 | End: 2018-02-09 | Stop reason: HOSPADM

## 2018-02-07 RX ORDER — SODIUM CHLORIDE 0.9 % (FLUSH) 0.9 %
5-10 SYRINGE (ML) INJECTION AS NEEDED
Status: DISCONTINUED | OUTPATIENT
Start: 2018-02-07 | End: 2018-02-07 | Stop reason: HOSPADM

## 2018-02-07 RX ORDER — SODIUM CHLORIDE 9 MG/ML
25 INJECTION, SOLUTION INTRAVENOUS CONTINUOUS
Status: DISCONTINUED | OUTPATIENT
Start: 2018-02-07 | End: 2018-02-07 | Stop reason: HOSPADM

## 2018-02-07 RX ORDER — HYDROMORPHONE HYDROCHLORIDE 1 MG/ML
0.2 INJECTION, SOLUTION INTRAMUSCULAR; INTRAVENOUS; SUBCUTANEOUS
Status: DISCONTINUED | OUTPATIENT
Start: 2018-02-07 | End: 2018-02-07 | Stop reason: HOSPADM

## 2018-02-07 RX ORDER — WARFARIN SODIUM 5 MG/1
5 TABLET ORAL
Status: COMPLETED | OUTPATIENT
Start: 2018-02-07 | End: 2018-02-07

## 2018-02-07 RX ORDER — SODIUM CHLORIDE, SODIUM LACTATE, POTASSIUM CHLORIDE, CALCIUM CHLORIDE 600; 310; 30; 20 MG/100ML; MG/100ML; MG/100ML; MG/100ML
75 INJECTION, SOLUTION INTRAVENOUS CONTINUOUS
Status: DISCONTINUED | OUTPATIENT
Start: 2018-02-07 | End: 2018-02-07 | Stop reason: HOSPADM

## 2018-02-07 RX ORDER — POTASSIUM CHLORIDE 750 MG/1
10 TABLET, FILM COATED, EXTENDED RELEASE ORAL DAILY
Status: DISCONTINUED | OUTPATIENT
Start: 2018-02-08 | End: 2018-02-09 | Stop reason: HOSPADM

## 2018-02-07 RX ORDER — CEFAZOLIN SODIUM/WATER 2 G/20 ML
2 SYRINGE (ML) INTRAVENOUS EVERY 8 HOURS
Status: COMPLETED | OUTPATIENT
Start: 2018-02-07 | End: 2018-02-08

## 2018-02-07 RX ORDER — KETAMINE HYDROCHLORIDE 10 MG/ML
INJECTION, SOLUTION INTRAMUSCULAR; INTRAVENOUS AS NEEDED
Status: DISCONTINUED | OUTPATIENT
Start: 2018-02-07 | End: 2018-02-07 | Stop reason: HOSPADM

## 2018-02-07 RX ORDER — ALBUTEROL SULFATE 0.83 MG/ML
2.5 SOLUTION RESPIRATORY (INHALATION)
Status: DISCONTINUED | OUTPATIENT
Start: 2018-02-07 | End: 2018-02-09

## 2018-02-07 RX ORDER — ACETAMINOPHEN 325 MG/1
650 TABLET ORAL EVERY 6 HOURS
Status: DISCONTINUED | OUTPATIENT
Start: 2018-02-07 | End: 2018-02-09 | Stop reason: HOSPADM

## 2018-02-07 RX ORDER — BUPIVACAINE HYDROCHLORIDE 7.5 MG/ML
INJECTION, SOLUTION EPIDURAL; RETROBULBAR AS NEEDED
Status: DISCONTINUED | OUTPATIENT
Start: 2018-02-07 | End: 2018-02-07 | Stop reason: HOSPADM

## 2018-02-07 RX ORDER — DIPHENHYDRAMINE HYDROCHLORIDE 50 MG/ML
12.5 INJECTION, SOLUTION INTRAMUSCULAR; INTRAVENOUS AS NEEDED
Status: DISCONTINUED | OUTPATIENT
Start: 2018-02-07 | End: 2018-02-07 | Stop reason: HOSPADM

## 2018-02-07 RX ORDER — LANOLIN ALCOHOL/MO/W.PET/CERES
1000 CREAM (GRAM) TOPICAL DAILY
Status: DISCONTINUED | OUTPATIENT
Start: 2018-02-08 | End: 2018-02-09 | Stop reason: HOSPADM

## 2018-02-07 RX ORDER — METOPROLOL SUCCINATE 50 MG/1
50 TABLET, EXTENDED RELEASE ORAL DAILY
Status: DISCONTINUED | OUTPATIENT
Start: 2018-02-08 | End: 2018-02-09 | Stop reason: HOSPADM

## 2018-02-07 RX ORDER — CEFAZOLIN SODIUM/WATER 2 G/20 ML
2 SYRINGE (ML) INTRAVENOUS ONCE
Status: COMPLETED | OUTPATIENT
Start: 2018-02-07 | End: 2018-02-07

## 2018-02-07 RX ORDER — LIDOCAINE HYDROCHLORIDE 10 MG/ML
0.1 INJECTION, SOLUTION EPIDURAL; INFILTRATION; INTRACAUDAL; PERINEURAL AS NEEDED
Status: DISCONTINUED | OUTPATIENT
Start: 2018-02-07 | End: 2018-02-07 | Stop reason: HOSPADM

## 2018-02-07 RX ORDER — HYDROCHLOROTHIAZIDE 25 MG/1
12.5 TABLET ORAL DAILY
Status: DISCONTINUED | OUTPATIENT
Start: 2018-02-08 | End: 2018-02-09 | Stop reason: HOSPADM

## 2018-02-07 RX ORDER — RAMIPRIL 2.5 MG/1
10 CAPSULE ORAL EVERY 12 HOURS
Status: DISCONTINUED | OUTPATIENT
Start: 2018-02-08 | End: 2018-02-09 | Stop reason: HOSPADM

## 2018-02-07 RX ORDER — HYDROMORPHONE HYDROCHLORIDE 1 MG/ML
0.5 INJECTION, SOLUTION INTRAMUSCULAR; INTRAVENOUS; SUBCUTANEOUS
Status: ACTIVE | OUTPATIENT
Start: 2018-02-07 | End: 2018-02-08

## 2018-02-07 RX ORDER — PROPOFOL 10 MG/ML
INJECTION, EMULSION INTRAVENOUS
Status: DISCONTINUED | OUTPATIENT
Start: 2018-02-07 | End: 2018-02-07 | Stop reason: HOSPADM

## 2018-02-07 RX ORDER — MIDAZOLAM HYDROCHLORIDE 1 MG/ML
INJECTION, SOLUTION INTRAMUSCULAR; INTRAVENOUS AS NEEDED
Status: DISCONTINUED | OUTPATIENT
Start: 2018-02-07 | End: 2018-02-07 | Stop reason: HOSPADM

## 2018-02-07 RX ORDER — ONDANSETRON 2 MG/ML
4 INJECTION INTRAMUSCULAR; INTRAVENOUS AS NEEDED
Status: DISCONTINUED | OUTPATIENT
Start: 2018-02-07 | End: 2018-02-07 | Stop reason: HOSPADM

## 2018-02-07 RX ORDER — WARFARIN 10 MG/1
10 TABLET ORAL ONCE
Status: COMPLETED | OUTPATIENT
Start: 2018-02-07 | End: 2018-02-07

## 2018-02-07 RX ORDER — HYDROCODONE BITARTRATE AND ACETAMINOPHEN 5; 325 MG/1; MG/1
1 TABLET ORAL AS NEEDED
Status: DISCONTINUED | OUTPATIENT
Start: 2018-02-07 | End: 2018-02-07 | Stop reason: HOSPADM

## 2018-02-07 RX ORDER — PROPOFOL 10 MG/ML
INJECTION, EMULSION INTRAVENOUS AS NEEDED
Status: DISCONTINUED | OUTPATIENT
Start: 2018-02-07 | End: 2018-02-07 | Stop reason: HOSPADM

## 2018-02-07 RX ORDER — LORATADINE 10 MG/1
10 TABLET ORAL
Status: DISCONTINUED | OUTPATIENT
Start: 2018-02-07 | End: 2018-02-09 | Stop reason: HOSPADM

## 2018-02-07 RX ORDER — SODIUM CHLORIDE 0.9 % (FLUSH) 0.9 %
5-10 SYRINGE (ML) INJECTION AS NEEDED
Status: DISCONTINUED | OUTPATIENT
Start: 2018-02-07 | End: 2018-02-09 | Stop reason: HOSPADM

## 2018-02-07 RX ORDER — FENTANYL CITRATE 50 UG/ML
25 INJECTION, SOLUTION INTRAMUSCULAR; INTRAVENOUS
Status: DISCONTINUED | OUTPATIENT
Start: 2018-02-07 | End: 2018-02-07 | Stop reason: HOSPADM

## 2018-02-07 RX ADMIN — KETAMINE HYDROCHLORIDE 10 MG: 10 INJECTION, SOLUTION INTRAMUSCULAR; INTRAVENOUS at 13:14

## 2018-02-07 RX ADMIN — ACETAMINOPHEN 650 MG: 325 TABLET, FILM COATED ORAL at 21:37

## 2018-02-07 RX ADMIN — BUDESONIDE 500 MCG: 0.5 INHALANT RESPIRATORY (INHALATION) at 22:59

## 2018-02-07 RX ADMIN — WARFARIN SODIUM 5 MG: 5 TABLET ORAL at 10:38

## 2018-02-07 RX ADMIN — Medication 2 G: at 19:34

## 2018-02-07 RX ADMIN — Medication 80 MCG: at 12:12

## 2018-02-07 RX ADMIN — SODIUM CHLORIDE 125 ML/HR: 900 INJECTION, SOLUTION INTRAVENOUS at 14:14

## 2018-02-07 RX ADMIN — BUPIVACAINE HYDROCHLORIDE 12 MG: 7.5 INJECTION, SOLUTION EPIDURAL; RETROBULBAR at 11:42

## 2018-02-07 RX ADMIN — MIDAZOLAM HYDROCHLORIDE 1 MG: 1 INJECTION, SOLUTION INTRAMUSCULAR; INTRAVENOUS at 11:28

## 2018-02-07 RX ADMIN — EPHEDRINE SULFATE 5 MG: 50 INJECTION, SOLUTION INTRAVENOUS at 12:41

## 2018-02-07 RX ADMIN — EPHEDRINE SULFATE 5 MG: 50 INJECTION, SOLUTION INTRAVENOUS at 11:57

## 2018-02-07 RX ADMIN — Medication 2 G: at 11:42

## 2018-02-07 RX ADMIN — ALBUTEROL SULFATE 2.5 MG: 2.5 SOLUTION RESPIRATORY (INHALATION) at 23:00

## 2018-02-07 RX ADMIN — SODIUM CHLORIDE, SODIUM LACTATE, POTASSIUM CHLORIDE, AND CALCIUM CHLORIDE: 600; 310; 30; 20 INJECTION, SOLUTION INTRAVENOUS at 13:13

## 2018-02-07 RX ADMIN — PROPOFOL 50 MG: 10 INJECTION, EMULSION INTRAVENOUS at 11:33

## 2018-02-07 RX ADMIN — CELECOXIB 200 MG: 200 CAPSULE ORAL at 10:39

## 2018-02-07 RX ADMIN — OXYCODONE HYDROCHLORIDE 5 MG: 5 TABLET ORAL at 23:43

## 2018-02-07 RX ADMIN — KETAMINE HYDROCHLORIDE 20 MG: 10 INJECTION, SOLUTION INTRAMUSCULAR; INTRAVENOUS at 11:59

## 2018-02-07 RX ADMIN — FENTANYL CITRATE 50 MCG: 50 INJECTION, SOLUTION INTRAMUSCULAR; INTRAVENOUS at 10:45

## 2018-02-07 RX ADMIN — PROPOFOL 20 MG: 10 INJECTION, EMULSION INTRAVENOUS at 11:37

## 2018-02-07 RX ADMIN — CELECOXIB 200 MG: 200 CAPSULE ORAL at 19:34

## 2018-02-07 RX ADMIN — EPHEDRINE SULFATE 5 MG: 50 INJECTION, SOLUTION INTRAVENOUS at 12:12

## 2018-02-07 RX ADMIN — OXYCODONE HYDROCHLORIDE 5 MG: 5 TABLET ORAL at 16:24

## 2018-02-07 RX ADMIN — EPHEDRINE SULFATE 10 MG: 50 INJECTION, SOLUTION INTRAVENOUS at 12:21

## 2018-02-07 RX ADMIN — Medication 80 MCG: at 12:21

## 2018-02-07 RX ADMIN — MIDAZOLAM HYDROCHLORIDE 3 MG: 1 INJECTION, SOLUTION INTRAMUSCULAR; INTRAVENOUS at 10:44

## 2018-02-07 RX ADMIN — OXYCODONE HYDROCHLORIDE 10 MG: 5 TABLET ORAL at 19:33

## 2018-02-07 RX ADMIN — SODIUM CHLORIDE, SODIUM LACTATE, POTASSIUM CHLORIDE, AND CALCIUM CHLORIDE: 600; 310; 30; 20 INJECTION, SOLUTION INTRAVENOUS at 13:14

## 2018-02-07 RX ADMIN — ACETAMINOPHEN 650 MG: 325 TABLET, FILM COATED ORAL at 15:43

## 2018-02-07 RX ADMIN — WARFARIN SODIUM 10 MG: 10 TABLET ORAL at 15:43

## 2018-02-07 RX ADMIN — SODIUM CHLORIDE, SODIUM LACTATE, POTASSIUM CHLORIDE, AND CALCIUM CHLORIDE 125 ML/HR: 600; 310; 30; 20 INJECTION, SOLUTION INTRAVENOUS at 10:21

## 2018-02-07 RX ADMIN — OXYCODONE HYDROCHLORIDE 5 MG: 5 TABLET ORAL at 23:42

## 2018-02-07 RX ADMIN — PROPOFOL 100 MCG/KG/MIN: 10 INJECTION, EMULSION INTRAVENOUS at 11:57

## 2018-02-07 RX ADMIN — LIDOCAINE HYDROCHLORIDE 40 MG: 20 INJECTION, SOLUTION EPIDURAL; INFILTRATION; INTRACAUDAL; PERINEURAL at 11:30

## 2018-02-07 NOTE — ANESTHESIA PROCEDURE NOTES
Peripheral Block    Start time: 2/7/2018 10:41 AM  End time: 2/7/2018 10:45 AM  Performed by: Heather Kam  Authorized by: Heather Kam       Pre-procedure: Indications: at surgeon's request and post-op pain management    Preanesthetic Checklist: patient identified, risks and benefits discussed, site marked, timeout performed and patient being monitored      Block Type:   Block Type:   Adductor canal  Laterality:  Right  Monitoring:  Standard ASA monitoring, continuous pulse ox, frequent vital sign checks, heart rate, responsive to questions and oxygen  Injection Technique:  Single shot  Procedures: ultrasound guided    Patient Position: supine  Prep: DuraPrep    Needle Type:  Stimuplex  Needle Gauge:  22 G  Needle Localization:  Ultrasound guidance  Medication Injected:  0.5%  ropivacaine  Volume (mL):  20    Assessment:  Number of attempts:  1  Injection Assessment:  Incremental injection every 5 mL, local visualized surrounding nerve on ultrasound, negative aspiration for blood, no paresthesia and no intravascular symptoms  Patient tolerance:  Patient tolerated the procedure well with no immediate complications

## 2018-02-07 NOTE — ANESTHESIA PROCEDURE NOTES
Spinal Block    Performed by: Elvis Ronquillo  Authorized by: Elvis Ronquillo     Pre-procedure: Indications: primary anesthetic          Assessment:      Spinal Block Procedure Note    Anesthetic procedure including post-op analgesic plan was discussed and all questions were answered. Pt. agrees to proceed. Patient was assisted to the seated position in the O.R., a \"time-out\" was performed. Monitors (ECG, NIBP & Sp02) and oxygen @ 2LPM via nasal cannula were applied. Mild sedation was administered. The back was prepped at the lumbar region with topical antiseptic and draped. 3mL 1% lidocaine was injected locally at the L3-4 interspace. A 25 G pencil point spinal needle was placed @ the above noted interspace and advanced until CSF was obtained. No blood or paresthesia was noted. Bupivacaine-MPF(hyperbaric) 12mg   was injected into the CSF. Patient tolerated the procedure well. No apparent complications were noted.

## 2018-02-07 NOTE — PERIOP NOTES
1048: LT leg adductor canal nerve block done by Dr Gael Dorado using 20cc of 0.5% ropivicaine with US guidance, with pt monitored, O2 @ 3-4l/m/nc and IV sedation given. Pt tolerated procedure well. VSS post block: 551/54-43-60, SaO2=93-95% on 3-4l/m/nc. See anesthesia note.

## 2018-02-07 NOTE — PROGRESS NOTES
Primary Nurse Magalis Lopez RN and Susan Murphy RN performed a dual skin assessment on this patient No impairment noted  Caio score is 19

## 2018-02-07 NOTE — PROGRESS NOTES
TRANSFER - IN REPORT:    Verbal report received from [facility]) on Ngozi Valle  being received from PACU(unit) for routine post - op      Report consisted of patients Situation, Background, Assessment and   Recommendations(SBAR). Information from the following report(s) SBAR, Kardex, Intake/Output and MAR was reviewed with the receiving nurse. Opportunity for questions and clarification was provided. Assessment completed upon patients arrival to unit and care assumed.

## 2018-02-07 NOTE — PERIOP NOTES
TRANSFER - OUT REPORT:    Verbal report given to Shakira Mariano on Abbey Rodriguez  being transferred to [phone] for routine post - op       Report consisted of patients Situation, Background, Assessment and   Recommendations(SBAR). Time Pre op antibiotic given:1142  Anesthesia Stop time: 9167  Bradford Present on Transfer to floor:yes  Order for Bradford on Chart:yes  Discharge Prescriptions with Chart:no    Information from the following report(s) SBAR, OR Summary, Intake/Output and MAR was reviewed with the receiving nurse. Opportunity for questions and clarification was provided. Is the patient on 02? NO       L/Min 0       Other 0    Is the patient on a monitor? NO    Is the nurse transporting with the patient? NO    Surgical Waiting Area notified of patient's transfer from PACU?  YES      The following personal items collected during your admission accompanied patient upon transfer:   Dental Appliance:    Vision:    Hearing Aid:    Jewelry:    Clothing: Clothing:  (bag of clothes & glasses returned to pt in pacu)  Other Valuables:    Valuables sent to safe:

## 2018-02-07 NOTE — ANESTHESIA PROCEDURE NOTES
Spinal Block    Performed by: Laina Mattson  Authorized by: Laina Mattson     Pre-procedure:   Indications: primary anesthetic

## 2018-02-07 NOTE — ANESTHESIA PREPROCEDURE EVALUATION
Anesthetic History   No history of anesthetic complications            Review of Systems / Medical History  Patient summary reviewed, nursing notes reviewed and pertinent labs reviewed    Pulmonary  Within defined limits      Sleep apnea    Asthma        Neuro/Psych   Within defined limits           Cardiovascular  Within defined limits  Hypertension                   GI/Hepatic/Renal  Within defined limits              Endo/Other  Within defined limits      Obesity     Other Findings              Physical Exam    Airway  Mallampati: II  TM Distance: > 6 cm  Neck ROM: normal range of motion   Mouth opening: Normal     Cardiovascular  Regular rate and rhythm,  S1 and S2 normal,  no murmur, click, rub, or gallop             Dental  No notable dental hx       Pulmonary  Breath sounds clear to auscultation               Abdominal  GI exam deferred       Other Findings            Anesthetic Plan    ASA: 2  Anesthesia type: spinal          Induction: Intravenous  Anesthetic plan and risks discussed with: Patient      GA back-up

## 2018-02-07 NOTE — PROGRESS NOTES
Problem: Mobility Impaired (Adult and Pediatric)  Goal: *Acute Goals and Plan of Care (Insert Text)  Physical Therapy Goals  Initiated 2/7/2018    1. Patient will move from supine to sit and sit to supine, scoot up and down and roll side to side in bed with modified independence within 4 days. 2. Patient will perform sit to stand with modified independence within 4 days. 3. Patient will ambulate with modified independence for 200 feet with the least restrictive device within 4 days. 4. Patient will ascend/descend 3 stairs with single handrail with modified independence within 4 days. 5. Patient will perform home exercise program per protocol with modified independence within 4 days. 6. Patient will demonstrate AROM 0-90 degrees in operative joint within 4 days. physical Therapy EVALUATION  Patient: Catherine Kan (4 y.o. male)  Date: 2/7/2018  Primary Diagnosis: DJD LEFT KNEE  Primary osteoarthritis of left knee  Procedure(s) (LRB):  LEFT TOTAL KNEE ARTHROPLASTY  (Left) Day of Surgery   Precautions:  Fall, WBAT (L TKR)    ASSESSMENT :  Based on the objective data described below, the patient presents with decreased independence with mobility compared to baseline level due to decreased left knee ROM and left LE strength. Patient cleared by nursing for mobility and was agreeable to participation in POD #0 mobility. Patient able to perform ankle pumps bilaterally and SLR bilaterally. Upon sitting, patient complaining of inability to feel his glutes. Patient's BP high throughout session (RN aware; 150s/90s-100s). Patient able to perform bed mobility and achieve EOB sitting with supervision assist. Patient performed sit to stand transfer with minimal assist x 2 secondary to impulsivity and impaired gluteal sensation. Due to patient's impaired gluteal strength, had patient side-step along EOB x 5 ft, L/R twice. Physical assistance required for rolling walker movement/positioning. Noted wide BLAINE during.  Patient returned to sitting and then supine position in bed. Patient reportedly attended pre-operative joint replacement education class prior to previous knee replacement (Fall 2017). This physical therapist reviewed bed exercises and acute care PT expectations. Patient able to correctly perform ankle pumps and heel slides, and has no additional questions at this time. Patient has a RW to utilize at discharge. Will continue to follow in order to progress towards acute care PT goals. Recommend HHPT at discharge in order to continue to optimize independence and safety with mobility. Patient will benefit from skilled intervention to address the above impairments. Patients rehabilitation potential is considered to be Good  Factors which may influence rehabilitation potential include:   []         None noted  []         Mental ability/status  []         Medical condition  []         Home/family situation and support systems  []         Safety awareness  []         Pain tolerance/management  [x]         Other: Impulsiveness       PLAN :  Recommendations and Planned Interventions:  [x]           Bed Mobility Training             []    Neuromuscular Re-Education  [x]           Transfer Training                   []    Orthotic/Prosthetic Training  [x]           Gait Training                         []    Modalities  [x]           Therapeutic Exercises           [x]    Edema Management/Control  [x]           Therapeutic Activities            [x]    Patient and Family Training/Education  []           Other (comment):    Frequency/Duration: Patient will be followed by physical therapy  twice daily to address goals. Discharge Recommendations: Home Health  Further Equipment Recommendations for Discharge: Owns appropriate DME     SUBJECTIVE:   Patient stated I don't remember having therapy the last time I was here.     OBJECTIVE DATA SUMMARY:   HISTORY:    Past Medical History:   Diagnosis Date    Adverse effect of anesthesia went apneic post surgery [facility]    Anesthesia complication 8427    APNEA DURING HERNIA REPAIR, POST OPERATIVE INTUBATION AT [facility] [place]    Arrhythmia     FREQUENT PVCS    Arthritis     Asthma     HAS BRONCHITIS OFTEN    Cancer ([address]     SKIN- UNKNOWN    Colon ulcer, aphthous 4/1/2010    Finger amputation, traumatic 2003    R 2ND DIGIT    GERD (gastroesophageal reflux disease) 4/1/2010    High cholesterol 4/1/2010    HTN (hypertension), benign 4/1/2010    CONTROLLED BY MEDS    Hx MRSA infection 2013    CYSTS BL LEGS    Sleep apnea     CPAP     Past Surgical History:   Procedure Laterality Date    ABDOMEN SURGERY PROC UNLISTED  2001    hernia    HX APPENDECTOMY  1959    HX HEENT      T&A    HX KNEE REPLACEMENT Right 10/2017    HX ORTHOPAEDIC  1994    left knee scope    HX ORTHOPAEDIC  01/06/2017    RT KNEE ARTHROSCOPY WITH MINESCECTOMY    HX OTHER SURGICAL  1990S    CERVICAL- UNKNOWN    HX OTHER SURGICAL      R HAND 2ND DIGIT TRAUMATIC AMPUTATION REPAIR    HX TONSIL AND ADENOIDECTOMY       Prior Level of Function/Home Situation: Independent functional mobility. Attending OP PT for strengthening B LEs as of 2/1/18.  \"Still on the pay roll\" -   Personal factors and/or comorbidities impacting plan of care: Previous post-op arrhythmias, L TKR     Home Situation  Home Environment: Private residence  # Steps to Enter: 3  Rails to Enter: Yes  Hand Rails : Bilateral (> arm's width apart)  Wheelchair Ramp: No  One/Two Story Residence: One story  Living Alone: No  Support Systems: Spouse/Significant Other/Partner, Family member(s), Friends \ neighbors  Patient Expects to be Discharged to[de-identified] Private residence  Current DME Used/Available at Home: Compression garments, Blood pressure cuff, Cane, straight, Walker, rolling    EXAMINATION/PRESENTATION/DECISION MAKING:   Critical Behavior:  Neurologic State: Appropriate for age, Alert  Orientation Level: Appropriate for age, Oriented X4  Cognition: Appropriate decision making, Appropriate for age attention/concentration, Appropriate safety awareness, Follows commands  Safety/Judgement: Awareness of environment, Fall prevention, Insight into deficits  Hearing: Auditory  Auditory Impairment: None  Skin:  Ace bandage donned over L LE  Edema: Trace noted in LEs  Range Of Motion:  AROM: Generally decreased, functional  Strength:    Strength: Within functional limits  Tone & Sensation:   Tone: Normal  Sensation: Impaired (Anesthesia: tingling in B glutes)  Coordination:  Coordination: Within functional limits  Functional Mobility:  Bed Mobility:  Supine to Sit: Supervision; Additional time (VCs to prevent bearing down)  Sit to Supine: Supervision; Additional time (VCs to prevent bearing down)  Scooting: Supervision; Additional time (VCs to prevent bearing down/holding breath)  Transfers:  Sit to Stand: Assist x2;Minimum assistance (Impulsive; VCs for hand placement + pacing)  Stand to Sit: Assist x2;Minimum assistance (For controlled descent into sitting)  Balance:   Sitting: Intact; Without support  Standing: Impaired; With support  Standing - Static: Good;Constant support  Standing - Dynamic : Fair (Impaired sensation in glutes)  Ambulation/Gait Training:  Distance (ft): 5 Feet (ft) (L/R x 2 along edge of bed)  Assistive Device: Gait belt;Walker, rolling  Ambulation - Level of Assistance: Assist x2;Minimal assistance  Gait Description (WDL): Exceptions to WDL  Gait Abnormalities: Antalgic;Decreased step clearance; Step to gait (** Side-stepping only)  Base of Support: Widened;Shift to right  Speed/Janette: Shuffled    Functional Measure:  Tinetti test:    Sitting Balance: 1  Arises: 0  Attempts to Rise: 0  Immediate Standing Balance: 1  Standing Balance: 1  Nudged: 1  Eyes Closed: 0  Turn 360 Degrees - Continuous/Discontinuous: 0  Turn 360 Degrees - Steady/Unsteady: 0  Sitting Down: 1  Balance Score: 5  Indication of Gait: 1  R Step Length/Height: 0  L Step Length/Height: 0  R Foot Clearance: 1  L Foot Clearance: 1  Step Symmetry: 0  Step Continuity: 0  Path: 1  Trunk: 1  Walking Time: 0  Gait Score: 5  Total Score: 10       Tinetti Test and G-code impairment scale:  Percentage of Impairment CH    0%   CI    1-19% CJ    20-39% CK    40-59% CL    60-79% CM    80-99% CN     100%   Tinetti  Score 0-28 28 23-27 17-22 12-16 6-11 1-5 0       Tinetti Tool Score Risk of Falls  <19 = High Fall Risk  19-24 = Moderate Fall Risk  25-28 = Low Fall Risk  Tinetti ME. Performance-Oriented Assessment of Mobility Problems in Elderly Patients. [address]; [id number]. (Scoring Description: PT Bulletin Feb. 10, 1993)    Older adults: Kaleb Astorga et al, 2009; n = 1000 [facility], [place] elderly evaluated with ABC, SADAF, ADL, and IADL)  · Mean SADAF score for males aged 69-68 years = 26.21(3.40)  · Mean SADAF score for females age 69-68 years = 25.16(4.30)  · Mean SADAF score for males over 80 years = 23.29(6.02)  · Mean SADAF score for females over 80 years = 17.20(8.32)     G codes: In compliance with CMSs Claims Based Outcome Reporting, the following G-code set was chosen for this patient based on their primary functional limitation being treated: The outcome measure chosen to determine the severity of the functional limitation was the Tinetti with a score of 10/28 which was correlated with the impairment scale.     ? Mobility - Walking and Moving Around:     - CURRENT STATUS: CL - 60%-79% impaired, limited or restricted    - GOAL STATUS: CI - 1%-19% impaired, limited or restricted    - D/C STATUS:  ---------------To be determined---------------      Physical Therapy Evaluation Charge Determination   History Examination Presentation Decision-Making   HIGH Complexity :3+ comorbidities / personal factors will impact the outcome/ POC  HIGH Complexity : 4+ Standardized tests and measures addressing body structure, function, activity limitation and / or participation in recreation  LOW Complexity : Stable, uncomplicated  Other outcome measures Tinetti  MEDIUM      Based on the above components, the patient evaluation is determined to be of the following complexity level: LOW     Pain:  Pain Scale 1: Numeric (0 - 10)  Pain Intensity 1: 0    Activity Tolerance:   Please refer to the flowsheet for vital signs taken during this treatment. After treatment:   []         Patient left in no apparent distress sitting up in chair  [x]         Patient left in no apparent distress in bed  [x]         Call bell left within reach  [x]         Nursing notified  []         Caregiver present  []         Bed alarm activated    COMMUNICATION/EDUCATION:   The patients plan of care was discussed with: Registered Nurse and Rehabilitation Attendant. [x]         Fall prevention education was provided and the patient/caregiver indicated understanding. [x]         Patient/family have participated as able in goal setting and plan of care. [x]         Patient/family agree to work toward stated goals and plan of care. []         Patient understands intent and goals of therapy, but is neutral about his/her participation. []         Patient is unable to participate in goal setting and plan of care.     Thank you for this referral.  Aram Marshall PT, DPT   Time Calculation: 25 mins

## 2018-02-07 NOTE — IP AVS SNAPSHOT
[address] 
[phone] Patient: Yadira Crane MRN: [id number] KMO:[id number] About your hospitalization You were admitted on:  February 7, 2018 [facility] on:  February 9, 2018 Why you were hospitalized Your primary diagnosis was:  Primary Osteoarthritis Of Left Knee Follow-up Information Follow up With Details Comments Contact Info Socorro Benítez MD   [address] [place] [address] 
[phone] [facility], please call office if you have not heard from staff member by 12 noon first full day after discharge  Phone # [phone] Discharge Orders None A check bettie indicates which time of day the medication should be taken. My Medications START taking these medications Instructions Each Dose to Equal  
 Morning Noon Evening Bedtime  
 celecoxib 200 mg capsule Commonly known as:  CeleBREX Your last dose was: Your next dose is: Take 1 Cap by mouth daily for 30 days. 200 mg  
    
   
   
   
  
 oxyCODONE IR 5 mg immediate release tablet Commonly known as:  Olivier Livings Your last dose was: Your next dose is: Take 1-2 Tabs by mouth every four (4) hours as needed for Pain. Max Daily Amount: 60 mg.  
 5-10 mg  
    
   
   
   
  
 warfarin 2.5 mg tablet Commonly known as:  COUMADIN Your last dose was: Your next dose is: Take 1 Tab by mouth daily. 2.5 mg  
    
   
   
   
  
  
CHANGE how you take these medications Instructions Each Dose to Equal  
 Morning Noon Evening Bedtime  
 mometasone 220 mcg (60 doses) inhaler Commonly known as:  Andrzej Coburn What changed:   
- when to take this 
- reasons to take this Your last dose was: Your next dose is: Take 1 Puff by inhalation two (2) times a day. 1 Puff CONTINUE taking these medications Instructions Each Dose to Equal  
 Morning Noon Evening Bedtime * albuterol 90 mcg/actuation inhaler Commonly known as:  PROVENTIL HFA Your last dose was: Your next dose is: Take 2 Puffs by inhalation every six (6) hours as needed for Wheezing. 2 Puff * albuterol 2.5 mg /3 mL (0.083 %) nebulizer solution Commonly known as:  PROVENTIL VENTOLIN Your last dose was: Your next dose is:    
   
   
 3 mL by Nebulization route every six (6) hours. As needed for wheezing. 2.5 mg  
    
   
   
   
  
 CLARITIN 10 mg tablet Generic drug:  loratadine Your last dose was: Your next dose is: Take 10 mg by mouth daily. 10 mg  
    
   
   
   
  
 fluticasone 50 mcg/actuation nasal spray Commonly known as:  Kim New Your last dose was: Your next dose is: 2 Sprays by Both Nostrils route daily. 2 Spray  
    
   
   
   
  
 hydroCHLOROthiazide 12.5 mg tablet Commonly known as:  HYDRODIURIL Your last dose was: Your next dose is: Take 1 Tab by mouth daily. Indications: hypertension 12.5 mg  
    
   
   
   
  
 METAMUCIL (SUGAR) Powd Generic drug:  psyllium seed-sucrose Your last dose was: Your next dose is: Take 1 Dose by mouth two (2) times a day. 1 Dose  
    
   
   
   
  
 metoprolol succinate 50 mg XL tablet Commonly known as:  TOPROL-XL Your last dose was: Your next dose is: Take 1 Tab by mouth daily. Indications: hypertension 50 mg Omeprazole delayed release 20 mg tablet Commonly known as:  PRILOSEC D/R Your last dose was: Your next dose is: Take 20 mg by mouth daily.   
 20 mg  
    
   
   
   
  
 POTASSIUM CHLORIDE PO Your last dose was: Your next dose is: Take 595 mg by mouth daily. 595 mg  
    
   
   
   
  
 pravastatin 20 mg tablet Commonly known as:  PRAVACHOL Your last dose was: Your next dose is: TAKE 1 TABLET BY MOUTH NIGHTLY  
     
   
   
   
  
 ramipril 10 mg capsule Commonly known as:  ALTACE Your last dose was: Your next dose is: TAKE ONE CAPSULE BY MOUTH TWICE A DAY  
     
   
   
   
  
 TRIAMCINOLONE ACETONIDE (BULK) Your last dose was: Your next dose is:    
   
   
 by Does Not Apply route. AS NEEDED  
     
   
   
   
  
 TYLENOL 325 mg tablet Generic drug:  acetaminophen Your last dose was: Your next dose is: Take 650 mg by mouth every four (4) hours as needed for Pain. 650 mg  
    
   
   
   
  
 VITAMIN B-12 1,000 mcg tablet Generic drug:  cyanocobalamin Your last dose was: Your next dose is: Take 1 Tab by mouth daily. 1000 mcg * Notice: This list has 2 medication(s) that are the same as other medications prescribed for you. Read the directions carefully, and ask your doctor or other care provider to review them with you. STOP taking these medications   
 aspirin 325 mg tablet Commonly known as:  ASPIRIN Where to Get Your Medications Information on where to get these meds will be given to you by the nurse or doctor. ! Ask your nurse or doctor about these medications  
  celecoxib 200 mg capsule  
 oxyCODONE IR 5 mg immediate release tablet  
 warfarin 2.5 mg tablet Discharge Instructions DC Orders All Total Knees Case Management for DC planning to Home HC . - PT 3 times a week for 2 weeks; WBAT. - PT/INR Every Monday/Thursday for 2 weeks, Call Dr. Tatyana Sloan with results ([phone]). - Remove staples at 2 weeks post-op; 2/21/18 . - Follow up in Office in 2 1/2 weeks. COUMADIN INSTRUCTIONS: Please take 2.5 mg mg on Saturday (2/10/18) and please take 2.5 mg on Sunday (2/11/18); re-check INR on Monday for additional dosing instructions. After Hospital Care Plan:  Discharge Instructions Knee Replacement-Dr. Sunny Goldman Patient Name: Mendel Dick Date of procedure: 2/7/2018 Procedure: Procedure(s): LEFT TOTAL KNEE ARTHROPLASTY Surgeon: Ingris Leblanc) and Role: 
   * Neal Nava MD - Primary PCP: Delvin Rodriguez MD 
Date of discharge: No discharge date for patient encounter. Follow up appointments ? Follow up with Dr. Sunny Goldman in 2 ½ weeks. Call [phone] to make an appointment. ? If home health has been arranged for you the agency will contact you to arrange dates/times for visits. Please call them if you do not hear from them within 24 hours after you are discharged When to call your Orthopaedic Surgeon: Call [phone]. If you call after 5pm or on a weekend, the on call physician will be contacted ? Unrelieved pain ? Signs of infection-if your incision is red; continues to have drainage; drainage has a foul odor or if you have a persistent fever over 101 degrees ? Signs of a blood clot in your leg-calf pain, tenderness, redness, swelling of lower leg When to call your Primary Care Physician: 
? Concerns about medical conditions such as diabetes, high blood pressure, asthma, congestive heart failure ? Call if blood sugars are elevated, persistent headache or dizziness, coughing or congestion, constipation or diarrhea, burning with urination, abnormal heart rate When to call 085run go to the nearest emergency room ? Acute onset of chest pain, shortness of breath, difficulty breathing Activity ? Weight bearing as tolerated with walker or crutches. Refer to pages 23-31 of your handbook for instructions and pictures ?  Complete your Home Exercise Program daily as instructed by your therapist.  Refer to pages 33-41 of your handbook for instructions and pictures ? Get up every one hour and walk (except at night when sleeping) ? Do not drive or operate heavy machinery Incision Care ? The Aquacel (brown, waterproof) surgical dressing is to remain on your knee for 7 days. On the 7th day have someone gently peel the dressing off by carefully lifting the edge and stretching it slightly to break the adhesive seal 
? You will have staples in your knee incision. They will be removed by the home health agency staff ? If your Aquacel dressing comes loose/off before the 7th day, you may replace it with a dry sterile gauze dressing; change it daily. Once your incision is not draining, you may leave it open to air ? You may take a shower with the Aquacel dressing in place. Once the Aquacel is removed, you may shower and get your incision wet but do not submerge your incision under water in a bath tub, hot tub or swimming pool for 6 weeks after surgery. Preventing blood clots ? Take Coumadin as prescribed by Dr. Aleksey Pinzon for one month following surgery ? Wear elastic stockings (TEDS) for 4 weeks. You should remove them for approximately 1 hour daily for showering/sponge bathing Pain management ? Take pain medication as prescribed; decrease the amount you use as your pain lessens ? Avoid alcoholic beverages while taking pain medication ? Please be aware that many medications contain Tylenol. We do not want you to over medicate so please read the information below as a guide. Do not take more than 4 Grams of Tylenol in a 24 hour period. (There are 1000 milligrams in one Gram) o Percocet contains 325 mg of Tylenol per tablet (do not take more than 12 tablets in 24 hours) 
o Lortab contains 500 mg of Tylenol per tablet (do not take more than 8 tablets in 24 hours) o Norco contains 325 mg of Tylenol per tablet (do not take more than 12 tablets in 24 hours). ? You may place an ice bag on your hip for 15-20 minutes after exercising Diet ? Resume usual diet; drink plenty of fluids; eat foods high in fiber ? You may want to take a stool softener (such as Senokot-S or Colace) to prevent constipation while you are taking pain medication. If constipation occurs, take a laxative (such as Dulcolax tablets, Milk of Magnesia, or a suppository) Home Health Care Protocol (to be followed by [address] [place] [address] Nursing ? Draw a PT/INR per physicians orders. Call results to Dr. Bri Lees at [phone] ? Remove staples per physicians order ? Complete head to toe assessment, vital signs ? Medication reconciliation ? Review pain management ? Manage chronic medical conditions Physical Therapy-per physicians orders Weight bearing status: 
Precautions at Admission: Fall, WBAT (L TKR) Mobility Status: 
Supine to Sit: Minimum assistance Sit to Stand: Minimum assistance Sit to Supine: Supervision, Additional time (VCs to prevent bearing down) Gait: 
Distance (ft): 180 Feet (ft) Ambulation - Level of Assistance: Contact guard assistance, Assist x2 Assistive Device: Gait belt, Walker, rolling Gait Abnormalities: Antalgic, Decreased step clearance ADL status overall composite: 
  
  
  
  
  
 
Physical Therapy ? Assessment and evaluation-bed mobility; functional transfers (bed, chair, bathroom, stairs); ambulation with equipment, car transfers, shower transfers, safety and ability to get out of house in the event of an emergency ? Review weight bearing as tolerated, wean from walker or crutches as tolerated ? Discuss pain management ? Review how to do ADLs. Refer to page 42 of patient handbook Home Exercise Program-refer to pages 33-41 of patient handbook for exercises Introducing [facility]!    
 [address] introduces [employer] patient portal. Now you can access parts of your medical record, email your doctor's office, and request medication refills online. 1. In your internet browser, go to [email or web address] 2. Click on the First Time User? Click Here link in the Sign In box. You will see the New Member Sign Up page. 3. Enter your [employer] Access Code exactly as it appears below. You will not need to use this code after youve completed the sign-up process. If you do not sign up before the expiration date, you must request a new code. · [employer] Access Code: [email or web address] Expires: 3/29/2018  8:28 AM 
 
4. Enter the last four digits of your Social Security Number (xxxx) and Date of Birth (mm/dd/yyyy) as indicated and click Submit. You will be taken to the next sign-up page. 5. Create a [employer] ID. This will be your [employer] login ID and cannot be changed, so think of one that is secure and easy to remember. 6. Create a [employer] password. You can change your password at any time. 7. Enter your Password Reset Question and Answer. This can be used at a later time if you forget your password. 8. Enter your e-mail address. You will receive e-mail notification when new information is available in [address] 9. Click Sign Up. You can now view and download portions of your medical record. 10. Click the Download Summary menu link to download a portable copy of your medical information. If you have questions, please visit the Frequently Asked Questions section of the [employer] website. Remember, [employer] is NOT to be used for urgent needs. For medical emergencies, dial 911. Now available from your iPhone and Android! Providers Seen During Your Hospitalization Provider Specialty Primary office phone Paco Sosa MD Orthopedic Surgery [phone] Your Primary Care Physician (PCP) Primary Care Physician Office Phone Office Fax Perico Castrojodi [phone] You are allergic to the following Allergen Reactions Chlorhexidine Towelette Rash  
 ? Reaction from using wipes before [address] [facility] Recent Documentation Height Weight BMI Smoking Status 1.778 m 111.1 kg 35.15 kg/m2 Former Smoker Emergency Contacts Name Discharge Info Relation Home Work Mobile [facility] DISCHARGE CAREGIVER [3] Spouse [3] [phone] Patient Belongings The following personal items are in your possession at time of discharge: 
  Dental Appliances: None  Visual Aid: Glasses      Home Medications: None   Jewelry: None  Clothing: At bedside    Other Valuables: None Discharge Instructions Attachments/References MEFS - WARFARIN (COUMADIN, JANTOVEN) - (BY MOUTH) (ENGLISH) MEFS - OXYCODONE, RAPID RELEASE (ETH-OXYDOSE, OXY IR, ROXICODONE) - (BY MOUTH) (ENGLISH) MEFS - CELECOXIB (CELEBREX) - (BY MOUTH) (ENGLISH) Patient Handouts Warfarin (Coumadin, Jantoven) - (By mouth) Why this medicine is used:  
Prevents and treats blood clots. Contact a nurse or doctor right away if you have: 
· Sudden or severe headache · Red or dark brown urine, or red or black, tarry stools · Bloody vomit or vomit that looks like coffee grounds · Bleeding that does not stop or bruises that do not heal 
· Purplish red, net-like, blotchy spots on the skin Common side effects: · Minor bleeding or bruising © 2017 [address]  Information is for End User's use only and may not be sold, redistributed or otherwise used for commercial purposes. Oxycodone, Rapid Release (ETH-Oxydose, Oxy IR, Roxicodone) - (By mouth) Why this medicine is used:  
Treats moderate to severe pain. This medicine is a narcotic pain reliever. Contact a nurse or doctor right away if you have: 
· Fast or slow heart beat, shallow breathing, blue lips, skin or fingernails · Anxiety, restlessness, fever, sweating, muscle spasms, twitching, seeing or hearing things that are not there · Extreme weakness, shallow breathing, slow heartbeat · Severe confusion, lightheadedness, dizziness, fainting · Sweating or cold, clammy skin, seizures · Severe constipation, stomach pain, nausea, vomiting Common side effects: · Mild constipation · Sleepiness, tiredness © 2017 [facility] Information is for End User's use only and may not be sold, redistributed or otherwise used for commercial purposes. Celecoxib (CeleBREX) - (By mouth) Why this medicine is used:  
Treats pain. Contact a nurse or doctor right away if you have: · Chest pain, shortness of breath · Numbness or weakness in your arm or leg, or on one side of your body · Severe stomach pain, vomiting blood, bloody or black tarry stools · Swelling in your hands, ankles, or feet; rapid weight gain · Change in how much or how often you urinate Common side effects: 
· Nausea, diarrhea · Mild skin rash · Headache © 2017 [facility] Information is for End User's use only and may not be sold, redistributed or otherwise used for commercial purposes. Please provide this summary of care documentation to your next provider. Signatures-by signing, you are acknowledging that this After Visit Summary has been reviewed with you and you have received a copy. Patient Signature:  ____________________________________________________________ Date:  ____________________________________________________________  
  
Shea Moritz Provider Signature:  ____________________________________________________________ Date:  ____________________________________________________________

## 2018-02-07 NOTE — BRIEF OP NOTE
BRIEF OPERATIVE NOTE    Date of Procedure: 2/7/2018   Preoperative Diagnosis: DJD LEFT KNEE  Postoperative Diagnosis: DJD LEFT KNEE    Procedure(s):  LEFT TOTAL KNEE ARTHROPLASTY   Surgeon(s) and Role:     * Donald John MD - Primary         Assistant Staff: Physician Assistant: Ale Deleon PA-C      Surgical Staff:  Circ-1: Lenny Mohs, RN  Circ-Relief: Sidra Kingsley RN  Physician Assistant: Ale Deleon PA-C  Scrub Tech-Relief: Janine Nunez  Scrub RN-1: Conrad Guardado  Surg Asst-1: Zenobia Service  Event Time In   Incision Start 1208   Incision Close 1340     Anesthesia: Spinal   Estimated Blood Loss: 200 mL  Specimens: * No specimens in log *   Findings: DJD Left knee   Complications: None. Implants:   Implant Name Type Inv.  Item Serial No.  Lot No. LRB No. Used Action   CEMENT BNE SMARTSET HV 40GM -- SMARTSET - SN/A  CEMENT BNE SMARTSET HV 40GM -- SMARTSET N/A [employer] [id number] Left 2 Implanted   FEM PS SZ 7 LT JUAN -- ATTUNE - SN/A Joint Component FEM PS SZ 7 LT JUAN -- ATTUNE N/A [employer] [id number] Left 1 Implanted   ATTUNE TIBIAL COMPONENT SIZE 6 CEMENTED Joint Component  N/A [employer] [id number] Left 1 Implanted   PAT JUAN DOME MEDIAL 38MM -- ATTUNE - SN/A Joint Component PAT JUAN DOME MEDIAL 38MM -- ATTUNE N/A [employer] [id number] Left 1 Implanted   INSERT TIB FB PS SZ 7 5MM -- ATTUNE - SN/A   INSERT TIB FB PS SZ 7 5MM -- ATTUNE N/A [employer] [id number] Left 1 Implanted

## 2018-02-07 NOTE — PROGRESS NOTES
Bedside and Verbal shift change report given to Sergei Cooper RN (oncoming nurse) by Han Da Silva RN (offgoing nurse). Report included the following information SBAR, Kardex, Intake/Output, MAR and Recent Results.

## 2018-02-08 LAB
ANION GAP SERPL CALC-SCNC: 7 MMOL/L (ref 5–15)
BUN SERPL-MCNC: 15 MG/DL (ref 6–20)
BUN/CREAT SERPL: 15 (ref 12–20)
CALCIUM SERPL-MCNC: 8.2 MG/DL (ref 8.5–10.1)
CHLORIDE SERPL-SCNC: 101 MMOL/L (ref 97–108)
CO2 SERPL-SCNC: 28 MMOL/L (ref 21–32)
CREAT SERPL-MCNC: 1 MG/DL (ref 0.7–1.3)
GLUCOSE SERPL-MCNC: 98 MG/DL (ref 65–100)
HGB BLD-MCNC: 14.6 G/DL (ref 12.1–17)
INR PPP: 1.3 (ref 0.9–1.1)
POTASSIUM SERPL-SCNC: 4 MMOL/L (ref 3.5–5.1)
PROTHROMBIN TIME: 13.2 SEC (ref 9–11.1)
SODIUM SERPL-SCNC: 136 MMOL/L (ref 136–145)

## 2018-02-08 PROCEDURE — 80048 BASIC METABOLIC PNL TOTAL CA: CPT | Performed by: PHYSICIAN ASSISTANT

## 2018-02-08 PROCEDURE — 97110 THERAPEUTIC EXERCISES: CPT

## 2018-02-08 PROCEDURE — 85018 HEMOGLOBIN: CPT | Performed by: PHYSICIAN ASSISTANT

## 2018-02-08 PROCEDURE — 85610 PROTHROMBIN TIME: CPT | Performed by: PHYSICIAN ASSISTANT

## 2018-02-08 PROCEDURE — 36415 COLL VENOUS BLD VENIPUNCTURE: CPT | Performed by: PHYSICIAN ASSISTANT

## 2018-02-08 PROCEDURE — 65270000029 HC RM PRIVATE

## 2018-02-08 PROCEDURE — 97165 OT EVAL LOW COMPLEX 30 MIN: CPT

## 2018-02-08 PROCEDURE — 94640 AIRWAY INHALATION TREATMENT: CPT

## 2018-02-08 PROCEDURE — 74011000250 HC RX REV CODE- 250: Performed by: PHYSICIAN ASSISTANT

## 2018-02-08 PROCEDURE — 97535 SELF CARE MNGMENT TRAINING: CPT

## 2018-02-08 PROCEDURE — 74011250637 HC RX REV CODE- 250/637: Performed by: NURSE PRACTITIONER

## 2018-02-08 PROCEDURE — 74011250637 HC RX REV CODE- 250/637: Performed by: PHYSICIAN ASSISTANT

## 2018-02-08 PROCEDURE — 97116 GAIT TRAINING THERAPY: CPT

## 2018-02-08 PROCEDURE — 74011250636 HC RX REV CODE- 250/636: Performed by: PHYSICIAN ASSISTANT

## 2018-02-08 RX ORDER — CELECOXIB 200 MG/1
200 CAPSULE ORAL DAILY
Qty: 30 CAP | Refills: 0 | Status: SHIPPED | OUTPATIENT
Start: 2018-02-08 | End: 2018-03-10

## 2018-02-08 RX ORDER — WARFARIN 2.5 MG/1
2.5 TABLET ORAL DAILY
Qty: 90 TAB | Refills: 1 | Status: SHIPPED | OUTPATIENT
Start: 2018-02-08 | End: 2018-04-30 | Stop reason: ALTCHOICE

## 2018-02-08 RX ORDER — OXYCODONE HYDROCHLORIDE 5 MG/1
5-10 TABLET ORAL
Qty: 80 TAB | Refills: 0 | Status: SHIPPED | OUTPATIENT
Start: 2018-02-08 | End: 2018-04-30 | Stop reason: ALTCHOICE

## 2018-02-08 RX ORDER — WARFARIN SODIUM 5 MG/1
5 TABLET ORAL
Status: COMPLETED | OUTPATIENT
Start: 2018-02-08 | End: 2018-02-08

## 2018-02-08 RX ADMIN — Medication 10 ML: at 21:04

## 2018-02-08 RX ADMIN — OXYCODONE HYDROCHLORIDE 5 MG: 5 TABLET ORAL at 15:55

## 2018-02-08 RX ADMIN — OXYCODONE HYDROCHLORIDE 10 MG: 5 TABLET ORAL at 03:34

## 2018-02-08 RX ADMIN — Medication 2 G: at 03:34

## 2018-02-08 RX ADMIN — POTASSIUM CHLORIDE 10 MEQ: 750 TABLET, FILM COATED, EXTENDED RELEASE ORAL at 11:38

## 2018-02-08 RX ADMIN — WARFARIN SODIUM 5 MG: 5 TABLET ORAL at 07:36

## 2018-02-08 RX ADMIN — RAMIPRIL 10 MG: 2.5 CAPSULE ORAL at 21:39

## 2018-02-08 RX ADMIN — DOCUSATE SODIUM AND SENNOSIDES 1 TABLET: 8.6; 5 TABLET, FILM COATED ORAL at 09:37

## 2018-02-08 RX ADMIN — RAMIPRIL 10 MG: 2.5 CAPSULE ORAL at 11:40

## 2018-02-08 RX ADMIN — CELECOXIB 200 MG: 200 CAPSULE ORAL at 09:37

## 2018-02-08 RX ADMIN — METOPROLOL SUCCINATE 50 MG: 50 TABLET, EXTENDED RELEASE ORAL at 09:37

## 2018-02-08 RX ADMIN — ALBUTEROL SULFATE 2.5 MG: 2.5 SOLUTION RESPIRATORY (INHALATION) at 08:14

## 2018-02-08 RX ADMIN — ACETAMINOPHEN 650 MG: 325 TABLET, FILM COATED ORAL at 03:34

## 2018-02-08 RX ADMIN — POLYETHYLENE GLYCOL 3350 17 G: 17 POWDER, FOR SOLUTION ORAL at 09:36

## 2018-02-08 RX ADMIN — ACETAMINOPHEN 650 MG: 325 TABLET, FILM COATED ORAL at 09:37

## 2018-02-08 RX ADMIN — OXYCODONE HYDROCHLORIDE 5 MG: 5 TABLET ORAL at 09:42

## 2018-02-08 RX ADMIN — OXYCODONE HYDROCHLORIDE 5 MG: 5 TABLET ORAL at 19:40

## 2018-02-08 RX ADMIN — BUDESONIDE 500 MCG: 0.5 INHALANT RESPIRATORY (INHALATION) at 08:14

## 2018-02-08 RX ADMIN — OXYCODONE HYDROCHLORIDE 10 MG: 5 TABLET ORAL at 06:42

## 2018-02-08 RX ADMIN — Medication 1000 MCG: at 09:37

## 2018-02-08 RX ADMIN — ACETAMINOPHEN 650 MG: 325 TABLET, FILM COATED ORAL at 15:55

## 2018-02-08 RX ADMIN — ALBUTEROL SULFATE 2.5 MG: 2.5 SOLUTION RESPIRATORY (INHALATION) at 14:08

## 2018-02-08 RX ADMIN — DOCUSATE SODIUM AND SENNOSIDES 1 TABLET: 8.6; 5 TABLET, FILM COATED ORAL at 17:27

## 2018-02-08 RX ADMIN — CELECOXIB 200 MG: 200 CAPSULE ORAL at 17:27

## 2018-02-08 RX ADMIN — PANTOPRAZOLE SODIUM 40 MG: 40 TABLET, DELAYED RELEASE ORAL at 06:42

## 2018-02-08 RX ADMIN — SODIUM CHLORIDE 125 ML/HR: 900 INJECTION, SOLUTION INTRAVENOUS at 05:54

## 2018-02-08 RX ADMIN — HYDROCHLOROTHIAZIDE 12.5 MG: 25 TABLET ORAL at 09:37

## 2018-02-08 RX ADMIN — ACETAMINOPHEN 650 MG: 325 TABLET, FILM COATED ORAL at 21:03

## 2018-02-08 RX ADMIN — PRAVASTATIN SODIUM 10 MG: 10 TABLET ORAL at 21:03

## 2018-02-08 RX ADMIN — BENZOCAINE AND MENTHOL 1 LOZENGE: 15; 3.6 LOZENGE ORAL at 10:29

## 2018-02-08 RX ADMIN — OXYCODONE HYDROCHLORIDE 5 MG: 5 TABLET ORAL at 12:48

## 2018-02-08 NOTE — PROGRESS NOTES
CM reviewed chart. CM noted pt had left total knee arthroplasty wit history of arrhythmia, arthritis, asthma, GERD, high cholesterol, HTN, and sleep apnea. CM met with pt to introduce self and role and offer support. Bedside assessment completed. CURRENT LIVING SITUATION-  Pt states he lives with his wife in a private residence. Pt was driving prior to this hospital stay and has assistance with transportation as needed. Pt denies use of assistive devices and was independent with ADL's. DISCHARGE PLANNING-  Pt denies need for assistance with medications, transportation, and follow up appointments. Disposition plan is to discharge home in care of family, home health, and self. CM offered choice of Home Health, pt selected [facility] home health. Referral sent via [employer]. Salima Hernandez RN CRM      Pt is a low risk for readmission      Care Management Interventions  PCP Verified by CM:  Yes  Palliative Care Criteria Met (RRAT>21 & CHF Dx)?: No  Mode of Transport at Discharge: Self (Private car)  Transition of Care Consult (CM Consult): [address]: No  Reason Outside [place]: Out of service area, Patient already serviced by other home care/hospice agency ([facility])  Current Support Network: Lives with Spouse, Own Home  Confirm Follow Up Transport: Family  Plan discussed with Pt/Family/Caregiver: Yes  Freedom of Choice Offered: Yes  Discharge Location  Discharge Placement: Home with home health

## 2018-02-08 NOTE — PROGRESS NOTES
Problem: Mobility Impaired (Adult and Pediatric)  Goal: *Acute Goals and Plan of Care (Insert Text)  Physical Therapy Goals  Initiated 2/7/2018    1. Patient will move from supine to sit and sit to supine, scoot up and down and roll side to side in bed with modified independence within 4 days. 2. Patient will perform sit to stand with modified independence within 4 days. 3. Patient will ambulate with modified independence for 200 feet with the least restrictive device within 4 days. 4. Patient will ascend/descend 3 stairs with single handrail with modified independence within 4 days. 5. Patient will perform home exercise program per protocol with modified independence within 4 days. 6. Patient will demonstrate AROM 0-90 degrees in operative joint within 4 days. physical Therapy TREATMENT  Patient: Lucho Weems (89 y.o. male)  Date: 2/8/2018  Diagnosis: DJD LEFT KNEE  Primary osteoarthritis of left knee Primary osteoarthritis of left knee  Procedure(s) (LRB):  LEFT TOTAL KNEE ARTHROPLASTY  (Left) 1 Day Post-Op  Precautions: Fall, WBAT (L TKR)  Chart, physical therapy assessment, plan of care and goals were reviewed. ASSESSMENT:  Pt was agreeable to therapy. Pt reports feeling too medicated. Pt was able to increase gait tolerance and decrease assist level. pt had variable BP with treatment. Pt seated /76 HR 78, post gait 169/111 HR 80. Pt has received BP medication and pain medicine. Will continue to progress as able. Pt owns rolling walker from previous right TKR in 2017. Pt should be ready for discharge tomorrow. Progression toward goals:  [x]      Improving appropriately and progressing toward goals  []      Improving slowly and progressing toward goals  []      Not making progress toward goals and plan of care will be adjusted     PLAN:  Patient continues to benefit from skilled intervention to address the above impairments. Continue treatment per established plan of care.   Discharge Recommendations:  Home Health  Further Equipment Recommendations for Discharge: Owns rolling walker     SUBJECTIVE:   Patient stated I am sleepy.     OBJECTIVE DATA SUMMARY:   Critical Behavior:  Neurologic State: Alert, Appropriate for age  Orientation Level: Oriented X4  Cognition: Appropriate decision making, Appropriate for age attention/concentration, Appropriate safety awareness, Follows commands  Safety/Judgement: Awareness of environment, Fall prevention, Insight into deficits  Range of Motion:      unable due to ace wrap                      Functional Mobility Training:  Bed Mobility:     Supine to Sit: Minimum assistance               Transfers:  Sit to Stand: Minimum assistance  Stand to Sit: Contact guard assistance                             Balance:  Sitting: Intact  Standing: Impaired  Standing - Static: Good  Standing - Dynamic : Good  Ambulation/Gait Training:  Distance (ft): 180 Feet (ft)  Assistive Device: Gait belt;Walker, rolling  Ambulation - Level of Assistance: Contact guard assistance;Assist x2        Gait Abnormalities: Antalgic;Decreased step clearance        Base of Support: Shift to right;Widened     Speed/Janette: Pace decreased (<100 feet/min)                      Stairs:            Therapeutic Exercises:     EXERCISE   Sets   Reps   Active Active Assist   Passive Self ROM   Comments   Ankle Pumps  10 [x]                                []                                []                                []                                   Quad Sets  10 [x]                                []                                []                                []                                   Hamstring Sets   []                                []                                []                                []                                   Short Arc Quads   []                                []                                []                                [] Knee Extension Stretch     []                                  []                                  []                                  []                                   Heel Slides   []                                []                                []                                []                                   Long Arc Quads   []                                []                                []                                []                                   Knee Flexion Stretch   []                                []                                []                                []                                   Straight Leg Raises   []                                []                                []                                []                                       Pain:  Pain Scale 1: Numeric (0 - 10)  Pain Intensity 1: 4  Pain Location 1: Knee  Pain Orientation 1: Left  Pain Description 1: Aching  Pain Intervention(s) 1: Ice  Activity Tolerance:   Limited   Please refer to the flowsheet for vital signs taken during this treatment.   After treatment:   [x] Patient left in no apparent distress sitting up in chair  [] Patient left in no apparent distress in bed  [x] Call bell left within reach  [x] Nursing notified  [] Caregiver present  [] Bed alarm activated    COMMUNICATION/COLLABORATION:   The patients plan of care was discussed with: Registered Nurse    Junnie Klinefelter, PTA   Time Calculation: 31 mins

## 2018-02-08 NOTE — OP NOTES
[address]  ACUTE CARE OP NOTE    Alejandro Stein  MR#: [id number]  : 1950  ACCOUNT #: [de-identified]   DATE OF SERVICE: 2018    PREOPERATIVE DIAGNOSES:    1. Advanced degenerative arthritis of left knee. 2.  Status post right total knee replacement. POSTOPERATIVE DIAGNOSES:    1. Advanced degenerative arthritis of left knee. 2.  Status post right total knee replacement. PROCEDURE:  Left total knee replacement. ANESTHESIA:  Spinal.    ESTIMATED BLOOD LOSS:  200 mL. DRAIN:  None. COMPLICATIONS:  None. SPECIMENS:  Tibial and femoral bone fragments. SURGEON:  Kathy Dao MD.     ASSISTANT:  Reina Rowan PA-C. IMPLANTS:  Left total knee components as described in the operative report. INDICATIONS:  The patient has previously undergone right total knee replacement. He now presents for left total knee replacement. PROCEDURE:  On day of operation, the patient was taken to the operating room, spinal anesthesia administered, the patient was placed in supine position. Left lower extremity was prepped and draped in the usual fashion. After exsanguination with an Esmarch, tourniquet inflated to 300 mmHg. A midline longitudinal incision was made over the knee, it was carried through subcutaneous tissue. A medial parapatellar capsular incision made. Advanced degenerative changes are noted throughout the knee. The knee was debrided of osteophytes and soft tissue. A drill was used to gain access to the femoral canal.  Distal femoral cutting guide assembled with a 5 degree valgus cut. Distal femoral cut was made with an oscillating saw. The femur was sized and felt to be a #7 in the Attune system. Anterior and posterior cuts were made, chamfer cuts were made. Box cut for the posterior stabilized prosthesis made. The external tibial alignment guide assembled, tibial plateau cut was made with an oscillating saw.   Flexion and extension gaps were evaluated and felt to be appropriate. The patella was prepared with an oscillating saw and sized for 38 mm. The tibia was sized and felt to be a #6 in the Attune system. Drill and punch were used to prepare the tibial plateau. Trial components were put into place. A 5 mm insert gave the best fit, range of motion, with proper alignment and proper tracking of the patella, the ligaments were felt to be properly balanced. Trial components were then removed. The knee was thoroughly irrigated with pulse irrigation as well as antibiotic solution. The components were cemented into place. The 5 mm trial insert was put into place. Soft tissues were infiltrated with Exparel local anesthetic. After the cement matured, the 5 mm insert was put into place. Tourniquet released. Coagulation achieved with electrocautery. Capsule repaired with #1 Vicryl suture supplemented with #2 PDS, 2-0 Vicryl was used to close subcutaneous tissue and staples used to close the skin. Sterile dressings were applied. Patient taken to recovery room in satisfactory condition. MD ANI ReyesG / NAE  D: 02/07/2018 16:43     T: 02/07/2018 19:28  JOB #: [id number]  CC: Dulce Riley MD  CC: Kolton Verdugo PA-C

## 2018-02-08 NOTE — PROGRESS NOTES
Bedside shift change report given to Raul Esposito RN (oncoming nurse) by Vicky Thurston RN (offgoing nurse). Report included the following information SBAR, Kardex and Recent Results.

## 2018-02-08 NOTE — PROGRESS NOTES
Problem: Mobility Impaired (Adult and Pediatric)  Goal: *Acute Goals and Plan of Care (Insert Text)  Physical Therapy Goals  Initiated 2/7/2018    1. Patient will move from supine to sit and sit to supine, scoot up and down and roll side to side in bed with modified independence within 4 days. 2. Patient will perform sit to stand with modified independence within 4 days. 3. Patient will ambulate with modified independence for 200 feet with the least restrictive device within 4 days. 4. Patient will ascend/descend 3 stairs with single handrail with modified independence within 4 days. 5. Patient will perform home exercise program per protocol with modified independence within 4 days. 6. Patient will demonstrate AROM 0-90 degrees in operative joint within 4 days. physical Therapy TREATMENT  Patient: Adilia Hart (23 y.o. male)  Date: 2/8/2018  Diagnosis: DJD LEFT KNEE  Primary osteoarthritis of left knee Primary osteoarthritis of left knee  Procedure(s) (LRB):  LEFT TOTAL KNEE ARTHROPLASTY  (Left) 1 Day Post-Op  Precautions: Fall, WBAT (L TKR)  Chart, physical therapy assessment, plan of care and goals were reviewed. ASSESSMENT:  Pt was agreeable to mobilize. Pt reports pain but was able to increase gait tolerance and perform steps for home entry. Pt may benefit from a second trial on the steps. Pt should be ready for discharge post AM treatment tomorrow. Pt reports brother will pick him up but has a 2 hour drive. Pt has a rolling walker at home and will need HHPT  Progression toward goals:  [x]      Improving appropriately and progressing toward goals  []      Improving slowly and progressing toward goals  []      Not making progress toward goals and plan of care will be adjusted     PLAN:  Patient continues to benefit from skilled intervention to address the above impairments. Continue treatment per established plan of care.   Discharge Recommendations:  Home Health  Further Equipment Recommendations for Discharge: Owns rolling walker     SUBJECTIVE:   Patient stated I can do it.     OBJECTIVE DATA SUMMARY:   Critical Behavior:  Neurologic State: Alert, Appropriate for age  Orientation Level: Oriented X4  Cognition: Appropriate decision making, Appropriate for age attention/concentration, Appropriate safety awareness, Follows commands  Safety/Judgement: Awareness of environment, Fall prevention, Insight into deficits  Range of Motion:                    LLE AROM  L Knee Flexion: 55  L Knee Extension: -9     Functional Mobility Training:  Bed Mobility:     Supine to Sit: Minimum assistance  Sit to Supine: Contact guard assistance            Transfers:  Sit to Stand: Stand-by asssistance  Stand to Sit: Stand-by asssistance                             Balance:  Sitting: Intact  Standing: Impaired  Standing - Static: Good  Standing - Dynamic : Good  Ambulation/Gait Training:  Distance (ft): 180 Feet (ft)  Assistive Device: Gait belt;Walker, rolling  Ambulation - Level of Assistance: Stand-by asssistance        Gait Abnormalities: Antalgic;Decreased step clearance        Base of Support: Shift to right;Widened     Speed/Janette: Pace decreased (<100 feet/min)                      Stairs:  Number of Stairs Trained: 4  Stairs - Level of Assistance: Contact guard assistance  Rail Use: Left  (+cane)  Therapeutic Exercises:     EXERCISE   Sets   Reps   Active Active Assist   Passive Self ROM   Comments   Ankle Pumps  10 x                                []                                []                                []                                   Quad Sets  10 [x]                                []                                []                                []                                   Hamstring Sets   []                                []                                []                                []                                   Short Arc Quads   [x]                                [] []                                []                                   Knee Extension Stretch  2min   []                                  []                                  []                                  []                                   Heel Slides  10 []                                [x]                                []                                []                                   Long Arc Quads   []                                []                                []                                []                                   Knee Flexion Stretch  10 [x]                                []                                []                                []                                   Straight Leg Raises   []                                []                                []                                []                                       Pain:  Pain Scale 1: Numeric (0 - 10)  Pain Intensity 1: 3  Pain Location 1: Knee  Pain Orientation 1: Left  Pain Description 1: Aching  Pain Intervention(s) 1: Ice  Activity Tolerance:   Limited   Please refer to the flowsheet for vital signs taken during this treatment.   After treatment:   [] Patient left in no apparent distress sitting up in chair  [x] Patient left in no apparent distress in bed  [x] Call bell left within reach  [x] Nursing notified  [] Caregiver present  [] Bed alarm activated    COMMUNICATION/COLLABORATION:   The patients plan of care was discussed with: Registered Nurse    Shanae Mas PTA   Time Calculation: 39 mins

## 2018-02-08 NOTE — PROGRESS NOTES
Bedside and Verbal shift change report given to guicho (oncoming nurse) by Debra Gan (offgoing nurse). Report included the following information SBAR, Kardex, Procedure Summary, Intake/Output, MAR and Recent Results.

## 2018-02-08 NOTE — PROGRESS NOTES
Problem: Falls - Risk of  Goal: *Absence of Falls  Document Omar Fall Risk and appropriate interventions in the flowsheet.    Outcome: Progressing Towards Goal  Fall Risk Interventions:  Mobility Interventions: Patient to call before getting OOB, PT Consult for mobility concerns, PT Consult for assist device competence         Medication Interventions: Teach patient to arise slowly, Patient to call before getting OOB    Elimination Interventions: Call light in reach, Patient to call for help with toileting needs    History of Falls Interventions: Consult care management for discharge planning, Door open when patient unattended, Investigate reason for fall

## 2018-02-08 NOTE — PROGRESS NOTES
TOTAL KNEE PROGRESS NOTE      Subjective:     Post-Operative Day: 1 Status Post left Total Knee Arthroplasty  Systemic or Specific Complaints:No Complaints    Objective:     Patient Vitals for the past 24 hrs:   BP Temp Pulse Resp SpO2   02/08/18 0833 (!) 153/103 98 °F (36.7 °C) 80 16 95 %   02/08/18 0815 - - - - 96 %   02/08/18 0351 150/89 - - - -   02/08/18 0346 151/90 98.2 °F (36.8 °C) 84 16 95 %   02/07/18 2322 158/89 98.4 °F (36.9 °C) 79 16 97 %   02/07/18 2300 - - - - 96 %   02/07/18 1932 152/79 97.8 °F (36.6 °C) 72 16 95 %   02/07/18 1800 150/80 98.4 °F (36.9 °C) 75 17 95 %   02/07/18 1719 (!) 144/92 98.3 °F (36.8 °C) 71 16 94 %   02/07/18 1618 134/77 97.5 °F (36.4 °C) 70 16 98 %   02/07/18 1516 152/83 97.5 °F (36.4 °C) 68 16 100 %   02/07/18 1443 - 97.7 °F (36.5 °C) - - 100 %   02/07/18 1440 - - 65 16 100 %   02/07/18 1430 137/82 - 67 18 98 %   02/07/18 1415 137/80 - 65 16 100 %   02/07/18 1405 (!) 145/93 - 69 16 92 %   02/07/18 1400 128/77 - 71 16 94 %   02/07/18 1356 118/72 97.6 °F (36.4 °C) 72 16 95 %   02/07/18 1355 113/69 - 74 20 95 %   02/07/18 1353 118/72 97.6 °F (36.4 °C) 77 15 97 %       General: alert, cooperative, no distress, appears stated age   Wound: Wound clean and dry no evidence of infection. , No Erythema, No Edema, No Drainage and Wound Intact   Motion: Extension: Full Extension   DVT Exam: No evidence of DVT seen on physical exam.  Negative Vaughn's sign. No cords or calf tenderness. No significant calf/ankle edema.      Data Review:    Recent Results (from the past 24 hour(s))   METABOLIC PANEL, BASIC    Collection Time: 02/08/18  3:44 AM   Result Value Ref Range    Sodium 136 136 - 145 mmol/L    Potassium 4.0 3.5 - 5.1 mmol/L    Chloride 101 97 - 108 mmol/L    CO2 28 21 - 32 mmol/L    Anion gap 7 5 - 15 mmol/L    Glucose 98 65 - 100 mg/dL    BUN 15 6 - 20 MG/DL    Creatinine 1.00 0.70 - 1.30 MG/DL    BUN/Creatinine ratio 15 12 - 20      GFR est AA >60 >60 ml/min/1.73m2    GFR est non-AA >60 >60 ml/min/1.73m2    Calcium 8.2 (L) 8.5 - 10.1 MG/DL   HEMOGLOBIN    Collection Time: 02/08/18  3:44 AM   Result Value Ref Range    HGB 14.6 12.1 - 17.0 g/dL   PROTHROMBIN TIME + INR    Collection Time: 02/08/18  3:44 AM   Result Value Ref Range    INR 1.3 (H) 0.9 - 1.1      Prothrombin time 13.2 (H) 9.0 - 11.1 sec         Assessment:     Status Post left Total Knee Arthroplasty. Doing well postoperatively. . Bandage aquacell, clean/dry. Labs stable. PT progressing well. Pain control WNL.      Plan:     Continues current post-op course  Continue PT per protocol  Plan to discharge to Home [facility]. tomorrow, if cleared by PT.

## 2018-02-08 NOTE — PROGRESS NOTES
Occupational Therapy EVALUATION/discharge  Patient: Jigna Sin (23 y.o. male)  Date: 2/8/2018  Primary Diagnosis: DJD LEFT KNEE  Primary osteoarthritis of left knee  Procedure(s) (LRB):  LEFT TOTAL KNEE ARTHROPLASTY  (Left) 1 Day Post-Op   Precautions:   Fall, WBAT (L TKR)    ASSESSMENT:   Based on the objective data described below, the patient presents with ability to complete ADL tasks at min A to SBA at RW level s/p L TKA with hx of R TKA in October 2017. Nursing cleared for therapy and present in room at beginning and end of session. He lives with his wife who he reports will assist with distal LB dressing/bathing. Denies need for dressing aides. Amb at RW level and does report one fall in last few weeks. Provided education on home safety, toileting transfers, and LB dressing tasks with good understanding. Patient preseverating on urination attempting in urinal in front of chair, in bathroom and sitting EOB, small amount voided. Recommend supervision and assist from wife at home. Further skilled acute occupational therapy is not indicated at this time. Discharge Recommendations: Home Health vs None  Further Equipment Recommendations for Discharge: has all needed DME      SUBJECTIVE:   Patient stated You know I just can't go right now.     OBJECTIVE DATA SUMMARY:   HISTORY:   Past Medical History:   Diagnosis Date    Adverse effect of anesthesia     went apneic post surgery at [facility]    Anesthesia complication 2784    APNEA DURING HERNIA REPAIR, POST OPERATIVE INTUBATION AT [facility]    Arrhythmia     FREQUENT PVCS    Arthritis     Asthma     HAS BRONCHITIS OFTEN    Cancer ([facility] [address]     SKIN- UNKNOWN    Colon ulcer, aphthous 4/1/2010    Finger amputation, traumatic 2003    R 2ND DIGIT    GERD (gastroesophageal reflux disease) 4/1/2010    High cholesterol 4/1/2010    HTN (hypertension), benign 4/1/2010    CONTROLLED BY MEDS    Hx MRSA infection 2013    CYSTS BL LEGS    Sleep apnea     CPAP Past Surgical History:   Procedure Laterality Date    ABDOMEN SURGERY PROC UNLISTED  2001    hernia    HX APPENDECTOMY  1959    HX HEENT      T&A    HX KNEE REPLACEMENT Right 10/2017    HX ORTHOPAEDIC  1994    left knee scope    HX ORTHOPAEDIC  01/06/2017    RT KNEE ARTHROSCOPY WITH MINESCECTOMY    HX OTHER SURGICAL  1990S    CERVICAL- UNKNOWN    HX OTHER SURGICAL      R HAND 2ND DIGIT TRAUMATIC AMPUTATION REPAIR    HX TONSIL AND ADENOIDECTOMY         Prior Level of Function/Environment/Context: Home with wife. Cane use following R TKA. Light assist with LB dressing from wife as needed. Home Situation  Home Environment: Private residence  # Steps to Enter: 3  Rails to Enter: Yes  Hand Rails : Bilateral (> arm's width apart)  Wheelchair Ramp: No  One/Two Story Residence: One story  Living Alone: No  Support Systems: Spouse/Significant Other/Partner, Family member(s), Friends \ neighbors  Patient Expects to be Discharged to[de-identified] Private residence  Current DME Used/Available at Home: Compression garments, Blood pressure cuff, Cane, straight, Walker, rolling  Tub or Shower Type: Tub/Shower combination  [x]  Right hand dominant   []  Left hand dominant    EXAMINATION OF PERFORMANCE DEFICITS:  Cognitive/Behavioral Status:  Neurologic State: Alert; Appropriate for age  Orientation Level: Oriented X4  Cognition: Appropriate decision making; Appropriate for age attention/concentration; Appropriate safety awareness; Follows commands             Skin: uppers visible intact    Edema: uppers none    Hearing: Auditory  Auditory Impairment: None    Vision/Perceptual:                           Acuity: Within Defined Limits    Corrective Lenses: Glasses    Range of Motion:  BUE WDL                Strength:  BUE normal                   Coordination:     Fine Motor Skills-Upper: Left Intact; Right Intact    Gross Motor Skills-Upper: Left Intact; Right Intact    Tone & Sensation:  BUE normal Balance:  Sitting: Intact  Standing: Impaired  Standing - Static: Good  Standing - Dynamic : Good    Functional Mobility and Transfers for ADLs:  Bed Mobility:  Supine to Sit: Minimum assistance  Sit to Supine: Contact guard assistance    Transfers:  Sit to Stand: Stand-by asssistance  Stand to Sit: Stand-by asssistance  Toilet Transfer : Stand-by asssistance    ADL Assessment:  Feeding: Independent    Oral Facial Hygiene/Grooming: Contact guard assistance    Bathing: Moderate assistance    Upper Body Dressing: Supervision    Lower Body Dressing: Moderate assistance    Toileting: Moderate assistance                ADL Intervention and task modifications:       Bathing: Patient instructed and indicated understanding when bathing to not submerge wound in water, stand to shower or sponge bathe, and follow dc instructions. Dressing joint: Patient instructed and demonstrated understanding to don/doff RLE first/last.  Patient instructed and demonstrated to don all clothing while sitting prior to standing, doff all clothing to knees while standing, then sit to doff clothing off from knees to feet in order to facilitate fall prevention, pain management, and energy conservation. Home safety: Patient instructed and indicated understanding on home modifications and safety (raise height of ADL objects, appropriate height of chair surfaces, recliner safety, change of floor surfaces, clear pathways) to increase independence and fall prevention. Standing: Patient instructed and demonstrated during ADLs to walk up to sink/counter top/surfaces, step into walker to increase safety of joint and fall prevention with Supervision. Patient educated about knee anatomy verbally and with pictures and educated to avoid rotation of R LE. Instructed to apply concept to ADLs within the home (no twisting of knee during reaching across body, square off while using objects, slide objects along surfaces).   Patient instructed and indicated understanding to increase amount of time standing, observe standing position during ADLs in order to increase even weight bearing through bilateral LEs in order to increase independence with ADLs. Goal to be reached 30 days post - op, per orthopedic surgeon or per PT. Tub/shower transfer: Patient instructed and indicated understanding regarding when it is safe to begin transfer into tub/shower (complete stairs with PT, advance exercises with PT high enough to clear tub/shower height). Patient instructed to use the same technique as used with stairs when entering and exiting tub/shower (\"up with the non-surgical, down with the surgical leg\"). Functional Measure:  Barthel Index:    Bathin  Bladder: 5  Bowels: 10  Groomin  Dressin  Feedin  Mobility: 5  Stairs: 5  Toilet Use: 10  Transfer (Bed to Chair and Back): 10  Total: 60       Barthel and G-code impairment scale:  Percentage of impairment CH  0% CI  1-19% CJ  20-39% CK  40-59% CL  60-79% CM  80-99% CN  100%   Barthel Score 0-100 100 99-80 79-60 59-40 20-39 1-19   0   Barthel Score 0-20 20 17-19 13-16 9-12 5-8 1-4 0      The Barthel ADL Index: Guidelines  1. The index should be used as a record of what a patient does, not as a record of what a patient could do. 2. The main aim is to establish degree of independence from any help, physical or verbal, however minor and for whatever reason. 3. The need for supervision renders the patient not independent. 4. A patient's performance should be established using the best available evidence. Asking the patient, friends/relatives and nurses are the usual sources, but direct observation and common sense are also important. However direct testing is not needed. 5. Usually the patient's performance over the preceding 24-48 hours is important, but occasionally longer periods will be relevant. 6. Middle categories imply that the patient supplies over 50 per cent of the effort.   7. Use of aids to be independent is allowed. Rubbie Huge., Barthel, D.W. (4251). Functional evaluation: the Barthel Index. [address] (14)2. Amaya Bitter jessica [place], J.J.M.F, Aminta Bolaños., Dimas Montanez., Eric Woodward, [address] (1999). Measuring the change indisability after inpatient rehabilitation; comparison of the responsiveness of the Barthel Index and Functional [place] Measure. Journal of Neurology, Neurosurgery, and Psychiatry, 66(4), 903-343. LARRY Womack, CLARISA Mcmanus, & Deo Nance M.A. (2004.) Assessment of post-stroke quality of life in cost-effectiveness studies: The usefulness of the Barthel Index and the EuroQoL-5D. Quality of Life Research, 13, 633-15         G codes: In compliance with CMSs Claims Based Outcome Reporting, the following G-code set was chosen for this patient based on their primary functional limitation being treated: The outcome measure chosen to determine the severity of the functional limitation was the barthel Index with a score of 60/100 which was correlated with the impairment scale. ?  Self Care:     - CURRENT STATUS: CJ - 20%-39% impaired, limited or restricted    - GOAL STATUS: CJ - 20%-39% impaired, limited or restricted    - D/C STATUS:  CJ - 20%-39% impaired, limited or restricted     Occupational Therapy Evaluation Charge Determination   History Examination Decision-Making   LOW Complexity : Brief history review  LOW Complexity : 1-3 performance deficits relating to physical, cognitive , or psychosocial skils that result in activity limitations and / or participation restrictions  LOW Complexity : No comorbidities that affect functional and no verbal or physical assistance needed to complete eval tasks       Based on the above components, the patient evaluation is determined to be of the following complexity level: LOW   Pain:  Pain Scale 1: Numeric (0 - 10)  Pain Intensity 1: 3  Pain Location 1: Knee  Pain Orientation 1: Left  Pain Description 1: Aching  Pain Intervention(s) 1: Ice  Activity Tolerance:   Good for extended standing with attempts for urination. No dizziness. After treatment:   []  Patient left in no apparent distress sitting up in chair  [x]  Patient left in no apparent distress in bed  [x]  Call bell left within reach  [x]  Nursing notified  []  Caregiver present  []  Bed alarm activated    COMMUNICATION/EDUCATION:   Communication/Collaboration:  [x]      Home safety education was provided and the patient/caregiver indicated understanding. [x]      Patient/family have participated as able and agree with findings and recommendations. []      Patient is unable to participate in plan of care at this time.   Findings and recommendations were discussed with: Physical Therapy Assistant, Registered Nurse and Patient    Jordan Taveras OT  Time Calculation: 30 mins

## 2018-02-09 VITALS
WEIGHT: 245 LBS | DIASTOLIC BLOOD PRESSURE: 82 MMHG | HEIGHT: 70 IN | SYSTOLIC BLOOD PRESSURE: 147 MMHG | RESPIRATION RATE: 16 BRPM | HEART RATE: 80 BPM | BODY MASS INDEX: 35.07 KG/M2 | OXYGEN SATURATION: 98 % | TEMPERATURE: 98.2 F

## 2018-02-09 LAB
HGB BLD-MCNC: 13.7 G/DL (ref 12.1–17)
INR PPP: 2.1 (ref 0.9–1.1)
PROTHROMBIN TIME: 22.1 SEC (ref 9–11.1)

## 2018-02-09 PROCEDURE — 94640 AIRWAY INHALATION TREATMENT: CPT

## 2018-02-09 PROCEDURE — 97110 THERAPEUTIC EXERCISES: CPT

## 2018-02-09 PROCEDURE — 85610 PROTHROMBIN TIME: CPT | Performed by: PHYSICIAN ASSISTANT

## 2018-02-09 PROCEDURE — 85018 HEMOGLOBIN: CPT | Performed by: PHYSICIAN ASSISTANT

## 2018-02-09 PROCEDURE — 36415 COLL VENOUS BLD VENIPUNCTURE: CPT | Performed by: PHYSICIAN ASSISTANT

## 2018-02-09 PROCEDURE — 74011250637 HC RX REV CODE- 250/637: Performed by: NURSE PRACTITIONER

## 2018-02-09 PROCEDURE — 97116 GAIT TRAINING THERAPY: CPT

## 2018-02-09 PROCEDURE — 74011250637 HC RX REV CODE- 250/637: Performed by: PHYSICIAN ASSISTANT

## 2018-02-09 PROCEDURE — 74011000250 HC RX REV CODE- 250: Performed by: PHYSICIAN ASSISTANT

## 2018-02-09 RX ORDER — WARFARIN 2 MG/1
2 TABLET ORAL
Status: COMPLETED | OUTPATIENT
Start: 2018-02-09 | End: 2018-02-09

## 2018-02-09 RX ADMIN — METOPROLOL SUCCINATE 50 MG: 50 TABLET, EXTENDED RELEASE ORAL at 09:08

## 2018-02-09 RX ADMIN — PANTOPRAZOLE SODIUM 40 MG: 40 TABLET, DELAYED RELEASE ORAL at 06:56

## 2018-02-09 RX ADMIN — WARFARIN SODIUM 2 MG: 2 TABLET ORAL at 09:08

## 2018-02-09 RX ADMIN — CELECOXIB 200 MG: 200 CAPSULE ORAL at 09:08

## 2018-02-09 RX ADMIN — OXYCODONE HYDROCHLORIDE 5 MG: 5 TABLET ORAL at 06:56

## 2018-02-09 RX ADMIN — ACETAMINOPHEN 650 MG: 325 TABLET, FILM COATED ORAL at 03:07

## 2018-02-09 RX ADMIN — FLUTICASONE PROPIONATE 2 SPRAY: 50 SPRAY, METERED NASAL at 09:07

## 2018-02-09 RX ADMIN — DOCUSATE SODIUM AND SENNOSIDES 1 TABLET: 8.6; 5 TABLET, FILM COATED ORAL at 09:08

## 2018-02-09 RX ADMIN — BUDESONIDE 500 MCG: 0.5 INHALANT RESPIRATORY (INHALATION) at 08:44

## 2018-02-09 RX ADMIN — Medication 1000 MCG: at 09:08

## 2018-02-09 RX ADMIN — OXYCODONE HYDROCHLORIDE 5 MG: 5 TABLET ORAL at 13:25

## 2018-02-09 RX ADMIN — HYDROCHLOROTHIAZIDE 12.5 MG: 25 TABLET ORAL at 09:09

## 2018-02-09 RX ADMIN — OXYCODONE HYDROCHLORIDE 5 MG: 5 TABLET ORAL at 01:26

## 2018-02-09 RX ADMIN — ACETAMINOPHEN 650 MG: 325 TABLET, FILM COATED ORAL at 09:09

## 2018-02-09 RX ADMIN — RAMIPRIL 10 MG: 2.5 CAPSULE ORAL at 09:07

## 2018-02-09 RX ADMIN — OXYCODONE HYDROCHLORIDE 5 MG: 5 TABLET ORAL at 10:15

## 2018-02-09 RX ADMIN — ALBUTEROL SULFATE 2.5 MG: 2.5 SOLUTION RESPIRATORY (INHALATION) at 03:46

## 2018-02-09 RX ADMIN — POTASSIUM CHLORIDE 10 MEQ: 750 TABLET, FILM COATED, EXTENDED RELEASE ORAL at 09:08

## 2018-02-09 NOTE — PROGRESS NOTES
ADULT PROTOCOL: JET AEROSOL ASSESSMENT    Patient  Lucho Weems     74 y.o.   male     2/9/2018  12:53 AM    Breath Sounds Pre Procedure: Right Breath Sounds: Diminished                               Left Breath Sounds: Diminished    Breath Sounds Post Procedure: Right Breath Sounds: Diminished                                 Left Breath Sounds: Diminished    Breathing pattern: Pre procedure Breathing Pattern: Regular          Post procedure Breathing Pattern: Regular    Heart Rate: Pre procedure Pulse: 74           Post procedure Pulse: 85    Resp Rate: Pre procedure Respirations: 18           Post procedure Respirations: 20      Cough: Pre procedure Cough: Non-productive               Post procedure           Oxygen: O2 Device: Room air   21%     Changed: NO    SpO2: Pre procedure SpO2: 96 %   without oxygen              Post procedure SpO2: 95 %  without oxygen    Nebulizer Therapy: Current medications Aerosolized Medications: Albuterol      Changed: YES D/C        Problem List:   Patient Active Problem List   Diagnosis Code    HTN (hypertension) I10    High cholesterol E78.00    GERD (gastroesophageal reflux disease) K21.9    Colon ulcer, aphthous K63.3    Positive PPD R76.11    RACHEL (obstructive sleep apnea) G47.33    Vitamin D deficiency E55.9    SOB (shortness of breath) R06.02    HLD (hyperlipidemia) E78.5    Knee pain M25.569    B12 deficiency E53.8    PVC's (premature ventricular contractions) I49.3    DDD (degenerative disc disease), lumbar M51.36    Rectus diastasis M62.08    Complex tear of medial meniscus of right knee as current injury S83.231A    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 9) E66.9    HTN (hypertension), benign I10    Asthma J45.909    Primary osteoarthritis of left knee M17.12

## 2018-02-09 NOTE — PROGRESS NOTES
TOTAL KNEE PROGRESS NOTE      Subjective:     Post-Operative Day: 2 Status Post left Total Knee Arthroplasty  Systemic or Specific Complaints:No Complaints    Objective:     Patient Vitals for the past 24 hrs:   BP Temp Pulse Resp SpO2   02/09/18 0346 - - - - 95 %   02/09/18 0308 132/78 98.5 °F (36.9 °C) 65 16 95 %   02/08/18 2102 129/80 98.3 °F (36.8 °C) 70 15 99 %   02/08/18 1353 144/78 98.5 °F (36.9 °C) 77 18 98 %       General: alert, cooperative, no distress, appears stated age   Wound: Wound clean and dry no evidence of infection. , No Erythema, No Edema, No Drainage and Wound Intact   Motion: Extension: Full Extension   DVT Exam: No evidence of DVT seen on physical exam.  Negative Vaughn's sign. No cords or calf tenderness. No significant calf/ankle edema. Data Review:    Recent Results (from the past 24 hour(s))   HEMOGLOBIN    Collection Time: 02/09/18  3:14 AM   Result Value Ref Range    HGB 13.7 12.1 - 17.0 g/dL   PROTHROMBIN TIME + INR    Collection Time: 02/09/18  3:14 AM   Result Value Ref Range    INR 2.1 (H) 0.9 - 1.1      Prothrombin time 22.1 (H) 9.0 - 11.1 sec         Assessment:     Status Post left Total Knee Arthroplasty. Doing well postoperatively. . Bandage aquaecll, clean/dry. Labs stable. PT progressing well. Pain control WNL.      Plan:     Continues current post-op course  Continue PT per protocol  Plan to discharge to [facility] today after PT.

## 2018-02-09 NOTE — PROGRESS NOTES
Bedside shift change report given to Ifrah Martinez RN (oncoming nurse) by Darien Schafer (offgoing nurse). Report included the following information SBAR, Kardex, Intake/Output, MAR and Recent Results.

## 2018-02-09 NOTE — PROGRESS NOTES
Problem: Mobility Impaired (Adult and Pediatric)  Goal: *Acute Goals and Plan of Care (Insert Text)  Physical Therapy Goals  Initiated 2/7/2018    1. Patient will move from supine to sit and sit to supine, scoot up and down and roll side to side in bed with modified independence within 4 days. 2. Patient will perform sit to stand with modified independence within 4 days. 3. Patient will ambulate with modified independence for 200 feet with the least restrictive device within 4 days. 4. Patient will ascend/descend 3 stairs with single handrail with modified independence within 4 days. 5. Patient will perform home exercise program per protocol with modified independence within 4 days. 6. Patient will demonstrate AROM 0-90 degrees in operative joint within 4 days. physical Therapy TREATMENT  Patient: Ngozi Valle (78 y.o. male)  Date: 2/9/2018  Diagnosis: DJD LEFT KNEE  Primary osteoarthritis of left knee Primary osteoarthritis of left knee  Procedure(s) (LRB):  LEFT TOTAL KNEE ARTHROPLASTY  (Left) 2 Days Post-Op  Precautions: Fall, WBAT  Chart, physical therapy assessment, plan of care and goals were reviewed. ASSESSMENT:  Patient received supine in bed, agreeable to therapy. Demonstrated ability to perform bed mobility and manage L LE with modified independence. Min cues for performance of HEP. Reviewed as below. Patient with largest limitation in knee flexion. Able to ambulate for 180 ft total, but initially with circumduction of L LE, likely due to pain and poor quad/hip strength at this time. Able to correct with cues, but demonstrated significantly shorter step length. Returned to chair and d/c education provided regarding need for frequent movement at home (1x/waking hour), compliance with HEP, need for ice, and time propped in extension to encourage full ROM. Education provided for [employer] safety with transfers and turns, as patient somewhat impulsive with this.  Patient will benefit from HHPT at d/c.    Patient is cleared for discharge from PT standpoint:  YES [x]     NO []     Progression toward goals:  [x]      Improving appropriately and progressing toward goals  []      Improving slowly and progressing toward goals  []      Not making progress toward goals and plan of care will be adjusted     PLAN:  Patient continues to benefit from skilled intervention to address the above impairments. Continue treatment per established plan of care. Discharge Recommendations:  Home Health  Further Equipment Recommendations for Discharge: Owns RW     SUBJECTIVE:   Patient stated I don't think I need that.  re: gait belt    OBJECTIVE DATA SUMMARY:   Critical Behavior:  Neurologic State: Alert  Orientation Level: Oriented X4  Cognition: Appropriate decision making, Appropriate for age attention/concentration, Appropriate safety awareness, Follows commands  Safety/Judgement: Awareness of environment, Fall prevention, Insight into deficits  Functional Mobility Training:  Bed Mobility:  Supine to Sit: Modified independent  Sit to Supine: Modified independent  Scooting: Modified independent  Transfers:  Sit to Stand: Stand-by asssistance  Stand to Sit: Stand-by asssistance  Balance:  Sitting: Intact  Standing: Intact; With support  Ambulation/Gait Training:  Distance (ft): 180 Feet (ft)  Assistive Device: Walker, rolling;Gait belt  Ambulation - Level of Assistance: Contact guard assistance;Stand-by asssistance  Gait Abnormalities: Antalgic;Circumduction;Decreased step clearance  Base of Support: Shift to right;Widened  Speed/Janette: Slow  Stairs:  Number of Stairs Trained: 4  Stairs - Level of Assistance: Contact guard assistance  Rail Use: Left  (cane R)  Therapeutic Exercises:     EXERCISE   Sets   Reps   Active Active Assist   Passive Self ROM   Comments   Ankle Pumps 1 10 []                                        []                                        []                                        [] Quad Sets 1 10 []                                        []                                        []                                        []                                           Hamstring Sets 1 10 []                                        []                                        []                                        []                                           Short Arc Quads   []                                        []                                        []                                        []                                        unable   Knee Extension Stretch 1 2 min   []                                          []                                          [x]                                          []                                           Heel Slides 2 10 [x]                                        []                                        []                                        []                                           Long Arc Quads   []                                        []                                        []                                        []                                        unable   Knee Flexion Stretch   []                                        []                                        []                                        []                                        Instructed to sit in knee flexion stretch   Straight Leg Raises   []                                        []                                        []                                        []                                        unable     Pain:  Pain Scale 1: Numeric (0 - 10)  Pain Intensity 1: 4  Pain Location 1: Knee  Pain Orientation 1: Left  Pain Description 1: Aching  Pain Intervention(s) 1: Medication (see MAR); Distraction; Exercise  Activity Tolerance:   Good  Please refer to the flowsheet for vital signs taken during this treatment.   After treatment:   [x] Patient left in no apparent distress sitting up in chair  [] Patient left in no apparent distress in bed  [x] Call bell left within reach  [x] Nursing notified  [] Caregiver present  [] Bed alarm activated    COMMUNICATION/COLLABORATION:   The patients plan of care was discussed with: Registered Nurse    Chiki Gonzalez, PT, DPT   Time Calculation: 28 mins

## 2018-02-09 NOTE — DISCHARGE INSTRUCTIONS
DC Orders All Total Knees    Case Management for DC planning to Home HC .   - PT 3 times a week for 2 weeks; WBAT. - PT/INR Every Monday/Thursday for 2 weeks, Call Dr. Melvin Bowers with results ([phone]). - Remove staples at 2 weeks post-op; 2/21/18 .   - Follow up in Office in 2 1/2 weeks. COUMADIN INSTRUCTIONS: Please take 2.5 mg mg on Saturday (2/10/18) and please take 2.5 mg on Sunday (2/11/18); re-check INR on Monday for additional dosing instructions. After Hospital Care Plan:  Discharge Instructions Knee Replacement-Dr. Melvin Bowers    Patient Name: Abbey Rodriguez  Date of procedure: 2/7/2018   Procedure: Procedure(s):  LEFT TOTAL KNEE ARTHROPLASTY   Surgeon: Brandon Carreon) and Role:     * Jose Bautista MD - Primary    PCP: Dano Velazquez MD  Date of discharge: No discharge date for patient encounter. Follow up appointments   Follow up with Dr. Melvin Bowers in 2 ½ weeks. Call [phone] to make an appointment.  If home health has been arranged for you the agency will contact you to arrange dates/times for visits. Please call them if you do not hear from them within 24 hours after you are discharged    When to call your Orthopaedic Surgeon: Call [phone].  If you call after 5pm or on a weekend, the on call physician will be contacted   Unrelieved pain   Signs of infection-if your incision is red; continues to have drainage; drainage has a foul odor or if you have a persistent fever over 101 degrees   Signs of a blood clot in your leg-calf pain, tenderness, redness, swelling of lower leg    When to call your Primary Care Physician:   Concerns about medical conditions such as diabetes, high blood pressure, asthma, congestive heart failure   Call if blood sugars are elevated, persistent headache or dizziness, coughing or congestion, constipation or diarrhea, burning with urination, abnormal heart rate    When to call [id number] go to the nearest emergency room   Acute onset of chest pain, shortness of breath, difficulty breathing      Activity   Weight bearing as tolerated with walker or crutches. Refer to pages 23-31 of your handbook for instructions and pictures   Complete your Home Exercise Program daily as instructed by your therapist.  Refer to pages 33-41 of your handbook for instructions and pictures   Get up every one hour and walk (except at night when sleeping)   Do not drive or operate heavy machinery    Incision Care   The Aquacel (brown, waterproof) surgical dressing is to remain on your knee for 7 days. On the 7th day have someone gently peel the dressing off by carefully lifting the edge and stretching it slightly to break the adhesive seal   You will have staples in your knee incision. They will be removed by the home health agency staff   If your Aquacel dressing comes loose/off before the 7th day, you may replace it with a dry sterile gauze dressing; change it daily. Once your incision is not draining, you may leave it open to air   You may take a shower with the Aquacel dressing in place. Once the Aquacel is removed, you may shower and get your incision wet but do not submerge your incision under water in a bath tub, hot tub or swimming pool for 6 weeks after surgery. Preventing blood clots    Take Coumadin as prescribed by Dr. Emmy Dobson for one month following surgery   Wear elastic stockings (TEDS) for 4 weeks. You should remove them for approximately 1 hour daily for showering/sponge bathing    Pain management   Take pain medication as prescribed; decrease the amount you use as your pain lessens   Avoid alcoholic beverages while taking pain medication   Please be aware that many medications contain Tylenol. We do not want you to over medicate so please read the information below as a guide. Do not take more than 4 Grams of Tylenol in a 24 hour period.   (There are 1000 milligrams in one Gram)  o Percocet contains 325 mg of Tylenol per tablet (do not take more than 12 tablets in 24 hours)  o Lortab contains 500 mg of Tylenol per tablet (do not take more than 8 tablets in 24 hours)  o Norco contains 325 mg of Tylenol per tablet (do not take more than 12 tablets in 24 hours).  You may place an ice bag on your hip for 15-20 minutes after exercising    Diet   Resume usual diet; drink plenty of fluids; eat foods high in fiber   You may want to take a stool softener (such as Senokot-S or Colace) to prevent constipation while you are taking pain medication. If constipation occurs, take a laxative (such as Dulcolax tablets, Milk of Magnesia, or a suppository)    [facility] Protocol (to be followed by [address] [place] [address])  Nursing   Draw a PT/INR per physicians orders. Call results to Dr. Judson Ospina at [phone]  Skylar Sames Remove staples per physicians order   Complete head to toe assessment, vital signs   Medication reconciliation   Review pain management   Manage chronic medical conditions    Physical Therapy-per physicians orders     Weight bearing status:  Precautions at Admission: Fall, WBAT (L TKR)          Mobility Status:  Supine to Sit: Minimum assistance  Sit to Stand: Minimum assistance  Sit to Supine: Supervision, Additional time (VCs to prevent bearing down)       Gait:  Distance (ft): 180 Feet (ft)  Ambulation - Level of Assistance: Contact guard assistance, Assist x2  Assistive Device: Gait belt, Walker, rolling  Gait Abnormalities: Antalgic, Decreased step clearance    ADL status overall composite:                   Physical Therapy   Assessment and evaluation-bed mobility; functional transfers (bed, chair, bathroom, stairs); ambulation with equipment, car transfers, shower transfers, safety and ability to get out of house in the event of an emergency   Review weight bearing as tolerated, wean from walker or crutches as tolerated   Discuss pain management   Review how to do ADLs.  Refer to page 42 of patient handbook    Home Exercise Program-refer to pages 33-41 of patient handbook for exercises

## 2018-02-09 NOTE — PROGRESS NOTES
Bedside and Verbal shift change report given to Anand Garay RN (oncoming nurse) by Perfecto Pierre RN (offgoing nurse). Report included the following information SBAR, Kardex, OR Summary, Procedure Summary, Intake/Output, MAR and Recent Results.

## 2018-02-10 NOTE — PROGRESS NOTES
5533  I have reviewed discharge instructions and medications with the patient. The patient verbalized understanding. Patient discharged home with family member with all personal belongings.

## 2018-02-12 ENCOUNTER — PATIENT OUTREACH (OUTPATIENT)
Dept: FAMILY MEDICINE CLINIC | Age: 68
End: 2018-02-12

## 2018-02-12 NOTE — PROGRESS NOTES
[facility] 2/7-2/9/2018 Scheduled left knee arthroplasty    TRANSITIONS OF CARE NOTE     Please note functional assessment. Patient stated \"I'm doing good. Getting around just fine with a walker\". Patient denies fever, chills, nausea, vomiting, chest pain, sob or inability to take medications. NN discussed red flags with patient who verbalized understanding of when to seek immediate medical attention. Red flags/sepsis: fever or below normal temperature (97f), chills, nausea, vomiting, diarrhea, chest pain, shortness of breath, unable to take medications, unusual sensations, and BFAST. Home health in place with [facility]. Follow up with surgeon 2/26/2018. RRAT score: 17 Medium    Past Hospitalizations and/or ED visits last 12 months? 1    Advance Medical Directive on file in EMR? no     Barriers to care?  none    Support System consists of: spouse    [address], if so what agency? Yes. [facility]    Medical History:     Past Medical History:   Diagnosis Date    Adverse effect of anesthesia     went apneic post surgery at [facility]    Anesthesia complication 1637    APNEA DURING HERNIA REPAIR, POST OPERATIVE INTUBATION AT [facility]    Arrhythmia     FREQUENT PVCS    Arthritis     Asthma     HAS BRONCHITIS OFTEN    Cancer ([address]     SKIN- UNKNOWN    Colon ulcer, aphthous 4/1/2010    Finger amputation, traumatic 2003    R 2ND DIGIT    GERD (gastroesophageal reflux disease) 4/1/2010    High cholesterol 4/1/2010    HTN (hypertension), benign 4/1/2010    CONTROLLED BY MEDS    Hx MRSA infection 2013    CYSTS BL LEGS    Sleep apnea     CPAP       This represents Transitions of Care b/c NN spoke with patient and/or caregiver within 1 business days of discharge. Pt's TCM follow up appt is scheduled with Dr. Ney Mukherjee on 2/26/2018, which is within 17 days of discharge. Called patient on 2/12/2018 and verified patient with 2 identifiers.      Fall Risk Assessment:    Fall Risk Assessment, last 12 mths 3/5/2015 Able to walk? Yes   Fall in past 12 months? Yes   Fall with injury? No   Number of falls in past 12 months 2   Fall Risk Score 2     Patient presenting symptoms (referenced by Shweta Cohen PA-C): The patient has a long-standing history of pain within the left knee . The patient has severe degenerative arthritis of the left knee and has exhausted conservative therapy at this time including prior intra-articular injections, activity modifications, OTC NSAIDS. The patient has been limited in their ability to perform ADLs, walk short distances or climb steps appropriately. The patient presents for the above-mentioned procedure. The patient has been cleared from a medical and cardiac standpoint. Course of current Hospitalization (referenced by Shweta Cohen PA-C note dated 2/9/2018): The patient was admitted to the hospital.  The Pt. Underwent a left total knee replacement . Postoperatively, the pt. did well. The pt. Remained stable from a medical standpoint. The patient ambulated, WBAT weightbearing within the hospital with Physical Therapy. The patient continued with this therapy at [facility] with PT. The patient began taking Coumadin pre-operatively for DVT prophylaxis and will continue on this for one month. At the time of discharge, there was no evidence of any DVT noted. The patient had negative Homans signs bilateral lower extremities. At the time of discharge, the patient's left knee incision appeared with staples intact. No signs of infection or inflammation noted. The patient will follow up in our office in 2-1/2 weeks. Lab/Diagnostics at time of Discharge:   Results for [facility] Card (MRN [id number]) as of 2/12/2018 14:12   Ref.  Range 2/8/2018 03:44 2/9/2018 03:14   HGB Latest Ref Range: 12.1 - 17.0 g/dL 14.6 13.7   INR Latest Ref Range: 0.9 - 1.1   1.3 (H) 2.1 (H)   Prothrombin time Latest Ref Range: 9.0 - 11.1 sec 13.2 (H) 22.1 (H)   Sodium Latest Ref Range: 136 - 145 mmol/L 136 Potassium Latest Ref Range: 3.5 - 5.1 mmol/L 4.0    Chloride Latest Ref Range: 97 - 108 mmol/L 101    CO2 Latest Ref Range: 21 - 32 mmol/L 28    Anion gap Latest Ref Range: 5 - 15 mmol/L 7    Glucose Latest Ref Range: 65 - 100 mg/dL 98    BUN Latest Ref Range: 6 - 20 MG/DL 15    Creatinine Latest Ref Range: 0.70 - 1.30 MG/DL 1.00    BUN/Creatinine ratio Latest Ref Range: 12 - 20   15    Calcium Latest Ref Range: 8.5 - 10.1 MG/DL 8.2 (L)    GFR est non-AA Latest Ref Range: >60 ml/min/1.73m2 >60    GFR est AA Latest Ref Range: >60 ml/min/1.73m2 >60        Medication Reconciliation completed: yes      Patient Goals:  Goals      Attends follow-up appointments as directed. Surgeon 2/26/2018       Prevent complications post hospitalization.  Supportive resources in place to maintain patient in the community (ie. Home Health, DME equipment, refer to, medication assistant plan, etc.)            Home health set up with [facility] red flags post discharge. Red flags/sepsis: fever or below normal temperature (97f), chills, nausea, vomiting, diarrhea, chest pain, shortness of breath, unable to take medications, unusual sensations, and BFAST. NN remains available to patient.

## 2018-02-12 NOTE — DISCHARGE SUMMARY
Discharge Summary    Physician: Shantel Bruno MD    Physician Assistant: Austen Haynes PA-C    Admit Diagnosis:  DJD LEFT KNEE  Primary osteoarthritis of left knee    Final Diagnosis:   1. Advanced degenerative arthritis of left knee . Procedure: Left total knee replacement    Complications: None. History of Present Illness: The patient has a long-standing history of pain within the left knee . The patient has severe degenerative arthritis of the left knee and has exhausted conservative therapy at this time including prior intra-articular injections, activity modifications, OTC NSAIDS. The patient has been limited in their ability to perform ADLs, walk short distances or climb steps appropriately. The patient presents for the above-mentioned procedure. The patient has been cleared from a medical and cardiac standpoint. Past Medical History:  Recorded in the chart. Physical Examination: Also recorded in the chart. The patient is noted for ambulating with an antalgic gait favoring the left side. Examination of the left knee reveals He lacks 5 degrees full extension and flexes to 10 degrees.   He has diffuse tenderness and crepitus to ROM of the knee.  He has only mild discomfort to rotation of the right hip . X-rays reveal  essentially bone on bone contact at the medial compartment with significant lateral and patellofemoral changes. Course in the Hospital:  The patient was admitted to the hospital.  The Pt. Underwent a left total knee replacement . Postoperatively, the pt. did well. The pt. Remained stable from a medical standpoint. The patient ambulated, WBAT weightbearing within the hospital with Physical Therapy. The patient continued with this therapy at [facility] with PT. The patient began taking Coumadin pre-operatively for DVT prophylaxis and will continue on this for one month. At the time of discharge, there was no evidence of any DVT noted.   The patient had negative [address] signs bilateral lower extremities. At the time of discharge, the patient's left knee incision appeared with staples intact. No signs of infection or inflammation noted. The patient will follow up in our office in 2-1/2 weeks. DC med list:  Discharge Medication List as of 2/9/2018  1:10 PM      START taking these medications    Details   oxyCODONE IR (ROXICODONE) 5 mg immediate release tablet Take 1-2 Tabs by mouth every four (4) hours as needed for Pain. Max Daily Amount: 60 mg., Print, Disp-80 Tab, R-0      warfarin (COUMADIN) 2.5 mg tablet Take 1 Tab by mouth daily. , Print, Disp-90 Tab, R-1      celecoxib (CELEBREX) 200 mg capsule Take 1 Cap by mouth daily for 30 days. , Print, Disp-30 Cap, R-0         CONTINUE these medications which have NOT CHANGED    Details   metoprolol succinate (TOPROL-XL) 50 mg XL tablet Take 1 Tab by mouth daily. Indications: hypertension, Normal, Disp-30 Tab, R-11      ramipril (ALTACE) 10 mg capsule TAKE ONE CAPSULE BY MOUTH TWICE A DAY, Normal, Disp-60 Cap, R-5      hydroCHLOROthiazide (HYDRODIURIL) 12.5 mg tablet Take 1 Tab by mouth daily. Indications: hypertension, Normal, Disp-30 Tab, R-5      fluticasone (FLONASE) 50 mcg/actuation nasal spray 2 Sprays by Both Nostrils route daily. , Normal, Disp-1 Bottle, R-11      Omeprazole delayed release (PRILOSEC D/R) 20 mg tablet Take 20 mg by mouth daily. , Historical Med      pravastatin (PRAVACHOL) 20 mg tablet TAKE 1 TABLET BY MOUTH NIGHTLY, Normal, Disp-30 Tab, R-11      albuterol (PROVENTIL HFA) 90 mcg/actuation inhaler Take 2 Puffs by inhalation every six (6) hours as needed for Wheezing., Normal, Disp-2 Inhaler, R-5      albuterol (PROVENTIL VENTOLIN) 2.5 mg /3 mL (0.083 %) nebulizer solution 3 mL by Nebulization route every six (6) hours.  As needed for wheezing., Normal, Disp-50 Each, R-3      acetaminophen (TYLENOL) 325 mg tablet Take 650 mg by mouth every four (4) hours as needed for Pain., Historical Med      POTASSIUM CHLORIDE PO Take 595 mg by mouth daily. , Historical Med      mometasone (ASMANEX TWISTHALER) 220 mcg (60 doses) inhaler Take 1 Puff by inhalation two (2) times a day., Normal, Disp-60 Cap, R-3      TRIAMCINOLONE ACETONIDE, BULK, by Does Not Apply route. AS NEEDED, Historical Med      loratadine (CLARITIN) 10 mg tablet Take 10 mg by mouth daily. , Historical Med      cyanocobalamin (VITAMIN B-12) 1,000 mcg tablet Take 1 Tab by mouth daily. , Historical Med      psyllium seed-sucrose (METAMUCIL) Powd 1 Dose, Oral, 2 TIMES DAILY, Until Discontinued, Historical Med         STOP taking these medications       aspirin (ASPIRIN) 325 mg tablet Comments:   Reason for Stopping:               Patient Disposition: Home  Followup Care:  Discharge Condition: good  PT/OT per Home Health  Regular Diet  As directed    Follow-up Information     Follow up With Details Comments [address], [place]   [address], please call office if you have not heard from staff member by 12 noon first full day after discharge  Phone # [phone]                  Shweta Cohen PA-C

## 2018-02-21 NOTE — ANESTHESIA POSTPROCEDURE EVALUATION
Post-Anesthesia Evaluation and Assessment    Patient: Alberto Gonzalez MRN: [id number]  SSN: [id number]    YOB: 1950  Age: 74 y.o. Sex: male       Cardiovascular Function/Vital Signs  Visit Vitals    /82 (BP 1 Location: Right arm, BP Patient Position: At rest)    Pulse 80    Temp 36.8 °C (98.2 °F)    Resp 16    Ht 5' 10\" (1.778 m)    Wt 111.1 kg (245 lb)    SpO2 98%    BMI 35.15 kg/m2       Patient is status post spinal anesthesia for Procedure(s):  LEFT TOTAL KNEE ARTHROPLASTY . Nausea/Vomiting: None    Postoperative hydration reviewed and adequate. Pain:  Pain Scale 1: Numeric (0 - 10) (02/09/18 1325)  Pain Intensity 1: 6 (02/09/18 1325)   Managed    Neurological Status:   Neuro (WDL): Within Defined Limits (02/07/18 1443)  Neuro  Neurologic State: Alert (02/09/18 7898)  Orientation Level: Oriented X4 (02/09/18 0859)  Cognition: Appropriate safety awareness; Appropriate decision making; Appropriate for age attention/concentration (02/09/18 0859)  Speech: Clear (02/09/18 0859)  LUE Motor Response: Purposeful (02/09/18 0859)  LLE Motor Response: Purposeful (02/09/18 0859)  RUE Motor Response: Purposeful (02/09/18 0859)  RLE Motor Response: Purposeful (02/09/18 0859)   At baseline    Mental Status and Level of Consciousness: Arousable    Pulmonary Status:   O2 Device: Room air (02/09/18 0859)   Adequate oxygenation and airway patent    Complications related to anesthesia: None    Post-anesthesia assessment completed.  No concerns    Signed By: Angelica Tucker MD     February 21, 2018

## 2018-04-30 ENCOUNTER — OFFICE VISIT (OUTPATIENT)
Dept: FAMILY MEDICINE CLINIC | Age: 68
End: 2018-04-30

## 2018-04-30 VITALS
BODY MASS INDEX: 35.45 KG/M2 | DIASTOLIC BLOOD PRESSURE: 77 MMHG | TEMPERATURE: 98.4 F | WEIGHT: 247.6 LBS | SYSTOLIC BLOOD PRESSURE: 122 MMHG | HEART RATE: 66 BPM | HEIGHT: 70 IN | RESPIRATION RATE: 18 BRPM | OXYGEN SATURATION: 96 %

## 2018-04-30 DIAGNOSIS — E78.00 PURE HYPERCHOLESTEROLEMIA: Chronic | ICD-10-CM

## 2018-04-30 DIAGNOSIS — K21.9 GASTROESOPHAGEAL REFLUX DISEASE WITHOUT ESOPHAGITIS: Chronic | ICD-10-CM

## 2018-04-30 DIAGNOSIS — M51.36 DDD (DEGENERATIVE DISC DISEASE), LUMBAR: Chronic | ICD-10-CM

## 2018-04-30 DIAGNOSIS — I10 ESSENTIAL HYPERTENSION: Primary | Chronic | ICD-10-CM

## 2018-04-30 DIAGNOSIS — I49.3 PVCS (PREMATURE VENTRICULAR CONTRACTIONS): ICD-10-CM

## 2018-04-30 DIAGNOSIS — J45.20 MILD INTERMITTENT ASTHMA WITHOUT COMPLICATION: Chronic | ICD-10-CM

## 2018-04-30 DIAGNOSIS — G47.33 OSA (OBSTRUCTIVE SLEEP APNEA): Chronic | ICD-10-CM

## 2018-04-30 DIAGNOSIS — E55.9 VITAMIN D DEFICIENCY: Chronic | ICD-10-CM

## 2018-04-30 DIAGNOSIS — Z96.659 HISTORY OF KNEE REPLACEMENT, UNSPECIFIED LATERALITY: ICD-10-CM

## 2018-04-30 PROBLEM — E66.01 SEVERE OBESITY (BMI 35.0-39.9) WITH COMORBIDITY (HCC): Status: ACTIVE | Noted: 2018-04-30

## 2018-04-30 NOTE — MR AVS SNAPSHOT
Ammone Breeding 
 
 
 2003 [address] [address] 
[phone] Patient: Skyler Eller MRN: [id number] GFT:91/4/8412 Visit Information Date & Time Provider Department Dept. Phone Encounter #  
 4/30/2018  8:00 AM Christian Burrell MD [address] [place] [address] [id number] Follow-up Instructions Return in about 5 months (around 9/30/2018). Upcoming Health Maintenance Date Due  
 GLAUCOMA SCREENING Q2Y 11/7/2017 Pneumococcal 65+ Low/Medium Risk (1 of 2 - PCV13) 11/7/2017 Influenza Age 5 to Adult 8/1/2018 MEDICARE YEARLY EXAM 12/27/2018 DTaP/Tdap/Td series (2 - Td) 9/2/2021 COLONOSCOPY 12/24/2025 Allergies as of 4/30/2018  Review Complete On: 4/30/2018 By: Stalin Francisco Severity Noted Reaction Type Reactions Chlorhexidine [place]  02/07/2018    Rash  
 ? Reaction from using wipes before [facility] Current Immunizations  Reviewed on 11/19/2015 Name Date Influenza Vaccine 10/4/2016, 11/7/2015, 9/13/2013 Influenza Vaccine (Quad) PF 10/17/2014 Influenza Vaccine Split 1/1/2006 Pneumococcal Polysaccharide (PPSV-23) 11/19/2015 TD Vaccine 6/30/1999 TDAP Vaccine 9/2/2011 Zoster Vaccine, Live 12/10/2013 Not reviewed this visit You Were Diagnosed With   
  
 Codes Comments Essential hypertension    -  Primary ICD-10-CM: I10 
ICD-9-CM: 401.9 Gastroesophageal reflux disease without esophagitis     ICD-10-CM: K21.9 ICD-9-CM: 530.81   
 RACHEL (obstructive sleep apnea)     ICD-10-CM: G47.33 
ICD-9-CM: 327.23 Mild intermittent asthma without complication     HFF-87-FS: J45.20 ICD-9-CM: 493.90 Pure hypercholesterolemia     ICD-10-CM: E78.00 ICD-9-CM: 272.0   
 DDD (degenerative disc disease), lumbar     ICD-10-CM: M51.36 
ICD-9-CM: 722.52 Vitamin D deficiency     ICD-10-CM: E55.9 ICD-9-CM: 268.9 History of knee replacement, unspecified laterality     ICD-10-CM: K59.342 ICD-9-CM: V43.65 Vitals BP Pulse Temp Resp Height(growth percentile) Weight(growth percentile) 122/77 (BP 1 Location: Left arm, BP Patient Position: Sitting) 66 98.4 °F (36.9 °C) (Oral) 18 5' 10\" (1.778 m) 247 lb 9.6 oz (112.3 kg) SpO2 BMI Smoking Status 96% 35.53 kg/m2 Former Smoker Vitals History BMI and BSA Data Body Mass Index Body Surface Area 35.53 kg/m 2 2.36 m 2 Preferred Pharmacy Pharmacy Name Phone [phone] Your Updated Medication List  
  
   
This list is accurate as of 4/30/18  8:25 AM.  Always use your most recent med list.  
  
  
  
  
 * albuterol 90 mcg/actuation inhaler Commonly known as:  PROVENTIL HFA Take 2 Puffs by inhalation every six (6) hours as needed for Wheezing. * albuterol 2.5 mg /3 mL (0.083 %) nebulizer solution Commonly known as:  PROVENTIL VENTOLIN  
3 mL by Nebulization route every six (6) hours. As needed for wheezing. CLARITIN 10 mg tablet Generic drug:  loratadine Take 10 mg by mouth daily. fluticasone 50 mcg/actuation nasal spray Commonly known as:  Marine Knack 2 Sprays by Both Nostrils route daily. hydroCHLOROthiazide 12.5 mg tablet Commonly known as:  HYDRODIURIL Take 1 Tab by mouth daily. Indications: hypertension METAMUCIL (SUGAR) Powd Generic drug:  psyllium seed-sucrose Take 1 Dose by mouth two (2) times a day. metoprolol succinate 50 mg XL tablet Commonly known as:  TOPROL-XL Take 1 Tab by mouth daily. Indications: hypertension  
  
 mometasone 220 mcg (60 doses) inhaler Commonly known as:  Lovina Hand Take 1 Puff by inhalation two (2) times a day. Omeprazole delayed release 20 mg tablet Commonly known as:  PRILOSEC D/R Take 20 mg by mouth daily. pneumococcal 13 ricky conj dip 0.5 mL Syrg injection Commonly known as:  PREVNAR-13  
0.5 mL by IntraMUSCular route once for 1 dose. To be administered at Pharmacy. Fax confirmation to me at [phone]. Thank You. Indications: PREVENTION OF STREPTOCOCCUS PNEUMONIAE INFECTION  
  
 POTASSIUM CHLORIDE PO Take 595 mg by mouth daily. pravastatin 20 mg tablet Commonly known as:  PRAVACHOL  
TAKE 1 TABLET BY MOUTH NIGHTLY  
  
 ramipril 10 mg capsule Commonly known as:  ALTACE  
TAKE ONE CAPSULE BY MOUTH TWICE A DAY  
  
 TRIAMCINOLONE ACETONIDE (BULK)  
by Does Not Apply route. AS NEEDED  
  
 TYLENOL 325 mg tablet Generic drug:  acetaminophen Take 650 mg by mouth every four (4) hours as needed for Pain. VITAMIN B-12 1,000 mcg tablet Generic drug:  cyanocobalamin Take 1 Tab by mouth daily. * Notice: This list has 2 medication(s) that are the same as other medications prescribed for you. Read the directions carefully, and ask your doctor or other care provider to review them with you. Prescriptions Printed Refills  
 pneumococcal 13 ricky conj dip (PREVNAR-13) 0.5 mL syrg injection 0 Si.5 mL by IntraMUSCular route once for 1 dose. To be administered at Pharmacy. Fax confirmation to me at [phone]. Thank You. Indications: PREVENTION OF STREPTOCOCCUS PNEUMONIAE INFECTION Class: Print Route: IntraMUSCular We Performed the Following HEMOGLOBIN [id number] CPT(R)] LIPID PANEL [95582 CPT(R)] METABOLIC PANEL, COMPREHENSIVE [46397 CPT(R)] VITAMIN D, 25 HYDROXY [id number] CPT(R)] Follow-up Instructions Return in about 5 months (around 2018). Patient Instructions Learning About Healthy Weight What is a healthy weight? A healthy weight is the weight at which you feel good about yourself and have energy for work and play. It's also one that lowers your risk for health problems. What can you do to stay at a healthy weight? It can be hard to stay at a healthy weight, especially when fast food, vending-machine snacks, and processed foods are so easy to find. And with your busy lifestyle, activity may be low on your list of things to do. But staying at a healthy weight may be easier than you think. Here are some dos and don'ts for staying at a healthy weight: 
Do eat healthy foods The kinds of foods you eat have a big impact on both your weight and your health. Reaching and staying at a healthy weight is not about going on a diet. It's about making healthier food choices every day and changing your diet for good. Healthy eating means eating a variety of foods so that you get all the nutrients you need. Your body needs protein, carbohydrate, and fats for energy. They keep your heart beating, your brain active, and your muscles working. On most days, try to eat from each food group. This means eating a variety of: · Whole grains, such as whole wheat breads and pastas. · Fruits and vegetables. · Dairy products, such as low-fat milk, yogurt, and cheese. · Lean proteins, such as all types of fish, chicken without the skin, and beans. Don't have too much or too little of one thing. All foods, if eaten in moderation, can be part of healthy eating. Even sweets can be okay. If your favorite foods are high in fat, salt, sugar, or calories, limit how often you eat them. Eat smaller servings, or look for healthy substitutes. Do watch what you eat Many people eat more than their bodies need. Part of staying at a healthy weight means learning how much food you really need from day to day and not eating more than that. Even with healthy foods, eating too much can make you gain weight. Having a well-balanced diet means that you eat enough, but not too much, and that your food gives you the nutrients you need to stay healthy.  So listen to your body. Eat when you're hungry. Stop when you feel satisfied. It's a good idea to have healthy snacks ready for when you get hungry. Keep healthy snacks with you at work, in your car, and at home. If you have a healthy snack easily available, you'll be less likely to pick a candy bar or bag of chips from a vending machine instead. Some healthy snacks you might want to keep on hand are fruit, low-fat yogurt, string cheese, low-fat microwave popcorn, raisins and other dried fruit, nuts, whole wheat crackers, pretzels, carrots, celery sticks, and broccoli. Do some physical activity A big part of reaching and staying at a healthy weight is being active. When you're active, you burn calories. This makes it easier to reach and stay at a healthy weight. When you're active on a regular basis, your body burns more calories, even when you're at rest. Being active helps you lose fat and build lean muscle. Try to be active for at least 1 hour every day. This may sound like a lot, but it's okay to be active in smaller blocks of time that add up to 1 hour a day. Any activity that makes your heart beat faster and keeps it there for a while counts. A brisk walk, run, or swim will get your heart beating faster. So will climbing stairs, shooting baskets, or cycling. Even some household chores like vacuuming and mowing the lawn will get your heart rate up. Pick activities that you enjoy-ones that make your heart beat faster, your muscles stronger, and your muscles and joints more flexible. If you find more than one thing you like doing, do them all. You don't have to do the same thing every day. Don't diet Diets don't work. Diets are temporary. Because you give up so much when you diet, you may be hungry and think about food all the time. And after you stop dieting, you also may overeat to make up for what you missed. Most people who diet end up gaining back the pounds they lost-and more. Remember that healthy bodies come in lots of shapes and sizes. Everyone can get healthier by eating better and being more active. Where can you learn more? Go to http://kurt-lin.info/. Enter 449 [id number] in the search box to learn more about \"Learning About Healthy Weight. \" Current as of: October 13, 2016 Content Version: 11.4 © 1098-0630 [employer]. Care instructions adapted under license by [employer] (which disclaims liability or warranty for this information). If you have questions about a medical condition or this instruction, always ask your healthcare professional. [address] any warranty or liability for your use of this information. Introducing [facility]! [employer] introduces Lin[employer]ia patient portal. Now you can access parts of your medical record, email your doctor's office, and request medication refills online. 1. In your internet browser, go to [email or web address] 2. Click on the First Time User? Click Here link in the Sign In box. You will see the New Member Sign Up page. 3. Enter your Linqia Access Code exactly as it appears below. You will not need to use this code after youve completed the sign-up process. If you do not sign up before the expiration date, you must request a new code. · Linqia Access Code: [email or web address] Expires: 7/29/2018  8:22 AM 
 
4. Enter the last four digits of your Social Security Number (xxxx) and Date of Birth (mm/dd/yyyy) as indicated and click Submit. You will be taken to the next sign-up page. 5. Create a Our Security Teamt ID. This will be your Linqia login ID and cannot be changed, so think of one that is secure and easy to remember. 6. Create a Linqia password. You can change your password at any time. 7. Enter your Password Reset Question and Answer. This can be used at a later time if you forget your password. 8. Enter your e-mail address. You will receive e-mail notification when new information is available in [address] 9. Click Sign Up. You can now view and download portions of your medical record. 10. Click the Download Summary menu link to download a portable copy of your medical information. If you have questions, please visit the Frequently Asked Questions section of the [employer] website. Remember, [employer]a is NOT to be used for urgent needs. For medical emergencies, dial 911. Now available from your iPhone and Android! Please provide this summary of care documentation to your next provider. Your primary care clinician is listed as [address]. If you have any questions after today's visit, please call [phone].

## 2018-04-30 NOTE — PATIENT INSTRUCTIONS
Learning About Healthy Weight  What is a healthy weight? A healthy weight is the weight at which you feel good about yourself and have energy for work and play. It's also one that lowers your risk for health problems. What can you do to stay at a healthy weight? It can be hard to stay at a healthy weight, especially when fast food, vending-machine snacks, and processed foods are so easy to find. And with your busy lifestyle, activity may be low on your list of things to do. But staying at a healthy weight may be easier than you think. Here are some dos and don'ts for staying at a healthy weight:  Do eat healthy foods  The kinds of foods you eat have a big impact on both your weight and your health. Reaching and staying at a healthy weight is not about going on a diet. It's about making healthier food choices every day and changing your diet for good. Healthy eating means eating a variety of foods so that you get all the nutrients you need. Your body needs protein, carbohydrate, and fats for energy. They keep your heart beating, your brain active, and your muscles working. On most days, try to eat from each food group. This means eating a variety of:  · Whole grains, such as whole wheat breads and pastas. · Fruits and vegetables. · Dairy products, such as low-fat milk, yogurt, and cheese. · Lean proteins, such as all types of fish, chicken without the skin, and beans. Don't have too much or too little of one thing. All foods, if eaten in moderation, can be part of healthy eating. Even sweets can be okay. If your favorite foods are high in fat, salt, sugar, or calories, limit how often you eat them. Eat smaller servings, or look for healthy substitutes. Do watch what you eat  Many people eat more than their bodies need. Part of staying at a healthy weight means learning how much food you really need from day to day and not eating more than that.  Even with healthy foods, eating too much can make you gain weight. Having a well-balanced diet means that you eat enough, but not too much, and that your food gives you the nutrients you need to stay healthy. So listen to your body. Eat when you're hungry. Stop when you feel satisfied. It's a good idea to have healthy snacks ready for when you get hungry. Keep healthy snacks with you at work, in your car, and at home. If you have a healthy snack easily available, you'll be less likely to pick a candy bar or bag of chips from a vending machine instead. Some healthy snacks you might want to keep on hand are fruit, low-fat yogurt, string cheese, low-fat microwave popcorn, raisins and other dried fruit, nuts, whole wheat crackers, pretzels, carrots, celery sticks, and broccoli. Do some physical activity  A big part of reaching and staying at a healthy weight is being active. When you're active, you burn calories. This makes it easier to reach and stay at a healthy weight. When you're active on a regular basis, your body burns more calories, even when you're at rest. Being active helps you lose fat and build lean muscle. Try to be active for at least 1 hour every day. This may sound like a lot, but it's okay to be active in smaller blocks of time that add up to 1 hour a day. Any activity that makes your heart beat faster and keeps it there for a while counts. A brisk walk, run, or swim will get your heart beating faster. So will climbing stairs, shooting baskets, or cycling. Even some household chores like vacuuming and mowing the lawn will get your heart rate up. Pick activities that you enjoy-ones that make your heart beat faster, your muscles stronger, and your muscles and joints more flexible. If you find more than one thing you like doing, do them all. You don't have to do the same thing every day. Don't diet  Diets don't work. Diets are temporary. Because you give up so much when you diet, you may be hungry and think about food all the time.  And after you stop dieting, you also may overeat to make up for what you missed. Most people who diet end up gaining back the pounds they lost-and more. Remember that healthy bodies come in lots of shapes and sizes. Everyone can get healthier by eating better and being more active. Where can you learn more? Go to http://kurt-lin.info/. Enter 885 [id number] in the search box to learn more about \"Learning About Healthy Weight. \"  Current as of: October 13, 2016  Content Version: 11.4  © 1653-2460 [employer]. Care instructions adapted under license by [employer] (which disclaims liability or warranty for this information). If you have questions about a medical condition or this instruction, always ask your healthcare professional. [address] any warranty or liability for your use of this information.

## 2018-04-30 NOTE — PROGRESS NOTES
Progress Note    Patient: Jennifer Keller MRN: [id number]  SSN: [id number]    YOB: 1950  Age: 74 y.o. Sex: male        Chief Complaint   Patient presents with    Hypertension         Subjective:     Encounter Diagnoses   Name Primary?  Essential hypertension:controlled  BP Readings from Last 3 Encounters:   04/30/18 122/77   02/09/18 147/82   02/02/18 154/75     The patient reports:  taking medications as instructed, no medication side effects noted, no TIA's, no chest pain on exertion, no dyspnea on exertion, no swelling of ankles. Lab Results   Component Value Date/Time    Sodium 136 02/08/2018 03:44 AM    Potassium 4.0 02/08/2018 03:44 AM    Chloride 101 02/08/2018 03:44 AM    CO2 28 02/08/2018 03:44 AM    Anion gap 7 02/08/2018 03:44 AM    Glucose 98 02/08/2018 03:44 AM    BUN 15 02/08/2018 03:44 AM    Creatinine 1.00 02/08/2018 03:44 AM    BUN/Creatinine ratio 15 02/08/2018 03:44 AM    GFR est AA >60 02/08/2018 03:44 AM    GFR est non-AA >60 02/08/2018 03:44 AM    Calcium 8.2 (L) 02/08/2018 03:44 AM    Bilirubin, total <0.2 12/29/2017 11:37 AM    AST (SGOT) 17 12/29/2017 11:37 AM    Alk. phosphatase 65 12/29/2017 11:37 AM    Protein, total 6.3 12/29/2017 11:37 AM    Albumin 4.1 12/29/2017 11:37 AM    Globulin 3.4 04/02/2010 09:09 AM    A-G Ratio 1.9 12/29/2017 11:37 AM    ALT (SGPT) 24 12/29/2017 11:37 AM     Low salt diet? 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 Our goal is to normalize the blood pressure to decrease the risks of strokes and heart attacks. The patient is in agreement with the plan. Yes    Gastroesophageal reflux disease without esophagitis:  Current control of Symptoms:good  Hiatal Hernia:no  Current Medications:omeprazole  The patient has no history melena or bright red blood in the stools. The patient avoids high dose aspirin and NSAID therapy. The patient is aware of diet changes needed, elevating the head of the bed and appropriate use of antacids.  RACHEL (obstructive sleep apnea): Controlled on CPAP. 100% compliant. Feels rested in AM.         Mild intermittent asthma without complication:  asthma, not currently in exacerbation. Asthma symptoms occur: infrequently. Wheezing when present is described as mild and easily relieved with rescue bronchodilator. Current limitations in activity from asthma: none. SpO2 Readings from Last 3 Encounters:   04/30/18 96%   02/09/18 98%   01/26/18 93%     Frequency of use of quick-relief meds: rare. Medication compliance: Good  Patient does not smoke cigarettes. Monitors Peak Flow at home: no         Pure hypercholesterolemia:  Cardiovascular risks for him are: LDL goal is under 100. Currently he takes Pravachol (pravastatin) , 20 mg  Lab Results   Component Value Date/Time    Cholesterol, total 129 12/29/2017 11:37 AM    HDL Cholesterol 37 (L) 12/29/2017 11:37 AM    LDL, calculated 64 12/29/2017 11:37 AM    Triglyceride 140 12/29/2017 11:37 AM    CHOL/HDL Ratio 3.8 04/02/2010 09:09 AM     Lab Results   Component Value Date/Time    ALT (SGPT) 24 12/29/2017 11:37 AM    AST (SGOT) 17 12/29/2017 11:37 AM    Alk. phosphatase 65 12/29/2017 11:37 AM    Bilirubin, total <0.2 12/29/2017 11:37 AM      Myalgias: No   Fatigue: No   Other side effects: no  Wt Readings from Last 3 Encounters:   04/30/18 247 lb 9.6 oz (112.3 kg)   02/07/18 245 lb (111.1 kg)   02/02/18 245 lb (111.1 kg)     The patient is aware of our goal to reduce or eliminate the long term problems (such as strokes and heart attacks) related to poorly controlled hyperlipidemia.  DDD (degenerative disc disease), lumbar:  Chronic low grade pain.  Vitamin D deficiency:  No sx. Due for testing.   Lab Results   Component Value Date/Time    Vitamin D 25-Hydroxy 19 (L) 04/02/2010 09:09 AM    VITAMIN D, 25-HYDROXY 33.8 12/29/2017 11:37 AM            History of knee replacement, unspecified laterality:Now cannot get on his knees due to replacement. Ability to work is in question.  PVCs (premature ventricular contractions); no current sx, needs to see cardiology in F/U. Current and past medical information:    Current Medications after this visit[de-identified]   Current Outpatient Prescriptions   Medication Sig    pneumococcal 13 ricky conj dip (PREVNAR-13) 0.5 mL syrg injection 0.5 mL by IntraMUSCular route once for 1 dose. To be administered at Pharmacy. Fax confirmation to me at [phone]. Thank You. Indications: PREVENTION OF STREPTOCOCCUS PNEUMONIAE INFECTION    pravastatin (PRAVACHOL) 20 mg tablet TAKE 1 TABLET BY MOUTH NIGHTLY    metoprolol succinate (TOPROL-XL) 50 mg XL tablet Take 1 Tab by mouth daily. Indications: hypertension    ramipril (ALTACE) 10 mg capsule TAKE ONE CAPSULE BY MOUTH TWICE A DAY    hydroCHLOROthiazide (HYDRODIURIL) 12.5 mg tablet Take 1 Tab by mouth daily. Indications: hypertension    albuterol (PROVENTIL HFA) 90 mcg/actuation inhaler Take 2 Puffs by inhalation every six (6) hours as needed for Wheezing.  fluticasone (FLONASE) 50 mcg/actuation nasal spray 2 Sprays by Both Nostrils route daily.  albuterol (PROVENTIL VENTOLIN) 2.5 mg /3 mL (0.083 %) nebulizer solution 3 mL by Nebulization route every six (6) hours. As needed for wheezing.  acetaminophen (TYLENOL) 325 mg tablet Take 650 mg by mouth every four (4) hours as needed for Pain.  POTASSIUM CHLORIDE PO Take 595 mg by mouth daily.  mometasone (ASMANEX TWISTHALER) 220 mcg (60 doses) inhaler Take 1 Puff by inhalation two (2) times a day. (Patient taking differently: Take 1 Puff by inhalation two (2) times daily as needed.)    TRIAMCINOLONE ACETONIDE, BULK, by Does Not Apply route. AS NEEDED    loratadine (CLARITIN) 10 mg tablet Take 10 mg by mouth daily.  Omeprazole delayed release (PRILOSEC D/R) 20 mg tablet Take 20 mg by mouth daily.  cyanocobalamin (VITAMIN B-12) 1,000 mcg tablet Take 1 Tab by mouth daily.     psyllium seed-sucrose (METAMUCIL) Powd Take 1 Dose by mouth two (2) times a day. No current facility-administered medications for this visit. Patient Active Problem List    Diagnosis Date Noted    Positive PPD 07/01/2010     Priority: 1 - One    RACHEL (obstructive sleep apnea) 07/01/2010     Priority: 1 - One    HTN (hypertension) 04/01/2010     Priority: 1 - One    GERD (gastroesophageal reflux disease) 04/01/2010     Priority: 1 - One    Severe obesity (BMI 35.0-39. 9) with comorbidity (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 10/13/2017    Complex tear of medial meniscus of right knee as current injury 01/04/2017    Rectus diastasis 06/03/2016    DDD (degenerative disc disease), lumbar 05/16/2016    PVC's (premature ventricular contractions)     B12 deficiency 10/17/2014    Knee pain 08/04/2014    HLD (hyperlipidemia) 03/26/2013    SOB (shortness of breath) 10/11/2012    Vitamin D deficiency 04/27/2012    Colon ulcer, aphthous 04/01/2010       Past Medical History:   Diagnosis Date    Adverse effect of anesthesia     [place] [facility]    Anesthesia complication 0555    APNEA DURING HERNIA REPAIR, POST OPERATIVE INTUBATION [place] [facility]    Arrhythmia     FREQUENT PVCS    Arthritis     Asthma     HAS BRONCHITIS OFTEN    [facility]     SKIN- UNKNOWN    Colon ulcer, aphthous 4/1/2010    Finger amputation, traumatic 2003    R 2ND DIGIT    GERD (gastroesophageal reflux disease) 4/1/2010    High cholesterol 4/1/2010    HTN (hypertension), benign 4/1/2010    CONTROLLED BY MEDS    Hx MRSA infection 2013    CYSTS BL LEGS    Sleep apnea     CPAP       Allergies   Allergen Reactions    Chlorhexidine Towelette Rash     ?  Reaction from using wipes before [facility]       Past Surgical History:   Procedure Laterality Date    ABDOMEN SURGERY PROC UNLISTED  2001    hernia    HX APPENDECTOMY  1959    HX HEENT      T&A    HX KNEE REPLACEMENT Right 10/2017    HX ORTHOPAEDIC  1994    left knee scope    HX ORTHOPAEDIC  01/06/2017    RT KNEE ARTHROSCOPY WITH MINESCECTOMY    HX OTHER SURGICAL  1990S    CERVICAL- UNKNOWN    HX OTHER SURGICAL      R HAND 2ND DIGIT TRAUMATIC AMPUTATION REPAIR    HX TONSIL AND ADENOIDECTOMY         Social History     Social History    Marital status:      Spouse name: N/A    Number of children: N/A    Years of education: N/A     Social History Main Topics    Smoking status: Former Smoker     Packs/day: 3.00     Years: 35.00     Quit date: 1/3/2002    Smokeless tobacco: Never Used    Alcohol use 12.6 oz/week     0 Standard drinks or equivalent, 21 Shots of liquor per week      Comment: 3/day    Drug use: No    Sexual activity: Yes     Partners: Female     Birth control/ protection: None     Other Topics Concern    None     Social History Narrative       Review of Systems   Constitutional: Negative. Negative for chills, fever, malaise/fatigue and weight loss. HENT: Negative. Negative for hearing loss. Eyes: Negative. Negative for blurred vision and double vision. Respiratory: Negative. Negative for cough, hemoptysis, sputum production and shortness of breath. Cardiovascular: Negative. Negative for chest pain, palpitations and orthopnea. Gastrointestinal: Negative. Negative for abdominal pain, blood in stool, heartburn, nausea and vomiting. Genitourinary: Negative. Negative for dysuria, frequency and urgency. Musculoskeletal: Positive for back pain and joint pain. Negative for myalgias and neck pain. One fall onto right knee as documented before. Skin: Negative. Negative for rash. Neurological: Negative. Negative for dizziness, tingling, tremors, weakness and headaches. Endo/Heme/Allergies: Negative. Psychiatric/Behavioral: Negative. Negative for depression. Objective:     Vitals:    04/30/18 0806   BP: 122/77   Pulse: 66   Resp: 18   Temp: 98.4 °F (36.9 °C)   TempSrc: Oral   SpO2: 96%   Weight: 247 lb 9.6 oz (112.3 kg)   Height: 5' 10\" (1.778 m)      Body mass index is 35.53 kg/(m^2). Physical Exam   Constitutional: He is oriented to person, place, and time and well-developed, well-nourished, and in no distress. HENT:   Head: Normocephalic and atraumatic. Mouth/Throat: Oropharynx is clear and moist.   Eyes: Right eye exhibits no discharge. Left eye exhibits no discharge. No scleral icterus. Neck: No tracheal deviation present. No thyromegaly present. No bruit. Cardiovascular: Normal rate, regular rhythm and normal heart sounds. Pulmonary/Chest: Effort normal and breath sounds normal.   Abdominal: Soft. Neurological: He is alert and oriented to person, place, and time. Skin: No rash noted. No erythema. Psychiatric: Mood and affect normal.   Nursing note and vitals reviewed. Health Maintenance Due   Topic Date Due    GLAUCOMA SCREENING Q2Y  11/07/2017    Pneumococcal 65+ Low/Medium Risk (1 of 2 - PCV13) 11/07/2017         Assessment and orders:     Encounter Diagnoses     ICD-10-CM ICD-9-CM   1. Essential hypertension I10 401.9   2. Gastroesophageal reflux disease without esophagitis K21.9 530.81   3. RACHEL (obstructive sleep apnea) G47.33 327.23   4. Mild intermittent asthma without complication D63.33 802.03   5. Pure hypercholesterolemia E78.00 272.0   6. DDD (degenerative disc disease), lumbar M51.36 722.52   7. Vitamin D deficiency E55.9 268.9   8. History of knee replacement, unspecified laterality Z96.659 V43.65   9. PVCs (premature ventricular contractions) I49.3 427.69     Diagnoses and all orders for this visit:    1. Essential hypertension-controlled  -     LIPID PANEL  -     METABOLIC PANEL, COMPREHENSIVE  -     HEMOGLOBIN  -     REFERRAL TO CARDIOLOGY    2. Gastroesophageal reflux disease without esophagitis-stable sx.   - HEMOGLOBIN    3. RACHEL (obstructive sleep apnea)-controlled well. 4. Mild intermittent asthma without complication-no recent sx. 5. Pure hypercholesterolemia- recheck  -     LIPID PANEL  -     METABOLIC PANEL, COMPREHENSIVE    6. DDD (degenerative disc disease), lumbar- chronic low back pain. 7. Vitamin D deficiency-recheck  -     VITAMIN D, 25 HYDROXY    8. History of knee replacement, unspecified laterality- still has pain on both sides. 9. PVCs (premature ventricular contractions)- no sx. Will see cardiology soon.  -     REFERRAL TO CARDIOLOGY    Preventive care. -     pneumococcal 13 ricky conj dip (PREVNAR-13) 0.5 mL syrg injection; 0.5 mL by IntraMUSCular route once for 1 dose. To be administered at Pharmacy. Fax confirmation to me at [phone]. Thank You. Indications: PREVENTION OF STREPTOCOCCUS PNEUMONIAE INFECTION            Plan of care:  Discussed diagnoses in detail with patient. Medication risks/benefits/side effects discussed with patient. All of the patient's questions were addressed. The patient understands and agrees with our plan of care. The patient knows to call back if they are unsure of or forget any changes we discussed today or if the symptoms change. The patient received an After-Visit Summary which contains VS, orders, medication list and allergy list. This can be used as a \"mini-medical record\" should they have to seek medical care while out of town. Patient Care Team:  William Mcconnell MD as PCP - Gavi Pimentel MD (Cardiology)  Mars Hutchinson MD (Dermatology)  Misha Benjamin MD (Urology)  Janet Abdul MD (Gastroenterology)  Nitin Miner MD (Surgery)  Maki Bonilla MD (Orthopedic Surgery)  Kristie Garay MD (Orthopedic Surgery)  Deepti Maldonado NP as Nurse Practitioner (Surgery)  Phi Brooks, RADHA as Ambulatory Care Navigator    Follow-up Disposition:  Return in about 5 months (around 9/30/2018).     Future Appointments  Date Time Provider Department Center   9/24/2018 8:00 AM Darius Andres MD [facility] SCHED       Signed By: Darius Andres MD     April 30, 2018

## 2018-04-30 NOTE — PROGRESS NOTES
Reviewed record in preparation for visit and have necessary documentation  Pt did not bring medication to office visit for review  Goals that were addressed and/or need to be completed during or after this appointment include   Health Maintenance Due   Topic Date Due    GLAUCOMA SCREENING Q2Y  11/07/2017    Pneumococcal 65+ Low/Medium Risk (1 of 2 - PCV13) 11/07/2017

## 2018-05-02 LAB
25(OH)D3+25(OH)D2 SERPL-MCNC: 33.3 NG/ML (ref 30–100)
ALBUMIN SERPL-MCNC: 4.2 G/DL (ref 3.6–4.8)
ALBUMIN/GLOB SERPL: 1.6 {RATIO} (ref 1.2–2.2)
ALP SERPL-CCNC: 60 IU/L (ref 39–117)
ALT SERPL-CCNC: 34 IU/L (ref 0–44)
AST SERPL-CCNC: 25 IU/L (ref 0–40)
BILIRUB SERPL-MCNC: 0.2 MG/DL (ref 0–1.2)
BUN SERPL-MCNC: 12 MG/DL (ref 8–27)
BUN/CREAT SERPL: 14 (ref 10–24)
CALCIUM SERPL-MCNC: 9.2 MG/DL (ref 8.6–10.2)
CHLORIDE SERPL-SCNC: 101 MMOL/L (ref 96–106)
CHOLEST SERPL-MCNC: 150 MG/DL (ref 100–199)
CO2 SERPL-SCNC: 29 MMOL/L (ref 18–29)
CREAT SERPL-MCNC: 0.84 MG/DL (ref 0.76–1.27)
GFR SERPLBLD CREATININE-BSD FMLA CKD-EPI: 106 ML/MIN/1.73
GFR SERPLBLD CREATININE-BSD FMLA CKD-EPI: 92 ML/MIN/1.73
GLOBULIN SER CALC-MCNC: 2.6 G/DL (ref 1.5–4.5)
GLUCOSE SERPL-MCNC: 92 MG/DL (ref 65–99)
HDLC SERPL-MCNC: 41 MG/DL
HGB BLD-MCNC: 14.7 G/DL (ref 13–17.7)
LDLC SERPL CALC-MCNC: 84 MG/DL (ref 0–99)
POTASSIUM SERPL-SCNC: 4.9 MMOL/L (ref 3.5–5.2)
PROT SERPL-MCNC: 6.8 G/DL (ref 6–8.5)
SODIUM SERPL-SCNC: 144 MMOL/L (ref 134–144)
TRIGL SERPL-MCNC: 123 MG/DL (ref 0–149)
VLDLC SERPL CALC-MCNC: 25 MG/DL (ref 5–40)

## 2018-05-22 ENCOUNTER — OFFICE VISIT (OUTPATIENT)
Dept: CARDIOLOGY CLINIC | Age: 68
End: 2018-05-22

## 2018-05-22 VITALS
BODY MASS INDEX: 35.33 KG/M2 | OXYGEN SATURATION: 96 % | RESPIRATION RATE: 16 BRPM | HEIGHT: 70 IN | DIASTOLIC BLOOD PRESSURE: 78 MMHG | WEIGHT: 246.8 LBS | HEART RATE: 76 BPM | SYSTOLIC BLOOD PRESSURE: 120 MMHG

## 2018-05-22 DIAGNOSIS — E78.00 PURE HYPERCHOLESTEROLEMIA: Chronic | ICD-10-CM

## 2018-05-22 DIAGNOSIS — I49.3 PVC'S (PREMATURE VENTRICULAR CONTRACTIONS): Primary | Chronic | ICD-10-CM

## 2018-05-22 DIAGNOSIS — I10 ESSENTIAL HYPERTENSION: Chronic | ICD-10-CM

## 2018-05-22 RX ORDER — CETIRIZINE HCL 10 MG
10 TABLET ORAL
COMMUNITY

## 2018-05-22 RX ORDER — ASPIRIN 325 MG
TABLET, DELAYED RELEASE (ENTERIC COATED) ORAL DAILY
COMMUNITY
End: 2019-02-12 | Stop reason: ALTCHOICE

## 2018-05-22 RX ORDER — NABUMETONE 500 MG/1
TABLET, FILM COATED ORAL 2 TIMES DAILY
COMMUNITY
End: 2021-07-27

## 2018-05-22 NOTE — MR AVS SNAPSHOT
[address] [facility] [address] [place] 
[phone] Patient: Jace Hansen MRN:  [id number] Visit Information Date & Time Provider Department Dept. Phone Encounter #  
 5/22/2018  9:20 AM Nannette Cameron MD [facility] Balderrama [phone] [id number] Your Appointments 9/24/2018  8:00 AM  
ROUTINE CARE with Yessica Vaughn MD  
[facility]: [facility] mo f/u-HTN  
 [address] [address]  
[place] [address] [address] Upcoming Health Maintenance Date Due  
 GLAUCOMA SCREENING Q2Y 11/7/2017 Pneumococcal 65+ Low/Medium Risk (1 of 2 - PCV13) 11/7/2017 Influenza Age 5 to Adult 8/1/2018 MEDICARE YEARLY EXAM 12/27/2018 DTaP/Tdap/Td series (2 - Td) 9/2/2021 COLONOSCOPY 12/24/2025 Allergies as of 5/22/2018  Review Complete On: 5/22/2018 By: Dot Elliott Severity Noted Reaction Type Reactions Chlorhexidine Towelette  02/07/2018    Rash  
 ? Reaction from using wipes before [facility] Current Immunizations  Reviewed on 11/19/2015 Name Date Influenza Vaccine 10/4/2016, 11/7/2015, 9/13/2013 Influenza Vaccine (Quad) PF 10/17/2014 Influenza Vaccine Split 1/1/2006 Pneumococcal Polysaccharide (PPSV-23) 11/19/2015 TD Vaccine 6/30/1999 TDAP Vaccine 9/2/2011 Zoster Vaccine, Live 12/10/2013 Not reviewed this visit Vitals BP Pulse Resp Height(growth percentile) Weight(growth percentile) SpO2  
 120/78 (BP 1 Location: Left arm) 76 16 5' 10\" (1.778 m) 246 lb 12.8 oz (111.9 kg) 96% BMI Smoking Status 35.41 kg/m2 Former Smoker Vitals History BMI and BSA Data Body Mass Index Body Surface Area  
 35.41 kg/m 2 2.35 m 2 Preferred Pharmacy Pharmacy Name Phone [facility] [address] [place] [address] [phone] Your Updated Medication List  
  
   
This list is accurate as of 5/22/18 10:00 AM.  Always use your most recent med list.  
  
  
  
  
 aspirin delayed-release 325 mg tablet Take  by mouth daily. hydroCHLOROthiazide 12.5 mg tablet Commonly known as:  HYDRODIURIL Take 1 Tab by mouth daily. Indications: hypertension  
  
 metoprolol succinate 50 mg XL tablet Commonly known as:  TOPROL-XL Take 1 Tab by mouth daily. Indications: hypertension  
  
 nabumetone 500 mg tablet Commonly known as:  RELAFEN Take  by mouth two (2) times a day. Omeprazole delayed release 20 mg tablet Commonly known as:  PRILOSEC D/R Take 20 mg by mouth daily. POTASSIUM CHLORIDE PO Take 595 mg by mouth daily. pravastatin 20 mg tablet Commonly known as:  PRAVACHOL  
TAKE 1 TABLET BY MOUTH NIGHTLY  
  
 ramipril 10 mg capsule Commonly known as:  ALTACE  
TAKE ONE CAPSULE BY MOUTH TWICE A DAY  
  
 VITAMIN B-12 1,000 mcg tablet Generic drug:  cyanocobalamin Take 1 Tab by mouth daily. ZyrTEC 10 mg tablet Generic drug:  cetirizine Take  by mouth daily. Patient Instructions Follow up with cardiology as needed Introducing [facility]! Amish Paredes introduces [employer] patient portal. Now you can access parts of your medical record, email your doctor's office, and request medication refills online. 1. In your internet browser, go to [email or web address]. [employer] 2. Click on the First Time User? Click Here link in the Sign In box. You will see the New Member Sign Up page. 3. Enter your [employer] Access Code exactly as it appears below. You will not need to use this code after youve completed the sign-up process. If you do not sign up before the expiration date, you must request a new code. · [employer] Access Code: [email or web address] Expires: 7/29/2018  8:22 AM 
 
 4. Enter the last four digits of your Social Security Number (xxxx) and Date of Birth (mm/dd/yyyy) as indicated and click Submit. You will be taken to the next sign-up page. 5. Create a [employer] ID. This will be your [employer] login ID and cannot be changed, so think of one that is secure and easy to remember. 6. Create a [employer] password. You can change your password at any time. 7. Enter your Password Reset Question and Answer. This can be used at a later time if you forget your password. 8. Enter your e-mail address. You will receive e-mail notification when new information is available in [address] 9. Click Sign Up. You can now view and download portions of your medical record. 10. Click the Download Summary menu link to download a portable copy of your medical information. If you have questions, please visit the Frequently Asked Questions section of the [employer] website. Remember, [employer] is NOT to be used for urgent needs. For medical emergencies, dial 911. Now available from your iPhone and Android! Please provide this summary of care documentation to your next provider. Your primary care clinician is listed as [address]. If you have any questions after today's visit, please call [phone].

## 2018-05-22 NOTE — PROGRESS NOTES
Hung Redd     1950       Don Limon MD, [facility]  Date of Visit-5/22/2018   PCP is Lauren Ladd MD   [facility] [place]  [facility]  HPI:  Hung Redd is a 74 y.o. male   Follow up of PVCs and hypertension. Overall the pt states he is doing well. The pt reports that he has not been feeling his PVCs as much as he used to. He notes that he has now had both knees replaced and his knee pain is significantly improved. The pt thought that maybe all his PVCs could be due to him taking so much aleve and tylenol prior to his knee surgeries. His blood pressure has also improved since his knee surgeries as he is no longer in a significant amount of pain. The pt notes that at times his legs swell due to being on his feet for long periods of time and due to his knees. Denies chest pain, edema, syncope or shortness of breath at rest, has no tachycardia, palpitations or sense of arrhythmia. ECHO 9-16-15=[facility] exercise nuclear stress test 10-1-15= walked 6 minutes ,EF 64%, no EKG changes or ischemia, rare PVCs    Holter: 7/27/15. Average heart rate 71. Total beats 101,922. No pauses. Ventricular ectopics 3,993, averaging 167 per hour. Bigeminy was about half of those beats. Supraventricular ectopics 213. SVT the fasted at 4 beats and the longest at 11 beats. Assessment/Plan:     1. PVCs, about 4,000 PVCs on a holter, has no structural heart disease, echo and nuclear were fine. He is asymptomatic. 2. Hypertension, multiple meds, well controlled now. 3. Noncardiac pedal edema mild. 4. Lipids on statin  Lab Results   Component Value Date/Time    LDL, calculated 84 05/01/2018 11:40 AM    Follow up as needed.    Future Appointments  Date Time Provider Department Center   9/24/2018 8:00 AM Lauren Ladd MD [facility] [address]      Patient Instructions   Follow up with cardiology as needed     Key CAD CHF Meds             aspirin delayed-release 325 mg tablet  (Taking) Take  by mouth daily. pravastatin (PRAVACHOL) 20 mg tablet  (Taking) TAKE 1 TABLET BY MOUTH NIGHTLY    metoprolol succinate (TOPROL-XL) 50 mg XL tablet  (Taking) Take 1 Tab by mouth daily. Indications: hypertension    ramipril (ALTACE) 10 mg capsule  (Taking) TAKE ONE CAPSULE BY MOUTH TWICE A DAY    hydroCHLOROthiazide (HYDRODIURIL) 12.5 mg tablet  (Taking) Take 1 Tab by mouth daily. Indications: hypertension            Impression:   1. PVC's (premature ventricular contractions)    2. Essential hypertension    3. Pure hypercholesterolemia          ROS-except as noted above. . A complete cardiac and respiratory are reviewed and negative except as above ; Resp-denies wheezing  or productive cough,. Const- No unusual weight loss or fever; Neuro-no recent seizure or CVA ; GI- No BRBPR, abdom pain, bloating ; - no  hematuria   see supplement sheet, initialed and to be scanned by staff  Past Medical History:   Diagnosis Date    Adverse effect of anesthesia     went apneic post surgery [facility]    Anesthesia complication 2339    APNEA DURING HERNIA REPAIR[place] [facility] [place]    Arrhythmia     FREQUENT PVCS    Arthritis     Asthma     HAS BRONCHITIS OFTEN    Cancer ([facility] [address]     SKIN- UNKNOWN    Colon ulcer, aphthous 4/1/2010    Finger amputation, traumatic 2003    R 2ND DIGIT    GERD (gastroesophageal reflux disease) 4/1/2010    High cholesterol 4/1/2010    HTN (hypertension), benign 4/1/2010    CONTROLLED BY MEDS    Hx MRSA infection 2013    CYSTS BL LEGS    Sleep apnea     CPAP      Social Hx= reports that he quit smoking about 16 years ago. He has a 105.00 pack-year smoking history. He has never used smokeless tobacco. He reports that he drinks about 12.6 oz of alcohol per week  He reports that he does not use illicit drugs.      Exam and Labs:  /78 (BP 1 Location: Left arm)  Pulse 76  Resp 16  Ht 5' 10\" (1.778 m)  Wt 246 lb 12.8 oz (111.9 kg) SpO2 96%  BMI 35.41 kg/q5Asrrqxtbachewv:  NAD, comfortable  Head: NC,AT. Eyes: No scleral icterus. Neck:  Neck supple. No JVD present. Throat: moist mucous membranes. Chest: Effort normal & normal respiratory excursion . Neurological: alert, conversant and oriented . Skin: Skin is not cold. No obvious systemic rash noted. Not diaphoretic. No erythema. Psychiatric:  Grossly normal mood and affect. Behavior appears normal. Extremities:  no clubbing or cyanosis. Abdomen: non distended    Lungs:breath sounds normal. No stridor. distress, wheezes or  Rales. Heart: normal rate, regular rhythm, normal S1, S2, no murmurs, rubs, clicks or gallops , PMI non displaced. Edema: Edema is none. Lab Results   Component Value Date/Time    Cholesterol, total 150 05/01/2018 11:40 AM    HDL Cholesterol 41 05/01/2018 11:40 AM    LDL, calculated 84 05/01/2018 11:40 AM    Triglyceride 123 05/01/2018 11:40 AM    CHOL/HDL Ratio 3.8 04/02/2010 09:09 AM     Lab Results   Component Value Date/Time    Sodium 144 05/01/2018 11:40 AM    Potassium 4.9 05/01/2018 11:40 AM    Chloride 101 05/01/2018 11:40 AM    CO2 29 05/01/2018 11:40 AM    Anion gap 7 02/08/2018 03:44 AM    Glucose 92 05/01/2018 11:40 AM    BUN 12 05/01/2018 11:40 AM    Creatinine 0.84 05/01/2018 11:40 AM    BUN/Creatinine ratio 14 05/01/2018 11:40 AM    GFR est  05/01/2018 11:40 AM    GFR est non-AA 92 05/01/2018 11:40 AM    Calcium 9.2 05/01/2018 11:40 AM      Wt Readings from Last 3 Encounters:   05/22/18 246 lb 12.8 oz (111.9 kg)   04/30/18 247 lb 9.6 oz (112.3 kg)   02/07/18 245 lb (111.1 kg)      BP Readings from Last 3 Encounters:   05/22/18 120/78   04/30/18 122/77   02/09/18 147/82      Current Outpatient Prescriptions   Medication Sig    nabumetone (RELAFEN) 500 mg tablet Take  by mouth two (2) times a day.  aspirin delayed-release 325 mg tablet Take  by mouth daily.  cetirizine (ZYRTEC) 10 mg tablet Take  by mouth daily.     pravastatin (PRAVACHOL) 20 mg tablet TAKE 1 TABLET BY MOUTH NIGHTLY    metoprolol succinate (TOPROL-XL) 50 mg XL tablet Take 1 Tab by mouth daily. Indications: hypertension    ramipril (ALTACE) 10 mg capsule TAKE ONE CAPSULE BY MOUTH TWICE A DAY    hydroCHLOROthiazide (HYDRODIURIL) 12.5 mg tablet Take 1 Tab by mouth daily. Indications: hypertension    POTASSIUM CHLORIDE PO Take 595 mg by mouth daily.  Omeprazole delayed release (PRILOSEC D/R) 20 mg tablet Take 20 mg by mouth daily.  cyanocobalamin (VITAMIN B-12) 1,000 mcg tablet Take 1 Tab by mouth daily. No current facility-administered medications for this visit. Impression see above.       Written by Latrice Vincent, as dictated by Jonas Ngo MD.

## 2018-09-24 ENCOUNTER — OFFICE VISIT (OUTPATIENT)
Dept: FAMILY MEDICINE CLINIC | Age: 68
End: 2018-09-24

## 2018-09-24 VITALS
WEIGHT: 241.2 LBS | HEIGHT: 70 IN | HEART RATE: 61 BPM | DIASTOLIC BLOOD PRESSURE: 85 MMHG | RESPIRATION RATE: 20 BRPM | SYSTOLIC BLOOD PRESSURE: 130 MMHG | TEMPERATURE: 97.7 F | BODY MASS INDEX: 34.53 KG/M2 | OXYGEN SATURATION: 97 %

## 2018-09-24 DIAGNOSIS — E55.9 VITAMIN D DEFICIENCY: Chronic | ICD-10-CM

## 2018-09-24 DIAGNOSIS — E78.00 PURE HYPERCHOLESTEROLEMIA: Chronic | ICD-10-CM

## 2018-09-24 DIAGNOSIS — K21.9 GASTROESOPHAGEAL REFLUX DISEASE WITHOUT ESOPHAGITIS: Chronic | ICD-10-CM

## 2018-09-24 DIAGNOSIS — Z23 ENCOUNTER FOR IMMUNIZATION: ICD-10-CM

## 2018-09-24 DIAGNOSIS — I10 ESSENTIAL HYPERTENSION: Primary | Chronic | ICD-10-CM

## 2018-09-24 DIAGNOSIS — J45.20 MILD INTERMITTENT ASTHMA WITHOUT COMPLICATION: Chronic | ICD-10-CM

## 2018-09-24 DIAGNOSIS — G47.33 OSA (OBSTRUCTIVE SLEEP APNEA): Chronic | ICD-10-CM

## 2018-09-24 NOTE — MR AVS SNAPSHOT
[address] [address] 
[phone] Patient: Karyle Schwartz MRN: [id number] SQI:[id number] Visit Information Date & Time Provider Department Dept. Phone Encounter #  
 9/24/2018  8:00 AM Harmony Amor  [facility] [phone] [id number] Follow-up Instructions Return in about 4 months (around 1/24/2019). Upcoming Health Maintenance Date Due  
 GLAUCOMA SCREENING Q2Y 11/7/2017 Pneumococcal 65+ Low/Medium Risk (1 of 2 - PCV13) 11/7/2017 Influenza Age 5 to Adult 8/1/2018 DTaP/Tdap/Td series (2 - Td) 9/2/2021 COLONOSCOPY 12/24/2025 Allergies as of 9/24/2018  Review Complete On: 9/24/2018 By: Ortega Jesus LPN Severity Noted Reaction Type Reactions Chlorhexidine [place]  02/07/2018    Rash  
 ? Reaction from using wipes before [facility] Current Immunizations  Reviewed on 11/19/2015 Name Date Influenza Vaccine 10/4/2016, 11/7/2015, 9/13/2013 Influenza Vaccine (Quad) PF 10/17/2014 Influenza Vaccine Split 1/1/2006 Pneumococcal Polysaccharide (PPSV-23) 11/19/2015 TD Vaccine 6/30/1999 TDAP Vaccine 9/2/2011 Zoster Vaccine, Live 12/10/2013 Not reviewed this visit You Were Diagnosed With   
  
 Codes Comments Essential hypertension    -  Primary ICD-10-CM: I10 
ICD-9-CM: 401.9 Gastroesophageal reflux disease without esophagitis     ICD-10-CM: K21.9 ICD-9-CM: 530.81   
 RACHEL (obstructive sleep apnea)     ICD-10-CM: G47.33 
ICD-9-CM: 327.23 Vitamin D deficiency     ICD-10-CM: E55.9 ICD-9-CM: 268.9 Pure hypercholesterolemia     ICD-10-CM: E78.00 ICD-9-CM: 272.0 Mild intermittent asthma without complication     ZDB-08-LS: J45.20 ICD-9-CM: 493.90 Encounter for immunization     ICD-10-CM: P40 ICD-9-CM: V03.89 Vitals BP Pulse Temp Resp Height(growth percentile) Weight(growth percentile) 130/85 (BP 1 Location: Left arm, BP Patient Position: Sitting) 61 97.7 °F (36.5 °C) (Oral) 20 5' 10\" (1.778 m) 241 lb 3.2 oz (109.4 kg) SpO2 BMI Smoking Status 97% 34.61 kg/m2 Former Smoker Vitals History BMI and BSA Data Body Mass Index Body Surface Area  
 34.61 kg/m 2 2.32 m 2 Preferred Pharmacy Pharmacy Name Phone Jennifer Gallegos [address] [place] [address] [phone] Your Updated Medication List  
  
   
This list is accurate as of 9/24/18  8:17 AM.  Always use your most recent med list.  
  
  
  
  
 aspirin delayed-release 325 mg tablet Take  by mouth daily. hydroCHLOROthiazide 12.5 mg tablet Commonly known as:  HYDRODIURIL  
TAKE ONE TABLET BY MOUTH ONCE DAILY FOR  HYPERTENSION  
  
 metoprolol succinate 50 mg XL tablet Commonly known as:  TOPROL-XL Take 1 Tab by mouth daily. Indications: hypertension  
  
 nabumetone 500 mg tablet Commonly known as:  RELAFEN Take  by mouth two (2) times a day. Omeprazole delayed release 20 mg tablet Commonly known as:  PRILOSEC D/R Take 20 mg by mouth daily. pneumococcal 23-valent 25 mcg/0.5 mL injection Commonly known as:  PNEUMOVAX 23  
0.5 mL by IntraMUSCular route once for 1 dose. To be administered at Pharmacy. Fax confirmation to me at [phone]. Thank You. Indications: PREVENTION OF STREPTOCOCCUS PNEUMONIAE INFECTION  
  
 POTASSIUM CHLORIDE PO Take 595 mg by mouth daily. pravastatin 20 mg tablet Commonly known as:  PRAVACHOL  
TAKE 1 TABLET BY MOUTH NIGHTLY  
  
 ramipril 10 mg capsule Commonly known as:  ALTACE  
TAKE ONE CAPSULE BY MOUTH TWICE A DAY  
  
 VITAMIN B-12 1,000 mcg tablet Generic drug:  cyanocobalamin Take 1 Tab by mouth daily. ZyrTEC 10 mg tablet Generic drug:  cetirizine Take  by mouth daily. Prescriptions Printed Refills pneumococcal 23-valent (PNEUMOVAX 23) 25 mcg/0.5 mL injection 0 Si.5 mL by IntraMUSCular route once for 1 dose. To be administered at Pharmacy. Fax confirmation to me at [phone]. Thank You. Indications: PREVENTION OF STREPTOCOCCUS PNEUMONIAE INFECTION Class: Print Route: IntraMUSCular We Performed the Following HEMOGLOBIN [id number] CPT(R)] LIPID PANEL [03598 CPT(R)] METABOLIC PANEL, COMPREHENSIVE [69702 CPT(R)] VITAMIN D, 25 HYDROXY [id number] CPT(R)] Follow-up Instructions Return in about 4 months (around 2019). Patient Instructions Gastroesophageal Reflux Disease (GERD): Care Instructions Your Care Instructions Gastroesophageal reflux disease (GERD) is the backward flow of stomach acid into the esophagus. The esophagus is the tube that leads from your throat to your stomach. A one-way valve prevents the stomach acid from moving up into this tube. When you have GERD, this valve does not close tightly enough. If you have mild GERD symptoms including heartburn, you may be able to control the problem with antacids or over-the-counter medicine. Changing your diet, losing weight, and making other lifestyle changes can also help reduce symptoms. Follow-up care is a key part of your treatment and safety. Be sure to make and go to all appointments, and call your doctor if you are having problems. It's also a good idea to know your test results and keep a list of the medicines you take. How can you care for yourself at home? · Take your medicines exactly as prescribed. Call your doctor if you think you are having a problem with your medicine. · Your doctor may recommend over-the-counter medicine. For mild or occasional indigestion, antacids, such as Tums, Gaviscon, Mylanta, or Maalox, may help. Your doctor also may recommend over-the-counter acid reducers, such as Pepcid AC, Tagamet HB, Zantac 75, or Prilosec.  Read and follow all instructions on the label. If you use these medicines often, talk with your doctor. · Change your eating habits. ¨ It's best to eat several small meals instead of two or three large meals. ¨ After you eat, wait 2 to 3 hours before you lie down. ¨ Chocolate, mint, and alcohol can make GERD worse. ¨ Spicy foods, foods that have a lot of acid (like tomatoes and oranges), and coffee can make GERD symptoms worse in some people. If your symptoms are worse after you eat a certain food, you may want to stop eating that food to see if your symptoms get better. · Do not smoke or chew tobacco. Smoking can make GERD worse. If you need help quitting, talk to your doctor about stop-smoking programs and medicines. These can increase your chances of quitting for good. · If you have GERD symptoms at night, raise the head of your bed 6 to 8 inches by putting the frame on blocks or placing a foam wedge under the head of your mattress. (Adding extra pillows does not work.) · Do not wear tight clothing around your middle. · Lose weight if you need to. Losing just 5 to 10 pounds can help. When should you call for help? Call your doctor now or seek immediate medical care if: 
  · You have new or different belly pain.  
  · Your stools are black and tarlike or have streaks of blood.  
 Watch closely for changes in your health, and be sure to contact your doctor if: 
  · Your symptoms have not improved after 2 days.  
  · Food seems to catch in your throat or chest.  
Where can you learn more? Go to http://kurt-lin.info/. Enter S345 in the search box to learn more about \"Gastroesophageal Reflux Disease (GERD): Care Instructions. \" Current as of: May 12, 2017 Content Version: 11.7 © 2497-5125 [employer]. Care instructions adapted under license by [employer] (which disclaims liability or warranty for this information).  If you have questions about a medical condition or this instruction, always ask your healthcare professional. [address] [address] any warranty or liability for your use of this information. Introducing [facility]! [employer] introduces [employer] patient portal. Now you can access parts of your medical record, email your doctor's office, and request medication refills online. 1. In your internet browser, go to [email or web address]://[email or web address]. [employer]/Virtual Commandt 2. Click on the First Time User? Click Here link in the Sign In box. You will see the New Member Sign Up page. 3. Enter your [employer] Access Code exactly as it appears below. You will not need to use this code after youve completed the sign-up process. If you do not sign up before the expiration date, you must request a new code. · [employer] Access Code: [email or web address] Expires: 12/23/2018  7:48 AM 
 
4. Enter the last four digits of your Social Security Number (xxxx) and Date of Birth (mm/dd/yyyy) as indicated and click Submit. You will be taken to the next sign-up page. 5. Create a [employer] ID. This will be your [employer] login ID and cannot be changed, so think of one that is secure and easy to remember. 6. Create a [employer] password. You can change your password at any time. 7. Enter your Password Reset Question and Answer. This can be used at a later time if you forget your password. 8. Enter your e-mail address. You will receive e-mail notification when new information is available in [address] 9. Click Sign Up. You can now view and download portions of your medical record. 10. Click the Download Summary menu link to download a portable copy of your medical information. If you have questions, please visit the Frequently Asked Questions section of the [employer] website. Remember, [employer] is NOT to be used for urgent needs. For medical emergencies, dial 911. Now available from your iPhone and Android! Please provide this summary of care documentation to your next provider. Your primary care clinician is listed as [address]. If you have any questions after today's visit, please call [phone].

## 2018-09-24 NOTE — PROGRESS NOTES
Progress Note Patient: Germaine Reyez MRN: [id number]  SSN: [id number] YOB: 1950  Age: 74 y.o. Sex: male Chief Complaint Patient presents with  Hypertension Subjective:  
 
Encounter Diagnoses Name Primary?  Essential hypertension:controlled BP Readings from Last 3 Encounters:  
09/24/18 130/85  
05/22/18 120/78  
04/30/18 122/77 The patient reports:  taking medications as instructed, no medication side effects noted, no TIA's, no chest pain on exertion, no dyspnea on exertion, no swelling of ankles. Key CAD CHF Meds   
    
  
 hydroCHLOROthiazide (HYDRODIURIL) 12.5 mg tablet  (Taking) TAKE ONE TABLET BY MOUTH ONCE DAILY FOR  HYPERTENSION  
 aspirin delayed-release 325 mg tablet  (Taking) Take  by mouth daily. pravastatin (PRAVACHOL) 20 mg tablet  (Taking) TAKE 1 TABLET BY MOUTH NIGHTLY  
 metoprolol succinate (TOPROL-XL) 50 mg XL tablet  (Taking) Take 1 Tab by mouth daily. Indications: hypertension  
 ramipril (ALTACE) 10 mg capsule  (Taking) TAKE ONE CAPSULE BY MOUTH TWICE A DAY Lab Results Component Value Date/Time Sodium 144 05/01/2018 11:40 AM  
 Potassium 4.9 05/01/2018 11:40 AM  
 Chloride 101 05/01/2018 11:40 AM  
 CO2 29 05/01/2018 11:40 AM  
 Anion gap 7 02/08/2018 03:44 AM  
 Glucose 92 05/01/2018 11:40 AM  
 BUN 12 05/01/2018 11:40 AM  
 Creatinine 0.84 05/01/2018 11:40 AM  
 BUN/Creatinine ratio 14 05/01/2018 11:40 AM  
 GFR est  05/01/2018 11:40 AM  
 GFR est non-AA 92 05/01/2018 11:40 AM  
 Calcium 9.2 05/01/2018 11:40 AM  
 Bilirubin, total 0.2 05/01/2018 11:40 AM  
 AST (SGOT) 25 05/01/2018 11:40 AM  
 Alk. phosphatase 60 05/01/2018 11:40 AM  
 Protein, total 6.8 05/01/2018 11:40 AM  
 Albumin 4.2 05/01/2018 11:40 AM  
 Globulin 3.4 04/02/2010 09:09 AM  
 A-G Ratio 1.6 05/01/2018 11:40 AM  
 ALT (SGPT) 34 05/01/2018 11:40 AM  
 
Low salt diet? yes Aerobic exercise?  no 
 Our goal is to normalize the blood pressure to decrease the risks of strokes and heart attacks. The patient is in agreement with the plan. Yes  Gastroesophageal reflux disease without esophagitis: 
Current control of Symptoms:good Hiatal Hernia:no Current Medications:omeprazole The patient has no history melena or bright red blood in the stools. The patient avoids high dose aspirin and NSAID therapy. The patient is aware of diet changes needed, elevating the head of the bed and appropriate use of antacids.  RACHEL (obstructive sleep apnea): Controlled on CPAP. 100% compliant. Feels rested in AM.  Vitamin D deficiency: No sx. Due for testing. Lab Results Component Value Date/Time Vitamin D 25-Hydroxy 19 (L) 04/02/2010 09:09 AM  
 VITAMIN D, 25-HYDROXY 33.3 05/01/2018 11:40 AM  
   
 
   
 Pure hypercholesterolemia: 
Cardiovascular risks for him are: LDL goal is under 100. Key Antihyperlipidemia Meds   
    
  
 pravastatin (PRAVACHOL) 20 mg tablet  (Taking) TAKE 1 TABLET BY MOUTH NIGHTLY Lab Results Component Value Date/Time Cholesterol, total 150 05/01/2018 11:40 AM  
 HDL Cholesterol 41 05/01/2018 11:40 AM  
 LDL, calculated 84 05/01/2018 11:40 AM  
 Triglyceride 123 05/01/2018 11:40 AM  
 CHOL/HDL Ratio 3.8 04/02/2010 09:09 AM  
 
Lab Results Component Value Date/Time ALT (SGPT) 34 05/01/2018 11:40 AM  
 AST (SGOT) 25 05/01/2018 11:40 AM  
 Alk. phosphatase 60 05/01/2018 11:40 AM  
 Bilirubin, total 0.2 05/01/2018 11:40 AM  
 
 Myalgias: No 
 Fatigue: No 
 Other side effects: no Wt Readings from Last 3 Encounters:  
09/24/18 241 lb 3.2 oz (109.4 kg) 05/22/18 246 lb 12.8 oz (111.9 kg) 04/30/18 247 lb 9.6 oz (112.3 kg) The patient is aware of our goal to reduce or eliminate the long term problems (such as strokes and heart attacks) related to poorly controlled hyperlipidemia.  Mild intermittent asthma without complication: asthma, not currently in exacerbation. Asthma symptoms occur: infrequently. Wheezing when present is described as mild and easily relieved with rescue bronchodilator. Current limitations in activity from asthma: none. SpO2 Readings from Last 3 Encounters:  
09/24/18 97% 05/22/18 96% 04/30/18 96% Frequency of use of quick-relief meds: rare. Medication compliance: Good Patient does not smoke cigarettes. Monitors Peak Flow at home: no 
 
 
   
 Encounter for immunization: Flu shot given. Current and past medical information: 
 
Current Medications after this visit[de-identified]    
Current Outpatient Prescriptions Medication Sig  pneumococcal 23-valent (PNEUMOVAX 23) 25 mcg/0.5 mL injection 0.5 mL by IntraMUSCular route once for 1 dose. To be administered at Pharmacy. Fax confirmation to me at [phone]. Thank You. Indications: PREVENTION OF STREPTOCOCCUS PNEUMONIAE INFECTION  
 hydroCHLOROthiazide (HYDRODIURIL) 12.5 mg tablet TAKE ONE TABLET BY MOUTH ONCE DAILY FOR  HYPERTENSION  nabumetone (RELAFEN) 500 mg tablet Take  by mouth two (2) times a day.  aspirin delayed-release 325 mg tablet Take  by mouth daily.  cetirizine (ZYRTEC) 10 mg tablet Take  by mouth daily.  pravastatin (PRAVACHOL) 20 mg tablet TAKE 1 TABLET BY MOUTH NIGHTLY  metoprolol succinate (TOPROL-XL) 50 mg XL tablet Take 1 Tab by mouth daily. Indications: hypertension  ramipril (ALTACE) 10 mg capsule TAKE ONE CAPSULE BY MOUTH TWICE A DAY  POTASSIUM CHLORIDE PO Take 595 mg by mouth daily.  Omeprazole delayed release (PRILOSEC D/R) 20 mg tablet Take 20 mg by mouth daily.  cyanocobalamin (VITAMIN B-12) 1,000 mcg tablet Take 1 Tab by mouth daily. No current facility-administered medications for this visit. Patient Active Problem List  
 Diagnosis Date Noted  Positive PPD 07/01/2010 Priority: 1 - One  
 RACHEL (obstructive sleep apnea) 07/01/2010   Priority: 1 - One  
  HTN (hypertension) 04/01/2010 Priority: 1 - One  GERD (gastroesophageal reflux disease) 04/01/2010 Priority: 1 - One  Severe obesity (BMI 35.0-39. 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  
 SOB (shortness of breath) 10/11/2012  Vitamin D deficiency 04/27/2012  Colon ulcer, aphthous 04/01/2010 Past Medical History:  
Diagnosis Date  Adverse effect of anesthesia   
 went apneic post surgery at [facility]  Anesthesia complication 6263 APNEA DURING HERNIA REPAIR, POST OPERATIVE INTUBATION AT [facility]  Arrhythmia FREQUENT PVCS  
 Arthritis  Asthma HAS BRONCHITIS OFTEN  
 Cancer ([facility] [address] SKIN- UNKNOWN  
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  
 GERD (gastroesophageal reflux disease) 4/1/2010  High cholesterol 4/1/2010  
 HTN (hypertension), benign 4/1/2010 CONTROLLED BY MEDS  Hx MRSA infection 2013 CYSTS BL LEGS  Sleep apnea CPAP Allergies Allergen Reactions  Chlorhexidine Towelette Rash  
  ? Reaction from using wipes before [facility] Past Surgical History:  
Procedure Laterality Date  ABDOMEN SURGERY PROC UNLISTED  2001  
 hernia [address]  HX HEENT    
 T&A  HX KNEE REPLACEMENT Right 10/2017  HX ORTHOPAEDIC  1994  
 left knee scope  HX ORTHOPAEDIC  01/06/2017 RT KNEE ARTHROSCOPY WITH MINESCECTOMY [address] CERVICAL- UNKNOWN  
 HX OTHER SURGICAL R HAND 2ND DIGIT TRAUMATIC AMPUTATION REPAIR  
 HX TONSIL AND ADENOIDECTOMY Social History Social History  Marital status:  Spouse name: N/A  
 Number of children: N/A  
 Years of education: N/A Social History Main Topics  Smoking status: Former Smoker Packs/day: 3.00 Years: 35.00 Quit date: 1/3/2002  Smokeless tobacco: Never Used  Alcohol use 12.6 oz/week  
  0 Standard drinks or equivalent, 21 Shots of liquor per week Comment: 3/day  Drug use: No  
 Sexual activity: Yes  
  Partners: Female Birth control/ protection: None Other Topics Concern  None Social History Narrative Review of Systems Constitutional: Negative. Negative for chills, fever, malaise/fatigue and weight loss. HENT: Negative. Negative for hearing loss. Eyes: Negative. Negative for blurred vision and double vision. Respiratory: Negative. Negative for cough, hemoptysis, sputum production and shortness of breath. Cardiovascular: Negative. Negative for chest pain, palpitations and orthopnea. Gastrointestinal: Negative. Negative for abdominal pain, blood in stool, heartburn, nausea and vomiting. Genitourinary: Negative. Negative for dysuria, frequency and urgency. Musculoskeletal: Positive for falls. Negative for back pain, myalgias and neck pain. He has developed left sided sciatica and right-sided numbness if he stands for a long period time. He is seeing Dr. Radha Herrera 83. He has noticed a decrease in his balance since having his knees replaced. I recommended he use [place] 5 [place]. Skin: Negative. Negative for rash. Neurological: Negative. Negative for dizziness, tingling, tremors, weakness and headaches. Endo/Heme/Allergies: Negative. Psychiatric/Behavioral: Negative. Negative for depression. Objective:  
 
Vitals:  
 09/24/18 0801 BP: 130/85 Pulse: 61 Resp: 20 Temp: 97.7 °F (36.5 °C) TempSrc: Oral  
SpO2: 97% Weight: 241 lb 3.2 oz (109.4 kg) Height: 5' 10\" (1.778 m) Body mass index is 34.61 kg/(m^2). Physical Exam  
Constitutional: He is oriented to person, place, and time and well-developed, well-nourished, and in no distress. HENT:  
Head: Normocephalic and atraumatic. Mouth/Throat: Oropharynx is clear and moist.  
Eyes: Right eye exhibits no discharge. Left eye exhibits no discharge. No scleral icterus. Neck: No thyromegaly present. No bruit. Cardiovascular: Normal rate, regular rhythm and normal heart sounds. Pulmonary/Chest: Effort normal and breath sounds normal.  
Abdominal: Soft. Neurological: He is alert and oriented to person, place, and time. Skin: No rash noted. No erythema. Psychiatric: Mood and affect normal.  
Nursing note and vitals reviewed. Health Maintenance Due Topic Date Due  GLAUCOMA SCREENING Q2Y  11/07/2017  Pneumococcal 65+ Low/Medium Risk (1 of 2 - PCV13) 11/07/2017  Influenza Age 5 to Adult  08/01/2018 Assessment and orders:  
 
Encounter Diagnoses ICD-10-CM ICD-9-CM 1. Essential hypertension I10 401.9 2. Gastroesophageal reflux disease without esophagitis K21.9 530.81  
3. RACHEL (obstructive sleep apnea) G47.33 327.23  
4. Vitamin D deficiency E55.9 268.9 5. Pure hypercholesterolemia E78.00 272.0 6. Mild intermittent asthma without complication Z17.22 503.91  
7. Encounter for immunization Z23 V03.89 Diagnoses and all orders for this visit: 1. Essential hypertension-controlled -     LIPID PANEL 
-     METABOLIC PANEL, COMPREHENSIVE 
-     HEMOGLOBIN 
 
2. Gastroesophageal reflux disease without esophagitis-controlled 
-     HEMOGLOBIN 
 
3. RACHEL (obstructive sleep apnea)-corrected 4. Vitamin D deficiency-recheck level -     VITAMIN D, 25 HYDROXY 5. Pure hypercholesterolemia-recheck -     LIPID PANEL 
-     METABOLIC PANEL, COMPREHENSIVE 
 
 6. Mild intermittent asthma without complication-controlled 7. Encounter for immunization-flu shot followed by pneumonia shot in several weeks. -     pneumococcal 23-valent (PNEUMOVAX 23) 25 mcg/0.5 mL injection; 0.5 mL by IntraMUSCular route once for 1 dose. To be administered at Pharmacy. Fax confirmation to me at [phone]. Thank You. Indications: PREVENTION OF STREPTOCOCCUS PNEUMONIAE INFECTION Plan of care: 
Discussed diagnoses in detail with patient. Medication risks/benefits/side effects discussed with patient. All of the patient's questions were addressed. The patient understands and agrees with our plan of care. The patient knows to call back if they are unsure of or forget any changes we discussed today or if the symptoms change. The patient received an After-Visit Summary which contains VS, orders, medication list and allergy list. This can be used as a \"mini-medical record\" should they have to seek medical care while out of town. Patient Care Team: 
Sandhya Hebert MD as PCP - Adalberto Savage MD (Cardiology) Benson Tyler MD (Dermatology) Ryann Boyer MD (Urology) Columba Camejo MD (Gastroenterology) Maria E Galaviz MD (Surgery) Kashif Vann MD (Orthopedic Surgery) Wauneta Goldberg, MD (Orthopedic Surgery) Charly Cohen NP as Nurse Practitioner (Surgery) Romel Iraheta RN as Ambulatory Care Navigator Follow-up Disposition: 
Return in about 4 months (around 1/24/2019). No future appointments. Signed By: Sandhya Hebert MD   
 September 24, 2018

## 2018-09-24 NOTE — PROGRESS NOTES
1. Have you been to the ER, urgent care clinic since your last visit? Hospitalized since your last visit? No 
 
2. Have you seen or consulted any other health care providers outside of the [address] [place] [address] since your last visit? Include any pap smears or colon screening. No 
Reviewed record in preparation for visit and have necessary documentation Pt did not bring medication to office visit for reviewquestions Goals that were addressed and/or need to be completed during or after this appointment include Health Maintenance Due Topic Date Due  GLAUCOMA SCREENING Q2Y  11/07/2017  Pneumococcal 65+ Low/Medium Risk (1 of 2 - PCV13) 11/07/2017  Influenza Age 5 to Adult  08/01/2018

## 2018-09-24 NOTE — PATIENT INSTRUCTIONS

## 2018-09-25 ENCOUNTER — TELEPHONE (OUTPATIENT)
Dept: FAMILY MEDICINE CLINIC | Age: 68
End: 2018-09-25

## 2018-09-25 LAB
25(OH)D3+25(OH)D2 SERPL-MCNC: 23.6 NG/ML (ref 30–100)
ALBUMIN SERPL-MCNC: 4.4 G/DL (ref 3.6–4.8)
ALBUMIN/GLOB SERPL: 1.9 {RATIO} (ref 1.2–2.2)
ALP SERPL-CCNC: 63 IU/L (ref 39–117)
ALT SERPL-CCNC: 31 IU/L (ref 0–44)
AST SERPL-CCNC: 19 IU/L (ref 0–40)
BILIRUB SERPL-MCNC: 0.3 MG/DL (ref 0–1.2)
BUN SERPL-MCNC: 17 MG/DL (ref 8–27)
BUN/CREAT SERPL: 17 (ref 10–24)
CALCIUM SERPL-MCNC: 9.5 MG/DL (ref 8.6–10.2)
CHLORIDE SERPL-SCNC: 101 MMOL/L (ref 96–106)
CHOLEST SERPL-MCNC: 196 MG/DL (ref 100–199)
CO2 SERPL-SCNC: 25 MMOL/L (ref 20–29)
CREAT SERPL-MCNC: 0.99 MG/DL (ref 0.76–1.27)
GLOBULIN SER CALC-MCNC: 2.3 G/DL (ref 1.5–4.5)
GLUCOSE SERPL-MCNC: 87 MG/DL (ref 65–99)
HDLC SERPL-MCNC: 35 MG/DL
HGB BLD-MCNC: 15.3 G/DL (ref 13–17.7)
LDLC SERPL CALC-MCNC: 83 MG/DL (ref 0–99)
POTASSIUM SERPL-SCNC: 4.9 MMOL/L (ref 3.5–5.2)
PROT SERPL-MCNC: 6.7 G/DL (ref 6–8.5)
SODIUM SERPL-SCNC: 142 MMOL/L (ref 134–144)
TRIGL SERPL-MCNC: 388 MG/DL (ref 0–149)
VLDLC SERPL CALC-MCNC: 78 MG/DL (ref 5–40)

## 2018-09-25 NOTE — TELEPHONE ENCOUNTER
Spoke with patient and informed him per Dr. Jesus Celeste: \"Dear Anthony Richmond:    Please find your most recent results below. With exception of a high triglycerides and low vitamin D your labs are acceptable. Triglycerides are fatty particles that accelerate plaque formation in arteries. Eliminate all sugar and starches from your diet to include white potatoes, white bread, pasta and white rice. Wheat products are better alternatives. Please call with your current dose of vitamin D.\"    Patient verbalized understanding and states that he takes 1000 international units every once in a while. Advised him per Dr. Jesus Celeste to take 2000 international units three times a week or every other day. Patient verbalized understanding.

## 2018-12-31 ENCOUNTER — OFFICE VISIT (OUTPATIENT)
Dept: FAMILY MEDICINE CLINIC | Age: 68
End: 2018-12-31

## 2018-12-31 VITALS
WEIGHT: 246 LBS | RESPIRATION RATE: 20 BRPM | BODY MASS INDEX: 35.22 KG/M2 | DIASTOLIC BLOOD PRESSURE: 85 MMHG | SYSTOLIC BLOOD PRESSURE: 120 MMHG | TEMPERATURE: 99.4 F | HEIGHT: 70 IN | OXYGEN SATURATION: 94 % | HEART RATE: 81 BPM

## 2018-12-31 DIAGNOSIS — M51.36 DDD (DEGENERATIVE DISC DISEASE), LUMBAR: Chronic | ICD-10-CM

## 2018-12-31 DIAGNOSIS — E78.00 PURE HYPERCHOLESTEROLEMIA: Chronic | ICD-10-CM

## 2018-12-31 DIAGNOSIS — J45.20 MILD INTERMITTENT ASTHMA WITHOUT COMPLICATION: Chronic | ICD-10-CM

## 2018-12-31 DIAGNOSIS — M79.89 LEG SWELLING: ICD-10-CM

## 2018-12-31 DIAGNOSIS — I49.3 PVC'S (PREMATURE VENTRICULAR CONTRACTIONS): Chronic | ICD-10-CM

## 2018-12-31 DIAGNOSIS — K21.9 GASTROESOPHAGEAL REFLUX DISEASE WITHOUT ESOPHAGITIS: Chronic | ICD-10-CM

## 2018-12-31 DIAGNOSIS — J20.9 ACUTE BRONCHITIS, UNSPECIFIED ORGANISM: ICD-10-CM

## 2018-12-31 DIAGNOSIS — J45.20 ASTHMATIC BRONCHITIS, MILD INTERMITTENT, UNCOMPLICATED: Chronic | ICD-10-CM

## 2018-12-31 DIAGNOSIS — M17.0 PRIMARY OSTEOARTHRITIS OF BOTH KNEES: ICD-10-CM

## 2018-12-31 DIAGNOSIS — E55.9 VITAMIN D DEFICIENCY: Chronic | ICD-10-CM

## 2018-12-31 DIAGNOSIS — I10 ESSENTIAL HYPERTENSION: Primary | Chronic | ICD-10-CM

## 2018-12-31 DIAGNOSIS — G47.33 OSA (OBSTRUCTIVE SLEEP APNEA): Chronic | ICD-10-CM

## 2018-12-31 RX ORDER — GUAIFENESIN AND DEXTROMETHORPHAN HYDROBROMIDE 1200; 60 MG/1; MG/1
1 TABLET, EXTENDED RELEASE ORAL
COMMUNITY
Start: 2018-12-31 | End: 2021-07-27

## 2018-12-31 RX ORDER — ALBUTEROL SULFATE 90 UG/1
2 AEROSOL, METERED RESPIRATORY (INHALATION)
Qty: 1 INHALER | Refills: 6 | Status: SHIPPED | OUTPATIENT
Start: 2018-12-31 | End: 2019-12-03 | Stop reason: SDUPTHER

## 2018-12-31 RX ORDER — CEFPROZIL 500 MG/1
500 TABLET, FILM COATED ORAL 2 TIMES DAILY
Qty: 20 TAB | Refills: 0 | Status: SHIPPED | OUTPATIENT
Start: 2018-12-31 | End: 2019-01-10

## 2018-12-31 RX ORDER — ALBUTEROL SULFATE 0.83 MG/ML
2.5 SOLUTION RESPIRATORY (INHALATION)
COMMUNITY
Start: 2017-12-29 | End: 2019-12-02 | Stop reason: SDUPTHER

## 2018-12-31 RX ORDER — MENTHOL
1000 GEL (GRAM) TOPICAL DAILY
COMMUNITY
Start: 2018-12-31 | End: 2019-02-12 | Stop reason: ALTCHOICE

## 2018-12-31 RX ORDER — FLUTICASONE PROPIONATE 50 MCG
SPRAY, SUSPENSION (ML) NASAL
Qty: 1 BOTTLE | Refills: 5 | Status: SHIPPED | OUTPATIENT
Start: 2018-12-31 | End: 2019-07-31

## 2018-12-31 NOTE — PROGRESS NOTES
1. Have you been to the ER, urgent care clinic since your last visit? Hospitalized since your last visit? No 
 
2. Have you seen or consulted any other health care providers outside of the [address] [place] [address] since your last visit? Include any pap smears or colon screening. No 
Reviewed record in preparation for visit and have necessary documentation Pt did not bring medication to office visit for review Goals that were addressed and/or need to be completed during or after this appointment include Health Maintenance Due Topic Date Due  Shingrix Vaccine Age 50> (1 of 2) 11/07/2002  GLAUCOMA SCREENING Q2Y  11/07/2017  Pneumococcal 65+ Low/Medium Risk (1 of 2 - PCV13) 11/07/2017  AAA Screening 73-69 YO Male Smoking Patients  11/07/2017  MEDICARE YEARLY EXAM  12/27/2018

## 2018-12-31 NOTE — PROGRESS NOTES
[address], [address] [place] [address] 
[phone]  
 
 
Progress Note Patient: Breanna Vargas MRN: [id number]  SSN: [id number] YOB: 1950  Age: 79 y.o. Sex: male Chief Complaint Patient presents with  Hypertension Subjective:  
Multiple acute and chronic medical problems necessitating extensive decision making, prolonged chart review and prolonged OV. Encounter Diagnoses Name Primary?  Essential hypertension: 
BP Readings from Last 3 Encounters:  
12/31/18 120/85  
09/24/18 130/85  
05/22/18 120/78 The patient reports:  taking medications as instructed, no medication side effects noted, no TIA's, no chest pain on exertion, no dyspnea on exertion, no swelling of ankles. Key CAD CHF Meds   
    
  
 ramipril (ALTACE) 10 mg capsule  (Taking) TAKE ONE CAPSULE BY MOUTH TWICE DAILY pravastatin (PRAVACHOL) 20 mg tablet  (Taking) TAKE 1 TABLET BY MOUTH NIGHTLY  
 metoprolol succinate (TOPROL-XL) 50 mg XL tablet  (Taking) Take 1 Tab by mouth daily. Indications: hypertension  
 hydroCHLOROthiazide (HYDRODIURIL) 12.5 mg tablet TAKE ONE TABLET BY MOUTH ONCE DAILY FOR  HYPERTENSION  
 aspirin delayed-release 325 mg tablet Take  by mouth daily. Lab Results Component Value Date/Time Sodium 142 09/24/2018 11:53 AM  
 Potassium 4.9 09/24/2018 11:53 AM  
 Chloride 101 09/24/2018 11:53 AM  
 CO2 25 09/24/2018 11:53 AM  
 Anion gap 7 02/08/2018 03:44 AM  
 Glucose 87 09/24/2018 11:53 AM  
 BUN 17 09/24/2018 11:53 AM  
 Creatinine 0.99 09/24/2018 11:53 AM  
 BUN/Creatinine ratio 17 09/24/2018 11:53 AM  
 GFR est AA 92 09/24/2018 11:53 AM  
 GFR est non-AA 80 09/24/2018 11:53 AM  
 Calcium 9.5 09/24/2018 11:53 AM  
 Bilirubin, total 0.3 09/24/2018 11:53 AM  
 AST (SGOT) 19 09/24/2018 11:53 AM  
 Alk.  phosphatase 63 09/24/2018 11:53 AM  
 Protein, total 6.7 09/24/2018 11:53 AM  
 Albumin 4.4 09/24/2018 11:53 AM  
 Globulin 3.4 04/02/2010 09:09 AM  
 A-G Ratio 1.9 09/24/2018 11:53 AM  
 ALT (SGPT) 31 09/24/2018 11:53 AM  
 
Low salt diet? yes Aerobic exercise? no 
Our goal is to normalize the blood pressure to decrease the risks of strokes and heart attacks. The patient is in agreement with the plan. Yes  Asthmatic bronchitis, mild intermittent, uncomplicated: He has multiple chemical exposures at work. He has hyperactive airways disease. He has been wheezing for the last several days along with acute cough.  Acute bronchitis, unspecified organism: 
 Duration of symptoms:3 days Smoker:former Fever:no Chills:no Sweats:no Shortness of breath:no Wheezing:yes History of asthma:yes Sputum: yelloow  Gastroesophageal reflux disease without esophagitis: 
Current control of Symptoms:good Hiatal Hernia:no Current Medications:omeprazole The patient has no history melena or bright red blood in the stools. The patient avoids high dose aspirin and NSAID therapy. The patient is aware of diet changes needed, elevating the head of the bed and appropriate use of antacids.  RACHEL (obstructive sleep apnea): Controlled on CPAP. 100% compliant. Feels rested in AM.  Vitamin D deficiency: No sx. Due for testing again Lab Results Component Value Date/Time Vitamin D 25-Hydroxy 19 (L) 04/02/2010 09:09 AM  
 VITAMIN D, 25-HYDROXY 23.6 (L) 09/24/2018 11:53 AM  
   
 
 
   
 Pure hypercholesterolemia: 
Cardiovascular risks for him are: LDL goal is under 100 
hypertension 
hyperlipidemia. Key Antihyperlipidemia Meds   
    
  
 pravastatin (PRAVACHOL) 20 mg tablet  (Taking) TAKE 1 TABLET BY MOUTH NIGHTLY Lab Results Component Value Date/Time  Cholesterol, total 196 09/24/2018 11:53 AM  
 HDL Cholesterol 35 (L) 09/24/2018 11:53 AM  
 LDL, calculated 83 09/24/2018 11:53 AM  
 Triglyceride 388 (H) 09/24/2018 11:53 AM  
 CHOL/HDL Ratio 3.8 04/02/2010 09:09 AM  
 
Lab Results Component Value Date/Time ALT (SGPT) 31 09/24/2018 11:53 AM  
 AST (SGOT) 19 09/24/2018 11:53 AM  
 Alk. phosphatase 63 09/24/2018 11:53 AM  
 Bilirubin, total 0.3 09/24/2018 11:53 AM  
 
 Myalgias: No 
 Fatigue: No 
 Other side effects: no Wt Readings from Last 3 Encounters:  
12/31/18 246 lb (111.6 kg) 09/24/18 241 lb 3.2 oz (109.4 kg) 05/22/18 246 lb 12.8 oz (111.9 kg) The patient is aware of our goal to reduce or eliminate the long term problems (such as strokes and heart attacks) related to poorly controlled hyperlipidemia.  PVC's (premature ventricular contractions): no sx.  DDD (degenerative disc disease), lumbar: He has had 4 back injections since January. He continues to have severe lower back pain that radiates down both legs. He needs prolonged physical therapy for this as well as arthritis in his knees.  Mild intermittent asthma without complication: above.  Primary osteoarthritis of both knees: He has had knee replacements but his legs continue to swell particularly his right leg. He has no history of having had a blood clot however he may have venous valvular incompetence. I have ordered a  duplex study to be done his legs. He will get physical therapy for his knees as well as his low back. Current and past medical information: 
 
Current Medications after this visit[de-identified]    
Current Outpatient Medications Medication Sig  
 albuterol (PROVENTIL VENTOLIN) 2.5 mg /3 mL (0.083 %) nebulizer solution Take 2.5 mg by inhalation.  dextromethorphan-guaiFENesin (MUCINEX DM) 60-1,200 mg Tb12 Take  by mouth.  vitamin e (E GEMS) 1,000 unit capsule Take 1 Cap by mouth daily.  pneumococcal 13 ricky conj dip (PREVNAR 13, PF,) 0.5 mL syrg injection 0.5 mL by IntraMUSCular route once for 1 dose.   
 varicella-zoster recombinant, PF, (SHINGRIX, PF,) 50 mcg/0.5 mL susr injection 0.5 mL by IntraMUSCular route once for 1 dose.  fluticasone (FLONASE) 50 mcg/actuation nasal spray 2 prays in each nostril  albuterol (PROVENTIL HFA, VENTOLIN HFA, PROAIR HFA) 90 mcg/actuation inhaler Take 2 Puffs by inhalation every four (4) hours as needed for Wheezing for up to 360 days.  cefPROZIL (CEFZIL) 500 mg tablet Take 1 Tab by mouth two (2) times a day for 10 days.  ramipril (ALTACE) 10 mg capsule TAKE ONE CAPSULE BY MOUTH TWICE DAILY  cetirizine (ZYRTEC) 10 mg tablet Take  by mouth daily.  pravastatin (PRAVACHOL) 20 mg tablet TAKE 1 TABLET BY MOUTH NIGHTLY  metoprolol succinate (TOPROL-XL) 50 mg XL tablet Take 1 Tab by mouth daily. Indications: hypertension  Omeprazole delayed release (PRILOSEC D/R) 20 mg tablet Take 20 mg by mouth daily.  cyanocobalamin (VITAMIN B-12) 1,000 mcg tablet Take 1 Tab by mouth daily.  hydroCHLOROthiazide (HYDRODIURIL) 12.5 mg tablet TAKE ONE TABLET BY MOUTH ONCE DAILY FOR  HYPERTENSION  nabumetone (RELAFEN) 500 mg tablet Take  by mouth two (2) times a day.  aspirin delayed-release 325 mg tablet Take  by mouth daily.  POTASSIUM CHLORIDE PO Take 595 mg by mouth daily. No current facility-administered medications for this visit. Patient Active Problem List  
 Diagnosis Date Noted  Positive PPD 07/01/2010 Priority: 1 - One  
 RACHEL (obstructive sleep apnea) 07/01/2010 Priority: 1 - One  
 HTN (hypertension) 04/01/2010 Priority: 1 - One  GERD (gastroesophageal reflux disease) 04/01/2010 Priority: 1 - One  Acute bronchitis 12/31/2018  Severe obesity (BMI 35.0-39. 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  
 SOB (shortness of breath) 10/11/2012  Vitamin D deficiency 2012  Colon ulcer, aphthous 2010 Past Medical History:  
Diagnosis Date  Adverse effect of anesthesia   
 [place] [facility] [place]  Anesthesia complication [address]NEA DURING HERNIA REPAIR, POST OPERATIVE INTUBATION [place] [facility] [place]  Arrhythmia FREQUENT PVCS  
 Arthritis  Asthma HAS BRONCHITIS OFTEN  
 Cancer ([facility] [address] SKIN- UNKNOWN  
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  
 GERD (gastroesophageal reflux disease) 2010  High cholesterol 2010  
 HTN (hypertension), benign 2010 CONTROLLED BY MEDS  Hx MRSA infection  CYSTS BL LEGS  Sleep apnea CPAP Allergies Allergen Reactions  Chlorhexidine Towelette Rash  
  ? Reaction from using wipes before [facility] Past Surgical History:  
Procedure Laterality Date  ABDOMEN SURGERY PROC UNLISTED    
 hernia [address]  HX HEENT    
 T&A  HX KNEE REPLACEMENT Right 10/2017  HX ORTHOPAEDIC    
 left knee scope  HX ORTHOPAEDIC  2017 RT KNEE ARTHROSCOPY WITH MINESCECTOMY [place] CERVICAL- UNKNOWN  
 HX OTHER SURGICAL    
 R HAND 2ND DIGIT TRAUMATIC AMPUTATION REPAIR  
 HX TONSIL AND ADENOIDECTOMY Social History Socioeconomic History  Marital status:  Spouse name: Not on file  Number of children: Not on file  Years of education: Not on file  Highest education level: Not on file Tobacco Use  Smoking status: Former Smoker Packs/day: 3.00 Years: 35.00 Pack years: 105.00 Last attempt to quit: 1/3/2002 Years since quittin.0  Smokeless tobacco: Never Used Substance and Sexual Activity  Alcohol use: Yes Alcohol/week: 12.6 oz Types: 21 Shots of liquor per week Comment: 3/day  Drug use: No  
 Sexual activity: Yes  
  Partners: Female Birth control/protection: None Review of Systems Constitutional: Positive for malaise/fatigue. Negative for chills, fever and weight loss. He has gained 10 pounds over the holidays. HENT: Positive for hearing loss and tinnitus. Eyes: Negative. Negative for blurred vision and double vision. Respiratory: Positive for cough, sputum production and wheezing. Negative for hemoptysis and shortness of breath. Cardiovascular: Negative. Negative for chest pain, palpitations and orthopnea. Gastrointestinal: Negative. Negative for abdominal pain, blood in stool, heartburn, nausea and vomiting. Genitourinary: Negative. Negative for dysuria, frequency and urgency. Musculoskeletal: Positive for back pain and joint pain. Negative for myalgias and neck pain. He has bilateral leg edema and right knee swelling. His orthopedist says his knee replacements are in good shape. Suspect venous insufficiency. Skin: Negative. Negative for rash. Neurological: Negative. Negative for dizziness, tingling, tremors, weakness and headaches. Endo/Heme/Allergies: Negative. Psychiatric/Behavioral: Negative. Negative for depression. Family stress with his mom's dementia. Objective:  
 
Vitals:  
 12/31/18 9651 BP: 120/85 Pulse: 81 Resp: 20 Temp: 99.4 °F (37.4 °C) TempSrc: Oral  
SpO2: 94% Weight: 246 lb (111.6 kg) Height: 5' 10\" (1.778 m) Body mass index is 35.3 kg/m². Physical Exam  
Constitutional: He is oriented to person, place, and time and well-developed, well-nourished, and in no distress. HENT:  
Head: Normocephalic and atraumatic. Red inflamed oropharynx. Eyes: Right eye exhibits no discharge. Left eye exhibits no discharge. No scleral icterus. Neck: No thyromegaly present. No bruit. Cardiovascular: Normal rate, regular rhythm and normal heart sounds. Pulmonary/Chest: Effort normal. No respiratory distress. He has wheezes. He has no rales. Scattered wet rhonchi with coughing. Expiratory wheezing. Abdominal: Soft. He exhibits no distension. There is no tenderness. There is no rebound and no guarding. Musculoskeletal: He exhibits edema. Neurological: He is alert and oriented to person, place, and time. Skin: No rash noted. No erythema. Psychiatric: Mood and affect normal.  
Nursing note and vitals reviewed. Health Maintenance Due Topic Date Due  Shingrix Vaccine Age 50> (1 of 2) 11/07/2002  GLAUCOMA SCREENING Q2Y  11/07/2017  Pneumococcal 65+ Low/Medium Risk (1 of 2 - PCV13) 11/07/2017  AAA Screening 73-69 YO Male Smoking Patients  11/07/2017  MEDICARE YEARLY EXAM  12/27/2018 Assessment and orders:  
 
Encounter Diagnoses ICD-10-CM ICD-9-CM 1. Essential hypertension I10 401.9 2. Asthmatic bronchitis, mild intermittent, uncomplicated X00.04 588.87  
3. Acute bronchitis, unspecified organism J20.9 466.0  
4. Gastroesophageal reflux disease without esophagitis K21.9 530.81  
5. RACHEL (obstructive sleep apnea) G47.33 327.23  
6. Vitamin D deficiency E55.9 268.9 7. Pure hypercholesterolemia E78.00 272.0 8. PVC's (premature ventricular contractions) I49.3 427.69  
9. DDD (degenerative disc disease), lumbar M51.36 722.52  
10. Mild intermittent asthma without complication B83.76 277.34  
11. Primary osteoarthritis of both knees M17.0 715.16 Diagnoses and all orders for this visit: 1.  Essential hypertension-controlled- 
-     REFERRAL TO OPTOMETRY 
-     T4, FREE 
-     METABOLIC PANEL, COMPREHENSIVE 
-     MICROALBUMIN, UR, RAND W/ MICROALB/CREAT RATIO 
-     HEMOGLOBIN 
-     LIPID PANEL 
 
 2. Asthmatic bronchitis, mild intermittent, uncomplicated-current flare with acute bronchitis -     cefPROZIL (CEFZIL) 500 mg tablet; Take 1 Tab by mouth two (2) times a day for 10 days. 3. Acute bronchitis, unspecified organism-as above 
-     cefPROZIL (CEFZIL) 500 mg tablet; Take 1 Tab by mouth two (2) times a day for 10 days. 4. Gastroesophageal reflux disease without esophagitis-controlled 
-     HEMOGLOBIN 
 
5. RACHEL (obstructive sleep apnea)-treated. Predisposes him to bronchitis. 6. Vitamin D deficiency-retest below level 7. Pure hypercholesterolemia-retest 
-     T4, FREE 
-     METABOLIC PANEL, COMPREHENSIVE 
-     LIPID PANEL 8. PVC's (premature ventricular contractions)-no symptoms 9. DDD (degenerative disc disease), lumbar-chronic low back pain with bilateral leg radiculopathy 
-     REFERRAL TO PHYSICAL THERAPY 10. Mild intermittent asthma without complication-acute bronchitis on top of intermittent asthma 11. Primary osteoarthritis of both knees-status post knee replacements. Will need vascular study of venous system due to leg swelling. Other orders -     pneumococcal 13 ricky conj dip (PREVNAR 13, PF,) 0.5 mL syrg injection; 0.5 mL by IntraMUSCular route once for 1 dose.-He may have had this. Need documentation. 
-     varicella-zoster recombinant, PF, (SHINGRIX, PF,) 50 mcg/0.5 mL susr injection; 0.5 mL by IntraMUSCular route once for 1 dose. -     fluticasone (FLONASE) 50 mcg/actuation nasal spray; 2 prays in each nostril 
-     albuterol (PROVENTIL HFA, VENTOLIN HFA, PROAIR HFA) 90 mcg/actuation inhaler; Take 2 Puffs by inhalation every four (4) hours as needed for Wheezing for up to 360 days. Plan of care: 
Discussed diagnoses in detail with patient. Medication risks/benefits/side effects discussed with patient. All of the patient's questions were addressed. The patient understands and agrees with our plan of care. The patient knows to call back if they are unsure of or forget any changes we discussed today or if the symptoms change. The patient received an After-Visit Summary which contains VS, orders, medication list and allergy list. This can be used as a \"mini-medical record\" should they have to seek medical care while out of town. Patient Care Team: 
Meagan Carrizales MD as PCP - General 
Angie Nelson MD (Cardiology) Jorge Escoto MD (Dermatology) Sheldon Flores MD (Urology) Javier Sequeira MD (Gastroenterology) Yoel Abbott MD (Surgery) Adin Shen MD (Orthopedic Surgery) Kwasi Meyers MD (Orthopedic Surgery) Gloria Lan NP as Nurse Practitioner (Surgery) Follow-up Disposition: 
Return in about 3 months (around 3/31/2019) for -Due for a Medicare Wellness Visit please scheudle this appointment  . Future Appointments Date Time Provider Erin Chloe 1/3/2019 11:20 AM Sheron Ley  [address] Signed By: Nay Kitchen MD   
 December 31, 2018 This note was created using voice recognition software. Despite editing, there may be syntax errors.

## 2018-12-31 NOTE — PATIENT INSTRUCTIONS
December 31, 2018 [address] [place] [id number] Dear Jay Dee: Thank you for requesting access to [facility]. Please follow the instructions below to view your test results, access and download parts of your medical record, view details of your past and upcoming appointments, and view your medications online. How Do I Sign Up? 1. In your internet browser, go to [email or web address] 2. Click on the First Time User? Click Here link in the Sign In box. You will see the New Member Sign Up page. 3. Enter your [facility] Access Code exactly as it appears below. You will not need to use this code after youve completed the sign-up process. If you do not sign up before the expiration date, you must request a new code. · [facility] Access Code: [email or web address] · Expires: 3/31/2019  8:35 AM 
 
4. Enter the last four digits of your Social Security Number (xxxx) and Date of Birth (mm/dd/yyyy) as indicated and click Submit. You will be taken to the next sign-up page. 5. Create a [facility] ID. This will be your [facility] login ID and cannot be changed, so think of one that is secure and easy to remember. 6. Create a [facility] password. You can change your password at any time. 7. Enter your Password Reset Question and Answer. This can be used at a later time if you forget your password. 8. Enter your e-mail address. You will receive e-mail notification when new information is available in [address] 9. Click Sign Up. You can now view and download portions of your medical record. 10. Click the Download Summary menu link to download a portable copy of your medical information. Additional Information: If you require any assistance or have any questions, please contact [address] at [phone], email at [email or web address] or check online in our Frequently Asked Questions. Remember, MyChart is NOT to be used for urgent needs. For medical emergencies, dial 911. Now available from your iPhone and Android! Sincerely, Hunter Malhotra Preventing Falls: Care Instructions Your Care Instructions Getting around your home safely can be a challenge if you have injuries or health problems that make it easy for you to fall. Loose rugs and furniture in walkways are among the dangers for many older people who have problems walking or who have poor eyesight. People who have conditions such as arthritis, osteoporosis, or dementia also have to be careful not to fall. You can make your home safer with a few simple measures. Follow-up care is a key part of your treatment and safety. Be sure to make and go to all appointments, and call your doctor if you are having problems. It's also a good idea to know your test results and keep a list of the medicines you take. How can you care for yourself at home? Taking care of yourself · You may get dizzy if you do not drink enough water. To prevent dehydration, drink plenty of fluids, enough so that your urine is light yellow or clear like water. Choose water and other caffeine-free clear liquids. If you have kidney, heart, or liver disease and have to limit fluids, talk with your doctor before you increase the amount of fluids you drink. · Exercise regularly to improve your strength, muscle tone, and balance. Walk if you can. Swimming may be a good choice if you cannot walk easily. · Have your vision and hearing checked each year or any time you notice a change. If you have trouble seeing and hearing, you might not be able to avoid objects and could lose your balance. · Know the side effects of the medicines you take. Ask your doctor or pharmacist whether the medicines you take can affect your balance. Sleeping pills or sedatives can affect your balance. · Limit the amount of alcohol you drink.  Alcohol can impair your balance and other senses. · Ask your doctor whether calluses or corns on your feet need to be removed. If you wear loose-fitting shoes because of calluses or corns, you can lose your balance and fall. · Talk to your doctor if you have numbness in your feet. Preventing falls at home · Remove raised doorway thresholds, throw rugs, and clutter. Repair loose carpet or raised areas in the floor. · Move furniture and electrical cords to keep them out of walking paths. · Use nonskid floor wax, and wipe up spills right away, especially on ceramic tile floors. · If you use a walker or cane, put rubber tips on it. If you use crutches, clean the bottoms of them regularly with an abrasive pad, such as steel wool. · Keep your house well lit, especially [facility], and outside walkways. Use night-lights in areas such as hallways and bathrooms. Add extra light switches or use remote switches (such as switches that go on or off when you clap your hands) to make it easier to turn lights on if you have to get up during the night. · Install sturdy handrails on stairways. · Move items in your cabinets so that the things you use a lot are on the lower shelves (about waist level). · Keep a cordless phone and a flashlight with new batteries by your bed. If possible, put a phone in each of the main rooms of your house, or carry a cell phone in case you fall and cannot reach a phone. Or, you can wear a device around your neck or wrist. You push a button that sends a signal for help. · Wear low-heeled shoes that fit well and give your feet good support. Use footwear with nonskid soles. Check the heels and soles of your shoes for wear. Repair or replace worn heels or soles. · Do not wear socks without shoes on wood floors. · Walk on the grass when the sidewalks are slippery. If you live in an area that gets snow and ice in the winter, sprinkle salt on slippery steps and sidewalks. Preventing falls in the bath · Install grab bars and nonskid mats inside and outside your shower or tub and near the toilet and sinks. · Use shower chairs and bath benches. · Use a hand-held shower head that will allow you to sit while showering. · Get into a tub or shower by putting the weaker leg in first. Get out of a tub or shower with your strong side first. 
· Repair loose toilet seats and consider installing a raised toilet seat to make getting on and off the toilet easier. · Keep your bathroom door unlocked while you are in the shower. Where can you learn more? Go to http://kurt-lin.info/. Enter [id number] [id number] [phone] in the search box to learn more about \"Preventing Falls: Care Instructions. \" Current as of: March 16, 2018 Content Version: 11.8 © 4310-8597 [employer]. Care instructions adapted under license by [employer] (which disclaims liability or warranty for this information). If you have questions about a medical condition or this instruction, always ask your healthcare professional. [address] any warranty or liability for your use of this information.

## 2019-01-01 LAB
ALBUMIN SERPL-MCNC: 4.5 G/DL (ref 3.6–4.8)
ALBUMIN/CREAT UR: <2.8 MG/G CREAT (ref 0–30)
ALBUMIN/GLOB SERPL: 1.9 {RATIO} (ref 1.2–2.2)
ALP SERPL-CCNC: 61 IU/L (ref 39–117)
ALT SERPL-CCNC: 35 IU/L (ref 0–44)
AST SERPL-CCNC: 20 IU/L (ref 0–40)
BILIRUB SERPL-MCNC: 0.5 MG/DL (ref 0–1.2)
BUN SERPL-MCNC: 13 MG/DL (ref 8–27)
BUN/CREAT SERPL: 14 (ref 10–24)
CALCIUM SERPL-MCNC: 9.5 MG/DL (ref 8.6–10.2)
CHLORIDE SERPL-SCNC: 100 MMOL/L (ref 96–106)
CHOLEST SERPL-MCNC: 155 MG/DL (ref 100–199)
CO2 SERPL-SCNC: 24 MMOL/L (ref 20–29)
CREAT SERPL-MCNC: 0.94 MG/DL (ref 0.76–1.27)
CREAT UR-MCNC: 108.3 MG/DL
GLOBULIN SER CALC-MCNC: 2.4 G/DL (ref 1.5–4.5)
GLUCOSE SERPL-MCNC: 91 MG/DL (ref 65–99)
HDLC SERPL-MCNC: 39 MG/DL
HGB BLD-MCNC: 15.7 G/DL (ref 13–17.7)
LDLC SERPL CALC-MCNC: 81 MG/DL (ref 0–99)
MICROALBUMIN UR-MCNC: <3 UG/ML
POTASSIUM SERPL-SCNC: 4.6 MMOL/L (ref 3.5–5.2)
PROT SERPL-MCNC: 6.9 G/DL (ref 6–8.5)
SODIUM SERPL-SCNC: 142 MMOL/L (ref 134–144)
T4 FREE SERPL-MCNC: 1.53 NG/DL (ref 0.82–1.77)
TRIGL SERPL-MCNC: 173 MG/DL (ref 0–149)
VLDLC SERPL CALC-MCNC: 35 MG/DL (ref 5–40)

## 2019-01-03 ENCOUNTER — OFFICE VISIT (OUTPATIENT)
Dept: SLEEP MEDICINE | Age: 69
End: 2019-01-03

## 2019-01-03 ENCOUNTER — TELEPHONE (OUTPATIENT)
Dept: FAMILY MEDICINE CLINIC | Age: 69
End: 2019-01-03

## 2019-01-03 ENCOUNTER — HOSPITAL ENCOUNTER (OUTPATIENT)
Dept: VASCULAR SURGERY | Age: 69
Discharge: HOME | End: 2019-01-03
Attending: FAMILY MEDICINE
Payer: MEDICARE

## 2019-01-03 ENCOUNTER — DOCUMENTATION ONLY (OUTPATIENT)
Dept: SLEEP MEDICINE | Age: 69
End: 2019-01-03

## 2019-01-03 VITALS
BODY MASS INDEX: 35.33 KG/M2 | OXYGEN SATURATION: 96 % | WEIGHT: 246.8 LBS | DIASTOLIC BLOOD PRESSURE: 88 MMHG | HEART RATE: 84 BPM | HEIGHT: 70 IN | SYSTOLIC BLOOD PRESSURE: 150 MMHG | RESPIRATION RATE: 18 BRPM

## 2019-01-03 DIAGNOSIS — M79.89 LEG SWELLING: ICD-10-CM

## 2019-01-03 DIAGNOSIS — G47.33 OSA (OBSTRUCTIVE SLEEP APNEA): Primary | ICD-10-CM

## 2019-01-03 PROCEDURE — 93970 EXTREMITY STUDY: CPT

## 2019-01-03 RX ORDER — ASCORBIC ACID 500 MG
1000 TABLET ORAL
COMMUNITY
End: 2021-07-07

## 2019-01-03 NOTE — PROGRESS NOTES
217 [facility] [place]. 1668 [facility], [address] Tel.  [phone] Fax. 100 [facility] [address] [place], [address] [facility] Tel.  [phone] Fax. [phone] 9250 [facility] Edwin Boyle  Tel.  [phone] Fax. [phone] Chief Complaint Chief Complaint Patient presents with  Sleep Problem HPI Neda Casanova is a  79 y.o. male seen for follow-up. He was evaluated at [facility] with a sleep study which demonstrated obstructive sleep apnea characterized by an AHI of 19.2 per hour associated with arterial desaturations to 78%. Events were more prominent in REM sleep with the REM-related AHI of 82.6 per hour. During a second study,  CPAP  of 6 cm associated with AHI 0.4 per hour and SaO2 91%. Due to a number of respiratory-related arousals, CPAP initiated at 8 cm. CPAP currently 9 cm. Compliance data downloaded and reviewed in detail with the patient today. During the past 30 days, CPAP used during 30 days with the average daily use of 7.95 hours. CMS compliance criteria 100%. AHI 1.4 per hour. He notes he is sleeping well and not experiencing non-restorative sleep or daytime fatigue when using CPAP. Allergies Allergen Reactions  Chlorhexidine Towelette Rash  
  ? Reaction from using wipes before [facility] Current Outpatient Medications Medication Sig Dispense Refill  ascorbic acid, vitamin C, (VITAMIN C) 500 mg tablet Take 1,000 mg by mouth.  albuterol (PROVENTIL VENTOLIN) 2.5 mg /3 mL (0.083 %) nebulizer solution Take 2.5 mg by inhalation.  dextromethorphan-guaiFENesin (MUCINEX DM) 60-1,200 mg Tb12 Take  by mouth.  fluticasone (FLONASE) 50 mcg/actuation nasal spray 2 prays in each nostril 1 Bottle 5  
 albuterol (PROVENTIL HFA, VENTOLIN HFA, PROAIR HFA) 90 mcg/actuation inhaler Take 2 Puffs by inhalation every four (4) hours as needed for Wheezing for up to 360 days.  1 Inhaler 6  
  cefPROZIL (CEFZIL) 500 mg tablet Take 1 Tab by mouth two (2) times a day for 10 days. 20 Tab 0  
 ramipril (ALTACE) 10 mg capsule TAKE ONE CAPSULE BY MOUTH TWICE DAILY 60 Cap 11  
 hydroCHLOROthiazide (HYDRODIURIL) 12.5 mg tablet TAKE ONE TABLET BY MOUTH ONCE DAILY FOR  HYPERTENSION 90 Tab 3  
 nabumetone (RELAFEN) 500 mg tablet Take  by mouth two (2) times a day.  cetirizine (ZYRTEC) 10 mg tablet Take  by mouth daily.  pravastatin (PRAVACHOL) 20 mg tablet TAKE 1 TABLET BY MOUTH NIGHTLY 30 Tab 11  
 metoprolol succinate (TOPROL-XL) 50 mg XL tablet Take 1 Tab by mouth daily. Indications: hypertension 30 Tab 11  
 Omeprazole delayed release (PRILOSEC D/R) 20 mg tablet Take 20 mg by mouth daily.  cyanocobalamin (VITAMIN B-12) 1,000 mcg tablet Take 1 Tab by mouth daily.  vitamin e (E GEMS) 1,000 unit capsule Take 1 Cap by mouth daily.  aspirin delayed-release 325 mg tablet Take  by mouth daily.  POTASSIUM CHLORIDE PO Take 595 mg by mouth daily. He  has a past medical history of Adverse effect of anesthesia, Anesthesia complication, Arrhythmia, Arthritis, Asthma, Cancer ([address] Colon ulcer, aphthous, Finger amputation, traumatic, GERD (gastroesophageal reflux disease), High cholesterol, HTN (hypertension), benign, MRSA infection, and Sleep apnea. He  has a past surgical history that includes pr abdomen surgery proc unlisted (2001); hx appendectomy (1959); hx tonsil and adenoidectomy; hx heent; hx other surgical (1990S); hx other surgical; hx orthopaedic (1994); hx orthopaedic (01/06/2017); hx knee replacement (Right, 10/2017); LEFT TOTAL KNEE ARTHROPLASTY (Left, 2/7/2018); RIGHT TOTAL KNEE REPLACEMENT (Right, 10/19/2017); and RIGHT KNEE ARTHROSCOPY,MENISECTOMY (Right, 1/5/2017). He family history includes Cancer in his brother and brother; Dementia in his mother; Heart Disease in his father and mother; Hypertension in his mother; Lung Disease in his father; No Known Problems in his brother. He  reports that he quit smoking about 17 years ago. He has a 105.00 pack-year smoking history. he has never used smokeless tobacco. He reports that he drinks about 12.6 oz of alcohol per week. He reports that he does not use drugs. Review of Systems: 
Unchanged per patient Objective:  
 
Visit Vitals /88 (BP 1 Location: Left arm, BP Patient Position: Sitting) Pulse 84 Resp 18 Ht 5' 10\" (1.778 m) Wt 246 lb 12.8 oz (111.9 kg) SpO2 96% BMI 35.41 kg/m² Body mass index is 35.41 kg/m². Assessment: ICD-10-CM ICD-9-CM 1. RACHEL (obstructive sleep apnea) G47.33 327.23 AMB SUPPLY ORDER Sleep disordered breathing responding well to CPAP at 9 cm. We will continue at the current pressure settings. He will contact the office for specific problems. Plan:  
 
Orders Placed This Encounter  AMB SUPPLY ORDER Diagnosis: Obstructive Sleep Apnea ICD-10 Code (G47.33) CPAP mask and supplies-  Patient preference, headgear, heated  tubing, and filter;  heated humidifier; wireless modem. Remote monitoring enrollment. Estelle Weber MD, Usha Kramer Diplomate, American Board of Sleep Medicine * Patient has a history and examination consistent with the diagnosis of sleep apnea. * He was provided information on sleep apnea including coresponding risk factors and the importance of proper treatment. * Treatment options if indicated were reviewed today. Potential benefit of weight reduction was discussed. Weight reduction techniques reviewed. Estelle Weber MD, Usha Kramer Electronically signed 01/03/19 This note was created using voice recognition software. Despite editing, there may be syntax errors. This note will not be viewable in [address]

## 2019-01-03 NOTE — PROGRESS NOTES
[facility] to [facility] [place] [facility] He was not satisfied with [facility].

## 2019-01-03 NOTE — TELEPHONE ENCOUNTER
Called and spoke to patient. Advised per Dr. Chery Centeno:  * His Doppler test did not show any clots in his legs however it did show enlarged lymph nodes in his groin.  Please have him come in tomorrow so we can discuss this and a plan for follow-up. *    Pt verbalized understanding.  Appt scheduled for 8:00AM 1/4/19

## 2019-01-03 NOTE — PROCEDURES
[address]  *** FINAL REPORT ***    Name: Jose Juan Lantigua  MRN: [id number]    Outpatient  : 1950  HIS Order #: [id number]  [address] [facility] Visit #: [id number]  Date: 2019    TYPE OF TEST: Peripheral Venous Testing    REASON FOR TEST  Pain in limb, Limb swelling    Right Leg:-  Deep venous thrombosis:           No  Superficial venous thrombosis:    No  Deep venous insufficiency:        No  Superficial venous insufficiency: No    Left Leg:-  Deep venous thrombosis:           No  Superficial venous thrombosis:    No  Deep venous insufficiency:        Yes  Superficial venous insufficiency: No      INTERPRETATION/FINDINGS  PROCEDURE:  BILATERAL LE VENOUS DUPLEX. Evaluation of lower extremity veins with ultrasound (B-mode imaging,  pulsed Doppler, color Doppler). Includes the common femoral, deep  femoral, femoral, popliteal, posterior tibial, peroneal, and great  saphenous veins. FINDINGS:  Read [place] scale and color flow duplex images of the veins in  both lower extremities demonstrate normal compressibility, spontaneous   and augmented flow profiles, and absence of filling defects  throughout the deep and superficial veins in both lower extremities. CONCLUSION: Bilateral lower extremity venous duplex negative for deep  venous thrombosis or thrombophlebitis. Reflx noted in the left lower  extremity. Multiple prominent lymph nodes noted in the groin  bilaterally, the largest measuring 2.16 x 0.75 cm on the right and  1.54 x 0.44 cm on the left. ADDITIONAL COMMENTS    I have personally reviewed the data relevant to the interpretation of  this  study. TECHNOLOGIST: Diane Squires RDCS  Signed: 2019 08:28 AM    PHYSICIAN: Guru Lara.  Davian Espitia MD  Signed: 2019 11:21 AM

## 2019-01-03 NOTE — TELEPHONE ENCOUNTER
----- Message from Kumar Benjamin MD sent at 1/3/2019 12:17 PM EST -----  Regarding: Follow-up. His Doppler test did not show any clots in his legs however it did show enlarged lymph nodes in his groin. Please have him come in tomorrow so we can discuss this and a plan for follow-up.   Thank you,  Dr. Eliza Cyr    ----- Message -----  From: Apryl Burnett  Sent: 1/3/2019  11:21 AM  To: Kumar Benjamin MD

## 2019-01-03 NOTE — PATIENT INSTRUCTIONS

## 2019-01-04 ENCOUNTER — OFFICE VISIT (OUTPATIENT)
Dept: FAMILY MEDICINE CLINIC | Age: 69
End: 2019-01-04

## 2019-01-04 ENCOUNTER — TELEPHONE (OUTPATIENT)
Dept: FAMILY MEDICINE CLINIC | Age: 69
End: 2019-01-04

## 2019-01-04 VITALS
DIASTOLIC BLOOD PRESSURE: 72 MMHG | HEART RATE: 94 BPM | SYSTOLIC BLOOD PRESSURE: 120 MMHG | BODY MASS INDEX: 35.79 KG/M2 | TEMPERATURE: 98.3 F | HEIGHT: 70 IN | WEIGHT: 250 LBS | OXYGEN SATURATION: 94 % | RESPIRATION RATE: 20 BRPM

## 2019-01-04 DIAGNOSIS — Z86.14 HISTORY OF MRSA INFECTION: ICD-10-CM

## 2019-01-04 DIAGNOSIS — R59.1 LYMPHADENOPATHY: Primary | ICD-10-CM

## 2019-01-04 DIAGNOSIS — R82.90 MALODOROUS URINE: ICD-10-CM

## 2019-01-04 DIAGNOSIS — L73.9 FOLLICULITIS: ICD-10-CM
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
## 2019-01-04 RX ORDER — METOPROLOL SUCCINATE 50 MG/1
50 TABLET, EXTENDED RELEASE ORAL DAILY
Qty: 30 TAB | Refills: 11 | Status: SHIPPED | OUTPATIENT
Start: 2019-01-04 | End: 2020-01-10

## 2019-01-04 RX ORDER — DOXYCYCLINE 100 MG/1
100 CAPSULE ORAL 2 TIMES DAILY
Qty: 20 CAP | Refills: 0 | Status: SHIPPED | OUTPATIENT
Start: 2019-01-04 | End: 2019-01-14

## 2019-01-04 NOTE — PATIENT INSTRUCTIONS
Swollen Lymph Nodes: Care Instructions  Your Care Instructions    Lymph nodes are small, bean-shaped glands throughout the body. They help your body fight germs and infections. Lymph nodes often swell when there is a problem such as an injury, infection, or tumor. · The nodes in your neck, under your chin, or behind your ears may swell when you have a cold or sore throat. · An injury or infection in a leg or foot can make the nodes in your groin swell. · Sometimes medicine can make lymph nodes swell, but this is rare. Treatment depends on what caused your nodes to swell. Usually the nodes return to normal size without a problem. Follow-up care is a key part of your treatment and safety. Be sure to make and go to all appointments, and call your doctor if you are having problems. It's also a good idea to know your test results and keep a list of the medicines you take. How can you care for yourself at home? · Take your medicines exactly as prescribed. Call your doctor if you think you are having a problem with your medicine. · Avoid irritation. ? Do not squeeze or pick at the lump. ? Do not stick a needle in it. · Prevent infection. Do not squeeze, drain, or puncture a painful lump. Doing this can irritate or inflame the lump, push any existing infection deeper into the skin, or cause severe bleeding. · Get extra rest. Slow down just a little from your usual routine. · Drink plenty of fluids, enough so that your urine is light yellow or clear like water. If you have kidney, heart, or liver disease and have to limit fluids, talk with your doctor before you increase the amount of fluids you drink. · Take an over-the-counter pain medicine, such as acetaminophen (Tylenol), ibuprofen (Advil, Motrin), or naproxen (Aleve). Read and follow all instructions on the label. · Do not take two or more pain medicines at the same time unless the doctor told you to.  Many pain medicines have acetaminophen, which is Tylenol. Too much acetaminophen (Tylenol) can be harmful. When should you call for help? Call your doctor now or seek immediate medical care if:    · You have worse symptoms of infection, such as:  ? Increased pain, swelling, warmth, or redness. ? Red streaks leading from the area. ? Pus draining from the area. ? A fever.    Watch closely for changes in your health, and be sure to contact your doctor if:    · Your lymph nodes do not get smaller or do not return to normal.     · You do not get better as expected. Where can you learn more? Go to http://kurt-lin.info/. Enter H752 in the search box to learn more about \"Swollen Lymph Nodes: Care Instructions. \"  Current as of: November 18, 2017  Content Version: 11.8  © 1837-1891 [employer]. Care instructions adapted under license by [employer] (which disclaims liability or warranty for this information). If you have questions about a medical condition or this instruction, always ask your healthcare professional. [place] [address] any warranty or liability for your use of this information. Swollen Lymph Nodes: Care Instructions  Your Care Instructions    Lymph nodes are small, bean-shaped glands throughout the body. They help your body fight germs and infections. Lymph nodes often swell when there is a problem such as an injury, infection, or tumor. · The nodes in your neck, under your chin, or behind your ears may swell when you have a cold or sore throat. · An injury or infection in a leg or foot can make the nodes in your groin swell. · Sometimes medicine can make lymph nodes swell, but this is rare. Treatment depends on what caused your nodes to swell. Usually the nodes return to normal size without a problem. Follow-up care is a key part of your treatment and safety. Be sure to make and go to all appointments, and call your doctor if you are having problems.  It's also a good idea to know your test results and keep a list of the medicines you take. How can you care for yourself at home? · Take your medicines exactly as prescribed. Call your doctor if you think you are having a problem with your medicine. · Avoid irritation. ? Do not squeeze or pick at the lump. ? Do not stick a needle in it. · Prevent infection. Do not squeeze, drain, or puncture a painful lump. Doing this can irritate or inflame the lump, push any existing infection deeper into the skin, or cause severe bleeding. · Get extra rest. Slow down just a little from your usual routine. · Drink plenty of fluids, enough so that your urine is light yellow or clear like water. If you have kidney, heart, or liver disease and have to limit fluids, talk with your doctor before you increase the amount of fluids you drink. · Take an over-the-counter pain medicine, such as acetaminophen (Tylenol), ibuprofen (Advil, Motrin), or naproxen (Aleve). Read and follow all instructions on the label. · Do not take two or more pain medicines at the same time unless the doctor told you to. Many pain medicines have acetaminophen, which is Tylenol. Too much acetaminophen (Tylenol) can be harmful. When should you call for help? Call your doctor now or seek immediate medical care if:    · You have worse symptoms of infection, such as:  ? Increased pain, swelling, warmth, or redness. ? Red streaks leading from the area. ? Pus draining from the area. ? A fever.    Watch closely for changes in your health, and be sure to contact your doctor if:    · Your lymph nodes do not get smaller or do not return to normal.     · You do not get better as expected. Where can you learn more? Go to http://kurt-lin.info/. Enter Q962 in the search box to learn more about \"Swollen Lymph Nodes: Care Instructions. \"  Current as of: November 18, 2017  Content Version: 11.8  © 2136-2280 [employer].  Care instructions adapted under license by [employer] (which disclaims liability or warranty for this information). If you have questions about a medical condition or this instruction, always ask your healthcare professional. [address] any warranty or liability for your use of this information.

## 2019-01-04 NOTE — PROGRESS NOTES
[address], [address] [facility]  [phone]           Progress Note    Patient: Mina oWrthy MRN: [id number]  SSN: [id number]    YOB: 1950  Age: 79 y.o. Sex: male        Chief Complaint   Patient presents with    Results     Discuss test results     Medication Refill         Subjective:     Encounter Diagnoses   Name Primary?  Lymphadenopathy: His venous Doppler showed bilateral inguinal adenopathy. Any systemic symptoms or fatigue. He has no fever no night sweats. This morning he does have some discomfort in his left groin. He notices malodorous urine intermittently. He has no family history of lymphoma. He does have respiratory symptoms due to exposure to multiple chemicals while working as a . He would need a screening chest x-ray with multisite lymph node enlargement. Yes    History of MRSA infection: He has had repeated MRSA infections in the past currently has a very small furuncle beginning on his left buttock. There is nothing to drain and no drainage.  Folliculitis: He also has chronic inflamed follicular on his lower legs. He had several areas on both legs noticed today. 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 He does not specifically recall a tick bite on his lower extremities recently.  Malodorous urine: Last week he noticed some penile bleeding after squeezing his penis to stop his urine flow. Other than intermittent malodorous urine he is not had any urinary tract infection symptoms. Current and past medical information:    Current Medications after this visit[de-identified]     Current Outpatient Medications   Medication Sig    metoprolol succinate (TOPROL-XL) 50 mg XL tablet Take 1 Tab by mouth daily.     doxycycline (VIBRAMYCIN) 100 mg capsule Take 1 Cap by mouth two (2) times a day for 10 days.  ascorbic acid, vitamin C, (VITAMIN C) 500 mg tablet Take 1,000 mg by mouth.  albuterol (PROVENTIL VENTOLIN) 2.5 mg /3 mL (0.083 %) nebulizer solution Take 2.5 mg by inhalation.  dextromethorphan-guaiFENesin (MUCINEX DM) 60-1,200 mg Tb12 Take  by mouth.  vitamin e (E GEMS) 1,000 unit capsule Take 1 Cap by mouth daily.  fluticasone (FLONASE) 50 mcg/actuation nasal spray 2 prays in each nostril    albuterol (PROVENTIL HFA, VENTOLIN HFA, PROAIR HFA) 90 mcg/actuation inhaler Take 2 Puffs by inhalation every four (4) hours as needed for Wheezing for up to 360 days.  cefPROZIL (CEFZIL) 500 mg tablet Take 1 Tab by mouth two (2) times a day for 10 days.  ramipril (ALTACE) 10 mg capsule TAKE ONE CAPSULE BY MOUTH TWICE DAILY    hydroCHLOROthiazide (HYDRODIURIL) 12.5 mg tablet TAKE ONE TABLET BY MOUTH ONCE DAILY FOR  HYPERTENSION    nabumetone (RELAFEN) 500 mg tablet Take  by mouth two (2) times a day.  aspirin delayed-release 325 mg tablet Take  by mouth daily.  cetirizine (ZYRTEC) 10 mg tablet Take  by mouth daily.  pravastatin (PRAVACHOL) 20 mg tablet TAKE 1 TABLET BY MOUTH NIGHTLY    POTASSIUM CHLORIDE PO Take 595 mg by mouth daily.  Omeprazole delayed release (PRILOSEC D/R) 20 mg tablet Take 20 mg by mouth daily.  cyanocobalamin (VITAMIN B-12) 1,000 mcg tablet Take 1 Tab by mouth daily. No current facility-administered medications for this visit. Patient Active Problem List    Diagnosis Date Noted    Positive PPD 07/01/2010     Priority: 1 - One    RACHEL (obstructive sleep apnea) 07/01/2010     Priority: 1 - One    HTN (hypertension) 04/01/2010     Priority: 1 - One    GERD (gastroesophageal reflux disease) 04/01/2010     Priority: 1 - One    Acute bronchitis 12/31/2018    Severe obesity (BMI 35.0-39. 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 10/13/2017    Complex tear of medial meniscus of right knee as current injury 01/04/2017    Rectus diastasis 06/03/2016    DDD (degenerative disc disease), lumbar 05/16/2016    PVC's (premature ventricular contractions)     B12 deficiency 10/17/2014    Knee pain 08/04/2014    HLD (hyperlipidemia) 03/26/2013    SOB (shortness of breath) 10/11/2012    Vitamin D deficiency 04/27/2012    Colon ulcer, aphthous 04/01/2010       Past Medical History:   Diagnosis Date    Adverse effect of anesthesia     went apneic post surgery at [facility]    Anesthesia complication 5938    APNEA DURING HERNIA REPAIR, POST OPERATIVE INTUBATION AT [facility]    Arrhythmia     FREQUENT PVCS    Arthritis     Asthma     HAS BRONCHITIS OFTEN    Cancer ([address]     SKIN- UNKNOWN    Colon ulcer, aphthous 4/1/2010    Finger amputation, traumatic 2003    R 2ND DIGIT    GERD (gastroesophageal reflux disease) 4/1/2010    High cholesterol 4/1/2010    HTN (hypertension), benign 4/1/2010    CONTROLLED BY MEDS    Hx MRSA infection 2013    CYSTS BL LEGS    Sleep apnea     CPAP       Allergies   Allergen Reactions    Chlorhexidine Towelette Rash     ?  Reaction from using wipes before [facility]       Past Surgical History:   Procedure Laterality Date    ABDOMEN SURGERY PROC UNLISTED  2001    hernia    HX APPENDECTOMY  1959    HX HEENT      T&A    HX KNEE REPLACEMENT Right 10/2017    HX ORTHOPAEDIC  1994    left knee scope    HX ORTHOPAEDIC  01/06/2017    RT KNEE ARTHROSCOPY WITH MINESCECTOMY    HX OTHER SURGICAL  1990S    CERVICAL- UNKNOWN    HX OTHER SURGICAL      R HAND 2ND DIGIT TRAUMATIC AMPUTATION REPAIR    HX TONSIL AND ADENOIDECTOMY         Social History     Socioeconomic History    Marital status:      Spouse name: Not on file    Number of children: Not on file    Years of education: Not on file    Highest education level: Not on file   Tobacco Use  Smoking status: Former Smoker     Packs/day: 3.00     Years: 35.00     Pack years: 105.00     Last attempt to quit: 1/3/2002     Years since quittin.0    Smokeless tobacco: Never Used   Substance and Sexual Activity    Alcohol use: Yes     Alcohol/week: 12.6 oz     Types: 21 Shots of liquor per week     Comment: 3/day    Drug use: No    Sexual activity: Yes     Partners: Female     Birth control/protection: None       Review of Systems   Constitutional: Negative. Negative for chills, fever, malaise/fatigue and weight loss. HENT: Negative. Negative for hearing loss. Eyes: Negative. Negative for blurred vision and double vision. Respiratory: Negative. Negative for cough, hemoptysis, sputum production and shortness of breath. Cardiovascular: Negative. Negative for chest pain, palpitations and orthopnea. Gastrointestinal: Negative. Negative for abdominal pain, blood in stool, heartburn, nausea and vomiting. Genitourinary: Negative. Negative for dysuria, frequency and urgency. Intermittent malodorous urine   Musculoskeletal: Positive for neck pain. Negative for back pain and myalgias. Bilateral knee replacements   Skin: Negative. Negative for rash. Neurological: Negative. Negative for dizziness, tingling, tremors, weakness and headaches. Endo/Heme/Allergies: Negative. Psychiatric/Behavioral: Negative. Negative for depression. Objective:     Vitals:    19 0803   BP: 120/72   Pulse: 94   Resp: 20   Temp: 98.3 °F (36.8 °C)   TempSrc: Oral   SpO2: 94%   Weight: 250 lb (113.4 kg)   Height: 5' 10\" (1.778 m)      Body mass index is 35.87 kg/m². Physical Exam   Constitutional: He is oriented to person, place, and time and well-developed, well-nourished, and in no distress. HENT:   Head: Normocephalic and atraumatic. Mouth/Throat: Oropharynx is clear and moist.   Eyes: Right eye exhibits no discharge. Left eye exhibits no discharge. No scleral icterus. Neck: No thyromegaly present. No bruit. Cardiovascular: Normal rate, regular rhythm and normal heart sounds. Pulmonary/Chest: Effort normal and breath sounds normal. No respiratory distress. He has no wheezes. He has no rales. Abdominal: Soft. There is no tenderness. There is no rebound and no guarding. I cannot detect hepatosplenomegaly. Musculoskeletal: He exhibits edema. He has swelling in his right leg. I cannot feel lymph nodes in the right groin but I do feel one lymph node in the left groin. It is tender. Neurological: He is alert and oriented to person, place, and time. Skin: No rash noted. No erythema. Psychiatric: Mood and affect normal.   Nursing note and vitals reviewed. Health Maintenance Due   Topic Date Due    Shingrix Vaccine Age 49> (1 of 2) 11/07/2002    GLAUCOMA SCREENING Q2Y  11/07/2017    Pneumococcal 65+ Low/Medium Risk (1 of 2 - PCV13) 11/07/2017    AAA Screening 73-67 YO Male Smoking Patients  11/07/2017    MEDICARE YEARLY EXAM  12/27/2018         Assessment and orders:     Encounter Diagnoses     ICD-10-CM ICD-9-CM   1. Lymphadenopathy R59.1 785.6   2. History of MRSA infection Z86.14 V12.04   3. Folliculitis M96.5 377.2   4. Tick bite, initial encounter W57. XXXA 919.4     E906.4   5. Malodorous urine R82.90 791.9     Diagnoses and all orders for this visit:    1. Lymphadenopathy-the plan is to treat this is a presumptive lymphadenitis of infectious etiology and repeat his ultrasound of the groin lymph nodes in 2 weeks. His lab, chest x-ray or other findings warrant an oncology referral will be placed. Lack of response to the antibiotic is the reason to have him seen by the oncologist as well.  -     LYME AB/WESTERN BLOT REFLEX  -     CBC WITH AUTOMATED DIFF  -     PATHOLOGIST REVIEW SMEARS  -     XR CHEST PA LAT; Future        -     doxycycline (VIBRAMYCIN) 100 mg capsule; Take 1 Cap by mouth two (2) times a day for 10 days.     2. History of MRSA infection-one active lesion on his left buttocks  -     CBC WITH AUTOMATED DIFF    3. Folliculitis-chronic recurrent lesions of his lower legs. -     CBC WITH AUTOMATED DIFF    4. Tick bite, initial encounter-other than fatigue he does not have any symptoms specific to Lyme's  -     LYME AB/WESTERN BLOT REFLEX  -     CBC WITH AUTOMATED DIFF  -     PATHOLOGIST REVIEW SMEARS    5. Malodorous urine-rule out UTI  -     URINALYSIS W/MICROSCOPIC  -     CULTURE, URINE    Other orders  -     metoprolol succinate (TOPROL-XL) 50 mg XL tablet; Take 1 Tab by mouth daily. Plan of care:  Discussed diagnoses in detail with patient. Medication risks/benefits/side effects discussed with patient. All of the patient's questions were addressed. The patient understands and agrees with our plan of care. The patient knows to call back if they are unsure of or forget any changes we discussed today or if the symptoms change. The patient received an After-Visit Summary which contains VS, orders, medication list and allergy list. This can be used as a \"mini-medical record\" should they have to seek medical care while out of town. Patient Care Team:  Emely Victor MD as PCP - General Bryn Jay MD (Cardiology)  González Boo MD (Dermatology)  Benji Barajas MD (Urology)  Yaya Coe MD (Gastroenterology)  Pricilla Santana MD (Surgery)  Mick Montejo MD (Orthopedic Surgery)  Miguel Ospina MD (Orthopedic Surgery)  Bhavana Krishna NP as Nurse Practitioner (Surgery)    Follow-up Disposition:  Return in about 2 weeks (around 1/18/2019). Future Appointments   Date Time Provider Erin Blandi   4/1/2019  8:40 AM Emely Victor MD [facility] BARBIE SCHED   7/3/2019 11:40 AM No Mckinney  [address]       Signed By: Kiki Prince MD     January 4, 2019        This note was created using voice recognition software. Despite editing, there may be syntax errors.

## 2019-01-04 NOTE — TELEPHONE ENCOUNTER
Spoke with patient and informed him per Dr. Kerwin Sever: Mykamilah NevesBarber x-ray is unremarkable. \"    Patient verbalized understanding.

## 2019-01-04 NOTE — PROGRESS NOTES
Chief Complaint   Patient presents with    Results     Discuss test results     Medication Refill     Body mass index is 35.87 kg/m². 1. Have you been to the ER, urgent care clinic since your last visit? Hospitalized since your last visit? No    2. Have you seen or consulted any other health care providers outside of the [address] [place] [address] since your last visit? Include any pap smears or colon screening.  No    Reviewed record in preparation for visit and have necessary documentation  Pt did not bring medication to office visit for review  Information was given to pt on Advanced Directives, Living Will  Information was given on Shingles Vaccine  Opportunity was given for questions  Goals that were addressed and/or need to be completed after this appointment include:     Health Maintenance Due   Topic Date Due    Shingrix Vaccine Age 49> (1 of 2) 11/07/2002    GLAUCOMA SCREENING Q2Y  11/07/2017    Pneumococcal 65+ Low/Medium Risk (1 of 2 - PCV13) 11/07/2017    AAA Screening 73-67 YO Male Smoking Patients  11/07/2017    MEDICARE YEARLY EXAM  12/27/2018

## 2019-01-07 LAB
APPEARANCE UR: CLEAR
B BURGDOR IGG+IGM SER-ACNC: <0.91 ISR (ref 0–0.9)
B BURGDOR IGM SER IA-ACNC: <0.8 INDEX (ref 0–0.79)
BACTERIA #/AREA URNS HPF: NORMAL /[HPF]
BACTERIA UR CULT: NO GROWTH
BASOPHILS # BLD AUTO: 0.1 X10E3/UL (ref 0–0.2)
BASOPHILS NFR BLD AUTO: 1 %
BILIRUB UR QL STRIP: NEGATIVE
CASTS URNS QL MICRO: NORMAL /LPF
COLOR UR: YELLOW
EOSINOPHIL # BLD AUTO: 0.3 X10E3/UL (ref 0–0.4)
EOSINOPHIL NFR BLD AUTO: 4 %
EPI CELLS #/AREA URNS HPF: NORMAL /HPF
ERYTHROCYTE [DISTWIDTH] IN BLOOD BY AUTOMATED COUNT: 13 % (ref 12.3–15.4)
GLUCOSE UR QL: NEGATIVE
HCT VFR BLD AUTO: 43.9 % (ref 37.5–51)
HGB BLD-MCNC: 14.5 G/DL (ref 13–17.7)
HGB UR QL STRIP: NEGATIVE
IMM GRANULOCYTES # BLD AUTO: 0 X10E3/UL (ref 0–0.1)
IMM GRANULOCYTES NFR BLD AUTO: 0 %
KETONES UR QL STRIP: NEGATIVE
LEUKOCYTE ESTERASE UR QL STRIP: NEGATIVE
LYMPHOCYTES # BLD AUTO: 0.8 X10E3/UL (ref 0.7–3.1)
LYMPHOCYTES NFR BLD AUTO: 10 %
MCH RBC QN AUTO: 30.7 PG (ref 26.6–33)
MCHC RBC AUTO-ENTMCNC: 33 G/DL (ref 31.5–35.7)
MCV RBC AUTO: 93 FL (ref 79–97)
MICRO URNS: NORMAL
MICRO URNS: NORMAL
MONOCYTES # BLD AUTO: 0.9 X10E3/UL (ref 0.1–0.9)
MONOCYTES NFR BLD AUTO: 11 %
MUCOUS THREADS URNS QL MICRO: PRESENT
NEUTROPHILS # BLD AUTO: 5.9 X10E3/UL (ref 1.4–7)
NEUTROPHILS NFR BLD AUTO: 74 %
NITRITE UR QL STRIP: NEGATIVE
PH UR STRIP: 5.5 [PH] (ref 5–7.5)
PLATELET # BLD AUTO: 217 X10E3/UL (ref 150–379)
PROT UR QL STRIP: NEGATIVE
RBC # BLD AUTO: 4.72 X10E6/UL (ref 4.14–5.8)
RBC #/AREA URNS HPF: NORMAL /HPF
SP GR UR: 1.02 (ref 1–1.03)
UROBILINOGEN UR STRIP-MCNC: 0.2 MG/DL (ref 0.2–1)
WBC # BLD AUTO: 7.9 X10E3/UL (ref 3.4–10.8)
WBC #/AREA URNS HPF: NORMAL /HPF

## 2019-01-08 LAB
BASOPHILS # BLD AUTO: 0.1 X10E3/UL (ref 0–0.2)
BASOPHILS NFR BLD AUTO: 1 %
EOSINOPHIL # BLD AUTO: 0.3 X10E3/UL (ref 0–0.4)
EOSINOPHIL NFR BLD AUTO: 4 %
ERYTHROCYTE [DISTWIDTH] IN BLOOD BY AUTOMATED COUNT: 17.1 % (ref 12.3–15.4)
HCT VFR BLD AUTO: 43.5 % (ref 37.5–51)
HGB BLD-MCNC: 14.5 G/DL (ref 13–17.7)
IMM GRANULOCYTES # BLD AUTO: 0.1 X10E3/UL (ref 0–0.1)
IMM GRANULOCYTES NFR BLD AUTO: 1 %
LYMPHOCYTES # BLD AUTO: 0.7 X10E3/UL (ref 0.7–3.1)
LYMPHOCYTES NFR BLD AUTO: 10 %
MCH RBC QN AUTO: 31.5 PG (ref 26.6–33)
MCHC RBC AUTO-ENTMCNC: 33.3 G/DL (ref 31.5–35.7)
MCV RBC AUTO: 94 FL (ref 79–97)
MONOCYTES # BLD AUTO: 1 X10E3/UL (ref 0.1–0.9)
MONOCYTES NFR BLD AUTO: 13 %
NEUTROPHILS # BLD AUTO: 5.3 X10E3/UL (ref 1.4–7)
NEUTROPHILS NFR BLD AUTO: 71 %
PATH INTERP BLD-IMP: ABNORMAL
PATH REV BLD -IMP: ABNORMAL
PATHOLOGIST NAME: ABNORMAL
PLATELET # BLD AUTO: 283 X10E3/UL (ref 150–379)
RBC # BLD AUTO: 4.61 X10E6/UL (ref 4.14–5.8)
WBC # BLD AUTO: 7.4 X10E3/UL (ref 3.4–10.8)

## 2019-01-17 ENCOUNTER — OFFICE VISIT (OUTPATIENT)
Dept: FAMILY MEDICINE CLINIC | Age: 69
End: 2019-01-17

## 2019-01-17 VITALS
BODY MASS INDEX: 35.96 KG/M2 | TEMPERATURE: 98.4 F | DIASTOLIC BLOOD PRESSURE: 78 MMHG | OXYGEN SATURATION: 95 % | WEIGHT: 251.2 LBS | RESPIRATION RATE: 20 BRPM | HEART RATE: 84 BPM | HEIGHT: 70 IN | SYSTOLIC BLOOD PRESSURE: 125 MMHG

## 2019-01-17 DIAGNOSIS — R59.0 INGUINAL LYMPHADENOPATHY: Primary | ICD-10-CM

## 2019-01-17 NOTE — PROGRESS NOTES
[facility]  
Outpatient Resident Progress Note Encounter Date: 1/17/2019 Chief Complaint Patient presents with  Results Discuss recent bloodwork History of Present Illness Patient is a 79 y.o. male, who presents to clinic for Results (Discuss recent bloodwork) Patient presents to follow up on bilateral LE edema and groin lymphadenopathy. Patient had a duplex scan of LE on 1/3/19 showing \"Multiple prominent lymph nodes noted in the groin bilaterally, the largest measuring 2.16 x 0.75 cm on the right and 1.54 x 0.44 cm on the left. \" Patient was treated with Doxycycline for suspected Tick Borne disease. Lyme Ig (-). Today patient reports that his LE edema is resolved and denies feeling any nodules on his groin area, and overall feeling well. Denies rash, fever, fatigue, dizziness, lightheadedness, headaches, changes in vision, cough, sore throat, CP, SOB, sputum production, abdominal pain or tenderness, nausea, vomiting, dysuria, hematuria, diarrhea, melena, hematochezia, leg swelling, or any other complains at this moment. Review of Systems A complete ROS was reviewed and only pertinent items documented on HPI. Allergies - reviewed: Allergies Allergen Reactions  Chlorhexidine Towelette Rash  
  ? Reaction from using wipes before [facility] Medications - reviewed:  
Current Outpatient Medications Medication Sig  
 metoprolol succinate (TOPROL-XL) 50 mg XL tablet Take 1 Tab by mouth daily.  ascorbic acid, vitamin C, (VITAMIN C) 500 mg tablet Take 1,000 mg by mouth.  albuterol (PROVENTIL VENTOLIN) 2.5 mg /3 mL (0.083 %) nebulizer solution Take 2.5 mg by inhalation.  dextromethorphan-guaiFENesin (MUCINEX DM) 60-1,200 mg Tb12 Take  by mouth.  vitamin e (E GEMS) 1,000 unit capsule Take 1 Cap by mouth daily.  fluticasone (FLONASE) 50 mcg/actuation nasal spray 2 prays in each nostril  albuterol (PROVENTIL HFA, VENTOLIN HFA, PROAIR HFA) 90 mcg/actuation inhaler Take 2 Puffs by inhalation every four (4) hours as needed for Wheezing for up to 360 days.  ramipril (ALTACE) 10 mg capsule TAKE ONE CAPSULE BY MOUTH TWICE DAILY  hydroCHLOROthiazide (HYDRODIURIL) 12.5 mg tablet TAKE ONE TABLET BY MOUTH ONCE DAILY FOR  HYPERTENSION  nabumetone (RELAFEN) 500 mg tablet Take  by mouth two (2) times a day.  aspirin delayed-release 325 mg tablet Take  by mouth daily.  cetirizine (ZYRTEC) 10 mg tablet Take  by mouth daily.  pravastatin (PRAVACHOL) 20 mg tablet TAKE 1 TABLET BY MOUTH NIGHTLY  POTASSIUM CHLORIDE PO Take 595 mg by mouth daily.  Omeprazole delayed release (PRILOSEC D/R) 20 mg tablet Take 20 mg by mouth daily.  cyanocobalamin (VITAMIN B-12) 1,000 mcg tablet Take 1 Tab by mouth daily. No current facility-administered medications for this visit. Past Medical History - reviewed: 
Past Medical History:  
Diagnosis Date  Adverse effect of anesthesia   
 [place] [facility]  Anesthesia complication 4643 APNEA DURING HERNIA REPAIR, [place] [facility]  Arrhythmia FREQUENT PVCS  
 Arthritis  Asthma HAS BRONCHITIS OFTEN  
 Cancer ([facility] [address] SKIN- UNKNOWN  
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  
 GERD (gastroesophageal reflux disease) 4/1/2010  High cholesterol 4/1/2010  
 HTN (hypertension), benign 4/1/2010 CONTROLLED BY MEDS  Hx MRSA infection 2013 CYSTS BL LEGS  Sleep apnea CPAP Family Medical History - reviewed: 
Family History Problem Relation Age of Onset  Heart Disease Mother  Dementia Mother  Hypertension Mother  Heart Disease Father  Lung Disease Father  Cancer Brother SKIN  
 No Known Problems Brother  Cancer Brother UNKNOWN  
 Anesth Problems Neg Hx Objective Visit Vitals /78 (BP 1 Location: Right arm, BP Patient Position: Sitting) Pulse 84 Temp 98.4 °F (36.9 °C) (Oral) Resp 20 Ht 5' 10\" (1.778 m) Wt 251 lb 3.2 oz (113.9 kg) SpO2 95% BMI 36.04 kg/m² Body mass index is 36.04 kg/m². Nursing note and vitals reviewed. Physical Exam 
Constitutional: Well-developed, well-nourished, and in no distress. Obese. Lymphadenopathy: No cervical adenopathy. Palpable 1 cm non-tender, movable lymph node of the Lt groin area. Cardiovascular: Normal rate, regular rhythm, normal heart sounds and intact distal pulses. Exam reveals no gallop and no friction rub. No murmur heard. Pulmonary/Chest: Effort normal and breath sounds normal. No respiratory distress. No wheezes, no rales, no tenderness. Abdominal: Soft. Bowel sounds are normal. No distension and no mass. There is no tenderness. There is no rebound and no guarding. Musculoskeletal: Normal range of motion. Exhibits no edema, tenderness or deformity. Neurological: Alert and oriented. No focal findings. Skin: Skin is warm and dry. No rash noted. Not diaphoretic. No erythema. No pallor. Psychiatric: Mood, memory, affect and judgment normal.  
 
Assessment / Plan Mr. Natalio Berkowitz is a 79 y.o. male with the following medical condition(s): 
 
1. Inguinal lymphadenopathy Suspected to be reactive due to infectious agent. Will check Raejev Ig to r/o Mononucleosis and will recheck inguinal lymphadenopathy with US Duplex of LE. If there evidence of no improvement or worsening lymphadenopathy, will consider Heme/Onc referral.   
- MONONUCLEOSIS SCREEN 
- DUPLEX LOWER EXT VENOUS BILAT; Future Patient was advised to return to clinic in case of worsening of symptoms or fail to improve. Life threatening signs and symptoms were discussed and patient advised to go to the nearest ED for prompt evaluation and care in case of onset of any of these. Follow-up Disposition: Return in about 2 weeks (around 1/31/2019), or if symptoms worsen or fail to improve, for F/U Lymphadenopathy. · I have discussed the diagnosis with the patient and the intended plan as seen in the above orders. The patient has received an after-visit summary and questions were answered concerning future plans. I have discussed medication side effects and warnings with the patient as well. Patient/Plan evaluated and discussed with Dr. Jana Turner (Attending Physician) Timothy Younger MD 
PGY-3 Family Medicine Resident Encounter Date: 1/17/2019

## 2019-01-17 NOTE — PROGRESS NOTES
Chief Complaint Patient presents with  Results Discuss recent bloodwork Body mass index is 36.04 kg/m². 1. Have you been to the ER, urgent care clinic since your last visit? Hospitalized since your last visit? No 
 
2. Have you seen or consulted any other health care providers outside of the [address] [place] [address] since your last visit? Include any pap smears or colon screening. No 
 
Reviewed record in preparation for visit and have necessary documentation Pt did not bring medication to office visit for review Information was given to pt on Advanced Directives, Living Will Information was given on Shingles Vaccine Opportunity was given for questions Goals that were addressed and/or need to be completed after this appointment include:  
 
Health Maintenance Due Topic Date Due  Shingrix Vaccine Age 50> (1 of 2) 11/07/2002  GLAUCOMA SCREENING Q2Y  11/07/2017  Pneumococcal 65+ Low/Medium Risk (1 of 2 - PCV13) 11/07/2017  AAA Screening 73-69 YO Male Smoking Patients  11/07/2017  MEDICARE YEARLY EXAM  12/27/2018

## 2019-01-17 NOTE — PATIENT INSTRUCTIONS
Swollen Lymph Nodes: Care Instructions Your Care Instructions Lymph nodes are small, bean-shaped glands throughout the body. They help your body fight germs and infections. Lymph nodes often swell when there is a problem such as an injury, infection, or tumor. · The nodes in your neck, under your chin, or behind your ears may swell when you have a cold or sore throat. · An injury or infection in a leg or foot can make the nodes in your groin swell. · Sometimes medicine can make lymph nodes swell, but this is rare. Treatment depends on what caused your nodes to swell. Usually the nodes return to normal size without a problem. Follow-up care is a key part of your treatment and safety. Be sure to make and go to all appointments, and call your doctor if you are having problems. It's also a good idea to know your test results and keep a list of the medicines you take. How can you care for yourself at home? · Take your medicines exactly as prescribed. Call your doctor if you think you are having a problem with your medicine. · Avoid irritation. ? Do not squeeze or pick at the lump. ? Do not stick a needle in it. · Prevent infection. Do not squeeze, drain, or puncture a painful lump. Doing this can irritate or inflame the lump, push any existing infection deeper into the skin, or cause severe bleeding. · Get extra rest. Slow down just a little from your usual routine. · Drink plenty of fluids, enough so that your urine is light yellow or clear like water. If you have kidney, heart, or liver disease and have to limit fluids, talk with your doctor before you increase the amount of fluids you drink. · Take an over-the-counter pain medicine, such as acetaminophen (Tylenol), ibuprofen (Advil, Motrin), or naproxen (Aleve). Read and follow all instructions on the label. · Do not take two or more pain medicines at the same time unless the doctor told you to.  Many pain medicines have acetaminophen, which is Tylenol. Too much acetaminophen (Tylenol) can be harmful. When should you call for help? Call your doctor now or seek immediate medical care if: 
  · You have worse symptoms of infection, such as: 
? Increased pain, swelling, warmth, or redness. ? Red streaks leading from the area. ? Pus draining from the area. ? A fever.  
 Watch closely for changes in your health, and be sure to contact your doctor if: 
  · Your lymph nodes do not get smaller or do not return to normal.  
  · You do not get better as expected. Where can you learn more? Go to http://kurt-lin.info/. Enter F291 in the search box to learn more about \"Swollen Lymph Nodes: Care Instructions. \" Current as of: November 18, 2017 Content Version: 11.8 © 3294-8920 [employer]. Care instructions adapted under license by [employer] (which disclaims liability or warranty for this information). If you have questions about a medical condition or this instruction, always ask your healthcare professional. [place] [address] any warranty or liability for your use of this information.

## 2019-01-17 NOTE — PROGRESS NOTES
I saw and evaluated the patient with the resident, performing the key elements of the exam and service. I discussed the findings, assessment and plan with the resident and agree with the resident's findings and plan as documented in the resident's note. He had one small lymph node in the left groin palpable. I cannot detect hepatosplenomegaly or axillary adenopathy. His chest x-ray was unremarkable for hilar adenopathy. César Franklin M.D.

## 2019-01-18 LAB — HETEROPH AB SER QL LA: NEGATIVE

## 2019-01-18 NOTE — PROGRESS NOTES
Results within acceptable parameters for this patient. Patient notified about results.   
 
Mustapha Benitez MD 
PGY-3 Family Medicine Resident

## 2019-01-21 ENCOUNTER — DOCUMENTATION ONLY (OUTPATIENT)
Dept: SLEEP MEDICINE | Age: 69
End: 2019-01-21

## 2019-01-25 ENCOUNTER — HOSPITAL ENCOUNTER (OUTPATIENT)
Dept: VASCULAR SURGERY | Age: 69
Discharge: HOME | End: 2019-01-25
Attending: FAMILY MEDICINE
Payer: MEDICARE

## 2019-01-25 DIAGNOSIS — R59.0 INGUINAL LYMPHADENOPATHY: ICD-10-CM

## 2019-01-25 PROCEDURE — 93970 EXTREMITY STUDY: CPT

## 2019-01-25 NOTE — PROCEDURES
[address]  *** FINAL REPORT ***    Name: Trina Delgado  MRN: [id number]    Outpatient  : 1950  HIS Order #: [id number]  [address] [facility] Visit #: [id number]  Date: 2019    TYPE OF TEST: Peripheral Venous Testing    REASON FOR TEST    Right Leg:-  Deep venous thrombosis:           No  Superficial venous thrombosis:    No  Deep venous insufficiency:        No  Superficial venous insufficiency: No    Left Leg:-  Deep venous thrombosis:           No  Superficial venous thrombosis:    No  Deep venous insufficiency:        No  Superficial venous insufficiency: No      INTERPRETATION/FINDINGS  PROCEDURE:  BILATERAL LE VENOUS DUPLEX. Evaluation of lower extremity veins with ultrasound (B-mode imaging,  pulsed Doppler, color Doppler). Includes the common femoral, deep  femoral, femoral, popliteal, posterior tibial, peroneal, and great  saphenous veins. FINDINGS:  Unable to visualize the entire length of the paired  posterior tibial or paired peroneal veins bilaterally due to patient  body habitus. Gray scale and color flow duplex images of the remaining   veins in both lower extremities demonstrate normal compressibility,  spontaneous and augmented flow profiles, and absence of filling  defects throughout the deep and superficial veins in both lower  extremities. CONCLUSION: Bilateral lower extremity venous duplex negative for deep  venous thrombosis or thrombophlebitis. ADDITIONAL COMMENTS  INCIDENTAL FINDING:Bilareral enlarged lymphnodes with no vascular flow   noted with the right measuring 2.46 cm x 0.95 cm and the left  measuring 2.11 cm x 0.77 cm. I have personally reviewed the data relevant to the interpretation of  this  study. TECHNOLOGIST: Juvenal Quesada RVT  Signed: 2019 11:10 AM    PHYSICIAN: Angela Baldwin.  Gerardo Villegas MD  Signed: 2019 09:52 AM

## 2019-01-29 RX ORDER — PRAVASTATIN SODIUM 20 MG/1
TABLET ORAL
Qty: 30 TAB | Refills: 11 | Status: SHIPPED | OUTPATIENT
Start: 2019-01-29 | End: 2019-07-03

## 2019-02-04 ENCOUNTER — OFFICE VISIT (OUTPATIENT)
Dept: FAMILY MEDICINE CLINIC | Age: 69
End: 2019-02-04

## 2019-02-04 VITALS
OXYGEN SATURATION: 96 % | SYSTOLIC BLOOD PRESSURE: 116 MMHG | WEIGHT: 250 LBS | BODY MASS INDEX: 35.79 KG/M2 | RESPIRATION RATE: 20 BRPM | HEART RATE: 72 BPM | HEIGHT: 70 IN | TEMPERATURE: 97.8 F | DIASTOLIC BLOOD PRESSURE: 75 MMHG

## 2019-02-04 DIAGNOSIS — D72.821 MONOCYTOSIS: ICD-10-CM

## 2019-02-04 DIAGNOSIS — E65 CENTRAL OBESITY: ICD-10-CM

## 2019-02-04 DIAGNOSIS — R59.0 INGUINAL LYMPHADENOPATHY: Primary | ICD-10-CM

## 2019-02-04 DIAGNOSIS — Z87.891 FORMER SMOKER: ICD-10-CM

## 2019-02-04 PROBLEM — R59.1 LYMPHADENOPATHY: Chronic | Status: ACTIVE | Noted: 2019-02-04

## 2019-02-04 RX ORDER — CHOLECALCIFEROL TAB 125 MCG (5000 UNIT) 125 MCG
1000 TAB ORAL
COMMUNITY
Start: 2019-02-04

## 2019-02-04 NOTE — PROGRESS NOTES
1. Have you been to the ER, urgent care clinic since your last visit? Hospitalized since your last visit? No    2. Have you seen or consulted any other health care providers outside of the [address] [place] [address] since your last visit? Include any pap smears or colon screening.  No  Reviewed record in preparation for visit and have necessary documentation  Pt did not bring medication to office visit for review    Goals that were addressed and/or need to be completed during or after this appointment include   Health Maintenance Due   Topic Date Due    Shingrix Vaccine Age 49> (1 of 2) 11/07/2002    GLAUCOMA SCREENING Q2Y  11/07/2017    Pneumococcal 65+ Low/Medium Risk (1 of 2 - PCV13) 11/07/2017    AAA Screening 73-69 YO Male Smoking Patients  11/07/2017    MEDICARE YEARLY EXAM  12/27/2018     Pt has appt with Dr Ana Cedillo for eye exam next week

## 2019-02-04 NOTE — PROGRESS NOTES
[address]  [phone]           Progress Note    Patient: Liang Silver MRN: [id number]  SSN: [id number]    YOB: 1950  Age: 79 y.o. Sex: male        No chief complaint on file. Subjective:     Encounter Diagnoses   Name Primary?  Inguinal lymphadenopathy: Has not resolved with a course of antibiotics. He occasionally has some mild discomfort in his left groin. Yes    Monocytosis: His CBC showed only a very mild monocytosis. Mono test was unremarkable and peripheral blood smear was unremarkable for malignant looking cells.  Former smoker: He needs to have a abdominal aortic aneurysm scan.  Central obesity: His liver function tests are normal but there is a high likelihood that he has Cleotis Pih. I cannot palpate his liver edge on exam.              Current and past medical information:    Current Medications after this visit[de-identified]     Current Outpatient Medications   Medication Sig    cholecalciferol (VITAMIN D3) 1,000 unit tablet Take 1 Tab by mouth two (2) times a day.  pneumococcal 13 ricky conj dip (PREVNAR-13) 0.5 mL syrg injection 0.5 mL by IntraMUSCular route once for 1 dose. To be administered at Pharmacy. Fax confirmation to me at [phone]. Thank You.  pravastatin (PRAVACHOL) 20 mg tablet TAKE 1 TABLET BY MOUTH EACH NIGHT    metoprolol succinate (TOPROL-XL) 50 mg XL tablet Take 1 Tab by mouth daily.  ascorbic acid, vitamin C, (VITAMIN C) 500 mg tablet Take 1,000 mg by mouth.  albuterol (PROVENTIL VENTOLIN) 2.5 mg /3 mL (0.083 %) nebulizer solution Take 2.5 mg by inhalation.  dextromethorphan-guaiFENesin (MUCINEX DM) 60-1,200 mg Tb12 Take  by mouth.     fluticasone (FLONASE) 50 mcg/actuation nasal spray 2 prays in each nostril    albuterol (PROVENTIL HFA, VENTOLIN HFA, PROAIR HFA) 90 mcg/actuation inhaler Take 2 Puffs by inhalation every four (4) hours as needed for Wheezing for up to 360 days.  ramipril (ALTACE) 10 mg capsule TAKE ONE CAPSULE BY MOUTH TWICE DAILY    hydroCHLOROthiazide (HYDRODIURIL) 12.5 mg tablet TAKE ONE TABLET BY MOUTH ONCE DAILY FOR  HYPERTENSION    nabumetone (RELAFEN) 500 mg tablet Take  by mouth two (2) times a day.  aspirin delayed-release 325 mg tablet Take  by mouth daily.  cetirizine (ZYRTEC) 10 mg tablet Take  by mouth daily.  POTASSIUM CHLORIDE PO Take 595 mg by mouth daily.  Omeprazole delayed release (PRILOSEC D/R) 20 mg tablet Take 20 mg by mouth daily.  cyanocobalamin (VITAMIN B-12) 1,000 mcg tablet Take 1 Tab by mouth daily.  vitamin e (E GEMS) 1,000 unit capsule Take 1 Cap by mouth daily. No current facility-administered medications for this visit. Patient Active Problem List    Diagnosis Date Noted    Inguinal lymphadenopathy 01/17/2019     Priority: 1 - One    Asthma 10/19/2017     Priority: 1 - One    Complex tear of medial meniscus of right knee as current injury 01/04/2017     Priority: 1 - One    DDD (degenerative disc disease), lumbar 05/16/2016     Priority: 1 - One    PVC's (premature ventricular contractions)      Priority: 1 - One    B12 deficiency 10/17/2014     Priority: 1 - One    HLD (hyperlipidemia) 03/26/2013     Priority: 1 - One    Positive PPD 07/01/2010     Priority: 1 - One    RACHEL (obstructive sleep apnea) 07/01/2010     Priority: 1 - One    HTN (hypertension) 04/01/2010     Priority: 1 - One    GERD (gastroesophageal reflux disease) 04/01/2010     Priority: 1 - One    Lymphadenopathy 02/04/2019    Acute bronchitis 12/31/2018    Severe obesity (BMI 35.0-39. 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/2017    Rectus diastasis 06/03/2016    Knee pain 08/04/2014    SOB (shortness of breath) 10/11/2012    Vitamin D deficiency 2012    Colon ulcer, aphthous 2010       Past Medical History:   Diagnosis Date    Adverse effect of anesthesia     went apneic post surgery at [facility]    Anesthesia complication 3931    APNEA DURING HERNIA REPAIR, POST OPERATIVE INTUBATION AT [facility]    Arrhythmia     FREQUENT PVCS    Arthritis     Asthma     HAS BRONCHITIS OFTEN    Cancer ([address]     SKIN- UNKNOWN    Colon ulcer, aphthous 2010    Finger amputation, traumatic     R 2ND DIGIT    GERD (gastroesophageal reflux disease) 2010    High cholesterol 2010    HTN (hypertension), benign 2010    CONTROLLED BY MEDS    Hx MRSA infection     CYSTS BL LEGS    Sleep apnea     CPAP       Allergies   Allergen Reactions    Chlorhexidine Towelette Rash     ? Reaction from using wipes before [facility]       Past Surgical History:   Procedure Laterality Date    ABDOMEN SURGERY PROC UNLISTED      hernia    HX APPENDECTOMY      HX HEENT      T&A    HX KNEE REPLACEMENT Right 10/2017    HX ORTHOPAEDIC      left knee scope    HX ORTHOPAEDIC  2017    RT KNEE ARTHROSCOPY WITH MINESCECTOMY    HX OTHER SURGICAL  1990S    CERVICAL- UNKNOWN    HX OTHER SURGICAL      R HAND 2ND DIGIT TRAUMATIC AMPUTATION REPAIR    HX TONSIL AND ADENOIDECTOMY         Social History     Socioeconomic History    Marital status:      Spouse name: Not on file    Number of children: Not on file    Years of education: Not on file    Highest education level: Not on file   Tobacco Use    Smoking status: Former Smoker     Packs/day: 3.00     Years: 35.00     Pack years: 105.00     Last attempt to quit: 1/3/2002     Years since quittin.0    Smokeless tobacco: Never Used   Substance and Sexual Activity    Alcohol use:  Yes     Alcohol/week: 12.6 oz     Types: 21 Shots of liquor per week     Comment: 3/day    Drug use: No    Sexual activity: Yes     Partners: Female     Birth control/protection: None Review of Systems   Constitutional: Negative. Negative for chills, diaphoresis, fever, malaise/fatigue and weight loss. HENT: Negative. Negative for hearing loss. Eyes: Negative. Negative for blurred vision and double vision. Respiratory: Negative. Negative for cough, hemoptysis, sputum production and shortness of breath. Cardiovascular: Negative. Negative for chest pain, palpitations and orthopnea. Gastrointestinal: Negative. Negative for abdominal pain, blood in stool, heartburn, nausea and vomiting. Genitourinary: Negative. Negative for dysuria, frequency and urgency. Musculoskeletal: Negative. Negative for back pain, myalgias and neck pain. Skin: Negative. Negative for rash. Neurological: Negative. Negative for dizziness, tingling, tremors, weakness and headaches. Endo/Heme/Allergies: Negative. Psychiatric/Behavioral: Negative. Negative for depression. Objective:     Vitals:    02/04/19 1137   BP: 116/75   Pulse: 72   Resp: 20   Temp: 97.8 °F (36.6 °C)   TempSrc: Oral   SpO2: 96%   Weight: 250 lb (113.4 kg)   Height: 5' 10\" (1.778 m)      Body mass index is 35.87 kg/m². Physical Exam   Constitutional: He is oriented to person, place, and time and well-developed, well-nourished, and in no distress. HENT:   Head: Normocephalic and atraumatic. Mouth/Throat: Oropharynx is clear and moist.   Eyes: Right eye exhibits no discharge. Left eye exhibits no discharge. No scleral icterus. Neck: No thyromegaly present. No bruit. Cardiovascular: Normal rate, regular rhythm and normal heart sounds. Pulmonary/Chest: Effort normal and breath sounds normal. He has no wheezes. He has no rales. Abdominal: Soft. He exhibits no mass. There is no tenderness. There is no rebound and no guarding. Cannot central obesity. Palpate liver edge. Neurological: He is alert and oriented to person, place, and time. Skin: No rash noted. No erythema.    Psychiatric: Mood and affect normal.   Nursing note and vitals reviewed. Health Maintenance Due   Topic Date Due    Shingrix Vaccine Age 49> (1 of 2) 11/07/2002    GLAUCOMA SCREENING Q2Y  11/07/2017    Pneumococcal 65+ Low/Medium Risk (1 of 2 - PCV13) 11/07/2017    AAA Screening 73-69 YO Male Smoking Patients  11/07/2017    MEDICARE YEARLY EXAM  12/27/2018         Assessment and orders:     Encounter Diagnoses     ICD-10-CM ICD-9-CM   1. Inguinal lymphadenopathy R59.0 785.6   2. Monocytosis D72.821 288.63   3. Former smoker Z87.891 V15.82   4. Central obesity E65 278.1     Diagnoses and all orders for this visit:    1. Inguinal/groin lymphadenopathy-has not resolved. -     REFERRAL TO ONCOLOGY    2. Monocytosis-monitor  -     REFERRAL TO ONCOLOGY    3. Former smoker-screening needed for [address]; Future    4. Central obesity  -     [facility]; Future    Encounter for immunization. -     pneumococcal 13 ricky conj dip (PREVNAR-13) 0.5 mL syrg injection; 0.5 mL by IntraMUSCular route once for 1 dose. To be administered at Pharmacy. Fax confirmation to me at [phone]. Thank You. Plan of care:  Discussed diagnoses in detail with patient. Medication risks/benefits/side effects discussed with patient. All of the patient's questions were addressed. The patient understands and agrees with our plan of care. The patient knows to call back if they are unsure of or forget any changes we discussed today or if the symptoms change. The patient received an After-Visit Summary which contains VS, orders, medication list and allergy list. This can be used as a \"mini-medical record\" should they have to seek medical care while out of town.     Patient Care Team:  Julien Anderson MD as PCP - General  Cyndee Matute MD (Cardiology)  Ling Simpson MD (Dermatology)  Richy Hooker MD (Urology)  Shasta Meléndez MD (Gastroenterology)  Valentin Jimenez MD (Surgery)  Keerthi Mckeon MD (Orthopedic Surgery)  Madelyn Cronin MD (Orthopedic Surgery)  Rosamaria Hart NP as Nurse Practitioner (Surgery)    Follow-up Disposition:  Return in about 3 months (around 5/4/2019) for -Due for a Medicare Wellness Visit. Future Appointments   Date Time Provider Erin Corona   4/1/2019  8:40 AM Julien Anderson MD [facility] BARBIE SCHED   7/3/2019 11:40 AM Annette Javier  [address]       Signed By: Juan Tripp MD     February 4, 2019        This note was created using voice recognition software. Despite editing, there may be syntax errors.

## 2019-02-05 ENCOUNTER — TELEPHONE (OUTPATIENT)
Dept: FAMILY MEDICINE CLINIC | Age: 69
End: 2019-02-05

## 2019-02-05 NOTE — TELEPHONE ENCOUNTER
----- Message from Jazmine Davis MD sent at 2/5/2019 12:31 AM EST -----  Regarding: Discuss results from Doppler scan  Jessica Islas, please call Mr Sonu Adams to schedule an appointment to discuss the results of the most recent doppler scan.      Thank you very much for your help;  Cherelle Charles      ----- Message -----  From: Lex, Transcription  Sent: 1/26/2019   9:52 AM  To: Jazmine Davis MD

## 2019-02-05 NOTE — TELEPHONE ENCOUNTER
Spoke with pt   He reports discussing the results with Dr Leta Kaiser and a referral was made     Please advise

## 2019-02-05 NOTE — PROGRESS NOTES
Results noted. Doppler scan of bilateral LE show increased enlargement of lymph nodes when compared to 12/31/2018. Then the lymph nodes measured 2.16 x 0.75 cm on the right  
1.54 x 0.44 cm on the left, now right measuring 2.46 cm x 0.95 cm and the left measuring 2.11 cm x 0.77 cm. Will discuss results with patient and explore the possibility of biopsy. Andrei Tabares MD 
PGY-3 Family Medicine Resident

## 2019-02-11 NOTE — PROGRESS NOTES
[address] [facility] at [facility]  [phone]    Hematology / Oncology Consult    Reason for Visit:   Shiv Khan is a 79 y.o. male who is seen in consultation at the request of Dr. Marva Scott for evaluation of inguinal lymphadenopathy. History of Present Illness:   Mr. Sonu Adams is a 80 y/o male with h/o osteoarthritis, skin cancer comes in for evaluation of bilateral inguinal lymphadenopathy. Patient had bronchitis and started on antibiotics on 12/31/19. On 1/4/19, he had CBC drawn with normal blood counts but mild elevation in monocytes. Given LE edema on exam, he underwent LE dopplers in early and late Jan 2019 which were negative for DVT but notable for enlarged inguinal LNs. He has had some skin cancers removed from his face and R arm, but no recent surgeries in his legs. Remote h/o R knee surgery in Oct 2017 and L knee surgery Feb 2018. Pt denies any fevers, chills, sweats, unintentional weight loss. He denies any cuts, injuries, infections in his legs. He does occasionally have some redness on the skin in his groin at times, sometimes pruritic. No pain in the groin. No dysuria, no penis discharge or rashes.     Past Medical History:   Diagnosis Date    Adverse effect of anesthesia     went apneic post surgery at [facility]    Anesthesia complication 6042    APNEA DURING HERNIA REPAIR, POST OPERATIVE INTUBATION AT [facility]    Arrhythmia     FREQUENT PVCS    Arthritis     Asthma     HAS BRONCHITIS OFTEN    Cancer ([address]     SKIN- UNKNOWN    Colon ulcer, aphthous 4/1/2010    Finger amputation, traumatic 2003    R 2ND DIGIT    GERD (gastroesophageal reflux disease) 4/1/2010    High cholesterol 4/1/2010    HTN (hypertension), benign 4/1/2010    CONTROLLED BY MEDS    Hx MRSA infection 2013    CYSTS BL LEGS    Sleep apnea     CPAP      Past Surgical History:   Procedure Laterality Date    ABDOMEN SURGERY PROC UNLISTED  2001    hernia    HX APPENDECTOMY  1959    HX HEENT      T&A    HX KNEE REPLACEMENT Right 10/2017    HX ORTHOPAEDIC      left knee scope    HX ORTHOPAEDIC  2017    RT KNEE ARTHROSCOPY WITH MINESCECTOMY    HX OTHER SURGICAL  1990S    CERVICAL- UNKNOWN    HX OTHER SURGICAL      R HAND 2ND DIGIT TRAUMATIC AMPUTATION REPAIR    HX TONSIL AND ADENOIDECTOMY        Social History     Tobacco Use    Smoking status: Former Smoker     Packs/day: 3.00     Years: 35.00     Pack years: 105.00     Last attempt to quit: 1/3/2002     Years since quittin.1    Smokeless tobacco: Never Used   Substance Use Topics    Alcohol use: Yes     Alcohol/week: 12.6 oz     Types: 21 Shots of liquor per week     Comment: 3/day      Family History   Problem Relation Age of Onset    Heart Disease Mother     Dementia Mother     Hypertension Mother     Heart Disease Father     Lung Disease Father     Cancer Brother         SKIN    No Known Problems Brother     Cancer Brother         UNKNOWN    Anesth Problems Neg Hx      Current Outpatient Medications   Medication Sig    cholecalciferol (VITAMIN D3) 1,000 unit tablet Take 1 Tab by mouth two (2) times a day.  pravastatin (PRAVACHOL) 20 mg tablet TAKE 1 TABLET BY MOUTH EACH NIGHT    metoprolol succinate (TOPROL-XL) 50 mg XL tablet Take 1 Tab by mouth daily.  ascorbic acid, vitamin C, (VITAMIN C) 500 mg tablet Take 1,000 mg by mouth.  albuterol (PROVENTIL VENTOLIN) 2.5 mg /3 mL (0.083 %) nebulizer solution Take 2.5 mg by inhalation.  dextromethorphan-guaiFENesin (MUCINEX DM) 60-1,200 mg Tb12 Take  by mouth.  vitamin e (E GEMS) 1,000 unit capsule Take 1 Cap by mouth daily.  fluticasone (FLONASE) 50 mcg/actuation nasal spray 2 prays in each nostril    albuterol (PROVENTIL HFA, VENTOLIN HFA, PROAIR HFA) 90 mcg/actuation inhaler Take 2 Puffs by inhalation every four (4) hours as needed for Wheezing for up to 360 days.     ramipril (ALTACE) 10 mg capsule TAKE ONE CAPSULE BY MOUTH TWICE DAILY    hydroCHLOROthiazide (HYDRODIURIL) 12.5 mg tablet TAKE ONE TABLET BY MOUTH ONCE DAILY FOR  HYPERTENSION    nabumetone (RELAFEN) 500 mg tablet Take  by mouth two (2) times a day.  aspirin delayed-release 325 mg tablet Take  by mouth daily.  cetirizine (ZYRTEC) 10 mg tablet Take  by mouth daily.  POTASSIUM CHLORIDE PO Take 595 mg by mouth daily.  Omeprazole delayed release (PRILOSEC D/R) 20 mg tablet Take 20 mg by mouth daily.  cyanocobalamin (VITAMIN B-12) 1,000 mcg tablet Take 1 Tab by mouth daily. No current facility-administered medications for this visit. Allergies   Allergen Reactions    Chlorhexidine Towelette Rash     ? Reaction from using wipes before [facility]        Review of Systems: A complete review of systems was obtained, negative except as described above. Physical Exam:     Visit Vitals  /76 (BP 1 Location: Right arm, BP Patient Position: Sitting)   Pulse 69   Temp 97.9 °F (36.6 °C) (Oral)   Ht 5' 10\" (1.778 m)   Wt 250 lb 12.8 oz (113.8 kg)   SpO2 95%   BMI 35.99 kg/m²     ECOG PS: 0  General: Well developed, no acute distress, obese  Eyes: PERRLA, EOMI, anicteric sclerae  HENT: Atraumatic, OP clear, TMs intact without erythema  Neck: Supple  Lymphatic: No cervical, supraclavicular, axillary adenopathy. Bilateral inguinal LNs which 1-2cm, soft, mobile  Respiratory: CTAB, normal respiratory effort  CV: Normal rate, regular rhythm, no murmurs, no peripheral edema  GI: Soft, nontender, nondistended, no masses, no hepatomegaly, no splenomegaly  MS: Normal gait and station. Digits without clubbing or cyanosis. Skin: No rashes, ecchymoses, or petechiae. Normal temperature, turgor, and texture. Neuro/Psych: Alert, oriented. 5/5 strength in all 4 extremities. Appropriate affect, normal judgment/insight.     Results:     Lab Results   Component Value Date/Time    WBC 7.9 01/04/2019 01:13 PM    HGB 14.5 01/04/2019 01:13 PM    HCT 43.9 01/04/2019 01:13 PM    PLATELET 675 73/13/4777 01:13 PM    MCV 93 2019 01:13 PM    ABS. NEUTROPHILS 5.9 2019 01:13 PM     Lab Results   Component Value Date/Time    Sodium 142 2018 12:30 PM    Potassium 4.6 2018 12:30 PM    Chloride 100 2018 12:30 PM    CO2 24 2018 12:30 PM    Glucose 91 2018 12:30 PM    BUN 13 2018 12:30 PM    Creatinine 0.94 2018 12:30 PM    GFR est AA 97 2018 12:30 PM    GFR est non-AA 84 2018 12:30 PM    Calcium 9.5 2018 12:30 PM    Glucose (POC) 93 2018 10:24 AM     Lab Results   Component Value Date/Time    Bilirubin, total 0.5 2018 12:30 PM    ALT (SGPT) 35 2018 12:30 PM    AST (SGOT) 20 2018 12:30 PM    Alk. phosphatase 61 2018 12:30 PM    Protein, total 6.9 2018 12:30 PM    Albumin 4.5 2018 12:30 PM    Globulin 3.4 2010 09:09 AM     No results found for: IRON, FE, TIBC, IBCT, PSAT, FERR    Lab Results   Component Value Date/Time    Vitamin B12 642 2015 08:22 AM     Lab Results   Component Value Date/Time    TSH 1.490 2017 11:37 AM     Lab Results   Component Value Date/Time    HEP C VIRUS AB <0.1 2015 08:22 AM         Imagin/31/18 Dopplers:   FINDINGS:  Summer Cake scale and color flow duplex images of the veins in  both lower extremities demonstrate normal compressibility, spontaneous   and augmented flow profiles, and absence of filling defects  throughout the deep and superficial veins in both lower extremities. CONCLUSION: Bilateral lower extremity venous duplex negative for deep  venous thrombosis or thrombophlebitis. Reflx noted in the left lower  extremity. Multiple prominent lymph nodes noted in the groin  bilaterally, the largest measuring 2.16 x 0.75 cm on the right and  1.54 x 0.44 cm on the left. 19 LE Dopplers:  CONCLUSION: Bilateral lower extremity venous duplex negative for deep venous thrombosis or thrombophlebitis.   ADDITIONAL COMMENTS  INCIDENTAL FINDING: Bilateral enlarged lymph nodes with no vascular flow noted with the right measuring 2.46 cm x 0.95 cm and the left  measuring 2.11 cm x 0.77 cm. 1/4/19 CXR: IMPRESSION: No acute cardiopulmonary disease. Assessment & Plan:   Kenneth Medina is a 79 y.o. male comes in for evaluation of inguinal LAD. 1. Bilateral inguinal lymphadenopathy: Mild and likely reactive in nature. No B symptoms. Normal CBC with mild monocytosis, which is likely reactive from the recent bronchitis. The inguinal LNs are small soft, mobile and most likely reaction. The inguinal region is an area where healthy people can have occasional lymphadenopathy due to chronic trauma or infection in the lower extremities. I do not recommend biopsy of FNA at this time. Will check some labs and have patient return in 6 weeks for repeat exam.   -- HIV, Hep B/C profiles, AIDEE, LDH, uric acid - will call pt with results. -- Return in 6 weeks for f/u and repeat physical exam  -- f/u ultrasound of abdomen    2. Bilateral knee osteoarthritis: s/p bilateral knee replacements in 2017 (R in 2017, L in 2018). Sees Dr. Amy Zuñiga in Orthopedics. Does PT.    3. H/o Squamous cell carcinoma: Removed from right face and right arm. I appreciate the opportunity to participate in Mr. Remedios Haley care.     Signed By: Madeline Duke MD     February 11, 2019

## 2019-02-12 ENCOUNTER — TELEPHONE (OUTPATIENT)
Dept: FAMILY MEDICINE CLINIC | Age: 69
End: 2019-02-12

## 2019-02-12 ENCOUNTER — HOSPITAL ENCOUNTER (OUTPATIENT)
Dept: ULTRASOUND IMAGING | Age: 69
Discharge: HOME | End: 2019-02-12
Attending: FAMILY MEDICINE
Payer: MEDICARE

## 2019-02-12 ENCOUNTER — OFFICE VISIT (OUTPATIENT)
Dept: ONCOLOGY | Age: 69
End: 2019-02-12

## 2019-02-12 VITALS
OXYGEN SATURATION: 95 % | DIASTOLIC BLOOD PRESSURE: 76 MMHG | HEART RATE: 69 BPM | WEIGHT: 250.8 LBS | BODY MASS INDEX: 35.9 KG/M2 | HEIGHT: 70 IN | SYSTOLIC BLOOD PRESSURE: 128 MMHG | TEMPERATURE: 97.9 F

## 2019-02-12 DIAGNOSIS — R59.0 INGUINAL LYMPHADENOPATHY: Primary | ICD-10-CM

## 2019-02-12 DIAGNOSIS — Z11.59 ENCOUNTER FOR SCREENING FOR OTHER VIRAL DISEASES: ICD-10-CM

## 2019-02-12 DIAGNOSIS — M17.11 PRIMARY OSTEOARTHRITIS OF RIGHT KNEE: ICD-10-CM

## 2019-02-12 DIAGNOSIS — M17.12 PRIMARY OSTEOARTHRITIS OF LEFT KNEE: ICD-10-CM

## 2019-02-12 DIAGNOSIS — Z87.891 FORMER SMOKER: ICD-10-CM

## 2019-02-12 DIAGNOSIS — E65 CENTRAL OBESITY: ICD-10-CM

## 2019-02-12 DIAGNOSIS — K75.81 NASH (NONALCOHOLIC STEATOHEPATITIS): Primary | ICD-10-CM

## 2019-02-12 PROCEDURE — 76700 US EXAM ABDOM COMPLETE: CPT

## 2019-02-12 NOTE — PROGRESS NOTES
Please call patient. His abdominal ultrasound does not show an aneurysm. It does not show any enlarged lymph nodes. He does have that infiltration of his liver changes and a left renal cyst.   In order to assess his liver function more comprehensively I recommend he see a GI specialist for consideration of an elastogram.  Does he have one in mind?   Thank you,  Dr. Cassia Chung

## 2019-02-12 NOTE — TELEPHONE ENCOUNTER
Attempted to call patient x2. No answer. Need to advise patient :    Please call patient.  His abdominal ultrasound does not show an aneurysm.  It does not show any enlarged lymph nodes.  He does have that infiltration of his liver changes and a left renal cyst.   In order to assess his liver function more comprehensively I recommend he see a GI specialist for consideration of an elastogram.  Does he have one in mind?

## 2019-02-13 NOTE — TELEPHONE ENCOUNTER
Pt walked into office stating he has a missed call from Dr. Elma Duenas nurse:    Advised of ultrasound results. Pt states he would like Dr. Danyelle Garcia if that is okay.

## 2019-02-14 LAB
ANA SER QL: NEGATIVE
HBV CORE AB SERPL QL IA: NEGATIVE
HBV SURFACE AB SER QL: NON REACTIVE
HBV SURFACE AG SERPL QL IA: NEGATIVE
HCV AB S/CO SERPL IA: <0.1 S/CO RATIO (ref 0–0.9)
HIV 1+2 AB+HIV1 P24 AG SERPL QL IA: NON REACTIVE
LDH SERPL-CCNC: 184 IU/L (ref 121–224)
URATE SERPL-MCNC: 9.9 MG/DL (ref 3.7–8.6)

## 2019-02-15 NOTE — PROGRESS NOTES
Labs all normal except elevated uric acid level, which is nonspecific.  Let's keep our plan for meeting again at our next visit for repeat physical exam.

## 2019-02-15 NOTE — PROGRESS NOTES
Spoke with patient. Informed him, per Dr. Chele Bojorquez, that all of his labs are normal except for an elevated uric acid level which is nonspecific. Advised that he keep follow up as scheduled for March so that he can have repeat physical exam. Patient verbalized understanding. No further questions or concerns at this time.

## 2019-03-25 NOTE — PROGRESS NOTES
[id number] [facility] at [address] [place] [address]  [phone]    Hematology / Oncology Established Visit    Reason for Visit:   Jarrell Harley is a 79 y.o. male comes in for f/u inguinal lymphadenopathy. PCP is Dr. Benson Mitchell. History of Present Illness:   Mr. Messi Sanchez is a 78 y/o male with h/o osteoarthritis, skin cancer comes in for evaluation of bilateral inguinal lymphadenopathy. Patient had bronchitis and started on antibiotics on 12/31/19. On 1/4/19, he had CBC drawn with normal blood counts but mild elevation in monocytes. Given LE edema on exam, he underwent LE dopplers in early and late Jan 2019 which were negative for DVT but notable for enlarged inguinal LNs. He has had some skin cancers removed from his face and R arm, but no recent surgeries in his legs. Remote h/o R knee surgery in Oct 2017 and L knee surgery Feb 2018. Pt denies any fevers, chills, sweats, unintentional weight loss. He denies any cuts, injuries, infections in his legs. He does occasionally have some redness on the skin in his groin at times, sometimes pruritic. No pain in the groin. No dysuria, no penis discharge or rashes. Patient comes in for follow up of inguinal LAD. He states he saw Dr. Megan Mojica and is scheduled for EGD, colonoscopy 4/3/19. He is scheduled for epidural steroid injection for his low back pain this week on 3/28/19. His primary complaint is constant dull ache. He has loose stools 2-3 times a day. This morning he saw some blood on the toilet paper.     Past Medical History:   Diagnosis Date    Adverse effect of anesthesia     went apneic post surgery at [facility]    Anesthesia complication 0086    APNEA DURING HERNIA REPAIR, [place] [facility]    Arrhythmia     FREQUENT PVCS    Arthritis     Asthma     HAS BRONCHITIS OFTEN    Cancer ([address]     SKIN- UNKNOWN    Colon ulcer, aphthous 4/1/2010    Finger amputation, traumatic 2003    R 2ND DIGIT    GERD (gastroesophageal reflux disease) 4/1/2010  High cholesterol 2010    HTN (hypertension), benign 2010    CONTROLLED BY MEDS    Hx MRSA infection 2013    CYSTS BL LEGS    Sleep apnea     CPAP      Past Surgical History:   Procedure Laterality Date    ABDOMEN SURGERY PROC UNLISTED      hernia    HX APPENDECTOMY  1959    HX HEENT      T&A    HX KNEE REPLACEMENT Right 10/2017    HX ORTHOPAEDIC      left knee scope    HX ORTHOPAEDIC  2017    RT KNEE ARTHROSCOPY WITH MINESCECTOMY    HX OTHER SURGICAL  1990S    CERVICAL- UNKNOWN    HX OTHER SURGICAL      R HAND 2ND DIGIT TRAUMATIC AMPUTATION REPAIR    HX TONSIL AND ADENOIDECTOMY        Social History     Tobacco Use    Smoking status: Former Smoker     Packs/day: 3.00     Years: 35.00     Pack years: 105.00     Last attempt to quit: 2000     Years since quittin.2    Smokeless tobacco: Never Used   Substance Use Topics    Alcohol use: Yes     Alcohol/week: 12.6 oz     Types: 21 Shots of liquor per week     Comment: 3/day      Family History   Problem Relation Age of Onset    Heart Disease Mother     Dementia Mother     Hypertension Mother     Heart Disease Father     Lung Disease Father     Cancer Brother         SKIN, prostate    No Known Problems Brother     Cancer Brother         prostate    Anesth Problems Neg Hx      Current Outpatient Medications   Medication Sig    OTHER 1,000 mg daily. Vitamin B3    cholecalciferol (VITAMIN D3) 1,000 unit tablet Take 1 Tab by mouth two (2) times a day.  pravastatin (PRAVACHOL) 20 mg tablet TAKE 1 TABLET BY MOUTH EACH NIGHT    metoprolol succinate (TOPROL-XL) 50 mg XL tablet Take 1 Tab by mouth daily.  ascorbic acid, vitamin C, (VITAMIN C) 500 mg tablet Take 1,000 mg by mouth.  albuterol (PROVENTIL VENTOLIN) 2.5 mg /3 mL (0.083 %) nebulizer solution Take 2.5 mg by inhalation.  dextromethorphan-guaiFENesin (MUCINEX DM) 60-1,200 mg Tb12 Take  by mouth.     fluticasone (FLONASE) 50 mcg/actuation nasal spray 2 prays in each nostril    albuterol (PROVENTIL HFA, VENTOLIN HFA, PROAIR HFA) 90 mcg/actuation inhaler Take 2 Puffs by inhalation every four (4) hours as needed for Wheezing for up to 360 days.  ramipril (ALTACE) 10 mg capsule TAKE ONE CAPSULE BY MOUTH TWICE DAILY    hydroCHLOROthiazide (HYDRODIURIL) 12.5 mg tablet TAKE ONE TABLET BY MOUTH ONCE DAILY FOR  HYPERTENSION    nabumetone (RELAFEN) 500 mg tablet Take  by mouth two (2) times a day.  cetirizine (ZYRTEC) 10 mg tablet Take  by mouth daily.  POTASSIUM CHLORIDE PO Take 595 mg by mouth daily.  Omeprazole delayed release (PRILOSEC D/R) 20 mg tablet Take 20 mg by mouth daily.  cyanocobalamin (VITAMIN B-12) 1,000 mcg tablet Take 1 Tab by mouth daily. No current facility-administered medications for this visit. Allergies   Allergen Reactions    Chlorhexidine Towelette Rash     ? Reaction from using wipes before [address] [facility] Other Plant, Animal, Environmental Sneezing        Review of Systems: A complete review of systems was obtained, negative except as described above. Physical Exam:     Visit Vitals  /80   Pulse 69   Temp 97.8 °F (36.6 °C) (Oral)   Resp 16   Ht 5' 10\" (1.778 m)   Wt 247 lb (112 kg)   SpO2 98%   BMI 35.44 kg/m²     ECOG PS: 0  General: Well developed, no acute distress, obese  Eyes: PERRLA, EOMI, anicteric sclerae  HENT: Atraumatic, OP clear, TMs intact without erythema  Neck: Supple  Lymphatic: No cervical, supraclavicular, axillary adenopathy. Prior bilateral inguinal LNs 1-2cm, soft, mobile - no longer detectable today. Respiratory: CTAB, normal respiratory effort  CV: Normal rate, regular rhythm, no murmurs, no peripheral edema  GI: Soft, nontender, nondistended, no masses, no hepatomegaly, no splenomegaly  MS: Normal gait and station. Digits without clubbing or cyanosis. Skin: No rashes, ecchymoses, or petechiae. Normal temperature, turgor, and texture. Neuro/Psych: Alert, oriented.  5/5 strength in all 4 extremities. Appropriate affect, normal judgment/insight. Results:     Lab Results   Component Value Date/Time    WBC 7.9 2019 01:13 PM    HGB 14.5 2019 01:13 PM    HCT 43.9 2019 01:13 PM    PLATELET 321  01:13 PM    MCV 93 2019 01:13 PM    ABS. NEUTROPHILS 5.9 2019 01:13 PM     Lab Results   Component Value Date/Time    Sodium 142 2018 12:30 PM    Potassium 4.6 2018 12:30 PM    Chloride 100 2018 12:30 PM    CO2 24 2018 12:30 PM    Glucose 91 2018 12:30 PM    BUN 13 2018 12:30 PM    Creatinine 0.94 2018 12:30 PM    GFR est AA 97 2018 12:30 PM    GFR est non-AA 84 2018 12:30 PM    Calcium 9.5 2018 12:30 PM    Glucose (POC) 93 2018 10:24 AM     Lab Results   Component Value Date/Time    Bilirubin, total 0.5 2018 12:30 PM    ALT (SGPT) 35 2018 12:30 PM    AST (SGOT) 20 2018 12:30 PM    Alk. phosphatase 61 2018 12:30 PM    Protein, total 6.9 2018 12:30 PM    Albumin 4.5 2018 12:30 PM    Globulin 3.4 2010 09:09 AM     No results found for: IRON, FE, TIBC, IBCT, PSAT, FERR    Lab Results   Component Value Date/Time    Vitamin B12 642 2015 08:22 AM     Lab Results   Component Value Date/Time    TSH 1.490 2017 11:37 AM     Lab Results   Component Value Date/Time    Hep B surface Ag screen Negative 2019 08:44 AM    Hep B Core Ab, total Negative 2019 08:44 AM    HEP B SURFACE AB, QUAL Non Reactive 2019 08:44 AM    HEP C VIRUS AB <0.1 2015 08:22 AM    Hep C Virus Ab <0.1 2019 08:44 AM         Imagin/31/18 Dopplers:   FINDINGS:  Exie Locks scale and color flow duplex images of the veins in  both lower extremities demonstrate normal compressibility, spontaneous   and augmented flow profiles, and absence of filling defects  throughout the deep and superficial veins in both lower extremities.   CONCLUSION: Bilateral lower extremity venous duplex negative for deep  venous thrombosis or thrombophlebitis. Reflx noted in the left lower  extremity. Multiple prominent lymph nodes noted in the groin  bilaterally, the largest measuring 2.16 x 0.75 cm on the right and  1.54 x 0.44 cm on the left. 1/25/19 LE Dopplers:  CONCLUSION: Bilateral lower extremity venous duplex negative for deep venous thrombosis or thrombophlebitis. ADDITIONAL COMMENTS  INCIDENTAL FINDING: Bilateral enlarged lymph nodes with no vascular flow noted with the right measuring 2.46 cm x 0.95 cm and the left  measuring 2.11 cm x 0.77 cm. 1/4/19 CXR: IMPRESSION: No acute cardiopulmonary disease. Abdomen ultrasound 2/12/19:  FINDINGS:   Liver:  Normal size with diffuse increase in echotexture and no focal lesion. Normal hepatopedal flow in the portal vein. Gallbladder:  Normal.  There is no gallbladder stone, distention,  pericholecystic fluid, sonographic Powell's sign, or gallbladder wall  thickening. Bile ducts:  Normal.  CBD 3 mm. No intrahepatic biliary dilation. Spleen:  Normal.   Kidneys:  Left renal cyst measuring 6.3 x 5.6 x 5.5 cm redemonstrated but  otherwise normal with no stone, mass, or hydronephrosis. Kidneys measure 12.6  cm on the right and 12.7 cm on the left. Pancreas:  Obscured by bowel gas and not well evaluated. Abdominal aorta:  Normal in course and caliber. IVC:  Visualized portions normal.  Misc:  No pleural effusion or ascites identified  IMPRESSION: Increased hepatic echogenicity compatible with diffuse  hepatocellular process, most commonly seen in steatosis. Left renal cyst.       Assessment & Plan:   Merline Arm is a 79 y.o. male comes in for evaluation of inguinal LAD. 1. Bilateral inguinal lymphadenopathy: Mild and likely reactive in nature with some improvement since the last visit. No B symptoms. Normal CBC with mild monocytosis, which was likely reactive from the recent bronchitis.  The inguinal LNs are small soft, mobile and most likely reaction. The inguinal region is an area where healthy people can have occasional lymphadenopathy due to chronic trauma or infection in the lower extremities. I do not recommend biopsy of FNA at this time. Labs negative/normal for Hep B/C, HIV, AIDEE. -- No further workup needed. Encouraged patient to call us back if LN nodes grow, become firm or fixed. 2. Intermittent diarrhea: Seeing Dr. Marylen Crofts for EGD, colonoscopy scheduled for 4/3/19. Brother follows a gluten free diet. 3. Fatty liver: Seeing Dr. Marylen Crofts and getting a fibrosure scan on 4/11/19.    4. Bilateral knee osteoarthritis: s/p bilateral knee replacements in 2017 (R in 2017, L in 2018). Sees Dr. Frederic Franco in Orthopedics. Does PT.    5. H/o Squamous cell carcinoma: Removed from right face and right arm. I appreciate the opportunity to participate in Mr. Brandy henley.     Signed By: Claudette Dobbs MD     March 25, 2019

## 2019-03-26 ENCOUNTER — OFFICE VISIT (OUTPATIENT)
Dept: ONCOLOGY | Age: 69
End: 2019-03-26

## 2019-03-26 VITALS
RESPIRATION RATE: 16 BRPM | TEMPERATURE: 97.8 F | HEIGHT: 70 IN | DIASTOLIC BLOOD PRESSURE: 80 MMHG | OXYGEN SATURATION: 98 % | WEIGHT: 247 LBS | HEART RATE: 69 BPM | BODY MASS INDEX: 35.36 KG/M2 | SYSTOLIC BLOOD PRESSURE: 144 MMHG

## 2019-03-26 DIAGNOSIS — K76.0 FATTY LIVER: ICD-10-CM

## 2019-03-26 DIAGNOSIS — R59.0 INGUINAL LYMPHADENOPATHY: Primary | ICD-10-CM

## 2019-03-26 DIAGNOSIS — R19.7 DIARRHEA, UNSPECIFIED TYPE: ICD-10-CM

## 2019-03-26 NOTE — PROGRESS NOTES
[address] [place] [address] Dr Camilo Preprocedure Instructions 1. On the day of your surgery, please report to registration located on the 2nd floor of the  [facility]. yes 2. You must have a responsible adult to drive you to the hospital, stay at the hospital during your procedure and drive you home. If they leave your procedure will not be started (no exceptions). yes 3. Do not have anything to eat or drink (including water, gum, mints, coffee, and juice) after midnight. This does not apply to the medications you were instructed to take by your physician. yesIf you are currently taking Plavix, Coumadin, Aspirin, or other blood-thinning agents, contact your physician for special instructions. yes, 
 
4. If you are having a procedure that requires bowel prep: We recommend that you have only clear liquids the day before your procedure and begin your bowel prep by 5:00 pm.  You may continue to drink clear liquids until midnight. If for any reason you are not able to complete your prep please notify your physician immediately. yes 5. Have a list of all current medications, including vitamins, herbal supplements and any other over the counter medications. yes 6. If you wear glasses, contacts, dentures and/or hearing aids, they may be removed prior to procedure, please bring a case to store them in. yes 7. You should understand that if you do not follow these instructions your procedure may be cancelled. If your physical condition changes (I.e. fever, cold or flu) please contact your doctor as soon as possible. 8. It is important that you be on time. If for any reason you are unable to keep your appointment please call )- the day of or your physicians office prior to your scheduled procedure

## 2019-03-26 NOTE — PROGRESS NOTES
Hung Redd is a 79 y.o. male here today for follow up of inguinal lymphadenopathy. He reports no pain today. States he is to have injections to back next week.

## 2019-04-01 ENCOUNTER — OFFICE VISIT (OUTPATIENT)
Dept: FAMILY MEDICINE CLINIC | Age: 69
End: 2019-04-01

## 2019-04-01 VITALS
OXYGEN SATURATION: 95 % | DIASTOLIC BLOOD PRESSURE: 74 MMHG | WEIGHT: 245.8 LBS | TEMPERATURE: 98.5 F | SYSTOLIC BLOOD PRESSURE: 125 MMHG | BODY MASS INDEX: 35.19 KG/M2 | RESPIRATION RATE: 22 BRPM | HEIGHT: 70 IN | HEART RATE: 61 BPM

## 2019-04-01 DIAGNOSIS — Z01.818 PREOP EXAMINATION: Primary | ICD-10-CM

## 2019-04-01 DIAGNOSIS — K75.81 NASH (NONALCOHOLIC STEATOHEPATITIS): Chronic | ICD-10-CM

## 2019-04-01 DIAGNOSIS — I10 ESSENTIAL HYPERTENSION: ICD-10-CM

## 2019-04-01 DIAGNOSIS — E53.8 B12 DEFICIENCY: ICD-10-CM

## 2019-04-01 DIAGNOSIS — E78.00 PURE HYPERCHOLESTEROLEMIA: ICD-10-CM

## 2019-04-01 DIAGNOSIS — R59.0 INGUINAL LYMPHADENOPATHY: ICD-10-CM

## 2019-04-01 DIAGNOSIS — K21.9 GASTROESOPHAGEAL REFLUX DISEASE WITHOUT ESOPHAGITIS: ICD-10-CM

## 2019-04-01 RX ORDER — RANITIDINE 150 MG/1
150 TABLET, FILM COATED ORAL
COMMUNITY
End: 2021-01-04

## 2019-04-01 NOTE — PROGRESS NOTES
[address]  [phone]           Progress Note    Patient: Cheko Saenz MRN: [id number]  SSN: [id number]    YOB: 1950  Age: 79 y.o. Sex: male        Chief Complaint   Patient presents with    Pre-op Exam         Subjective:     Encounter Diagnoses   Name Primary?  Preop examination: He is going to have a right cataract operation and after that a left cataract operation. In addition to his preop examination he would like for his routine visit and labs to be done as well. Yes    Essential hypertension:  BP Readings from Last 3 Encounters:   04/01/19 125/74   03/26/19 144/80   02/12/19 128/76     The patient reports:  taking medications as instructed, no medication side effects noted, no TIA's, no chest pain on exertion, no dyspnea on exertion, no swelling of ankles. Key CAD CHF Meds             pravastatin (PRAVACHOL) 20 mg tablet (Taking) TAKE 1 TABLET BY MOUTH EACH NIGHT    metoprolol succinate (TOPROL-XL) 50 mg XL tablet (Taking) Take 1 Tab by mouth daily. ramipril (ALTACE) 10 mg capsule (Taking) TAKE ONE CAPSULE BY MOUTH TWICE DAILY    hydroCHLOROthiazide (HYDRODIURIL) 12.5 mg tablet (Taking) TAKE ONE TABLET BY MOUTH ONCE DAILY FOR  HYPERTENSION           Lab Results   Component Value Date/Time    Sodium 142 12/31/2018 12:30 PM    Potassium 4.6 12/31/2018 12:30 PM    Chloride 100 12/31/2018 12:30 PM    CO2 24 12/31/2018 12:30 PM    Anion gap 7 02/08/2018 03:44 AM    Glucose 91 12/31/2018 12:30 PM    BUN 13 12/31/2018 12:30 PM    Creatinine 0.94 12/31/2018 12:30 PM    BUN/Creatinine ratio 14 12/31/2018 12:30 PM    GFR est AA 97 12/31/2018 12:30 PM    GFR est non-AA 84 12/31/2018 12:30 PM    Calcium 9.5 12/31/2018 12:30 PM    Bilirubin, total 0.5 12/31/2018 12:30 PM    AST (SGOT) 20 12/31/2018 12:30 PM    Alk.  phosphatase 61 12/31/2018 12:30 PM    Protein, total 6.9 12/31/2018 12:30 PM Albumin 4.5 12/31/2018 12:30 PM    Globulin 3.4 04/02/2010 09:09 AM    A-G Ratio 1.9 12/31/2018 12:30 PM    ALT (SGPT) 35 12/31/2018 12:30 PM     Low salt diet? yes  Aerobic exercise?no  Our goal is to normalize the blood pressure to decrease the risks of strokes and heart attacks. The patient is in agreement with the plan.  Pure hypercholesterolemia:  Cardiovascular risks for him are: LDL goal is under 100  hypertension  hyperlipidemia  former smoker  obese. Key Antihyperlipidemia Meds             pravastatin (PRAVACHOL) 20 mg tablet (Taking) TAKE 1 TABLET BY MOUTH EACH NIGHT        Lab Results   Component Value Date/Time    Cholesterol, total 155 12/31/2018 12:30 PM    HDL Cholesterol 39 (L) 12/31/2018 12:30 PM    LDL, calculated 81 12/31/2018 12:30 PM    Triglyceride 173 (H) 12/31/2018 12:30 PM    CHOL/HDL Ratio 3.8 04/02/2010 09:09 AM     Lab Results   Component Value Date/Time    ALT (SGPT) 35 12/31/2018 12:30 PM    AST (SGOT) 20 12/31/2018 12:30 PM    Alk. phosphatase 61 12/31/2018 12:30 PM    Bilirubin, total 0.5 12/31/2018 12:30 PM      Myalgias: No   Fatigue: No   Other side effects: no  Wt Readings from Last 3 Encounters:   04/01/19 245 lb 12.8 oz (111.5 kg)   03/26/19 247 lb (112 kg)   02/12/19 250 lb 12.8 oz (113.8 kg)     The patient is aware of our goal to reduce or eliminate the long term problems (such as strokes and heart attacks) related to poorly controlled hyperlipidemia.  B12 deficiency:  No symptoms. Off medication due to diarrhea. Lab Results   Component Value Date/Time    Vitamin B12 642 07/09/2015 08:22 AM            Inguinal lymphadenopathy: See Dr. Soheila Meléndez evaluation. No needle aspiration is indicated at this time. All lab work has been unremarkable.          Gastroesophageal reflux disease without esophagitis:  Current control of Symptoms:good  Hiatal Hernia:no  Current Medications: Ranitidine  The patient has no history melena or bright red blood in the stools. The patient avoids high dose aspirin and NSAID therapy. The patient is aware of diet changes needed, elevating the head of the bed and appropriate use of antacids.  GOVEA (nonalcoholic steatohepatitis): He is due to have a fibroscan 4/11/2019. Current and past medical information:    Current Medications after this visit[de-identified]     Current Outpatient Medications   Medication Sig    raNITIdine (ZANTAC) 150 mg tablet Take 150 mg by mouth two (2) times a day.  pravastatin (PRAVACHOL) 20 mg tablet TAKE 1 TABLET BY MOUTH EACH NIGHT    metoprolol succinate (TOPROL-XL) 50 mg XL tablet Take 1 Tab by mouth daily.  albuterol (PROVENTIL VENTOLIN) 2.5 mg /3 mL (0.083 %) nebulizer solution Take 2.5 mg by inhalation.  fluticasone (FLONASE) 50 mcg/actuation nasal spray 2 prays in each nostril    albuterol (PROVENTIL HFA, VENTOLIN HFA, PROAIR HFA) 90 mcg/actuation inhaler Take 2 Puffs by inhalation every four (4) hours as needed for Wheezing for up to 360 days.  ramipril (ALTACE) 10 mg capsule TAKE ONE CAPSULE BY MOUTH TWICE DAILY    hydroCHLOROthiazide (HYDRODIURIL) 12.5 mg tablet TAKE ONE TABLET BY MOUTH ONCE DAILY FOR  HYPERTENSION    cetirizine (ZYRTEC) 10 mg tablet Take  by mouth daily.  OTHER 1,000 mg daily. Vitamin B3    cholecalciferol (VITAMIN D3) 1,000 unit tablet Take 1 Tab by mouth two (2) times a day.  ascorbic acid, vitamin C, (VITAMIN C) 500 mg tablet Take 1,000 mg by mouth.  dextromethorphan-guaiFENesin (MUCINEX DM) 60-1,200 mg Tb12 Take  by mouth.  nabumetone (RELAFEN) 500 mg tablet Take  by mouth two (2) times a day.  POTASSIUM CHLORIDE PO Take 595 mg by mouth daily.  Omeprazole delayed release (PRILOSEC D/R) 20 mg tablet Take 20 mg by mouth daily.  cyanocobalamin (VITAMIN B-12) 1,000 mcg tablet Take 1 Tab by mouth daily. No current facility-administered medications for this visit.         Patient Active Problem List    Diagnosis Date Noted    Inguinal lymphadenopathy 01/17/2019     Priority: 1 - One    Asthma 10/19/2017     Priority: 1 - One    Complex tear of medial meniscus of right knee as current injury 01/04/2017     Priority: 1 - One    DDD (degenerative disc disease), lumbar 05/16/2016     Priority: 1 - One    PVC's (premature ventricular contractions)      Priority: 1 - One    B12 deficiency 10/17/2014     Priority: 1 - One    HLD (hyperlipidemia) 03/26/2013     Priority: 1 - One    Positive PPD 07/01/2010     Priority: 1 - One    RACHEL (obstructive sleep apnea) 07/01/2010     Priority: 1 - One    HTN (hypertension) 04/01/2010     Priority: 1 - One    GERD (gastroesophageal reflux disease) 04/01/2010     Priority: 1 - One    Lymphadenopathy 02/04/2019    Acute bronchitis 12/31/2018    Severe obesity (BMI 35.0-39. 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/13/2017    Rectus diastasis 06/03/2016    Knee pain 08/04/2014    SOB (shortness of breath) 10/11/2012    Vitamin D deficiency 04/27/2012    Colon ulcer, aphthous 04/01/2010       Past Medical History:   Diagnosis Date    Adverse effect of anesthesia     went apneic post surgery at [facility]    Anesthesia complication 3888    APNEA DURING HERNIA REPAIR, POST OPERATIVE INTUBATION AT [facility]    Arrhythmia     FREQUENT PVCS    Arthritis     Asthma     HAS BRONCHITIS OFTEN    Cancer ([address]     SKIN- UNKNOWN    Colon ulcer, aphthous 4/1/2010    Finger amputation, traumatic 2003    R 2ND DIGIT    GERD (gastroesophageal reflux disease) 4/1/2010    High cholesterol 4/1/2010    HTN (hypertension), benign 4/1/2010    CONTROLLED BY MEDS    Hx MRSA infection 2013    CYSTS BL LEGS    Sleep apnea     CPAP       Allergies   Allergen Reactions    Chlorhexidine Towelette Rash     ?  Reaction from using wipes before [address] Other Plant, Animal, Environmental Sneezing       Past Surgical History:   Procedure Laterality Date    ABDOMEN SURGERY PROC UNLISTED      hernia    HX APPENDECTOMY      HX HEENT      T&A    HX KNEE REPLACEMENT Right 10/2017    right/left knee replacement    HX ORTHOPAEDIC      left knee scope    HX ORTHOPAEDIC  2017    RT KNEE ARTHROSCOPY WITH MINESCECTOMY    HX OTHER SURGICAL  1990S    CERVICAL- UNKNOWN    HX OTHER SURGICAL      R HAND 2ND DIGIT TRAUMATIC AMPUTATION REPAIR    HX TONSIL AND ADENOIDECTOMY         Social History     Socioeconomic History    Marital status:      Spouse name: Not on file    Number of children: Not on file    Years of education: Not on file    Highest education level: Not on file   Tobacco Use    Smoking status: Former Smoker     Packs/day: 3.00     Years: 35.00     Pack years: 105.00     Last attempt to quit: 2000     Years since quittin.2    Smokeless tobacco: Never Used   Substance and Sexual Activity    Alcohol use: Yes     Alcohol/week: 12.6 oz     Types: 21 Shots of liquor per week     Comment: 3/day    Drug use: No    Sexual activity: Yes     Partners: Female     Birth control/protection: None       Review of Systems   Constitutional: Negative. Negative for chills, fever, malaise/fatigue and weight loss. HENT: Negative. Negative for hearing loss. Eyes: Positive for blurred vision. Negative for double vision. Respiratory: Negative. Negative for cough, hemoptysis, sputum production and shortness of breath. Cardiovascular: Negative. Negative for chest pain, palpitations and orthopnea. Gastrointestinal: Negative. Negative for abdominal pain, blood in stool, heartburn, nausea and vomiting. Genitourinary: Negative. Negative for dysuria, frequency and urgency. Musculoskeletal: Positive for back pain and joint pain. Negative for falls, myalgias and neck pain. Skin: Negative. Negative for rash. Neurological: Negative.   Negative for dizziness, tingling, tremors, weakness and headaches. Endo/Heme/Allergies: Negative. Psychiatric/Behavioral: Negative. Negative for depression. Objective:     Vitals:    04/01/19 0854   BP: 125/74   Pulse: 61   Resp: 22   Temp: 98.5 °F (36.9 °C)   TempSrc: Oral   SpO2: 95%   Weight: 245 lb 12.8 oz (111.5 kg)   Height: 5' 10\" (1.778 m)      Body mass index is 35.27 kg/m². Physical Exam   Constitutional: He is oriented to person, place, and time and well-developed, well-nourished, and in no distress. HENT:   Head: Normocephalic and atraumatic. Mouth/Throat: Oropharynx is clear and moist.   Eyes: Right eye exhibits no discharge. Left eye exhibits no discharge. No scleral icterus. Neck: No thyromegaly present. No bruit. Cardiovascular: Normal rate, regular rhythm and normal heart sounds. No murmur heard. Pulmonary/Chest: Effort normal and breath sounds normal. No respiratory distress. He has no rales. Abdominal: Soft. There is no tenderness. There is no rebound and no guarding. Musculoskeletal: He exhibits no edema. Neurological: He is alert and oriented to person, place, and time. Skin: No rash noted. No erythema. Psychiatric: Mood and affect normal.   Nursing note and vitals reviewed. Health Maintenance Due   Topic Date Due    Shingrix Vaccine Age 49> (1 of 2) 11/07/2002    GLAUCOMA SCREENING Q2Y  11/07/2017    MEDICARE YEARLY EXAM  12/27/2018         Assessment and orders:     Encounter Diagnoses     ICD-10-CM ICD-9-CM   1. Preop examination Z01.818 V72.84   2. Essential hypertension I10 401.9   3. Pure hypercholesterolemia E78.00 272.0   4. B12 deficiency E53.8 266.2   5. Inguinal lymphadenopathy R59.0 785.6   6. Gastroesophageal reflux disease without esophagitis K21.9 530.81   7. GOVEA (nonalcoholic steatohepatitis) K75.81 571.8     Diagnoses and all orders for this visit:    1. Preop examination: Cleared for cataract surgery.     2. Essential hypertension-controlled  -     T4, FREE  -     METABOLIC PANEL, COMPREHENSIVE  -     MICROALBUMIN, UR, RAND W/ MICROALB/CREAT RATIO  -     HEMOGLOBIN  -     LIPID PANEL    3. Pure hypercholesterolemia-retest  -     T4, FREE  -     METABOLIC PANEL, COMPREHENSIVE  -     HEMOGLOBIN  -     LIPID PANEL    4. B12 deficiency-medication. Retest  -     VITAMIN B12    5. Inguinal lymphadenopathy-felt to be reactive. No further workup indicated. 6. Gastroesophageal reflux disease without esophagitis-symptoms controlled  -     HEMOGLOBIN    7. GOVEA (nonalcoholic steatohepatitis)-fibroscan pending.  -     METABOLIC PANEL, COMPREHENSIVE  -     LIPID PANEL      See separate letter and form filled out for cataract surgery. Plan of care:  Discussed diagnoses in detail with patient. Medication risks/benefits/side effects discussed with patient. All of the patient's questions were addressed. The patient understands and agrees with our plan of care. The patient knows to call back if they are unsure of or forget any changes we discussed today or if the symptoms change. The patient received an After-Visit Summary which contains VS, orders, medication list and allergy list. This can be used as a \"mini-medical record\" should they have to seek medical care while out of town. Patient Care Team:  Harish Bledsoe MD as PCP - Dalton Sharma MD (Cardiology)  Pedro Cano MD (Dermatology)  Wayne Gibbs MD (Urology)  Beth Harris MD (Gastroenterology)  Keiry Thompson MD (Surgery)  Zenia Pulliam MD (Orthopedic Surgery)  Serenity Clement MD (Orthopedic Surgery)  Gunnar Cho NP as Nurse Practitioner (Surgery)  Elian Givens MD (Hematology and Oncology)    Follow-up and Dispositions    · Return in about 3 months (around 7/1/2019).          Future Appointments   Date Time Provider Erin Corona   4/11/2019 11:30 AM Elizabeth [facility] BARBIE SCHED   7/3/2019 11:40 AM Kimberlee Green  [address]       Signed By: Anahi Boswell MD     April 1, 2019        This note was created using voice recognition software. Despite editing, there may be syntax errors.

## 2019-04-01 NOTE — PROGRESS NOTES
1. Have you been to the ER, urgent care clinic since your last visit? Hospitalized since your last visit? No    2. Have you seen or consulted any other health care providers outside of the [address] [place] [address] since your last visit? Include any pap smears or colon screening.  No  Reviewed record in preparation for visit and have necessary documentation  Pt did not bring medication to office visit for review  Information was given to pt on Advanced Directives, Living Will  Information was given on Shingles Vaccine  opportunity was given for questions  Goals that were addressed and/or need to be completed during or after this appointment include     Health Maintenance Due   Topic Date Due    Shingrix Vaccine Age 49> (1 of 2) 11/07/2002    GLAUCOMA SCREENING Q2Y  11/07/2017    AAA Screening 73-67 YO Male Smoking Patients  11/07/2017    Pneumococcal 65+ years (1 of 2 - PCV13) 11/07/2017    MEDICARE YEARLY EXAM  12/27/2018

## 2019-04-01 NOTE — LETTER
4/1/2019 9:28 AM 
 
Mr. Carole Brandt 
[address] [address] Allergies Allergen Reactions  Chlorhexidine Towelette Rash  
  ? Reaction from using wipes before [facility]  Other Plant, Animal, Environmental Sneezing Patient Active Problem List  
Diagnosis Code  
 HTN (hypertension) I10  
 GERD (gastroesophageal reflux disease) K21.9  Colon ulcer, aphthous K63.3  
 Positive PPD R76.11  
 RACHEL (obstructive sleep apnea) G47.33  Vitamin D deficiency E55.9  
 SOB (shortness of breath) R06.02  
 HLD (hyperlipidemia) E78.5  Knee pain M25.569  
 B12 deficiency E53.8  PVC's (premature ventricular contractions) I49.3  DDD (degenerative disc disease), lumbar M51.36  
 Rectus diastasis M62.08  
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  
 Primary osteoarthritis of right knee M17.11  
 Asthma J45.909  Primary osteoarthritis of left knee M17.12  Severe obesity (BMI 35.0-39. 9) with comorbidity (HCC) E66.01  
 Acute bronchitis J20.9  Inguinal lymphadenopathy R59.0  Lymphadenopathy R59.1 Current Outpatient Medications on File Prior to Visit Medication Sig Dispense Refill  raNITIdine (ZANTAC) 150 mg tablet Take 150 mg by mouth two (2) times a day.  pravastatin (PRAVACHOL) 20 mg tablet TAKE 1 TABLET BY MOUTH EACH NIGHT 30 Tab 11  
 metoprolol succinate (TOPROL-XL) 50 mg XL tablet Take 1 Tab by mouth daily. 30 Tab 11  
 albuterol (PROVENTIL VENTOLIN) 2.5 mg /3 mL (0.083 %) nebulizer solution Take 2.5 mg by inhalation.  fluticasone (FLONASE) 50 mcg/actuation nasal spray 2 prays in each nostril 1 Bottle 5  
 albuterol (PROVENTIL HFA, VENTOLIN HFA, PROAIR HFA) 90 mcg/actuation inhaler Take 2 Puffs by inhalation every four (4) hours as needed for Wheezing for up to 360 days.  1 Inhaler 6  
  ramipril (ALTACE) 10 mg capsule TAKE ONE CAPSULE BY MOUTH TWICE DAILY 60 Cap 11  
 hydroCHLOROthiazide (HYDRODIURIL) 12.5 mg tablet TAKE ONE TABLET BY MOUTH ONCE DAILY FOR  HYPERTENSION 90 Tab 3  cetirizine (ZYRTEC) 10 mg tablet Take  by mouth daily.  OTHER 1,000 mg daily. Vitamin B3    
 cholecalciferol (VITAMIN D3) 1,000 unit tablet Take 1 Tab by mouth two (2) times a day.  ascorbic acid, vitamin C, (VITAMIN C) 500 mg tablet Take 1,000 mg by mouth.  dextromethorphan-guaiFENesin (MUCINEX DM) 60-1,200 mg Tb12 Take  by mouth.  nabumetone (RELAFEN) 500 mg tablet Take  by mouth two (2) times a day.  POTASSIUM CHLORIDE PO Take 595 mg by mouth daily.  Omeprazole delayed release (PRILOSEC D/R) 20 mg tablet Take 20 mg by mouth daily.  cyanocobalamin (VITAMIN B-12) 1,000 mcg tablet Take 1 Tab by mouth daily. No current facility-administered medications on file prior to visit. has a past medical history of Adverse effect of anesthesia, Anesthesia complication (0255), Arrhythmia, Arthritis, Asthma, Cancer ([address] Colon ulcer, aphthous (4/1/2010), Finger amputation, traumatic (2003), GERD (gastroesophageal reflux disease) (4/1/2010), High cholesterol (4/1/2010), HTN (hypertension), benign (4/1/2010), MRSA infection (2013), and Sleep apnea. He also has no past medical history of CAD (coronary artery disease), Diabetes ([address] Heart failure ([address] or History of abuse. Past Surgical History:  
Procedure Laterality Date  ABDOMEN SURGERY PROC UNLISTED  2001  
 hernia [address]  HX HEENT    
 T&A  HX KNEE REPLACEMENT Right 10/2017  
 right/left knee replacement  HX ORTHOPAEDIC  1994  
 left knee scope  HX ORTHOPAEDIC  01/06/2017 RT KNEE ARTHROSCOPY WITH MINESCECTOMY [address] CERVICAL- UNKNOWN  
 HX OTHER SURGICAL    
 R HAND 2ND DIGIT TRAUMATIC AMPUTATION REPAIR  
 HX TONSIL AND ADENOIDECTOMY Sincerely, 
 
 
Grayson Aviles MD

## 2019-04-01 NOTE — PATIENT INSTRUCTIONS
Learning About High Cholesterol  What is high cholesterol? Cholesterol is a type of fat in your blood. It is needed for many body functions, such as making new cells. Cholesterol is made by your body. It also comes from food you eat. If you have too much cholesterol, it starts to build up in your arteries. This is called hardening of the arteries, or atherosclerosis. High cholesterol raises your risk of a heart attack and stroke. There are different types of cholesterol. LDL is the \"bad\" cholesterol. High LDL can raise your risk for heart disease, heart attack, and stroke. HDL is the \"good\" cholesterol. High HDL is linked with a lower risk for heart disease, heart attack, and stroke. Your cholesterol levels help your doctor find out your risk for having a heart attack or stroke. How can you prevent high cholesterol? A heart-healthy lifestyle can help you prevent high cholesterol. This lifestyle helps lower your risk for a heart attack and stroke. · Eat heart-healthy foods. ? Eat fruits, vegetables, whole grains (like oatmeal), dried beans and peas, nuts and seeds, soy products (like tofu), and fat-free or low-fat dairy products. ? Replace butter, margarine, and hydrogenated or partially hydrogenated oils with olive and canola oils. (Canola oil margarine without trans fat is fine.)  ? Replace red meat with fish, poultry, and soy protein (like tofu). ? Limit processed and packaged foods like chips, crackers, and cookies. · Be active. Exercise can improve your cholesterol level. Get at least 30 minutes of exercise on most days of the week. Walking is a good choice. You also may want to do other activities, such as running, swimming, cycling, or playing tennis or team sports. · Stay at a healthy weight. Lose weight if you need to. · Don't smoke. If you need help quitting, talk to your doctor about stop-smoking programs and medicines. These can increase your chances of quitting for good.   How is high cholesterol treated? The goal of treatment is to reduce your chances of having a heart attack or stroke. The goal is not to lower your cholesterol numbers only. · You may make lifestyle changes, such as eating healthy foods, not smoking, losing weight, and being more active. · You may have to take medicine. Follow-up care is a key part of your treatment and safety. Be sure to make and go to all appointments, and call your doctor if you are having problems. It's also a good idea to know your test results and keep a list of the medicines you take. Where can you learn more? Go to http://kurt-lin.info/. Enter D289 in the search box to learn more about \"Learning About High Cholesterol. \"  Current as of: July 22, 2018  Content Version: 11.9  © 7097-3124 [employer]. Care instructions adapted under license by [employer] (which disclaims liability or warranty for this information). If you have questions about a medical condition or this instruction, always ask your healthcare professional. [address] any warranty or liability for your use of this information.

## 2019-04-02 ENCOUNTER — TELEPHONE (OUTPATIENT)
Dept: FAMILY MEDICINE CLINIC | Age: 69
End: 2019-04-02

## 2019-04-02 ENCOUNTER — HOSPITAL ENCOUNTER (OUTPATIENT)
Age: 69
Setting detail: OUTPATIENT SURGERY
Discharge: HOME | End: 2019-04-02
Attending: SPECIALIST | Admitting: SPECIALIST
Payer: MEDICARE

## 2019-04-02 ENCOUNTER — ANESTHESIA EVENT (OUTPATIENT)
Dept: ENDOSCOPY | Age: 69
End: 2019-04-02
Payer: MEDICARE

## 2019-04-02 ENCOUNTER — ANESTHESIA (OUTPATIENT)
Dept: ENDOSCOPY | Age: 69
End: 2019-04-02
Payer: MEDICARE

## 2019-04-02 VITALS
RESPIRATION RATE: 17 BRPM | WEIGHT: 245 LBS | SYSTOLIC BLOOD PRESSURE: 150 MMHG | BODY MASS INDEX: 35.07 KG/M2 | DIASTOLIC BLOOD PRESSURE: 80 MMHG | TEMPERATURE: 98.3 F | HEART RATE: 56 BPM | OXYGEN SATURATION: 98 % | HEIGHT: 70 IN

## 2019-04-02 DIAGNOSIS — E06.3 AUTOIMMUNE THYROIDITIS: ICD-10-CM

## 2019-04-02 DIAGNOSIS — R79.89 ABNORMAL THYROID BLOOD TEST: Primary | ICD-10-CM

## 2019-04-02 LAB
ALBUMIN SERPL-MCNC: 4.6 G/DL (ref 3.6–4.8)
ALBUMIN/CREAT UR: <3.2 MG/G CREAT (ref 0–30)
ALBUMIN/GLOB SERPL: 2.1 {RATIO} (ref 1.2–2.2)
ALP SERPL-CCNC: 62 IU/L (ref 39–117)
ALT SERPL-CCNC: 35 IU/L (ref 0–44)
AST SERPL-CCNC: 20 IU/L (ref 0–40)
BILIRUB SERPL-MCNC: <0.2 MG/DL (ref 0–1.2)
BUN SERPL-MCNC: 17 MG/DL (ref 8–27)
BUN/CREAT SERPL: 19 (ref 10–24)
CALCIUM SERPL-MCNC: 9.3 MG/DL (ref 8.6–10.2)
CHLORIDE SERPL-SCNC: 103 MMOL/L (ref 96–106)
CHOLEST SERPL-MCNC: 163 MG/DL (ref 100–199)
CO2 SERPL-SCNC: 25 MMOL/L (ref 20–29)
CREAT SERPL-MCNC: 0.89 MG/DL (ref 0.76–1.27)
CREAT UR-MCNC: 92.5 MG/DL
GLOBULIN SER CALC-MCNC: 2.2 G/DL (ref 1.5–4.5)
GLUCOSE SERPL-MCNC: 77 MG/DL (ref 65–99)
HDLC SERPL-MCNC: 49 MG/DL
HGB BLD-MCNC: 15.5 G/DL (ref 13–17.7)
LDLC SERPL CALC-MCNC: 99 MG/DL (ref 0–99)
MICROALBUMIN UR-MCNC: <3 UG/ML
POTASSIUM SERPL-SCNC: 4.9 MMOL/L (ref 3.5–5.2)
PROT SERPL-MCNC: 6.8 G/DL (ref 6–8.5)
SODIUM SERPL-SCNC: 144 MMOL/L (ref 134–144)
T4 FREE SERPL-MCNC: 1.8 NG/DL (ref 0.82–1.77)
TRIGL SERPL-MCNC: 77 MG/DL (ref 0–149)
VIT B12 SERPL-MCNC: 509 PG/ML (ref 232–1245)
VLDLC SERPL CALC-MCNC: 15 MG/DL (ref 5–40)

## 2019-04-02 PROCEDURE — 77030009426 HC FCPS BIOP ENDOSC BSC -B: Performed by: SPECIALIST

## 2019-04-02 PROCEDURE — 74011250636 HC RX REV CODE- 250/636: Performed by: PHYSICIAN ASSISTANT

## 2019-04-02 PROCEDURE — 76040000019: Performed by: SPECIALIST

## 2019-04-02 PROCEDURE — 77030013992 HC SNR POLYP ENDOSC BSC -B: Performed by: SPECIALIST

## 2019-04-02 PROCEDURE — 88305 TISSUE EXAM BY PATHOLOGIST: CPT

## 2019-04-02 PROCEDURE — 76060000031 HC ANESTHESIA FIRST 0.5 HR: Performed by: SPECIALIST

## 2019-04-02 RX ORDER — LIDOCAINE HCL/PF 100 MG/5ML
SYRINGE (ML) INTRAVENOUS AS NEEDED
Status: DISCONTINUED | OUTPATIENT
Start: 2019-04-02 | End: 2019-04-02 | Stop reason: HOSPADM

## 2019-04-02 RX ORDER — NALOXONE HYDROCHLORIDE 0.4 MG/ML
0.4 INJECTION, SOLUTION INTRAMUSCULAR; INTRAVENOUS; SUBCUTANEOUS
Status: DISCONTINUED | OUTPATIENT
Start: 2019-04-02 | End: 2019-04-02 | Stop reason: HOSPADM

## 2019-04-02 RX ORDER — FENTANYL CITRATE 50 UG/ML
25 INJECTION, SOLUTION INTRAMUSCULAR; INTRAVENOUS AS NEEDED
Status: DISCONTINUED | OUTPATIENT
Start: 2019-04-02 | End: 2019-04-02 | Stop reason: HOSPADM

## 2019-04-02 RX ORDER — EPINEPHRINE 0.1 MG/ML
1 INJECTION INTRACARDIAC; INTRAVENOUS
Status: DISCONTINUED | OUTPATIENT
Start: 2019-04-02 | End: 2019-04-02 | Stop reason: HOSPADM

## 2019-04-02 RX ORDER — MIDAZOLAM HYDROCHLORIDE 1 MG/ML
.25-5 INJECTION, SOLUTION INTRAMUSCULAR; INTRAVENOUS AS NEEDED
Status: DISCONTINUED | OUTPATIENT
Start: 2019-04-02 | End: 2019-04-02 | Stop reason: HOSPADM

## 2019-04-02 RX ORDER — DEXTROMETHORPHAN/PSEUDOEPHED 2.5-7.5/.8
1.2 DROPS ORAL
Status: DISCONTINUED | OUTPATIENT
Start: 2019-04-02 | End: 2019-04-02 | Stop reason: HOSPADM

## 2019-04-02 RX ORDER — PROPOFOL 10 MG/ML
INJECTION, EMULSION INTRAVENOUS AS NEEDED
Status: DISCONTINUED | OUTPATIENT
Start: 2019-04-02 | End: 2019-04-02 | Stop reason: HOSPADM

## 2019-04-02 RX ORDER — SODIUM CHLORIDE 9 MG/ML
50 INJECTION, SOLUTION INTRAVENOUS CONTINUOUS
Status: DISCONTINUED | OUTPATIENT
Start: 2019-04-02 | End: 2019-04-02 | Stop reason: HOSPADM

## 2019-04-02 RX ORDER — PROPOFOL 10 MG/ML
INJECTION, EMULSION INTRAVENOUS
Status: DISCONTINUED | OUTPATIENT
Start: 2019-04-02 | End: 2019-04-02 | Stop reason: HOSPADM

## 2019-04-02 RX ORDER — FLUMAZENIL 0.1 MG/ML
0.2 INJECTION INTRAVENOUS
Status: DISCONTINUED | OUTPATIENT
Start: 2019-04-02 | End: 2019-04-02 | Stop reason: HOSPADM

## 2019-04-02 RX ORDER — ATROPINE SULFATE 0.1 MG/ML
0.5 INJECTION INTRAVENOUS
Status: DISCONTINUED | OUTPATIENT
Start: 2019-04-02 | End: 2019-04-02 | Stop reason: HOSPADM

## 2019-04-02 RX ADMIN — PROPOFOL 50 MG: 10 INJECTION, EMULSION INTRAVENOUS at 10:12

## 2019-04-02 RX ADMIN — PROPOFOL 120 MCG/KG/MIN: 10 INJECTION, EMULSION INTRAVENOUS at 10:12

## 2019-04-02 RX ADMIN — PROPOFOL 50 MG: 10 INJECTION, EMULSION INTRAVENOUS at 10:16

## 2019-04-02 RX ADMIN — SODIUM CHLORIDE: 9 INJECTION, SOLUTION INTRAVENOUS at 10:07

## 2019-04-02 RX ADMIN — PROPOFOL 50 MG: 10 INJECTION, EMULSION INTRAVENOUS at 10:14

## 2019-04-02 RX ADMIN — Medication 100 MG: at 10:11

## 2019-04-02 NOTE — H&P
Date: 3/21/2019 3:30 PM  
Patient Name: Yadira Mancilla Account #: [id number] Gender: Male  
 (age): 1950 (68) Provider:    
Colleen Rivera. Edwin Lieberman MD  
  
 
Referring Physician:    
Ru Alvarenga. [address], [facility] 
[phone] (phone) 
[phone] (fax) Chief Complaint: Diarrhea History of Present Illness:  
79 With new onset diarrhea, fecal urgency, and occasional urge incontinence. No bleeding no weight loss. Has seen PCP and given dense liver on ultrasound concern for GOVEA, asked if we could arrange fibroscan to ensure no early fibrosis which we'll do. ? 79 With new onset diarrhea, fecal urgency, and occasional urge incontinence. No bleeding no weight loss. Has seen PCP and given dense liver on ultrasound concern for GOVEA, asked if we could arrange fibroscan to ensure no early fibrosis which we'll do.?   
  
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 
hand 
left knee Cervical Surgery Hernia Repair Dx Studies: Abdominal U/S, 2019 Colonoscopy, 2015 Colonoscopy 
EGD, 2015 Pre-Procedure Call, 2015 Medications: albuterol sulfate 2.5 mg/3 mL (0.083 %) 
albuterol sulfate 90 mcg/actuation Take 2 puffs by mouth every six hours as needed 
cetirizine 10 mg Take 1 tablet by mouth once a day 
cholecalciferol (vitamin D3) 1,000 unit Take 1 tablet by mouth twice a day 
fluticasone 50 mcg/actuation Administer 2 sprays into both nostrils once a day 
hydrochlorothiazide 12.5 mg 
metoprolol succinate 25 mg Take 1 tablet by mouth once a day Mucinex DM  mg 
nabumetone 500 mg Take 1 tablet by mouth twice a day 
pravastatin 20 mg 
ramipril 10 mg Take 1 capsule by mouth twice a day Allergies: Patient has no known allergies or drug allergies Immunizations: Flu vaccine, 11/19/2015 Pneumococcal conjugate PCV 13, 11/19/2015 
zoster, 12/10/2013 Social History Alcohol: Alcohol consumption: Daily. Hard Liquor 2-4 oz a day. Tobacco: Former smokerCigarettes, quit 12 years ago. Drugs: None Exercise: Exercise less than 3 times a week. Caffeine: 4. Coffee or Tea # of cups per day:. Daily. Marital Status:   
  
  
Occupation:   
  
  
  
 
Family History No history of Colon Cancer, Colon Polyps, Esophogeal Cancer, GI Cancers, IBD (Crohn's or UC), Liver disease Review of Systems:  
Cardiovascular: Denies chest pain, irregular heart beat, palpitations, peripheral edema, syncope, Sweats. Constitutional: Presents suffers from fatigue. Denies fever, loss of appetite, weight gain, weight loss. ENMT: Denies nose bleeds, sore throat, hearing loss. Endocrine: Denies excessive thirst, heat intolerance. Eyes: Denies loss of vision. Gastrointestinal: Presents suffers from abdominal pain, change in bowel habits, diarrhea. Denies abdominal swelling, constipation, Bloating/gas, heartburn, jaundice, nausea, rectal bleeding, stomach cramps, vomiting, dysphagia, rectal pain, Stool incontinence, hematemesis. Genitourinary: Denies dark urine, dysuria, frequent urination, hematuria, incontinence. Hematologic/Lymphatic: Denies easy bruising, prolonged bleeding. Integumentary: Denies itching, rashes, sun sensitivity. Musculoskeletal: Presents suffers from arthritis, back pain, joint pain. Denies gout, muscle weakness, stiffness. Neurological: Denies dizziness, fainting, frequent headaches, memory loss. Psychiatric: Denies anxiety, depression, difficulty sleeping, hallucinations, nervousness, panic attacks, paranoia. Respiratory: Denies cough, dyspnea, wheezing. Vital Signs:  
BP 
(mmHg)  Pulse 
(ppm) Rhythm Weight (lbs/oz) Height (ft/in) BMI Resp/min Temp  
150/84 69 Regular 244 /  5 / 11 34.03 10 98.1 (F) Physical Exam: Constitutional:   
 
Appearance: No distress, appears comfortable. Communication: Understands/receives spoken information. Skin: Inspection: No rash, no jaundice. Palpation: No subcutaneous nodules. Head/face: Inspection: Normacephalic, atraumatic. Palpation: normal.  
Eyes:   
 
Conjunctivae/lids: Normal.  
Pupils/irises: Pupils equal, round and normal.  
ENMT:   
 
External: Normal.  
Hearing: Normal.  
Neck:   
 
Neck: Normal appearance, trachea midline. Jugular veins: No JVD noted. Respiratory:   
 
Effort: Normal respiratory effort, comfortable, speaks in complete sentences. Auscultation: normal breath sounds, no rubs, wheezes or rhonchi. Gastrointestinal/Abdomen:   
 
Abdomen: non-distended, nontender. Liver/Spleen: normal, normal size, Liver size and consistency normal, spleen is non-palpable. Musculoskeletal:   
 
Gait/station: normal.  
Digits/nails: Normal, no spooning of nails, clubbing, or splinter hemorrhages, no clubbing, cyanosis, petechiae or other inflammatory conditions. Psychiatric:   
 
Judgment/insight: Normal, normal judgement, normal insight. Orientation: oriented to time, space and person. Lymphatic:   
 
Neck: No lymphadenopathy in the cervical or supraclavicular chain. Other: No periumbilic lymphadenopathy. Lab Results: No Electronic Results Impressions: Abnormal findings on diagnostic imaging of other parts of digestive tract Diarrhea, unspecified? ?Abnormal findings on diagnostic imaging of other parts of digestive tract? ? 
? ? Diarrhea, unspecified? Assessment: ?   
  
 
Plan: Fibroscan Upper Endoscopy Colonoscopy? ?Fibroscan? ? 
? ? Upper Endoscopy? ? 
? ? Colonoscopy? Risk & Medical Necessity: The patient requires Moderate to High Severity care for this visit. Diagnosis and management options are Minimal. The amount of data reviewed and/or ordered is Minimal/None. The level of risk is Minimal.  
  
  
 
Notes: Rachael Pappas. Christie Esquivel MD   
 Electronically signed on 3/21/2019 3:32:27 PM by Rachael Pappas. MD Prateek Alex. Dee Nino, MRN [id number],  1950 First Visit,

## 2019-04-02 NOTE — TELEPHONE ENCOUNTER
Spoke with patient and informed him per Dr. Jose Pryor: \"His thyroid level is high. I think this is lab error but I would need to repeat that this week. Orders in the chart. \"    Patient verbalized understanding and stated that he would stop by this week .

## 2019-04-02 NOTE — DISCHARGE INSTRUCTIONS
[address] [facility] SATISH Ortiz MD  [phone]      April 2, 2019    Mary Arora  YOB: 1950    COLONOSCOPY DISCHARGE INSTRUCTIONS    If there is redness at IV site you should apply warm compress to area. If redness or soreness persist contact Dr. Leobardo Ortiz' or your primary care doctor. There may be a slight amount of blood passed from the rectum. Gaseous discomfort may develop, but walking, belching will help relieve this. You may not operate a vehicle for 12 hours  You may not operate machinery or dangerous appliances for rest of today  You may not drink alcoholic beverages for 12 hours  Avoid making any critical decisions for 24 hours    DIET:  You may resume your normal diet, but some patients find that heavy or large meals may lead to indigestion or vomiting. I suggest a light meal as first food intake. MEDICATIONS:  The use of some over-the-counter pain medication may lead to bleeding after colon biopsies or polyp removal.  Tylenol (also called acetaminophen) is safe to take even if you have had colonoscopy with polyp removal.  Based on the procedure you had today you may not safely take aspirin or aspirin-like products for the next ten (10) days. Remember that Tylenol (also called acetaminophen) is safe to take after colonoscopy even if you have had biopsies or polyps removed. ACTIVITY:  You may resume your normal household activities, but it is recommended that you spend the remainder of the day resting -  avoid any strenuous activity.     CALL DR. Missy Thompson' OFFICE IF:  Increasing pain, nausea, vomiting  Abdominal distension (swelling)  Significant new or increased bleeding (oral or rectal)  Fever/Chills  Chest pain/shortness of breath                       Additional instructions:   Hold aspirin 10 days  We found a small polyp but everything else was normal.  We removed the polyp and also biopsied the small bowel and colon because of the diarrhea you've had. Because of the polyp you should have colonoscopy in 5 years and I'll contact you with the biopsy results to advise if anything else needs to be done     It was an honor to be your doctor today. Please let me or my office staff know if you have any feedback about today's procedure. Antonieta Geller MD    Colonoscopy saves lives, and can prevent colon cancer. Everyone aged 48 or older needs colonoscopy.   Tell your family and friends: get the test!

## 2019-04-02 NOTE — PROGRESS NOTES
Please call patient. His thyroid level is high. I think this is lab error but I would need to repeat that this week. Orders in the chart.   Thank you,  Dr. Dilip Sánchez

## 2019-04-02 NOTE — PROCEDURES
[address] [facility] SATISH Moreno MD  [phone]      2019    Esophagogastroduodenoscopy & Colonoscopy Procedure Note  Merceda Certain  : 1950  Lynn White Medical Record Number: [id number]      Indications:    Diarrhea Screening Colonoscopy   Referring Physician:  Laurie Richards MD  Anesthesia/Sedation: Conscious Sedation/Moderate Sedation/MAC  Endoscopist:  Dr. Laurie New  Complications:  None  Estimated Blood Loss:  None    Permit:  The indications, risks, benefits and alternatives were reviewed with the patient or their decision maker who was provided an opportunity to ask questions and all questions were answered. The specific risks of esophagogastroduodenoscopy with conscious sedation were reviewed, including but not limited to anesthetic complication, bleeding, adverse drug reaction, missed lesion, infection, IV site reactions, and intestinal perforation which would lead to the need for surgical repair. Alternatives to EGD and colonoscopy including radiographic imaging, observation without testing, or laboratory testing were reviewed as well as the limitations of those alternatives discussed. After considering the options and having all their questions answered, the patient or their decision maker provided both verbal and written consent to proceed. -----------EGD------------   Procedure in Detail:  After obtaining informed consent, positioning of the patient in the left lateral decubitus position, and conduction of a pre-procedure pause or \"time out\" the endoscope was introduced into the mouth and advanced to the duodenum. A careful inspection was made, and findings or interventions are described below. Findings:   Esophagus:normal  Stomach: normal   Duodenum/jejunum: normal.  A biopsy was taken from the duodenal mucosa for evaluation of villous atrophy or giardiasis.   Hemostasis is confirmed from biopsy sites.          ----------Colonoscopy-----------    Procedure in Detail:  After obtaining informed consent, positioning of the patient in the left lateral decubitus position, and conduction of a pre-procedure pause or \"time out\" the endoscope was introduced into the anus and advanced to the cecum, which was identified by the ileocecal valve and appendiceal orifice. The quality of the colonic preparation was good. A careful inspection was made as the colonoscope was withdrawn, findings and interventions are described below. Findings:   Normal colon mucosa, cold forceps biopsies taken for histology  A small 3mm sessile polyp of the descending colon is removed with cold snare but a usable sample could not be obtained. ------------------------------  Specimens:    See above    Complications:   None; patient tolerated the procedure well. Impressions:  EGD:  Normal, biopsies taken for celiac disease. Colonoscopy: Colon polyp - shredded on aspiration. , otherwise normal colonoscopy with random biopsies taken for histology. Recommendations:     - Repeat colonoscopy in 5 years.  -Await biopsies - BUT the patient reports he stopped all his vitamins and supplements and he feels his diarrhea has resolved. Thank you for entrusting me with this patient's care. Please do not hesitate to contact me with any questions or if I can be of assistance with any of your other patients' GI needs. Signed By: Valeria Bolivar MD                        April 2, 2019    Surgical assistant none. Implants none unless specified.

## 2019-04-02 NOTE — ANESTHESIA POSTPROCEDURE EVALUATION
Procedure(s): ESOPHAGOGASTRODUODENOSCOPY (EGD) COLONOSCOPY 
ESOPHAGOGASTRODUODENAL (EGD) BIOPSY COLON BIOPSY ENDOSCOPIC POLYPECTOMY. MAC Anesthesia Post Evaluation Patient location during evaluation: bedside Level of consciousness: awake Pain management: satisfactory to patient Airway patency: patent Anesthetic complications: no 
Cardiovascular status: acceptable Respiratory status: acceptable Hydration status: acceptable Vitals Value Taken Time /95 4/2/2019 10:48 AM  
Temp 36.8 °C (98.3 °F) 4/2/2019 10:38 AM  
Pulse 67 4/2/2019 10:51 AM  
Resp 20 4/2/2019 10:51 AM  
SpO2 93 % 4/2/2019 10:51 AM  
Vitals shown include unvalidated device data.

## 2019-04-02 NOTE — INTERVAL H&P NOTE
Pre-Endoscopy H&P Update Chief complaint/HPI/ROS:  The indication for the procedure, the patient's history and the patient's current medications are reviewed prior to the procedure and that data is reported on the H&P to which this document is attached. Any significant complaints with regard to organ systems will be noted, and if not mentioned then a review of systems is not contributory. Past Medical History:  
Diagnosis Date  Adverse effect of anesthesia   
 [place] [facility] [place] Anesthesia complication [address] DURING HERNIA REPAIR, [place] [facility]  Arrhythmia FREQUENT PVCS  
 Arthritis  Asthma HAS BRONCHITIS OFTEN  
 Cancer ([address] SKIN- UNKNOWN  
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  
 GERD (gastroesophageal reflux disease) 2010  High cholesterol 2010  
 HTN (hypertension), benign 2010 CONTROLLED BY MEDS  Hx MRSA infection  CYSTS BL LEGS  Sleep apnea CPAP Past Surgical History:  
Procedure Laterality Date  ABDOMEN SURGERY PROC UNLISTED    
 hernia [address] [facility] Hiram Gaia  HX HEENT    
 T&A  HX KNEE REPLACEMENT Right 10/2017  
 right/left knee replacement  HX ORTHOPAEDIC    
 left knee scope  HX ORTHOPAEDIC  2017 RT KNEE ARTHROSCOPY WITH MINESCECTOMY [address] CERVICAL- UNKNOWN  
 HX OTHER SURGICAL    
 R HAND 2ND DIGIT TRAUMATIC AMPUTATION REPAIR  
 HX TONSIL AND ADENOIDECTOMY Social  
Social History Tobacco Use  Smoking status: Former Smoker Packs/day: 3.00 Years: 35.00 Pack years: 105.00 Last attempt to quit: 2000 Years since quittin.2  Smokeless tobacco: Never Used Substance Use Topics  Alcohol use: Yes Alcohol/week: 12.6 oz Types: 21 Shots of liquor per week Comment: 3/day Family History Problem Relation Age of Onset  Heart Disease Mother  Dementia Mother  Hypertension Mother  Heart Disease Father  Lung Disease Father  Cancer Brother SKIN, prostate  No Known Problems Brother  Cancer Brother   
     prostate  Anesth Problems Neg Hx Allergies Allergen Reactions  Chlorhexidine Towelette Rash  
  ? Reaction from using wipes before [address]  Other Plant, Animal, Environmental Sneezing Prior to Admission Medications Prescriptions Last Dose Informant Patient Reported? Taking? OTHER Not Taking at Unknown time  Yes No  
Si,000 mg daily. Vitamin B3 Omeprazole delayed release (PRILOSEC D/R) 20 mg tablet 2019 at Unknown time  Yes Yes Sig: Take 20 mg by mouth daily. POTASSIUM CHLORIDE PO Not Taking at Unknown time  Yes No  
Sig: Take 595 mg by mouth daily. albuterol (PROVENTIL HFA, VENTOLIN HFA, PROAIR HFA) 90 mcg/actuation inhaler 3/26/2019 at Unknown time  No Yes Sig: Take 2 Puffs by inhalation every four (4) hours as needed for Wheezing for up to 360 days. albuterol (PROVENTIL VENTOLIN) 2.5 mg /3 mL (0.083 %) nebulizer solution 3/26/2019 at Unknown time  Yes Yes Sig: Take 2.5 mg by inhalation. ascorbic acid, vitamin C, (VITAMIN C) 500 mg tablet Not Taking at Unknown time  Yes No  
Sig: Take 1,000 mg by mouth. cetirizine (ZYRTEC) 10 mg tablet 2019 at Unknown time  Yes Yes Sig: Take  by mouth daily. cholecalciferol (VITAMIN D3) 1,000 unit tablet Not Taking at Unknown time  Yes No  
Sig: Take 1 Tab by mouth two (2) times a day. cyanocobalamin (VITAMIN B-12) 1,000 mcg tablet Not Taking at Unknown time  Yes No  
Sig: Take 1 Tab by mouth daily. dextromethorphan-guaiFENesin [facility] DM) 60-1,200 mg Tb12 3/26/2019 at Unknown time  Yes Yes Sig: Take  by mouth. fluticasone (FLONASE) 50 mcg/actuation nasal spray 2019 at Unknown time  No Yes Si prays in each nostril  
hydroCHLOROthiazide (HYDRODIURIL) 12.5 mg tablet 2019 at Unknown time No Yes Sig: TAKE ONE TABLET BY MOUTH ONCE DAILY FOR  HYPERTENSION  
metoprolol succinate (TOPROL-XL) 50 mg XL tablet 4/1/2019 at Unknown time  No Yes Sig: Take 1 Tab by mouth daily. nabumetone (RELAFEN) 500 mg tablet 4/1/2019 at Unknown time  Yes Yes Sig: Take  by mouth two (2) times a day. pravastatin (PRAVACHOL) 20 mg tablet 4/1/2019 at Unknown time  No Yes Sig: TAKE 1 TABLET BY MOUTH EACH NIGHT  
raNITIdine (ZANTAC) 150 mg tablet 4/1/2019 at Unknown time  Yes Yes Sig: Take 150 mg by mouth two (2) times a day. ramipril (ALTACE) 10 mg capsule 4/1/2019 at Unknown time  No Yes Sig: TAKE ONE CAPSULE BY MOUTH TWICE DAILY Facility-Administered Medications: None PHYSICAL EXAM:  The patient is examined immediately prior to the procedure. Visit Vitals BP (!) 152/94 Pulse 64 Temp 99 °F (37.2 °C) Resp 15 Ht 5' 10\" (1.778 m) Wt 111.1 kg (245 lb) SpO2 99% BMI 35.15 kg/m² Gen: Appears comfortable, no distress. Pulm: comfortable respirations with no abnormal audible breath sounds HEART: well perfused, no abnormal audible heart sounds GI: abdomen flat. PLAN:  Informed consent discussion held, patient afforded an opportunity to ask questions and all questions answered. After being advised of the risks, benefits, and alternatives, the patient requested that we proceed and indicated so on a written consent form. Will proceed with procedure as planned.  
Henry Persaud MD

## 2019-04-02 NOTE — TELEPHONE ENCOUNTER
Phone call to patient, no answer. Per Dr. Ryan Hernandez: \"His thyroid level is high. I think this is lab error but I would need to repeat that this week. Orders in the chart. \"

## 2019-04-02 NOTE — ROUTINE PROCESS
Karyn Lakhani 
1950 
[id number] Situation: 
Verbal report received from: Nurys Jimenez RN Procedure: Procedure(s): ESOPHAGOGASTRODUODENOSCOPY (EGD) COLONOSCOPY 
ESOPHAGOGASTRODUODENAL (EGD) BIOPSY COLON BIOPSY ENDOSCOPIC POLYPECTOMY Background: 
 
Preoperative diagnosis: ABNORMAL FINDINGS ON DIAGNOSTIC IMAGING OF OTHER PARTS OF DIGESTIVE TRACT Postoperative diagnosis: EGD-normal 
Colon-polyp removal hemorrhoids :  Dr. Darin Mar Assistant(s): Endoscopy Technician-1: Juan Mendez 
Endoscopy RN-1: Pamela Mccullough RN Specimens:  
ID Type Source Tests Collected by Time Destination 1 : biospy  Preservative Duodenum  Ivory Baig MD 4/2/2019 1019 Pathology 2 : biopsy  Preservative   Ivory Baig MD 4/2/2019 1023 Pathology H. Pylori  no Assessment: 
Intra-procedure medications Anesthesia gave intra-procedure sedation and medications, see anesthesia flow sheet yes Intravenous fluids: NS@ Lizet Elizabeth Vital signs stable Abdominal assessment: round and soft Recommendation: 
Discharge patient per MD order. Family or Friend Permission to share finding with family or friend yes

## 2019-04-02 NOTE — ANESTHESIA PREPROCEDURE EVALUATION
Anesthetic History No history of anesthetic complications Review of Systems / Medical History Patient summary reviewed, nursing notes reviewed and pertinent labs reviewed Pulmonary Within defined limits Sleep apnea Asthma Neuro/Psych Within defined limits Cardiovascular Within defined limits Hypertension GI/Hepatic/Renal 
Within defined limits Endo/Other Within defined limits Obesity Other Findings Physical Exam 
 
Airway Mallampati: II 
TM Distance: > 6 cm Neck ROM: normal range of motion Mouth opening: Normal 
 
 Cardiovascular Regular rate and rhythm,  S1 and S2 normal,  no murmur, click, rub, or gallop Dental 
No notable dental hx Pulmonary Breath sounds clear to auscultation Abdominal 
GI exam deferred Other Findings Anesthetic Plan ASA: 2 Anesthesia type: MAC Anesthetic plan and risks discussed with: Patient

## 2019-04-04 ENCOUNTER — OFFICE VISIT (OUTPATIENT)
Dept: FAMILY MEDICINE CLINIC | Age: 69
End: 2019-04-04

## 2019-04-04 ENCOUNTER — TELEPHONE (OUTPATIENT)
Dept: FAMILY MEDICINE CLINIC | Age: 69
End: 2019-04-04

## 2019-04-04 VITALS
TEMPERATURE: 98.7 F | HEIGHT: 70 IN | HEART RATE: 65 BPM | WEIGHT: 243.6 LBS | SYSTOLIC BLOOD PRESSURE: 117 MMHG | OXYGEN SATURATION: 96 % | BODY MASS INDEX: 34.88 KG/M2 | RESPIRATION RATE: 20 BRPM | DIASTOLIC BLOOD PRESSURE: 76 MMHG

## 2019-04-04 DIAGNOSIS — E06.9 THYROIDITIS: Primary | ICD-10-CM

## 2019-04-04 DIAGNOSIS — E05.90 HYPERTHYROIDISM: Primary | ICD-10-CM

## 2019-04-04 LAB
T4 FREE SERPL-MCNC: 2.25 NG/DL (ref 0.82–1.77)
TSH SERPL DL<=0.005 MIU/L-ACNC: 1.7 UIU/ML (ref 0.45–4.5)

## 2019-04-04 NOTE — PROGRESS NOTES
Chief Complaint Patient presents with  Thyroid Problem Body mass index is 34.95 kg/m². 1. Have you been to the ER, urgent care clinic since your last visit? Hospitalized since your last visit? No 
 
2. Have you seen or consulted any other health care providers outside of the [address] [place] [address] since your last visit? Include any pap smears or colon screening. No 
 
Reviewed record in preparation for visit and have necessary documentation Pt did not bring medication to office visit for review Information was given to pt on Advanced Directives, Living Will Information was given on Shingles Vaccine Opportunity was given for questions Goals that were addressed and/or need to be completed after this appointment include:  
 
Health Maintenance Due Topic Date Due  Shingrix Vaccine Age 50> (1 of 2) 11/07/2002  GLAUCOMA SCREENING Q2Y  11/07/2017  MEDICARE YEARLY EXAM  12/27/2018

## 2019-04-04 NOTE — PROGRESS NOTES
[id number] [facility], [address] [place] [address] Affiliated with [facility] and [facility] Note Subjective:  
Zahra Harden is a 79 y.o. male presents for evaluation of elevated T4. 
CC: \" I feel fine\" History provided by patient HPI Pt states he is always tired but is at baseline as far as energy level. He states he is here after being notified about abnormal labs. His TSH was normal but T4 has increased 1.80 to 2.25 over the past 2 days. Denies chest pain, chest palpitations,diaphoresis, heat intolerance. He states has had issues with sleeping he only requires 5 hours of sleep but that is not unusual for him. Current Outpatient Medications on File Prior to Visit Medication Sig Dispense Refill  raNITIdine (ZANTAC) 150 mg tablet Take 150 mg by mouth two (2) times a day.  OTHER 1,000 mg daily. Vitamin B3    
 cholecalciferol (VITAMIN D3) 1,000 unit tablet Take 1 Tab by mouth two (2) times a day.  pravastatin (PRAVACHOL) 20 mg tablet TAKE 1 TABLET BY MOUTH EACH NIGHT 30 Tab 11  
 metoprolol succinate (TOPROL-XL) 50 mg XL tablet Take 1 Tab by mouth daily. 30 Tab 11  
 ascorbic acid, vitamin C, (VITAMIN C) 500 mg tablet Take 1,000 mg by mouth.  albuterol (PROVENTIL VENTOLIN) 2.5 mg /3 mL (0.083 %) nebulizer solution Take 2.5 mg by inhalation.  dextromethorphan-guaiFENesin (MUCINEX DM) 60-1,200 mg Tb12 Take  by mouth.  fluticasone (FLONASE) 50 mcg/actuation nasal spray 2 prays in each nostril 1 Bottle 5  
 albuterol (PROVENTIL HFA, VENTOLIN HFA, PROAIR HFA) 90 mcg/actuation inhaler Take 2 Puffs by inhalation every four (4) hours as needed for Wheezing for up to 360 days.  1 Inhaler 6  
 ramipril (ALTACE) 10 mg capsule TAKE ONE CAPSULE BY MOUTH TWICE DAILY 60 Cap 11  
 hydroCHLOROthiazide (HYDRODIURIL) 12.5 mg tablet TAKE ONE TABLET BY MOUTH ONCE DAILY FOR  HYPERTENSION 90 Tab 3  
  nabumetone (RELAFEN) 500 mg tablet Take  by mouth two (2) times a day.  cetirizine (ZYRTEC) 10 mg tablet Take  by mouth daily.  POTASSIUM CHLORIDE PO Take 595 mg by mouth daily.  Omeprazole delayed release (PRILOSEC D/R) 20 mg tablet Take 20 mg by mouth daily.  cyanocobalamin (VITAMIN B-12) 1,000 mcg tablet Take 1 Tab by mouth daily. No current facility-administered medications on file prior to visit. Past Medical History:  
Diagnosis Date  Adverse effect of anesthesia   
 [place] [facility] [place] Anesthesia complication 1254 APNEA DURING HERNIA REPAIR, [place] [facility] [place] Arrhythmia FREQUENT PVCS  
 Arthritis  Asthma HAS BRONCHITIS OFTEN  
 Cancer ([facility] [address] SKIN- UNKNOWN  
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  
 GERD (gastroesophageal reflux disease) 2010  High cholesterol 2010  
 HTN (hypertension), benign 2010 CONTROLLED BY MEDS  Hx MRSA infection  CYSTS BL LEGS  Sleep apnea CPAP Social History Socioeconomic History  Marital status:  Spouse name: Not on file  Number of children: Not on file  Years of education: Not on file  Highest education level: Not on file Occupational History  Not on file Social Needs  Financial resource strain: Not on file  Food insecurity:  
  Worry: Not on file Inability: Not on file  Transportation needs:  
  Medical: Not on file Non-medical: Not on file Tobacco Use  Smoking status: Former Smoker Packs/day: 3.00 Years: 35.00 Pack years: 105.00 Last attempt to quit: 2000 Years since quittin.2  Smokeless tobacco: Never Used Substance and Sexual Activity  Alcohol use: Yes Alcohol/week: 12.6 oz Types: 21 Shots of liquor per week Comment: 3/day  Drug use: No  
 Sexual activity: Yes  
  Partners: Female Birth control/protection: None Lifestyle  Physical activity:  
  Days per week: Not on file Minutes per session: Not on file  Stress: Not on file Relationships  Social connections:  
  Talks on phone: Not on file Gets together: Not on file Attends Anabaptist service: Not on file Active member of club or organization: Not on file Attends meetings of clubs or organizations: Not on file Relationship status: Not on file  Intimate partner violence:  
  Fear of current or ex partner: Not on file Emotionally abused: Not on file Physically abused: Not on file Forced sexual activity: Not on file Other Topics Concern  Not on file Social History Narrative  Not on file Review of Systems Constitutional: Negative for chills and fever. Eyes: Negative for blurred vision and double vision. Respiratory: Negative for cough and hemoptysis. Cardiovascular: Negative for chest pain and palpitations. Gastrointestinal: Negative for abdominal pain, constipation, diarrhea, nausea and vomiting. Genitourinary: Negative for dysuria, frequency, hematuria and urgency. Musculoskeletal: Negative for myalgias. Skin: Negative for itching and rash. Neurological: Negative for dizziness, tingling, sensory change, focal weakness and headaches. Objective:  
 
Visit Vitals /76 (BP 1 Location: Left arm, BP Patient Position: Sitting) Pulse 65 Temp 98.7 °F (37.1 °C) (Oral) Resp 20 Ht 5' 10\" (1.778 m) Wt 243 lb 9.6 oz (110.5 kg) SpO2 96% BMI 34.95 kg/m² Physical Exam: 
Physical Exam  
Constitutional: He is oriented to person, place, and time. He appears well-developed and well-nourished. No distress. HENT:  
Head: Normocephalic and atraumatic. Eyes: Pupils are equal, round, and reactive to light. Conjunctivae and EOM are normal. No scleral icterus. Neck: Full passive range of motion without pain. Neck supple.  Carotid bruit is not present. Normal range of motion present. No thyroid mass and no thyromegaly present. Carotid bruits Cardiovascular: Normal rate, regular rhythm, normal heart sounds and intact distal pulses. Exam reveals no gallop and no friction rub. No murmur heard. Pulmonary/Chest: Effort normal and breath sounds normal. No respiratory distress. He has no wheezes. He has no rales. He exhibits no tenderness. Abdominal: Soft. Bowel sounds are normal. There is no tenderness. Musculoskeletal: He exhibits no edema or tenderness. Neurological: He is alert and oriented to person, place, and time. No cranial nerve deficit. Coordination normal.  
Skin: Skin is warm and dry. No rash noted. He is not diaphoretic. No erythema. Psychiatric: He has a normal mood and affect. Assessment and orders: ICD-10-CM ICD-9-CM 1. Hyperthyroidism E05.90 242.90 Diagnoses and all orders for this visit: 
 
1. Hyperthyroidism TSH normal, T4 persistently elevated. Vitals wnl, nontachycardic, Thyroid wnl,not enlarged,no palpable nodules. No carotid bruits. Pt is on metoprolol 50mg daily. No any medications that could effect thyroid levels. Pt has an appt on 4/5 at 2:00pm with endocrinologist, Dr. Martinez [place] Follow-up and Dispositions · Return for after getting endocrine recs. I have reviewed patient medical and social history and medications. I have reviewed pertinent labs results and other data. I have discussed the diagnosis with the patient and the intended plan as seen in the above orders. The patient has received an after-visit summary and questions were answered concerning future plans. I have discussed medication side effects and warnings with the patient as well. Arely Steele MD 
Resident [facility] 04/04/19

## 2019-04-04 NOTE — PROGRESS NOTES
Please call patient. He appears to have developed an overactive thyroid probably from acute inflammation in his thyroid. See if he can come in today to let us check his blood pressure pulse rate and examine his thyroid. Has he ever had thyroid problems before? Please also asked Braulio Macario to call Dr. Manriquez Dress office to see if she could possibly work him in tomorrow.   Thank you,  Dr. Dominique Leon

## 2019-04-04 NOTE — PATIENT INSTRUCTIONS
Hyperthyroidism: Care Instructions Your Care Instructions Hyperthyroidism occurs when the thyroid gland makes too much thyroid hormone. This speeds up your metabolismhow your body uses energy. This condition can cause you to be very active, lose weight, and have sleep problems, eye problems, and a fast heart rate. It can also cause a goiter. A goiter is an enlarged thyroid gland that you can see at the front of the neck. Hyperthyroidism is often caused by Graves' disease. In Graves' disease, the body's defense (immune) system attacks the thyroid gland. Your doctor may prescribe a beta-blocker medicine to slow your pulse and calm you down. But this is not a treatment for hyperthyroidism. It is given for your fast heart rate. Your doctor may also give you antithyroid medicine. This medicine keeps excess thyroid hormone in check. In some cases, doctors recommend radioactive iodine or surgery to remove the thyroid. After either of these treatments, you may need to take medicine to replace thyroid hormone for the rest of your life. Follow-up care is a key part of your treatment and safety. Be sure to make and go to all appointments, and call your doctor if you are having problems. It's also a good idea to know your test results and keep a list of the medicines you take. How can you care for yourself at home? · Take your medicines exactly as prescribed. You need to take the thyroid medicine at the same time each day. Call your doctor if you think you are having a problem with your medicine. · Graves' disease can make your eyes sore. Use artificial tears, eye drops, and sunglasses to protect your eyes from dryness, wind, and sun. Raise your head with pillows at night to prevent your eyes from swelling. In some cases, taping your eyelids shut at night will keep your eyes from being dry in the morning. · Make sure you get enough calcium.  Foods that are rich in calcium include milk, yogurt, cheese, and dark green vegetables. · If you need to gain weight, ask your doctor about special diets. · Do not eat kelp. Milbert Bracket is high in iodine, which can make hyperthyroidism worse. Milbert Bracket is commonly used in [employer] and other [place] foods. You can use iodized salt and eat bread and seafood. Try to eat a balanced diet. · Do not use caffeine and other stimulants. These can make symptoms worse, such as a fast heartbeat, nervousness, and problems focusing. · Do not smoke. Smoking can make your condition worse and may lead to more serious eye problems. If you need help quitting, talk to your doctor about stop-smoking programs and medicines. These can increase your chances of quitting for good. · Lower your stress. Learn to use biofeedback, guided imagery, meditation, or other methods to relax. · Use creams or ointments for irritated skin. Ask your doctor which type to use. · Tell all your doctors about your condition. They need to know because some medicines contain iodine. When should you call for help? Call your doctor now or seek immediate medical care if: 
  · You have symptoms of a sudden, very high thyroid level (thyroid storm). These include: 
? Being nauseated, vomiting, and having diarrhea. ? Sweating a lot. ? Feeling extremely restless and confused. ? Having a high fever. ? Having a fast heartbeat.  
  · You have sudden vision changes or eye pain.  
  · You have a fever or severe sore throat and are taking antithyroid medicines, such as PTU or methimazole.  
 Watch closely for changes in your health, and be sure to contact your doctor if: 
  · You have a sore throat or have problems swallowing.  
  · You have swollen, itchy, or red eyes or your other eye symptoms get worse, or you have new vision problems.  
  · You have signs of a low thyroid level (hypothyroidism). You may feel very tired, confused, or weak. Where can you learn more? Go to http://kurt-lin.info/. Enter W215 in the search box to learn more about \"Hyperthyroidism: Care Instructions. \" Current as of: March 14, 2018 Content Version: 11.9 © 1617-1799 [employer]. Care instructions adapted under license by [employer] (which disclaims liability or warranty for this information). If you have questions about a medical condition or this instruction, always ask your healthcare professional. [place] [address] any warranty or liability for your use of this information.

## 2019-04-04 NOTE — PROGRESS NOTES
I saw and evaluated the patient with the resident, performing the key elements of the exam and service. I discussed the findings, assessment and plan with the resident and agree with the resident's findings and plan as documented in the resident's note. Analisa Barnes M.D.

## 2019-04-04 NOTE — TELEPHONE ENCOUNTER
Spoke with patient and informed him of the following per Dr. Maryfrances Dakins: \"He appears to have developed an overactive thyroid probably from acute inflammation in his thyroid. See if he can come in today to let us check his blood pressure pulse rate and examine his thyroid. Has he ever had thyroid problems before? \"    Please also asked Mamie Ram to call Dr. Oropeza Cage office to see if she could possibly work him in tomorrow. He has an appointment to come in this morning at 9:15 with Dr. Winston Knapp. He says that to his knowledge he has not had thyroid problems before.

## 2019-04-05 ENCOUNTER — OFFICE VISIT (OUTPATIENT)
Dept: ENDOCRINOLOGY | Age: 69
End: 2019-04-05

## 2019-04-05 VITALS
RESPIRATION RATE: 12 BRPM | TEMPERATURE: 97.2 F | BODY MASS INDEX: 34.5 KG/M2 | SYSTOLIC BLOOD PRESSURE: 130 MMHG | HEIGHT: 70 IN | HEART RATE: 63 BPM | DIASTOLIC BLOOD PRESSURE: 74 MMHG | WEIGHT: 241 LBS

## 2019-04-05 DIAGNOSIS — R79.89 ABNORMAL THYROID BLOOD TEST: Primary | ICD-10-CM

## 2019-04-05 NOTE — PROGRESS NOTES
Chief Complaint Patient presents with  New Patient  Thyroid Problem Order placed for pt,  per Verbal Order with read back from Dr Tj Anne on 4/5/2019.

## 2019-04-05 NOTE — LETTER
4/6/19 Patient: Anthony Richmond YOB: 1950 Date of Visit: 4/5/2019 Peewee Markham MD 
[address] [place] [address] VIA In Basket Dear Peewee Markham MD, Thank you for referring Mr. Michelle Rubio to [id number] [address] for evaluation. My notes for this consultation are attached. If you have questions, please do not hesitate to call me. I look forward to following your patient along with you. Sincerely, Jeannie Larkin MD

## 2019-04-05 NOTE — PROGRESS NOTES
[facility] Sarah Hanna MD 
 
    [address] 78 [phone] Fax [phone] Patient Information Date:4/5/2019 Name : Carole Brandt 79 y.o.    
YOB: 1950 Referred by: Judson Rojas MD  
 
 
Chief Complaint Patient presents with  New Patient  Thyroid Problem History of Present Illness: Carole Brandt is a 79 y.o. male He was referred for evaluation and management of abnormal thyroid function test.  In April 2019 TSH was normal, free T4 was elevated, repeat labs showed persistent elevation of free T4 with normal TSH. In 2018 as well as 2017 he had  normal thyroid function tests. March 28 he got steroid injection, no other new medications, no anticoagulants. Denies any hyperadrenergic symptoms, however he is on metoprolol which could mask the symptoms Intermittently has headache, no known pituitary disorders No diplopia, polyuria, polydipsia No weight loss, No chest pain Past Medical History:  
Diagnosis Date  Adverse effect of anesthesia   
 went [place]ic [place] at [facility]  Anesthesia complication 2077 APNEA DURING HERNIA REPAIR, POST OPERATIVE INTUBATION AT [facility]  Arrhythmia FREQUENT PVCS  
 Arthritis  Asthma HAS BRONCHITIS OFTEN  
 Cancer ([address] SKIN- UNKNOWN  
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  
 GERD (gastroesophageal reflux disease) 4/1/2010  High cholesterol 4/1/2010  
 HTN (hypertension), benign 4/1/2010 CONTROLLED BY MEDS  Hx MRSA infection 2013 CYSTS BL LEGS  Sleep apnea CPAP Past Surgical History:  
Procedure Laterality Date  ABDOMEN SURGERY PROC UNLISTED  2001  
 hernia  COLONOSCOPY N/A 4/2/2019 COLONOSCOPY performed by Marylene Poe, MD at [address] [place] [facility]    
 T&A  HX KNEE REPLACEMENT Right 10/2017 right/left knee replacement  HX ORTHOPAEDIC  1994  
 left knee scope  HX ORTHOPAEDIC  01/06/2017 RT KNEE ARTHROSCOPY WITH MINESCECTOMY [address] CERVICAL- UNKNOWN  
 HX OTHER SURGICAL    
 R HAND 2ND DIGIT TRAUMATIC AMPUTATION REPAIR  
 HX TONSIL AND ADENOIDECTOMY Current Outpatient Medications Medication Sig  
 raNITIdine (ZANTAC) 150 mg tablet Take 150 mg by mouth two (2) times a day.  OTHER 1,000 mg daily. Vitamin B3  
 cholecalciferol (VITAMIN D3) 1,000 unit tablet Take 1 Tab by mouth two (2) times a day.  pravastatin (PRAVACHOL) 20 mg tablet TAKE 1 TABLET BY MOUTH EACH NIGHT  metoprolol succinate (TOPROL-XL) 50 mg XL tablet Take 1 Tab by mouth daily.  ascorbic acid, vitamin C, (VITAMIN C) 500 mg tablet Take 1,000 mg by mouth.  albuterol (PROVENTIL VENTOLIN) 2.5 mg /3 mL (0.083 %) nebulizer solution Take 2.5 mg by inhalation.  dextromethorphan-guaiFENesin (MUCINEX DM) 60-1,200 mg Tb12 Take  by mouth.  fluticasone (FLONASE) 50 mcg/actuation nasal spray 2 prays in each nostril  albuterol (PROVENTIL HFA, VENTOLIN HFA, PROAIR HFA) 90 mcg/actuation inhaler Take 2 Puffs by inhalation every four (4) hours as needed for Wheezing for up to 360 days.  ramipril (ALTACE) 10 mg capsule TAKE ONE CAPSULE BY MOUTH TWICE DAILY  hydroCHLOROthiazide (HYDRODIURIL) 12.5 mg tablet TAKE ONE TABLET BY MOUTH ONCE DAILY FOR  HYPERTENSION  nabumetone (RELAFEN) 500 mg tablet Take  by mouth two (2) times a day.  cetirizine (ZYRTEC) 10 mg tablet Take  by mouth daily.  POTASSIUM CHLORIDE PO Take 595 mg by mouth daily.  Omeprazole delayed release (PRILOSEC D/R) 20 mg tablet Take 20 mg by mouth daily.  cyanocobalamin (VITAMIN B-12) 1,000 mcg tablet Take 1 Tab by mouth daily. No current facility-administered medications for this visit. Allergies Allergen Reactions  Chlorhexidine Towelette Rash  
  ?  Reaction from using wipes before [facility]  
  Other Plant, Animal, Environmental Sneezing Review of Systems:  All 10 systems reviewed and are negative other than mentioned in HPI Physical Examination: 
Blood pressure 130/74, pulse 63, temperature 97.2 °F (36.2 °C), temperature source Oral, resp. rate 12, height 5' 10\" (1.778 m), weight 241 lb (109.3 kg). Body mass index is 34.58 kg/m². - General: pleasant, no distress, good eye contact 
- HEENT: no exopthalmos, no periorbital edema, no scleral/conjunctival injection, EOMI, no lid lag or stare 
- Neck: supple, no thyromegaly, lymph nodes, 
- Cardiovascular: regular, normal rate, normal S1 and S2, no murmurs - Respiratory: clear to auscultation bilaterally - Gastrointestinal: soft, nontender, nondistended,BS + 
- Musculoskeletal: no proximal muscle weakness in upper or lower extremities - Integumentary:  no abnormal abdominal striae, no rashes, no edema 
- Neurological: Alert and oriented, no tremor - Psychiatric: normal mood and affect Data Reviewed:  
 
Lab Results Component Value Date/Time Hemoglobin A1c 5.2 02/02/2018 11:04 AM  
 Hemoglobin A1c 5.4 10/09/2017 12:18 PM  
 Glucose 77 04/01/2019 10:04 AM  
 Glucose (POC) 93 02/07/2018 10:24 AM  
 Microalb/Creat ratio (ug/mg creat.) <3.2 04/01/2019 10:04 AM  
 LDL, calculated 99 04/01/2019 10:04 AM  
 Creatinine 0.89 04/01/2019 10:04 AM  
  
Lab Results Component Value Date/Time GFR est non-AA 89 04/01/2019 10:04 AM  
 GFR est  04/01/2019 10:04 AM  
 Creatinine 0.89 04/01/2019 10:04 AM  
 BUN 17 04/01/2019 10:04 AM  
 Sodium 144 04/01/2019 10:04 AM  
 Potassium 4.9 04/01/2019 10:04 AM  
 Chloride 103 04/01/2019 10:04 AM  
 CO2 25 04/01/2019 10:04 AM  
 
 
Assessment/Plan: 1. Abnormal thyroid blood test   
 
Discordant thyroid function test 
 
High-dose glucocorticoids can prevent the conversion of T4 to T3, thus increasing T4.   He does not have any hyper adrenergic symptoms however he is on metoprolol which can mask the symptoms. Reviewed prior labs and he had normal free T4 in the past. 
If he has persistent elevated free T4 with normal TSH, differential diagnosis are antibodies to T4, assay  interference, resistance to thyroid hormone, central hyperthyroidism . Check free T4 by equilibrium dialysis, check total T3 as well as total T4. Thank you for allowing me to participate in the care of this patient. Alba Muñiz MD 
 
 
There are no Patient Instructions on file for this visit. Patient /caregiver verbalized understanding . Voice-recognition software was used to generate this report, which may result in some phonetic-based errors in the grammar and contents. Even though attempts were made to correct all the mistakes, some may have been missed and remained in the body of the report.

## 2019-04-11 ENCOUNTER — HOSPITAL ENCOUNTER (OUTPATIENT)
Dept: LAB | Age: 69
Discharge: HOME | End: 2019-04-11
Payer: MEDICARE

## 2019-04-11 ENCOUNTER — OFFICE VISIT (OUTPATIENT)
Dept: HEMATOLOGY | Age: 69
End: 2019-04-11

## 2019-04-11 VITALS
SYSTOLIC BLOOD PRESSURE: 128 MMHG | TEMPERATURE: 97.6 F | HEART RATE: 78 BPM | WEIGHT: 239 LBS | BODY MASS INDEX: 34.22 KG/M2 | RESPIRATION RATE: 20 BRPM | HEIGHT: 70 IN | DIASTOLIC BLOOD PRESSURE: 85 MMHG

## 2019-04-11 DIAGNOSIS — K76.0 FATTY LIVER: Primary | ICD-10-CM

## 2019-04-11 PROCEDURE — 84481 FREE ASSAY (FT-3): CPT

## 2019-04-11 PROCEDURE — 84439 ASSAY OF FREE THYROXINE: CPT

## 2019-04-11 PROCEDURE — 84443 ASSAY THYROID STIM HORMONE: CPT

## 2019-04-11 NOTE — PROGRESS NOTES
[address], [place], [address], [place], [place]       Media Pankaj, CARLY Lowe, PA-ANNITA Keane, BILLIEP-BC   CARLY Cristina NP [address] [facility] [address]    at [address], [address] [place] [address].    [phone]    FAX: [address] [place] [address] Avenue    at [address], [address], [address] [place] [address],[address]    [address] [facility] [address]: [phone]       Patient Care Team:  Shari Conteh MD as PCP - Trupti Koo MD (Cardiology)  Kristel Freitas MD (Dermatology)  Albaro Nye MD (Urology)  Pacheco Rahman MD (Gastroenterology)  Sondra Juarez MD (Surgery)  Danielle Lowery MD (Orthopedic Surgery)  iVjay Nolan MD (Orthopedic Surgery)  Jennifer Auguste NP as Nurse Practitioner (Surgery)  Aida Stevens MD (Hematology and Oncology)  Robby Michelle MD (Endocrinology)      Problem List  Date Reviewed: 4/5/2019          Codes Class Noted    PVC's (premature ventricular contractions) (Chronic) ICD-10-CM: I49.3  ICD-9-CM: 427.69  Unknown        Lymphadenopathy (Chronic) ICD-10-CM: R59.1  ICD-9-CM: 785.6  2/4/2019        Inguinal lymphadenopathy ICD-10-CM: R59.0  ICD-9-CM: 785.6  1/17/2019        Acute bronchitis ICD-10-CM: J20.9  ICD-9-CM: 466.0  12/31/2018        Severe obesity (BMI 35.0-39. 9) with comorbidity ([facility] ICD-10-CM: E66.01  ICD-9-CM: 278.01  4/30/2018        Primary osteoarthritis of left knee ICD-10-CM: M17.12  ICD-9-CM: 715.16  2/2/2018        Asthma (Chronic) ICD-10-CM: J45.909  ICD-9-CM: 493.90  10/19/2017    Overview Signed 10/19/2017  5:23 PM by Danna Caroli, NP     HAS BRONCHITIS OFTEN. On Inhalers.              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 DDD (degenerative disc disease), lumbar (Chronic) ICD-10-CM: M51.36  ICD-9-CM: 722.52  5/16/2016        B12 deficiency ICD-10-CM: E53.8  ICD-9-CM: 266.2  10/17/2014        Knee pain ICD-10-CM: M25.569  ICD-9-CM: 719.46  8/4/2014        HLD (hyperlipidemia) (Chronic) ICD-10-CM: E78.5  ICD-9-CM: 272.4  3/26/2013        SOB (shortness of breath) ICD-10-CM: R06.02  ICD-9-CM: 786.05  10/11/2012        Vitamin D deficiency (Chronic) ICD-10-CM: E55.9  ICD-9-CM: 268.9  4/27/2012        Positive PPD (Chronic) ICD-10-CM: R76.11  ICD-9-CM: 795.51  7/1/2010        RACHEL (obstructive sleep apnea) (Chronic) ICD-10-CM: B37.27  ICD-9-CM: 327.23  7/1/2010        HTN (hypertension) (Chronic) ICD-10-CM: I10  ICD-9-CM: 401.9  4/1/2010        GERD (gastroesophageal reflux disease) (Chronic) ICD-10-CM: K21.9  ICD-9-CM: 530.81  4/1/2010        Colon ulcer, aphthous ICD-10-CM: K63.3  ICD-9-CM: 569.82  4/1/2010            The physicians listed above have asked me to see Andrew Miguel in consultation regarding fatty liver disease and to perform assessment of fibrosis by means of in-office fibroscan analysis. The patient is a 79 y.o.  male who was diagnosed with fatty liver suggested on recent ultrasound. He is sent to determine if the is fibrosis associated. The patient notes fatigue and diarrhea. He has recently had GI evaluation with endoscopy. The patient has limitations in functional activities secondary to other medical problems that are not related to the liver disease.       ALLERGIES:  Allergies   Allergen Reactions    Chlorhexidine Towelette Rash     ? Reaction from using wipes before LTK    Other Plant, Animal, Environmental Sneezing       MEDICATIONS:  Current Outpatient Medications   Medication Sig    raNITIdine (ZANTAC) 150 mg tablet Take 150 mg by mouth two (2) times a day.  pravastatin (PRAVACHOL) 20 mg tablet TAKE 1 TABLET BY MOUTH EACH NIGHT    metoprolol succinate (TOPROL-XL) 50 mg XL tablet Take 1 Tab by mouth daily.  albuterol (PROVENTIL VENTOLIN) 2.5 mg /3 mL (0.083 %) nebulizer solution Take 2.5 mg by inhalation.  dextromethorphan-guaiFENesin (MUCINEX DM) 60-1,200 mg Tb12 Take  by mouth.  fluticasone (FLONASE) 50 mcg/actuation nasal spray 2 prays in each nostril    albuterol (PROVENTIL HFA, VENTOLIN HFA, PROAIR HFA) 90 mcg/actuation inhaler Take 2 Puffs by inhalation every four (4) hours as needed for Wheezing for up to 360 days.  ramipril (ALTACE) 10 mg capsule TAKE ONE CAPSULE BY MOUTH TWICE DAILY    hydroCHLOROthiazide (HYDRODIURIL) 12.5 mg tablet TAKE ONE TABLET BY MOUTH ONCE DAILY FOR  HYPERTENSION    nabumetone (RELAFEN) 500 mg tablet Take  by mouth two (2) times a day.  cetirizine (ZYRTEC) 10 mg tablet Take  by mouth daily.  Omeprazole delayed release (PRILOSEC D/R) 20 mg tablet Take 20 mg by mouth daily.  OTHER 1,000 mg daily. Vitamin B3    cholecalciferol (VITAMIN D3) 1,000 unit tablet Take 1 Tab by mouth two (2) times a day.  ascorbic acid, vitamin C, (VITAMIN C) 500 mg tablet Take 1,000 mg by mouth.  POTASSIUM CHLORIDE PO Take 595 mg by mouth daily.  cyanocobalamin (VITAMIN B-12) 1,000 mcg tablet Take 1 Tab by mouth daily. No current facility-administered medications for this visit. SYSTEM REVIEW NOT RELATED TO LIVER DISEASE OR REVIEWED ABOVE:  Constitution systems: Negative for fever, chills, weight gain, weight loss. Eyes: Negative for visual changes. ENT: Negative for sore throat, painful swallowing.    Respiratory: Negative for cough, hemoptysis, SOB.   Cardiology: Negative for chest pain, palpitations. GI:  Negative for constipation or diarrhea. : Negative for urinary frequency, dysuria, hematuria, nocturia. Skin: Negative for rash. Hematology: Negative for easy bruising, blood clots. Musculo-skeletal: Positive for back pain. Neurologic: Negative for headaches, dizziness, vertigo, memory problems not related to HE. Psychology: Negative for anxiety, depression. FAMILY HISTORY:  The father   of CHF. The mother has the following chronic disease(s): dementia, 80 yrs. There is no family history of liver disease. SOCIAL HISTORY:  The patient is . The patient has 2 stepchildren and 2 grandchildren. The patient stopped using tobacco products in . The patient consumes 2-3 alcoholic beverages per day. The patient is currently receiving disability due to knees and back pain, long-standing. PHYSICAL EXAMINATION:  Visit Vitals  /85 (BP 1 Location: Right arm, BP Patient Position: Sitting)   Pulse 78   Temp 97.6 °F (36.4 °C) (Tympanic)   Resp 20   Ht 5' 10\" (1.778 m)   Wt 239 lb (108.4 kg)   BMI 34.29 kg/m²     General: No acute distress. Skin: No spider angiomata. No jaundice. No palmar erythema. Abdomen: Soft non-tender. Liver size normal to percussion/palpation. Spleen not palpable. No obvious ascites. Extremities: No edema. No muscle wasting. No gross arthritic changes. Neurologic: Alert and oriented. Cranial nerves grossly intact. No asterixis. LIVER HISTOLOGY:  2019. FibroScan performed at [address]. EkPa was 4.3. Suggested fibrosis level is F0-1. CAP score of 352, this is consistent with steatosis. ASSESSMENT AND PLAN:  Steatosis with no increased fibrosis suggested on Fibroscan. Assessment of fibrosis was made through performance of fibroscan in the office today.   The results of the analysis were shared with the patient in the office at the time of the procedure. A copy of the report was provided to the patient and will be forwarded to the referring medical practice. All questions were answered. The patient expressed a clear understanding of the above. Follow-up with referring physician.     Bernadine Montesinos PA-C  [address], [address] [address]  [address].  [phone]

## 2019-04-11 NOTE — PROGRESS NOTES
Identified pt with two pt identifiers(name and ). Reviewed record in preparation for visit and have obtained necessary documentation. Chief Complaint   Patient presents with    Other     Fibrocsan      Visit Vitals  /85 (BP 1 Location: Right arm, BP Patient Position: Sitting)   Pulse 78   Temp 97.6 °F (36.4 °C) (Tympanic)   Resp 20   Ht 5' 10\" (1.778 m)   Wt 239 lb (108.4 kg)   BMI 34.29 kg/m²       Health Maintenance Due   Topic    Shingrix Vaccine Age 50> (1 of 2)    GLAUCOMA SCREENING Q2Y     MEDICARE YEARLY EXAM        Coordination of Care Questionnaire:  :   1) Have you been to an emergency room, urgent care, or hospitalized since your last visit? If yes, where when, and reason for visit? no       2. Have seen or consulted any other health care provider since your last visit? If yes, where when, and reason for visit? YES      3) Do you have an Advanced Directive/ Living Will in place? NO  If yes, do we have a copy on file NO  If no, would you like information NO    Patient is accompanied by self I have received verbal consent from Adele Car to discuss any/all medical information while they are present in the room.

## 2019-04-16 LAB
T3FREE SERPL-MCNC: 3.4 PG/ML (ref 2–4.4)
T4 FREE SERPL DIALY-MCNC: 1.7 NG/DL
T4 FREE SERPL-MCNC: 1.75 NG/DL (ref 0.82–1.77)
TSH SERPL DL<=0.005 MIU/L-ACNC: 1.85 UIU/ML (ref 0.45–4.5)

## 2019-07-01 ENCOUNTER — OFFICE VISIT (OUTPATIENT)
Dept: FAMILY MEDICINE CLINIC | Age: 69
End: 2019-07-01

## 2019-07-01 ENCOUNTER — TELEPHONE (OUTPATIENT)
Dept: FAMILY MEDICINE CLINIC | Age: 69
End: 2019-07-01

## 2019-07-01 VITALS
HEART RATE: 67 BPM | SYSTOLIC BLOOD PRESSURE: 137 MMHG | WEIGHT: 241.8 LBS | HEIGHT: 70 IN | TEMPERATURE: 97.3 F | BODY MASS INDEX: 34.62 KG/M2 | OXYGEN SATURATION: 96 % | DIASTOLIC BLOOD PRESSURE: 54 MMHG | RESPIRATION RATE: 20 BRPM

## 2019-07-01 DIAGNOSIS — K21.9 GASTROESOPHAGEAL REFLUX DISEASE WITHOUT ESOPHAGITIS: ICD-10-CM

## 2019-07-01 DIAGNOSIS — E55.9 VITAMIN D DEFICIENCY: ICD-10-CM

## 2019-07-01 DIAGNOSIS — M51.36 DDD (DEGENERATIVE DISC DISEASE), LUMBAR: ICD-10-CM

## 2019-07-01 DIAGNOSIS — I49.3 FREQUENT PVCS: Primary | ICD-10-CM

## 2019-07-01 DIAGNOSIS — E78.00 PURE HYPERCHOLESTEROLEMIA: ICD-10-CM

## 2019-07-01 DIAGNOSIS — J45.20 MILD INTERMITTENT ASTHMA WITHOUT COMPLICATION: ICD-10-CM

## 2019-07-01 DIAGNOSIS — I10 ESSENTIAL HYPERTENSION: ICD-10-CM

## 2019-07-01 DIAGNOSIS — E53.8 B12 DEFICIENCY: ICD-10-CM

## 2019-07-01 RX ORDER — GABAPENTIN 300 MG/1
300 CAPSULE ORAL 3 TIMES DAILY
COMMUNITY
End: 2021-07-07

## 2019-07-01 NOTE — TELEPHONE ENCOUNTER
Pt's spouse called to give you the name of the medication Pt could not remember that Dr. Meche Bhatia added to his med list. It is Gabapentin 300 mg 2 x daily.  Please add to his list. Thanks

## 2019-07-01 NOTE — PROGRESS NOTES
1. Have you been to the ER, urgent care clinic since your last visit? Hospitalized since your last visit? No    2. Have you seen or consulted any other health care providers outside of the [address] [place] [address] since your last visit? Include any pap smears or colon screening.  No  Reviewed record in preparation for visit and have necessary documentation  Pt did not bring medication to office visit for review  Information was given to pt on Advanced Directives, Living Will  Information was given on Shingles Vaccine  opportunity was given for questions  Goals that were addressed and/or need to be completed during or after this appointment include     Health Maintenance Due   Topic Date Due    Shingrix Vaccine Age 50> (1 of 2) 11/07/2002    GLAUCOMA SCREENING Q2Y  11/07/2017    MEDICARE YEARLY EXAM  12/27/2018

## 2019-07-01 NOTE — PROGRESS NOTES
[address], [address] [place] [address]  [phone]           Progress Note    Patient: Scott Pena MRN: [id number]  SSN: [id number]    YOB: 1950  Age: 79 y.o. Sex: male        Chief Complaint   Patient presents with    Labs         Subjective:     Encounter Diagnoses   Name Primary?  Frequent PVCs: Since his back pain is been acting up he has had more frequent irregular heartbeats. Is been a year since he saw Dr. Mynor Burton should be we will reschedule him. He has no chest pain or symptoms of angina. No dyspnea on exertion. His activity is extremely limited because of his sciatica. Yes    Pure hypercholesterolemia:  Cardiovascular risks for him are: LDL goal is under 100  hypertension  hyperlipidemia. Key Antihyperlipidemia Meds             pravastatin (PRAVACHOL) 20 mg tablet (Taking) TAKE 1 TABLET BY MOUTH EACH NIGHT        Lab Results   Component Value Date/Time    Cholesterol, total 163 04/01/2019 10:04 AM    HDL Cholesterol 49 04/01/2019 10:04 AM    LDL, calculated 99 04/01/2019 10:04 AM    Triglyceride 77 04/01/2019 10:04 AM    CHOL/HDL Ratio 3.8 04/02/2010 09:09 AM     Lab Results   Component Value Date/Time    ALT (SGPT) 35 04/01/2019 10:04 AM    AST (SGOT) 20 04/01/2019 10:04 AM    Alk. phosphatase 62 04/01/2019 10:04 AM    Bilirubin, total <0.2 04/01/2019 10:04 AM      Myalgias: No   Fatigue: No   Other side effects: no  Wt Readings from Last 3 Encounters:   07/01/19 241 lb 12.8 oz (109.7 kg)   04/11/19 239 lb (108.4 kg)   04/05/19 241 lb (109.3 kg)     The patient is aware of our goal to reduce or eliminate the long term problems (such as strokes and heart attacks) related to poorly controlled hyperlipidemia.  Mild intermittent asthma without complication:  asthma, not currently in exacerbation. Asthma symptoms occur: infrequently.   Wheezing when present is described as mild and easily relieved with rescue bronchodilator. Current limitations in activity from asthma: none. SpO2 Readings from Last 3 Encounters:   07/01/19 96%   04/04/19 96%   04/02/19 98%     Frequency of use of quick-relief meds: rare. Medication compliance: Good  Patient does not smoke cigarettes. Monitors Peak Flow at home: no           Essential hypertension;  BP Readings from Last 3 Encounters:   07/01/19 137/54   04/11/19 128/85   04/05/19 130/74     The patient reports:  taking medications as instructed, no medication side effects noted, no TIA's, no chest pain on exertion, no dyspnea on exertion, no swelling of ankles. Key CAD CHF Meds             pravastatin (PRAVACHOL) 20 mg tablet (Taking) TAKE 1 TABLET BY MOUTH EACH NIGHT    metoprolol succinate (TOPROL-XL) 50 mg XL tablet (Taking) Take 1 Tab by mouth daily. ramipril (ALTACE) 10 mg capsule (Taking) TAKE ONE CAPSULE BY MOUTH TWICE DAILY    hydroCHLOROthiazide (HYDRODIURIL) 12.5 mg tablet (Taking) TAKE ONE TABLET BY MOUTH ONCE DAILY FOR  HYPERTENSION           Lab Results   Component Value Date/Time    Sodium 144 04/01/2019 10:04 AM    Potassium 4.9 04/01/2019 10:04 AM    Chloride 103 04/01/2019 10:04 AM    CO2 25 04/01/2019 10:04 AM    Anion gap 7 02/08/2018 03:44 AM    Glucose 77 04/01/2019 10:04 AM    BUN 17 04/01/2019 10:04 AM    Creatinine 0.89 04/01/2019 10:04 AM    BUN/Creatinine ratio 19 04/01/2019 10:04 AM    GFR est  04/01/2019 10:04 AM    GFR est non-AA 89 04/01/2019 10:04 AM    Calcium 9.3 04/01/2019 10:04 AM    Bilirubin, total <0.2 04/01/2019 10:04 AM    AST (SGOT) 20 04/01/2019 10:04 AM    Alk. phosphatase 62 04/01/2019 10:04 AM    Protein, total 6.8 04/01/2019 10:04 AM    Albumin 4.6 04/01/2019 10:04 AM    Globulin 3.4 04/02/2010 09:09 AM    A-G Ratio 2.1 04/01/2019 10:04 AM    ALT (SGPT) 35 04/01/2019 10:04 AM     Low salt diet? yes  Aerobic exercise? no  Our goal is to normalize the blood pressure to decrease the risks of strokes and heart attacks. The patient is in agreement with the plan.  Gastroesophageal reflux disease without esophagitis: He is on a 1:00 slot change  Current control of Symptoms:good  Hiatal Hernia:no  Current Medications: Ranitidine  The patient has no history melena or bright red blood in the stools. The patient avoids high dose aspirin and NSAID therapy. The patient is aware of diet changes needed, elevating the head of the bed and appropriate use of antacids.  DDD (degenerative disc disease), lumbar: Severe left leg radiculopathy. Pain radiates all the way to his foot. Pain on average day is 5-8 over 10. He has had 3 epidurals in the last 3 months. It helped temporarily. Physical therapy made his back pain worse. Dr. Radha Gruber has ordered an MRI.  B12 deficiency:  No symptoms. Borderline with neuropathic pain. Lab Results   Component Value Date/Time    Vitamin B12 509 04/01/2019 10:04 AM            Vitamin D deficiency:  No sx. Due for testing. Lab Results   Component Value Date/Time    Vitamin D 25-Hydroxy 19 (L) 04/02/2010 09:09 AM    VITAMIN D, 25-HYDROXY 23.6 (L) 09/24/2018 11:53 AM               Current and past medical information:    Current Medications after this visit[de-identified]     Current Outpatient Medications   Medication Sig    raNITIdine (ZANTAC) 150 mg tablet Take 150 mg by mouth two (2) times a day.  OTHER 1,000 mg daily. Vitamin B3    cholecalciferol (VITAMIN D3) 1,000 unit tablet Take 1 Tab by mouth two (2) times a day.  pravastatin (PRAVACHOL) 20 mg tablet TAKE 1 TABLET BY MOUTH EACH NIGHT    metoprolol succinate (TOPROL-XL) 50 mg XL tablet Take 1 Tab by mouth daily.  ascorbic acid, vitamin C, (VITAMIN C) 500 mg tablet Take 1,000 mg by mouth.  albuterol (PROVENTIL VENTOLIN) 2.5 mg /3 mL (0.083 %) nebulizer solution Take 2.5 mg by inhalation.  dextromethorphan-guaiFENesin (MUCINEX DM) 60-1,200 mg Tb12 Take  by mouth.     fluticasone (FLONASE) 50 mcg/actuation nasal spray 2 prays in each nostril    albuterol (PROVENTIL HFA, VENTOLIN HFA, PROAIR HFA) 90 mcg/actuation inhaler Take 2 Puffs by inhalation every four (4) hours as needed for Wheezing for up to 360 days.  ramipril (ALTACE) 10 mg capsule TAKE ONE CAPSULE BY MOUTH TWICE DAILY    hydroCHLOROthiazide (HYDRODIURIL) 12.5 mg tablet TAKE ONE TABLET BY MOUTH ONCE DAILY FOR  HYPERTENSION    nabumetone (RELAFEN) 500 mg tablet Take  by mouth two (2) times a day.  cetirizine (ZYRTEC) 10 mg tablet Take  by mouth daily.  POTASSIUM CHLORIDE PO Take 595 mg by mouth daily.  Omeprazole delayed release (PRILOSEC D/R) 20 mg tablet Take 20 mg by mouth daily.  cyanocobalamin (VITAMIN B-12) 1,000 mcg tablet Take 1 Tab by mouth daily. No current facility-administered medications for this visit. Patient Active Problem List    Diagnosis Date Noted    Inguinal lymphadenopathy 01/17/2019     Priority: 1 - One    Asthma 10/19/2017     Priority: 1 - One    Complex tear of medial meniscus of right knee as current injury 01/04/2017     Priority: 1 - One    DDD (degenerative disc disease), lumbar 05/16/2016     Priority: 1 - One    PVC's (premature ventricular contractions)      Priority: 1 - One    B12 deficiency 10/17/2014     Priority: 1 - One    HLD (hyperlipidemia) 03/26/2013     Priority: 1 - One    Positive PPD 07/01/2010     Priority: 1 - One    RACHEL (obstructive sleep apnea) 07/01/2010     Priority: 1 - One    HTN (hypertension) 04/01/2010     Priority: 1 - One    GERD (gastroesophageal reflux disease) 04/01/2010     Priority: 1 - One    Lymphadenopathy 02/04/2019    Acute bronchitis 12/31/2018    Severe obesity (BMI 35.0-39. 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 10/13/2017    Rectus diastasis 06/03/2016    Knee pain 08/04/2014    SOB (shortness of breath) 10/11/2012    Vitamin D deficiency 2012    Colon ulcer, aphthous 2010       Past Medical History:   Diagnosis Date    Adverse effect of anesthesia     went ap[place]ic [place] [facility]    Anesthesia complication 4482    APNEA DURING HERNIA REPAIR, POST OPERATIVE INTUBATION AT [facility]    Arrhythmia     FREQUENT PVCS    Arthritis     Asthma     HAS BRONCHITIS OFTEN    Cancer ([facility] [address]     SKIN- UNKNOWN    Colon ulcer, aphthous 2010    Finger amputation, traumatic     R 2ND DIGIT    GERD (gastroesophageal reflux disease) 2010    High cholesterol 2010    HTN (hypertension), benign 2010    CONTROLLED BY MEDS    Hx MRSA infection     CYSTS BL LEGS    Sleep apnea     CPAP       Allergies   Allergen Reactions    Chlorhexidine Towelette Rash     ?  Reaction from using wipes before [address] Other Plant, Animal, Environmental Sneezing       Past Surgical History:   Procedure Laterality Date    ABDOMEN SURGERY PROC UNLISTED      hernia    COLONOSCOPY N/A 2019    COLONOSCOPY performed by Chaka Campa MD at [facility] 21 HX HEENT      T&A    HX KNEE REPLACEMENT Right 10/2017    right/left knee replacement    HX ORTHOPAEDIC      left knee scope    HX ORTHOPAEDIC  2017    RT KNEE ARTHROSCOPY WITH MINESCECTOMY    HX OTHER SURGICAL  1990S    CERVICAL- UNKNOWN    HX OTHER SURGICAL      R HAND 2ND DIGIT TRAUMATIC AMPUTATION REPAIR    HX TONSIL AND ADENOIDECTOMY         Social History     Socioeconomic History    Marital status:      Spouse name: Not on file    Number of children: Not on file    Years of education: Not on file    Highest education level: Not on file   Tobacco Use    Smoking status: Former Smoker     Packs/day: 3.00     Years: 35.00     Pack years: 105.00     Last attempt to quit: 2000     Years since quittin.5    Smokeless tobacco: Never Used   Substance and Sexual Activity    Alcohol use: Yes     Alcohol/week: 12.6 oz     Types: 21 Shots of liquor per week     Comment: 3/day    Drug use: No    Sexual activity: Yes     Partners: Female     Birth control/protection: None       Review of Systems   Constitutional: Negative. Negative for chills, fever, malaise/fatigue and weight loss. HENT: Negative. Negative for hearing loss. Eyes: Negative. Negative for blurred vision and double vision. Respiratory: Negative. Negative for cough, sputum production and shortness of breath. Cardiovascular: Negative. Negative for chest pain and palpitations. Gastrointestinal: Negative. Negative for abdominal pain, blood in stool, heartburn, nausea and vomiting. Genitourinary: Negative. Negative for dysuria, frequency and urgency. Musculoskeletal: Negative. Negative for back pain, falls, myalgias and neck pain. Skin: Negative. Negative for rash. Neurological: Negative. Negative for dizziness, tingling, tremors, weakness and headaches. Endo/Heme/Allergies: Negative. Psychiatric/Behavioral: Negative. Negative for depression. Objective:     Vitals:    07/01/19 0842   BP: 137/54   Pulse: 67   Resp: 20   Temp: 97.3 °F (36.3 °C)   TempSrc: Oral   SpO2: 96%   Weight: 241 lb 12.8 oz (109.7 kg)   Height: 5' 10\" (1.778 m)      Body mass index is 34.69 kg/m². Physical Exam   Constitutional: He is oriented to person, place, and time and well-developed, well-nourished, and in no distress. HENT:   Head: Normocephalic and atraumatic. Mouth/Throat: Oropharynx is clear and moist.   Eyes: Right eye exhibits no discharge. Left eye exhibits no discharge. No scleral icterus. Neck: No thyromegaly present. No bruit. Cardiovascular: Normal rate, regular rhythm and normal heart sounds. Pulmonary/Chest: Effort normal and breath sounds normal. No respiratory distress. He has no wheezes. Abdominal: Soft. He exhibits no distension. There is no tenderness. There is no rebound.    Neurological: He is alert and oriented to person, place, and time. Skin: No rash noted. No erythema. Psychiatric: Mood and affect normal.   Nursing note and vitals reviewed. Health Maintenance Due   Topic Date Due    Shingrix Vaccine Age 49> (1 of 2) 11/07/2002    GLAUCOMA SCREENING Q2Y  11/07/2017    MEDICARE YEARLY EXAM  12/27/2018         Assessment and orders:     Encounter Diagnoses     ICD-10-CM ICD-9-CM   1. Frequent PVCs I49.3 427.69   2. Pure hypercholesterolemia E78.00 272.0   3. Mild intermittent asthma without complication K67.23 853.76   4. Essential hypertension I10 401.9   5. Gastroesophageal reflux disease without esophagitis K21.9 530.81   6. DDD (degenerative disc disease), lumbar M51.36 722.52   7. B12 deficiency E53.8 266.2   8. Vitamin D deficiency E55.9 268.9     Diagnoses and all orders for this visit:    1. Frequent PVCs-no severe symptoms.  -     REFERRAL TO CARDIOLOGY  -     METABOLIC PANEL, COMPREHENSIVE  -     LIPID PANEL    2. Pure hypercholesterolemia-retest  -     METABOLIC PANEL, COMPREHENSIVE  -     LIPID PANEL    3. Mild intermittent asthma without complication-controlled because she has been staying inside due to his back pain. 4. Essential hypertension-controlled  -     METABOLIC PANEL, COMPREHENSIVE  -     LIPID PANEL  -     HEMOGLOBIN    5. Gastroesophageal reflux disease without esophagitis-controlled    6. DDD (degenerative disc disease), lumbar-severe    7. B12 deficiency-retest  -     VITAMIN B12    8. Vitamin D deficiency-retest  -     VITAMIN D, 25 HYDROXY            Plan of care:  Discussed diagnoses in detail with patient. Medication risks/benefits/side effects discussed with patient. All of the patient's questions were addressed. The patient understands and agrees with our plan of care. The patient knows to call back if they are unsure of or forget any changes we discussed today or if the symptoms change.      The patient received an After-Visit Summary which contains VS, orders, medication list and allergy list. This can be used as a \"mini-medical record\" should they have to seek medical care while out of town. Patient Care Team:  Jennifer Kim MD as PCP - General  Laveta Cushing, MD (Cardiology)  Elma Abad MD (Dermatology)  Dixie Grimaldo MD (Urology)  Chelsi Gallo MD (Gastroenterology)  Urszula Nguyen MD (Surgery)  Climmie Phalen, MD (Orthopedic Surgery)  Nati Mcmillan MD (Orthopedic Surgery)  Sid Gage NP as Nurse Practitioner (Surgery)  April Bower MD (Hematology and Oncology)  Dominic Mcnulty MD (Endocrinology)    Follow-up and Dispositions    · Return in about 4 months (around 11/1/2019). Future Appointments   Date Time Provider Erin Blandi   7/3/2019 11:40 AM Pham Joe  [address]       Signed By: Hoa Romero MD     July 1, 2019      ATTENTION:   This medical record was transcribed using an electronic medical records/speech recognition system. Although proofread, it may and can contain electronic, spelling and other errors. Corrections may be executed at a later time. Please feel free to contact me for any clarifications as needed.

## 2019-07-02 ENCOUNTER — TELEPHONE (OUTPATIENT)
Dept: FAMILY MEDICINE CLINIC | Age: 69
End: 2019-07-02

## 2019-07-02 LAB
25(OH)D3+25(OH)D2 SERPL-MCNC: 23.4 NG/ML (ref 30–100)
ALBUMIN SERPL-MCNC: 4.3 G/DL (ref 3.6–4.8)
ALBUMIN/GLOB SERPL: 2 {RATIO} (ref 1.2–2.2)
ALP SERPL-CCNC: 60 IU/L (ref 39–117)
ALT SERPL-CCNC: 24 IU/L (ref 0–44)
AST SERPL-CCNC: 12 IU/L (ref 0–40)
BILIRUB SERPL-MCNC: 0.6 MG/DL (ref 0–1.2)
BUN SERPL-MCNC: 21 MG/DL (ref 8–27)
BUN/CREAT SERPL: 21 (ref 10–24)
CALCIUM SERPL-MCNC: 9.4 MG/DL (ref 8.6–10.2)
CHLORIDE SERPL-SCNC: 98 MMOL/L (ref 96–106)
CHOLEST SERPL-MCNC: 183 MG/DL (ref 100–199)
CO2 SERPL-SCNC: 27 MMOL/L (ref 20–29)
CREAT SERPL-MCNC: 0.98 MG/DL (ref 0.76–1.27)
GLOBULIN SER CALC-MCNC: 2.2 G/DL (ref 1.5–4.5)
GLUCOSE SERPL-MCNC: 78 MG/DL (ref 65–99)
HDLC SERPL-MCNC: 48 MG/DL
HGB BLD-MCNC: 15.6 G/DL (ref 13–17.7)
LDLC SERPL CALC-MCNC: 107 MG/DL (ref 0–99)
POTASSIUM SERPL-SCNC: 5 MMOL/L (ref 3.5–5.2)
PROT SERPL-MCNC: 6.5 G/DL (ref 6–8.5)
SODIUM SERPL-SCNC: 137 MMOL/L (ref 134–144)
TRIGL SERPL-MCNC: 142 MG/DL (ref 0–149)
VIT B12 SERPL-MCNC: 690 PG/ML (ref 232–1245)
VLDLC SERPL CALC-MCNC: 28 MG/DL (ref 5–40)

## 2019-07-02 NOTE — TELEPHONE ENCOUNTER
Spoke with patient and informed him of the following per Dr. Joni Meléndez: \"Dear Bandar Quispe:     Please find your most recent results below.       With exception of a high cholesterol and low vitamin D your labs are acceptable. I recommend that we switch you to generic Crestor which is a stronger statin to lower your cardiovascular risk. Let me know if you were willing to change your medication.     I also recommend that you take vitamin D 5000 international units from over-the-counter daily. \"    Patient would like to use [facility] and would like to try taking Crestor.

## 2019-07-03 ENCOUNTER — OFFICE VISIT (OUTPATIENT)
Dept: SLEEP MEDICINE | Age: 69
End: 2019-07-03

## 2019-07-03 VITALS
OXYGEN SATURATION: 95 % | HEIGHT: 70 IN | DIASTOLIC BLOOD PRESSURE: 71 MMHG | SYSTOLIC BLOOD PRESSURE: 126 MMHG | BODY MASS INDEX: 34.07 KG/M2 | HEART RATE: 91 BPM | WEIGHT: 238 LBS

## 2019-07-03 DIAGNOSIS — G47.33 OSA (OBSTRUCTIVE SLEEP APNEA): Primary | ICD-10-CM

## 2019-07-03 RX ORDER — ROSUVASTATIN CALCIUM 10 MG/1
10 TABLET, COATED ORAL
Qty: 30 TAB | Refills: 3 | Status: SHIPPED | OUTPATIENT
Start: 2019-07-03 | End: 2019-10-15 | Stop reason: SDUPTHER

## 2019-07-03 RX ORDER — OXYCODONE HYDROCHLORIDE 10 MG/1
10 TABLET ORAL
COMMUNITY
End: 2019-08-05

## 2019-07-03 NOTE — PROGRESS NOTES
[address] [facility]. [place], [facility]  Tel.  [phone]  Fax. [address] [facility] [address]  [place], [address] [facility]  Tel.  [phone]  Fax. [phone] [address] [facility]   Tel.  [phone]  Fax. [phone]         Chief Complaint       Chief Complaint   Patient presents with    Sleep Problem     6 month cpap follow up         HPI        [facility] is a 79 y.o. male seen for follow-up. He was evaluated at [facility] with a sleep study which demonstrated obstructive sleep apnea characterized by an AHI of 19.2 per hour associated with arterial desaturations to 78%. Events were more prominent in REM sleep with the REM-related AHI of 82.6 per hour. During a second study,  CPAP  of 6 cm associated with AHI 0.4 per hour and SaO2 91%. Due to a number of respiratory-related arousals, CPAP initiated at 8 cm. CPAP currently 9 cm. Compliance data downloaded and reviewed in detail with the patient today. During the past 30 days, CPAP used during 27 days with the average daily use of 5.8 hours. CMS compliance criteria 70%. AHI 1.8 per hour. He notes he is sleeping well and not experiencing non-restorative sleep or daytime fatigue when using CPAP. Allergies   Allergen Reactions    Chlorhexidine Towelette Rash     ? Reaction from using wipes before LTK    Other Plant, Animal, Environmental Sneezing       Current Outpatient Medications   Medication Sig Dispense Refill    oxyCODONE IR (ROXICODONE) 10 mg tab immediate release tablet Take  by mouth.  raNITIdine (ZANTAC) 150 mg tablet Take 150 mg by mouth two (2) times a day.  cholecalciferol (VITAMIN D3) 1,000 unit tablet Take 1 Tab by mouth two (2) times a day.  metoprolol succinate (TOPROL-XL) 50 mg XL tablet Take 1 Tab by mouth daily. 30 Tab 11    ascorbic acid, vitamin C, (VITAMIN C) 500 mg tablet Take 1,000 mg by mouth.       albuterol (PROVENTIL VENTOLIN) 2.5 mg /3 mL (0.083 %) nebulizer solution Take 2.5 mg by inhalation.  fluticasone (FLONASE) 50 mcg/actuation nasal spray 2 prays in each nostril 1 Bottle 5    albuterol (PROVENTIL HFA, VENTOLIN HFA, PROAIR HFA) 90 mcg/actuation inhaler Take 2 Puffs by inhalation every four (4) hours as needed for Wheezing for up to 360 days. 1 Inhaler 6    ramipril (ALTACE) 10 mg capsule TAKE ONE CAPSULE BY MOUTH TWICE DAILY 60 Cap 11    hydroCHLOROthiazide (HYDRODIURIL) 12.5 mg tablet TAKE ONE TABLET BY MOUTH ONCE DAILY FOR  HYPERTENSION 90 Tab 3    cetirizine (ZYRTEC) 10 mg tablet Take  by mouth daily.  POTASSIUM CHLORIDE PO Take 595 mg by mouth daily.  cyanocobalamin (VITAMIN B-12) 1,000 mcg tablet Take 1 Tab by mouth daily.  rosuvastatin (CRESTOR) 10 mg tablet Take 1 Tab by mouth nightly. Indications: excessive fat in the blood 30 Tab 3    gabapentin (NEURONTIN) 300 mg capsule Take 300 mg by mouth three (3) times daily.  OTHER 1,000 mg daily. Vitamin B3      dextromethorphan-guaiFENesin (MUCINEX DM) 60-1,200 mg Tb12 Take  by mouth.  nabumetone (RELAFEN) 500 mg tablet Take  by mouth two (2) times a day.  Omeprazole delayed release (PRILOSEC D/R) 20 mg tablet Take 20 mg by mouth daily. He  has a past medical history of Adverse effect of anesthesia, Anesthesia complication (0426), Arrhythmia, Arthritis, Asthma, Cancer ([facility] [address] Colon ulcer, aphthous (4/1/2010), Finger amputation, traumatic (2003), GERD (gastroesophageal reflux disease) (4/1/2010), High cholesterol (4/1/2010), HTN (hypertension), benign (4/1/2010), MRSA infection (2013), and Sleep apnea. He also has no past medical history of CAD (coronary artery disease), Diabetes ([address] Heart failure ([address] or History of abuse.     He  has a past surgical history that includes pr abdomen surgery proc unlisted (2001); hx appendectomy (1959); hx tonsil and adenoidectomy; hx heent; hx other surgical (1990S); hx other surgical; hx orthopaedic (1994); hx orthopaedic (01/06/2017); hx knee replacement (Right, 10/2017); and colonoscopy (N/A, 4/2/2019). He family history includes Cancer in his brother and brother; Dementia in his mother; Heart Disease in his father and mother; Hypertension in his mother; Lung Disease in his father; No Known Problems in his brother. He  reports that he quit smoking about 19 years ago. He has a 105.00 pack-year smoking history. He has never used smokeless tobacco. He reports that he drinks about 12.6 oz of alcohol per week. He reports that he does not use drugs. Review of Systems:  Unchanged per patient      Objective:     Visit Vitals  /71   Pulse 91   Ht 5' 10\" (1.778 m)   Wt 238 lb (108 kg)   SpO2 95%   BMI 34.15 kg/m²     Body mass index is 34.15 kg/m². General:   Conversant, cooperative   Eyes:  Pupils equal and reactive, no nystagmus   Oropharynx:   Mallampati score III                 CVS:  Normal rate, regular rhythm        Neuro:  Speech fluent, face symmetrica             Assessment:       ICD-10-CM ICD-9-CM    1. RACHEL (obstructive sleep apnea) G47.33 327.23      Sleep disordered breathing more prominent in REM sleep responding consistently to CPAP. Potential benefit of weight reduction was again discussed. He will continue at the current pressure settings. He will contact the office as needed. he is compliant with PAP therapy and PAP continues to benefit patient and remains necessary for control of his sleep apnea. Plan:   No orders of the defined types were placed in this encounter. * Patient has a history and examination consistent with the diagnosis of sleep apnea. * He was provided information on sleep apnea including corresponding risk factors and the importance of proper treatment. * Treatment options if indicated were reviewed today. Potential benefit of weight reduction was discussed. Weight reduction techniques reviewed.       Tavo Dodge MD, [facility]  Electronically signed 07/03/19        This note was created using voice recognition software. Despite editing, there may be syntax errors. This note will not be viewable in [address]

## 2019-07-03 NOTE — PATIENT INSTRUCTIONS

## 2019-07-13 ENCOUNTER — HOSPITAL ENCOUNTER (OUTPATIENT)
Dept: MRI IMAGING | Age: 69
Discharge: HOME | End: 2019-07-13
Attending: ORTHOPAEDIC SURGERY
Payer: MEDICARE

## 2019-07-13 DIAGNOSIS — M54.16 LUMBAR RADICULOPATHY: ICD-10-CM

## 2019-07-13 PROCEDURE — 72148 MRI LUMBAR SPINE W/O DYE: CPT

## 2019-07-31 ENCOUNTER — HOSPITAL ENCOUNTER (OUTPATIENT)
Dept: PREADMISSION TESTING | Age: 69
Discharge: HOME | End: 2019-07-31
Payer: MEDICARE

## 2019-07-31 ENCOUNTER — OFFICE VISIT (OUTPATIENT)
Dept: CARDIOLOGY CLINIC | Age: 69
End: 2019-07-31

## 2019-07-31 VITALS
DIASTOLIC BLOOD PRESSURE: 57 MMHG | HEIGHT: 70 IN | SYSTOLIC BLOOD PRESSURE: 118 MMHG | TEMPERATURE: 98.3 F | HEART RATE: 87 BPM | RESPIRATION RATE: 18 BRPM | OXYGEN SATURATION: 93 % | BODY MASS INDEX: 34.83 KG/M2 | WEIGHT: 243.25 LBS

## 2019-07-31 VITALS
DIASTOLIC BLOOD PRESSURE: 72 MMHG | OXYGEN SATURATION: 93 % | HEART RATE: 72 BPM | WEIGHT: 243 LBS | RESPIRATION RATE: 20 BRPM | TEMPERATURE: 98.2 F | SYSTOLIC BLOOD PRESSURE: 115 MMHG | HEIGHT: 70 IN | BODY MASS INDEX: 34.79 KG/M2

## 2019-07-31 DIAGNOSIS — I49.3 PVC'S (PREMATURE VENTRICULAR CONTRACTIONS): Primary | ICD-10-CM

## 2019-07-31 DIAGNOSIS — Z01.810 PREOPERATIVE CARDIOVASCULAR EXAMINATION: ICD-10-CM

## 2019-07-31 DIAGNOSIS — I10 ESSENTIAL HYPERTENSION: ICD-10-CM

## 2019-07-31 DIAGNOSIS — E78.00 PURE HYPERCHOLESTEROLEMIA: ICD-10-CM

## 2019-07-31 LAB
25(OH)D3 SERPL-MCNC: 28.8 NG/ML (ref 30–100)
ABO + RH BLD: NORMAL
ALBUMIN SERPL-MCNC: 4 G/DL (ref 3.5–5)
ALBUMIN/GLOB SERPL: 1.3 {RATIO} (ref 1.1–2.2)
ALP SERPL-CCNC: 65 U/L (ref 45–117)
ALT SERPL-CCNC: 34 U/L (ref 12–78)
ANION GAP SERPL CALC-SCNC: 6 MMOL/L (ref 5–15)
APPEARANCE UR: CLEAR
APTT PPP: 27.3 SEC (ref 22.1–32)
AST SERPL-CCNC: 18 U/L (ref 15–37)
BACTERIA URNS QL MICRO: NEGATIVE /HPF
BASOPHILS # BLD: 0.1 K/UL (ref 0–0.1)
BASOPHILS NFR BLD: 1 % (ref 0–1)
BILIRUB SERPL-MCNC: 0.6 MG/DL (ref 0.2–1)
BILIRUB UR QL: NEGATIVE
BLOOD GROUP ANTIBODIES SERPL: NORMAL
BUN SERPL-MCNC: 15 MG/DL (ref 6–20)
BUN/CREAT SERPL: 18 (ref 12–20)
CALCIUM SERPL-MCNC: 8.9 MG/DL (ref 8.5–10.1)
CHLORIDE SERPL-SCNC: 102 MMOL/L (ref 97–108)
CO2 SERPL-SCNC: 29 MMOL/L (ref 21–32)
COLOR UR: NORMAL
CREAT SERPL-MCNC: 0.84 MG/DL (ref 0.7–1.3)
DIFFERENTIAL METHOD BLD: ABNORMAL
EOSINOPHIL # BLD: 0.2 K/UL (ref 0–0.4)
EOSINOPHIL NFR BLD: 3 % (ref 0–7)
EPITH CASTS URNS QL MICRO: NORMAL /LPF
ERYTHROCYTE [DISTWIDTH] IN BLOOD BY AUTOMATED COUNT: 13.3 % (ref 11.5–14.5)
EST. AVERAGE GLUCOSE BLD GHB EST-MCNC: 100 MG/DL
GLOBULIN SER CALC-MCNC: 3 G/DL (ref 2–4)
GLUCOSE SERPL-MCNC: 83 MG/DL (ref 65–100)
GLUCOSE UR STRIP.AUTO-MCNC: NEGATIVE MG/DL
HBA1C MFR BLD: 5.1 % (ref 4.2–6.3)
HCT VFR BLD AUTO: 43.2 % (ref 36.6–50.3)
HGB BLD-MCNC: 14.3 G/DL (ref 12.1–17)
HGB UR QL STRIP: NEGATIVE
HYALINE CASTS URNS QL MICRO: NORMAL /LPF (ref 0–5)
IMM GRANULOCYTES # BLD AUTO: 0 K/UL (ref 0–0.04)
IMM GRANULOCYTES NFR BLD AUTO: 1 % (ref 0–0.5)
INR PPP: 1 (ref 0.9–1.1)
KETONES UR QL STRIP.AUTO: NEGATIVE MG/DL
LEUKOCYTE ESTERASE UR QL STRIP.AUTO: NEGATIVE
LYMPHOCYTES # BLD: 1.8 K/UL (ref 0.8–3.5)
LYMPHOCYTES NFR BLD: 22 % (ref 12–49)
MCH RBC QN AUTO: 31 PG (ref 26–34)
MCHC RBC AUTO-ENTMCNC: 33.1 G/DL (ref 30–36.5)
MCV RBC AUTO: 93.5 FL (ref 80–99)
MONOCYTES # BLD: 0.8 K/UL (ref 0–1)
MONOCYTES NFR BLD: 10 % (ref 5–13)
NEUTS SEG # BLD: 5.4 K/UL (ref 1.8–8)
NEUTS SEG NFR BLD: 63 % (ref 32–75)
NITRITE UR QL STRIP.AUTO: NEGATIVE
NRBC # BLD: 0 K/UL (ref 0–0.01)
NRBC BLD-RTO: 0 PER 100 WBC
PH UR STRIP: 7.5 [PH] (ref 5–8)
PLATELET # BLD AUTO: 215 K/UL (ref 150–400)
PMV BLD AUTO: 11.2 FL (ref 8.9–12.9)
POTASSIUM SERPL-SCNC: 4.3 MMOL/L (ref 3.5–5.1)
PROT SERPL-MCNC: 7 G/DL (ref 6.4–8.2)
PROT UR STRIP-MCNC: NEGATIVE MG/DL
PROTHROMBIN TIME: 9.8 SEC (ref 9–11.1)
RBC # BLD AUTO: 4.62 M/UL (ref 4.1–5.7)
RBC #/AREA URNS HPF: NORMAL /HPF (ref 0–5)
SODIUM SERPL-SCNC: 137 MMOL/L (ref 136–145)
SP GR UR REFRACTOMETRY: 1.02 (ref 1–1.03)
SPECIMEN EXP DATE BLD: NORMAL
THERAPEUTIC RANGE,PTTT: NORMAL SECS (ref 58–77)
UA: UC IF INDICATED,UAUC: NORMAL
UROBILINOGEN UR QL STRIP.AUTO: 0.2 EU/DL (ref 0.2–1)
WBC # BLD AUTO: 8.3 K/UL (ref 4.1–11.1)
WBC URNS QL MICRO: NORMAL /HPF (ref 0–4)

## 2019-07-31 PROCEDURE — 85025 COMPLETE CBC W/AUTO DIFF WBC: CPT

## 2019-07-31 PROCEDURE — 85610 PROTHROMBIN TIME: CPT

## 2019-07-31 PROCEDURE — 81001 URINALYSIS AUTO W/SCOPE: CPT

## 2019-07-31 PROCEDURE — 80053 COMPREHEN METABOLIC PANEL: CPT

## 2019-07-31 PROCEDURE — 83036 HEMOGLOBIN GLYCOSYLATED A1C: CPT

## 2019-07-31 PROCEDURE — 85730 THROMBOPLASTIN TIME PARTIAL: CPT

## 2019-07-31 PROCEDURE — 82306 VITAMIN D 25 HYDROXY: CPT

## 2019-07-31 PROCEDURE — 36415 COLL VENOUS BLD VENIPUNCTURE: CPT

## 2019-07-31 PROCEDURE — 86900 BLOOD TYPING SEROLOGIC ABO: CPT

## 2019-07-31 RX ORDER — FLUTICASONE PROPIONATE 50 MCG
2 SPRAY, SUSPENSION (ML) NASAL
COMMUNITY
End: 2020-08-10

## 2019-07-31 NOTE — PROGRESS NOTES
1. Have you been to the ER, urgent care clinic since your last visit? Hospitalized since your last visit? No    2. Have you seen or consulted any other health care providers outside of the [address] [place] [address] since your last visit? Include any pap smears or colon screening.  No  Reviewed record in preparation for visit and have necessary documentation  Pt did not bring medication to office visit for review    Goals that were addressed and/or need to be completed during or after this appointment include   Health Maintenance Due   Topic Date Due    Shingrix Vaccine Age 50> (1 of 2) 11/07/2002    GLAUCOMA SCREENING Q2Y  11/07/2017    MEDICARE YEARLY EXAM  12/27/2018

## 2019-07-31 NOTE — LETTER
7/31/19 Patient: Prasanth Bach YOB: 1950 Date of Visit: 7/31/2019 Kenna Saldana MD 
[address] [address] VIA In Basket Dear Kenna Saldana MD, Thank you for referring Mr. Ekaterina Salazar to [place] [address] for evaluation. My notes for this consultation are attached. If you have questions, please do not hesitate to call me. I look forward to following your patient along with you.  
 
 
Sincerely, 
 
Marcio Leon MD

## 2019-07-31 NOTE — PERIOP NOTES
[address] [facility], [address] [facility]   MAIN OR                                  [phone]   MAIN PRE OP                          [phone]                                                                                AMBULATORY PRE OP          [phone]  PRE-ADMISSION TESTING    [phone]     Surgery Date:   Monday August 5, 2019        Is surgery arrival time given by surgeon? NO  If NO, [phone] staff will call you between 3 and 7pm the day before your surgery with your arrival time. (If your surgery is on a Monday, we will call you the Friday before.)    Call [phone] after 7pm Monday-Friday if you did not receive your arrival time. INSTRUCTIONS BEFORE YOUR SURGERY   When You  Arrive Arrive at the [address] [facility] on the day of your surgery  Have your insurance card, photo ID, and any copayment (if needed)   Food   and   Drink NO food or drink after midnight the night before surgery    This means NO water, gum, mints, coffee, juice, etc.  No alcohol (beer, wine, liquor) 24 hours before and after surgery   Medications to   TAKE   Morning of Surgery MEDICATIONS TO TAKE THE MORNING OF SURGERY WITH A SIP OF WATER:    Metoprolol, gabapentin, Zyrtec   Medications  To  STOP      7 days before surgery  Non-Steroidal anti-inflammatory Drugs (NSAID's): for example, Ibuprofen (Advil, Motrin), Naproxen (Aleve)   Aspirin, if taking for pain    Herbal supplements, vitamins, and fish oil   Other:  (Pain medications not listed above, including Tylenol may be taken)   Blood  Thinners  If you take  Aspirin, Plavix, Coumadin, or any blood-thinning or anti-blood clot medicine, talk to the doctor who prescribed the medications for pre-operative instructions.    Bathing Clothing  Jewelry  Valuables       If you shower the morning of surgery, please do not apply anything to your skin (lotions, powders, deodorant, or makeup, especially tita)   Follow all special bath instructions (for total joint replacement, spine and bowel surgeries)   Do not shave or trim anywhere 24 hours before surgery   Wear your hair loose or down; no pony-tails, buns, or metal hair clips   Wear loose, comfortable, clean clothes   Wear glasses instead of contacts   Leave money, valuables, and jewelry, including body piercings, at home   Going Home - or Spending the Night  SAME-DAY SURGERY: You must have a responsible adult drive you home and stay with you 24 hours after surgery   ADMITS: If your doctor is keeping you in the hospital after surgery, leave personal belongings/luggage in your car until you have a hospital room number. Hospital discharge time is 12 noon  Drivers must be here before 12 noon unless you are told differently   Special Instructions   Special Instructions:  · Use Chlorhexidine Care Fusion wash and sponges 3 days prior to surgery as instructed. · Incentive spirometer given with instructions to practice at home and bring back to the hospital on the day of surgery. · [facility] will contact you if your Hemoglobin A1C is greater than 7.5. · Ensure/Glucerna  sample, nutritional information, and Ensure/Glucerna coupon given. · Pain pamphlet and Call Don't Fall reminder reviewed with patient. ·  parking is complimentary Monday - Friday 7 am - 5 pm  · Bring PTA Medication list day of surgery with the last doses taken documented  · Bring CPAP, inhaler and cane the day of surgery       Follow all instructions so your surgery wont be cancelled. Please, be on time. If a situation occurs and you are delayed the day of surgery, call [phone]. If your physical condition changes (like a fever, cold, flu, etc.) call your surgeon. The patient was contacted  in person. Home medication reviewed and verified during PAT appointment.   The patient verbalizes understanding of all instructions and does not  need reinforcement.

## 2019-07-31 NOTE — PROGRESS NOTES
Ron Cruz      1950   Marilu Chase MD  Date of Visit-7/31/2019     [facility],  PCP=Perez Morales MD     [facility]. HPI:   Jaydon Baez is a 79 y.o. male   One year fu of PVCs, HTN  ECHO 9-16-15=WNL  NUKE exercise nuclear stress test 10-1-15= walked 6 minutes ,EF 64%, no EKG changes or ischemia, rare PVCs     Holter: 7/27/15. Average heart rate 71. Total beats 101,922. No pauses. Ventricular ectopics 3,993, averaging 167 per hour. Bigeminy was about half of those beats. Supraventricular ectopics 213. SVT the fasted at 4 beats and the longest at 11 beats. Saw Dr Becky Berg in July noting PVCs  Pt to have back surgery Monday  He feels at usual baseline  occ heart palpitations    Some SOB/wheeze when he inales hay while baling  He baled hay on tractor for 4 days and had no angina,edema or ELLISON  Overall feels well except for his back    Assessment/Plans:  1. PVC's (premature ventricular contractions)  4000 on holter, but are benign with no structural heart dz  Will likely have them periop but not of concern as long as bp stable  EKG today is NSR WNL  No need to change meds or plans  Key CAD CHF Meds             rosuvastatin (CRESTOR) 10 mg tablet (Taking) Take 1 Tab by mouth nightly. Indications: excessive fat in the blood    metoprolol succinate (TOPROL-XL) 50 mg XL tablet (Taking) Take 1 Tab by mouth daily. ramipril (ALTACE) 10 mg capsule (Taking) TAKE ONE CAPSULE BY MOUTH TWICE DAILY    hydroCHLOROthiazide (HYDRODIURIL) 12.5 mg tablet (Taking) TAKE ONE TABLET BY MOUTH ONCE DAILY FOR  HYPERTENSION            2. Preoperative cardiovascular examination  I have no objection to the planned  surgery. Patient seems to be at a low risk for clay-operative severe adverse cardiac events.      3. Essential hypertension-great  Three meds  BP Readings from Last 6 Encounters:   07/31/19 115/72   07/03/19 126/71   07/01/19 137/54   04/11/19 128/85   04/05/19 130/74   04/04/19 117/76      Key CAD CHF Meds             rosuvastatin (CRESTOR) 10 mg tablet (Taking) Take 1 Tab by mouth nightly. Indications: excessive fat in the blood    metoprolol succinate (TOPROL-XL) 50 mg XL tablet (Taking) Take 1 Tab by mouth daily. ramipril (ALTACE) 10 mg capsule (Taking) TAKE ONE CAPSULE BY MOUTH TWICE DAILY    hydroCHLOROthiazide (HYDRODIURIL) 12.5 mg tablet (Taking) TAKE ONE TABLET BY MOUTH ONCE DAILY FOR  HYPERTENSION            4. Pure hypercholesterolemia  Key Antihyperlipidemia Meds             rosuvastatin (CRESTOR) 10 mg tablet (Taking) Take 1 Tab by mouth nightly. Indications: excessive fat in the blood        On high potency statin      Fu one year      Future Appointments   Date Time Provider Erin Corona   7/31/2019  2:00 PM [facility] PAT ASSESSMENT A [facility] OR PRE AS   11/1/2019  8:20 AM Clay Flores MD [facility] SCHED   7/8/2020 11:00 AM Pham Mcintosh  [address]       Cardiac History:   No specialty comments available. ROS:Cardiac complete  as above. Respiratory as above with no wheezing or hemoptysis.    He denies  symptoms of unusual weight loss , fevers,  BRBPR, hematuria,  or recent stroke    Past Medical History:   Diagnosis Date    Adverse effect of anesthesia     went apneic post surgery [facility]    Anesthesia complication 9404    APNEA DURING [place]IA REPA[place] [facility] Arrhythmia     FREQUENT PVCS    Arthritis     Asthma     HAS BRONCHITIS OFTEN    Cancer ([facility] [address]     SKIN- UNKNOWN    Colon ulcer, aphthous 4/1/2010    Finger amputation, traumatic 2003    R 2ND DIGIT    GERD (gastroesophageal reflux disease) 4/1/2010    High cholesterol 4/1/2010    HTN (hypertension), benign 4/1/2010    CONTROLLED BY MEDS    Hx MRSA infection 2013    CYSTS BL LEGS    Sleep apnea     CPAP      Exam and Labs:  Visit Vitals  /72 (BP 1 Location: Right arm, BP Patient Position: Sitting)   Pulse 72   Temp 98.2 °F (36.8 °C) (Oral)   Resp 20   Ht 5' 10\" (1.778 m)   Wt 243 lb (110.2 kg)   SpO2 93%   BMI 34.87 kg/m²     Constitutional:  NAD, comfortable , moist mucous membranesHENT: Head: NC,ATEyes: No scleral icterus. Neck:  Neck supple. No JVD present. No tracheal deviation,mass  Chest: Effort normal & normal respiratory excursion     Lungs:breath sounds normal. No stridor. distress, wheezes or  Rales. Heart:normal rate, regular rhythm, normal S1, S2, no murmurs, rubs, clicks or gallops , PMI non displaced. Edema: Edema is none. Extremities:  no clubbing or cyanosis. Abdominal:  no abnormal distension. Neurological: alert, conversant and oriented . Skin: Skin is not cold. No obvious systemic rash noted. Not diaphoretic. No erythema. Psychiatric:  Grossly normal mood and affect. Behavior appears normal.     Lab Results   Component Value Date/Time    Cholesterol, total 183 07/01/2019 09:15 AM    HDL Cholesterol 48 07/01/2019 09:15 AM    LDL, calculated 107 (H) 07/01/2019 09:15 AM    Triglyceride 142 07/01/2019 09:15 AM    CHOL/HDL Ratio 3.8 04/02/2010 09:09 AM     Lab Results   Component Value Date/Time    Sodium 137 07/01/2019 09:15 AM    Potassium 5.0 07/01/2019 09:15 AM    Chloride 98 07/01/2019 09:15 AM    CO2 27 07/01/2019 09:15 AM    Anion gap 7 02/08/2018 03:44 AM    Glucose 78 07/01/2019 09:15 AM    BUN 21 07/01/2019 09:15 AM    Creatinine 0.98 07/01/2019 09:15 AM    BUN/Creatinine ratio 21 07/01/2019 09:15 AM    GFR est AA 92 07/01/2019 09:15 AM    GFR est non-AA 80 07/01/2019 09:15 AM    Calcium 9.4 07/01/2019 09:15 AM      Wt Readings from Last 3 Encounters:   07/31/19 243 lb (110.2 kg)   07/03/19 238 lb (108 kg)   07/01/19 241 lb 12.8 oz (109.7 kg)      BP Readings from Last 3 Encounters:   07/31/19 115/72   07/03/19 126/71   07/01/19 137/54        Current Outpatient Medications   Medication Sig    rosuvastatin (CRESTOR) 10 mg tablet Take 1 Tab by mouth nightly. Indications: excessive fat in the blood    oxyCODONE IR (ROXICODONE) 10 mg tab immediate release tablet Take  by mouth.  gabapentin (NEURONTIN) 300 mg capsule Take 300 mg by mouth three (3) times daily.  raNITIdine (ZANTAC) 150 mg tablet Take 150 mg by mouth two (2) times a day.  OTHER 1,000 mg daily. Vitamin B3    cholecalciferol (VITAMIN D3) 1,000 unit tablet Take 1 Tab by mouth two (2) times a day.  metoprolol succinate (TOPROL-XL) 50 mg XL tablet Take 1 Tab by mouth daily.  ascorbic acid, vitamin C, (VITAMIN C) 500 mg tablet Take 1,000 mg by mouth.  albuterol (PROVENTIL VENTOLIN) 2.5 mg /3 mL (0.083 %) nebulizer solution Take 2.5 mg by inhalation.  dextromethorphan-guaiFENesin (MUCINEX DM) 60-1,200 mg Tb12 Take  by mouth.  fluticasone (FLONASE) 50 mcg/actuation nasal spray 2 prays in each nostril    albuterol (PROVENTIL HFA, VENTOLIN HFA, PROAIR HFA) 90 mcg/actuation inhaler Take 2 Puffs by inhalation every four (4) hours as needed for Wheezing for up to 360 days.  ramipril (ALTACE) 10 mg capsule TAKE ONE CAPSULE BY MOUTH TWICE DAILY    hydroCHLOROthiazide (HYDRODIURIL) 12.5 mg tablet TAKE ONE TABLET BY MOUTH ONCE DAILY FOR  HYPERTENSION    nabumetone (RELAFEN) 500 mg tablet Take  by mouth two (2) times a day.  cetirizine (ZYRTEC) 10 mg tablet Take  by mouth daily.  POTASSIUM CHLORIDE PO Take 595 mg by mouth daily.  Omeprazole delayed release (PRILOSEC D/R) 20 mg tablet Take 20 mg by mouth daily.  cyanocobalamin (VITAMIN B-12) 1,000 mcg tablet Take 1 Tab by mouth daily. No current facility-administered medications for this visit.        Past Surgical History:   Procedure Laterality Date    ABDOMEN SURGERY PROC UNLISTED  2001    hernia    COLONOSCOPY N/A 4/2/2019    COLONOSCOPY performed by Marietta Baptiste MD at [facility] HX HEENT      T&A    HX KNEE REPLACEMENT Right 10/2017    right/left knee replacement    HX ORTHOPAEDIC 1994    left knee scope    HX ORTHOPAEDIC  01/06/2017    RT KNEE ARTHROSCOPY WITH MINESCECTOMY    HX OTHER SURGICAL  1990S    CERVICAL- UNKNOWN    HX OTHER SURGICAL      R HAND 2ND DIGIT TRAUMATIC AMPUTATION REPAIR    HX TONSIL AND ADENOIDECTOMY        Social Hx=  reports that he quit smoking about 19 years ago. He has a 105.00 pack-year smoking history. He has never used smokeless tobacco. He reports that he drinks about 21.0 standard drinks of alcohol per week. He reports that he does not use drugs. Family Hx= family history includes Cancer in his brother and brother; Dementia in his mother; Heart Disease in his father and mother; Hypertension in his mother; Lung Disease in his father; No Known Problems in his brother. Impression see above.

## 2019-08-01 DIAGNOSIS — I10 ESSENTIAL HYPERTENSION: Chronic | ICD-10-CM

## 2019-08-01 LAB
BACTERIA SPEC CULT: NORMAL
BACTERIA SPEC CULT: NORMAL
SERVICE CMNT-IMP: NORMAL

## 2019-08-01 RX ORDER — HYDROCHLOROTHIAZIDE 12.5 MG/1
TABLET ORAL
Qty: 30 TAB | Refills: 11 | Status: SHIPPED | OUTPATIENT
Start: 2019-08-01 | End: 2020-08-26 | Stop reason: SDUPTHER

## 2019-08-02 ENCOUNTER — ANESTHESIA EVENT (OUTPATIENT)
Dept: SURGERY | Age: 69
End: 2019-08-02
Payer: MEDICARE

## 2019-08-02 NOTE — PERIOP NOTES
Patient with history of MRSA in 2003.  3 negative MRSA cultures noted in [facility] since then. NO need for contact isolation for the day of surgery.   DOS: 8/5/2019

## 2019-08-02 NOTE — H&P
Preoperative Evaluation                     History and Physical with Surgical Risk Stratification     7/31/2019    CC: Low back pain  Surgery: Left L5-S1 Laminectomy     HPI:   Echo Subramanian is a 79 y.o. male referred for pre-operative evaluation by Dr. Shoshana Bethea for surgery on 8/5/19. He states he has had chronic low back problems for most of his life. 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 He has also spent years standing on concrete floors which he states has hurt his back. His pain has been bearable up until now but of late the pain has impacted his life and gotten worse. If he sits straight up the pain is a zero but anything else the pain goes up to a 10/10. He has been sleeping in a recliner r/t back pain. His pain is across his left hip and down his leg to the bottom of his foot. He has tingling in his left leg. The patient was evaluated in the surgeon's office and it was determined that the most appropriate plan of care is to proceed with surgical intervention. Patient's PCP Mayra Hutson MD      Review of Systems     Constitutional: Negative for chills and fever  HENT: Negative for congestion and sore throat  Eyes: negative for blurred vision and double vision  Respiratory: Negative for cough, shortness of breath and wheezing  Mouth: Negative for loose, broken or chipped teeth. Negative for dentures  Cardiovascular: Negative for chest pain and palpitations  Gastrointestinal: Negative for abdominal pain, constipation, diarrhea and nausea  Genitourinary: Negative for dysuria and hematuria  Musculoskeletal: Positive for low back pain  Skin: Negative for rash, open wounds. Negative for bruises easily  Neurological: Negative for dizziness, tremors and headaches  Psychiatric: Negative for depression. The patient is not nervous/anxious.     Inherent Risk of Surgery     Surgical risk:  Intermediate  Low:  Intermediate:  Orthopedic, High:    Patient Cardiac Risk Assessment     Revised Cardiac Risk Index (RCRI)    Rate if cardiac death, nonfatal MI, nonfatal cardiac arrest by number of risk factor- None - 0.4%    KOTA/AHA 2007 Guidelines:   1) Surgery Emergency, Non-cardiac -> to surgery  2) If not, look at clinical predictors    Major Intermediate Minor    Abnormal EKG     Blood Thinner: None    METS     >4 METS Climb a flight of stairs or a hill Walk on level ground at 4 mph Run a short distance Heavy work around house (scrub floors, move furniture)    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 Known RACHEL:  Yes  Albumin normal, BUN normal    Past Medical, Surgical, Social History     Allergies: Allergies   Allergen Reactions    Chlorhexidine Towelette Rash         Current Outpatient Medications   Medication Sig    fluticasone propionate (FLONASE ALLERGY RELIEF) 50 mcg/actuation nasal spray 2 Sprays by Both Nostrils route daily as needed for Rhinitis.  mupirocin calcium (BACTROBAN NASAL) 2 % nasal ointment 1 Each by Both Nostrils route two (2) times daily as needed. Indications: Methicillin-Resistant Staphylococcus Aureus Bacteria in Nose    rosuvastatin (CRESTOR) 10 mg tablet Take 1 Tab by mouth nightly. Indications: excessive fat in the blood    oxyCODONE IR (ROXICODONE) 10 mg tab immediate release tablet Take 10 mg by mouth every eight (8) hours as needed.  gabapentin (NEURONTIN) 300 mg capsule Take 300 mg by mouth three (3) times daily.  raNITIdine (ZANTAC) 150 mg tablet Take 150 mg by mouth two (2) times daily as needed.     cholecalciferol, VITAMIN D3, (VITAMIN D3) 5,000 unit tab tablet Take 5,000 Units by mouth every morning.  metoprolol succinate (TOPROL-XL) 50 mg XL tablet Take 1 Tab by mouth daily.  ascorbic acid, vitamin C, (VITAMIN C) 500 mg tablet Take 1,000 mg by mouth every morning.  albuterol (PROVENTIL VENTOLIN) 2.5 mg /3 mL (0.083 %) nebulizer solution Take 2.5 mg by inhalation every four (4) hours as needed.  dextromethorphan-guaiFENesin (MUCINEX DM) 60-1,200 mg Tb12 Take 1 Tab by mouth daily as needed.  albuterol (PROVENTIL HFA, VENTOLIN HFA, PROAIR HFA) 90 mcg/actuation inhaler Take 2 Puffs by inhalation every four (4) hours as needed for Wheezing for up to 360 days.  ramipril (ALTACE) 10 mg capsule TAKE ONE CAPSULE BY MOUTH TWICE DAILY    nabumetone (RELAFEN) 500 mg tablet Take  by mouth two (2) times a day.  cetirizine (ZYRTEC) 10 mg tablet Take 10 mg by mouth every morning.  POTASSIUM CHLORIDE PO Take 95 mg by mouth every morning.  cyanocobalamin (VITAMIN B-12) 1,000 mcg tablet Take 1,000 mcg by mouth every morning.  hydroCHLOROthiazide (HYDRODIURIL) 12.5 mg tablet TAKE 1 TABLET BY MOUTH ONCE DAILY FOR  HYPERTENSION     No current facility-administered medications for this encounter.       Past Medical History:   Diagnosis Date    Anesthesia complication 0430    APNEA DURING HERNIA REPAIR, POST OPERATIVE INTUBATION AT [facility]    Arthritis     Basal cell carcinoma (BCC) in situ of skin     Colon ulcer, aphthous 4/1/2010    COPD, mild ([address]     Does not see anyone for this    Environmental and seasonal allergies     Essential tremor     Fatty liver 06/2019    Frequent episodes of bronchitis 2018    Frequent PVCs     Has been checked by cardiology    GERD (gastroesophageal reflux disease) 4/1/2010    High cholesterol 4/1/2010    HTN (hypertension), benign 04/01/2010    Hx MRSA infection 2013    CYSTS BL LEGS    Sleep apnea     CPAP     Past Surgical History:   Procedure Laterality Date    COLONOSCOPY N/A 4/2/2019    performed by Sabino Fisher, Doug Mccray MD [facility] 112 HX AMPUTATION FINGER Right     2nd digit    HX APPENDECTOMY      HX CATARACT REMOVAL Bilateral     HX CERVICAL FUSION N/A     HX HERNIA REPAIR      HX KNEE ARTHROSCOPY Left     HX KNEE REPLACEMENT Bilateral 10/2017    HX MENISCECTOMY Right 2017    HX TONSIL AND ADENOIDECTOMY       Social History     Tobacco Use    Smoking status: Former Smoker     Packs/day: 3.00     Years: 35.00     Pack years: 105.00     Types: Cigarettes     Last attempt to quit: 2000     Years since quittin.5    Smokeless tobacco: Never Used   Substance Use Topics    Alcohol use: Yes     Alcohol/week: 21.0 standard drinks     Types: 21 Shots of liquor per week    Drug use: No       Objective     Vitals:    19 1322 19 1402   BP:  118/57   Pulse:  87   Resp:  18   Temp:  98.3 °F (36.8 °C)   SpO2:  93%   Weight: 110.3 kg (243 lb 4 oz)    Height: 5' 10\" (1.778 m)        Constitutional:  Appears well,  No Acute Distress, Vitals noted  Psychiatric:   Affect normal, Alert and Oriented to person/place/time    Eyes:   Pupils equally round and reactive, EOMI, conjunctiva clear, eyelids normal  ENT:   External ears and nose normal/lips, teeth normal, gums normal, TMs and Orophyarynx normal  Neck:   general inspection and Thyroid normal.  No abnormal cervical or supraclavicular nodes    Lungs:   clear to auscultation, good respiratory effort  Heart: Ausculation normal.  Regular rhythm. No cardiac murmurs.   No carotid bruits or palpable thrills  Chest wall normal  Musculoskeletal: Gait antalgic  Extremities:   without edema, good peripheral pulses  Skin:   Warm to palpation, without rashes, bruising, or suspicious lesions       DVT /PE Risk Assessment      Bedridden for more than 3 days or major surgery within the last 4 weeks NO    Active cancer NO    Calf swelling >3 cm compared to other leg NO    Collateral superficial veins present NO    Entire leg swollen NO    Tenderness along the deep venous system & or pitting edema confined to symptomatic leg NO    Cast applied to lower limb, paralysis NO    Previous DVT NO    Recent Results (from the past 72 hour(s))   CBC WITH AUTOMATED DIFF    Collection Time: 07/31/19  1:18 PM   Result Value Ref Range    WBC 8.3 4.1 - 11.1 K/uL    RBC 4.62 4.10 - 5.70 M/uL    HGB 14.3 12.1 - 17.0 g/dL    HCT 43.2 36.6 - 50.3 %    MCV 93.5 80.0 - 99.0 FL    MCH 31.0 26.0 - 34.0 PG    MCHC 33.1 30.0 - 36.5 g/dL    RDW 13.3 11.5 - 14.5 %    PLATELET 257 896 - 995 K/uL    MPV 11.2 8.9 - 12.9 FL    NRBC 0.0 0  WBC    ABSOLUTE NRBC 0.00 0.00 - 0.01 K/uL    NEUTROPHILS 63 32 - 75 %    LYMPHOCYTES 22 12 - 49 %    MONOCYTES 10 5 - 13 %    EOSINOPHILS 3 0 - 7 %    BASOPHILS 1 0 - 1 %    IMMATURE GRANULOCYTES 1 (H) 0.0 - 0.5 %    ABS. NEUTROPHILS 5.4 1.8 - 8.0 K/UL    ABS. LYMPHOCYTES 1.8 0.8 - 3.5 K/UL    ABS. MONOCYTES 0.8 0.0 - 1.0 K/UL    ABS. EOSINOPHILS 0.2 0.0 - 0.4 K/UL    ABS. BASOPHILS 0.1 0.0 - 0.1 K/UL    ABS. IMM. GRANS. 0.0 0.00 - 0.04 K/UL    DF AUTOMATED     METABOLIC PANEL, COMPREHENSIVE    Collection Time: 07/31/19  1:18 PM   Result Value Ref Range    Sodium 137 136 - 145 mmol/L    Potassium 4.3 3.5 - 5.1 mmol/L    Chloride 102 97 - 108 mmol/L    CO2 29 21 - 32 mmol/L    Anion gap 6 5 - 15 mmol/L    Glucose 83 65 - 100 mg/dL    BUN 15 6 - 20 MG/DL    Creatinine 0.84 0.70 - 1.30 MG/DL    BUN/Creatinine ratio 18 12 - 20      GFR est AA >60 >60 ml/min/1.73m2    GFR est non-AA >60 >60 ml/min/1.73m2    Calcium 8.9 8.5 - 10.1 MG/DL    Bilirubin, total 0.6 0.2 - 1.0 MG/DL    ALT (SGPT) 34 12 - 78 U/L    AST (SGOT) 18 15 - 37 U/L    Alk.  phosphatase 65 45 - 117 U/L    Protein, total 7.0 6.4 - 8.2 g/dL    Albumin 4.0 3.5 - 5.0 g/dL    Globulin 3.0 2.0 - 4.0 g/dL    A-G Ratio 1.3 1.1 - 2.2     HEMOGLOBIN A1C WITH EAG    Collection Time: 07/31/19  1:18 PM   Result Value Ref Range    Hemoglobin A1c 5.1 4.2 - 6.3 %    Est. average glucose 100 mg/dL   CULTURE, MRSA    Collection Time: 07/31/19  1:18 PM   Result Value Ref Range    Special Requests: NO SPECIAL REQUESTS      Culture result: MRSA NOT PRESENT      Culture result:            Screening of patient nares for MRSA is for surveillance purposes and, if positive, to facilitate isolation considerations in high risk settings. It is not intended for automatic decolonization interventions per se as regimens are not sufficiently effective to warrant routine use. PROTHROMBIN TIME + INR    Collection Time: 07/31/19  1:18 PM   Result Value Ref Range    INR 1.0 0.9 - 1.1      Prothrombin time 9.8 9.0 - 11.1 sec   PTT    Collection Time: 07/31/19  1:18 PM   Result Value Ref Range    aPTT 27.3 22.1 - 32.0 sec    aPTT, therapeutic range     58.0 - 77.0 SECS   URINALYSIS W/ REFLEX CULTURE    Collection Time: 07/31/19  1:18 PM   Result Value Ref Range    Color YELLOW/STRAW      Appearance CLEAR CLEAR      Specific gravity 1.020 1.003 - 1.030      pH (UA) 7.5 5.0 - 8.0      Protein NEGATIVE  NEG mg/dL    Glucose NEGATIVE  NEG mg/dL    Ketone NEGATIVE  NEG mg/dL    Bilirubin NEGATIVE  NEG      Blood NEGATIVE  NEG      Urobilinogen 0.2 0.2 - 1.0 EU/dL    Nitrites NEGATIVE  NEG      Leukocyte Esterase NEGATIVE  NEG      WBC 0-4 0 - 4 /hpf    RBC 0-5 0 - 5 /hpf    Epithelial cells FEW FEW /lpf    Bacteria NEGATIVE  NEG /hpf    UA:UC IF INDICATED CULTURE NOT INDICATED BY UA RESULT CNI      Hyaline cast 0-2 0 - 5 /lpf   VITAMIN D, 25 HYDROXY    Collection Time: 07/31/19  1:18 PM   Result Value Ref Range    Vitamin D 25-Hydroxy 28.8 (L) 30 - 100 ng/mL   TYPE & SCREEN    Collection Time: 07/31/19  1:18 PM   Result Value Ref Range    Crossmatch Expiration 08/08/2019     ABO/Rh(D) O POSITIVE     Antibody screen NEG        Assessment and Plan     Assessment/Plan:   1) Lumbar Stenosis  2) Pre-Operative Evaluation    Labs and EKG reviewed from cardiology. MRSA negative    Cardiac clearance reviewed and placed in chart. HX of MRSA- contact isolation orders in CC  Vitamin D- 28.8- he is already being treated for this by PCP    Preoperative Clearance  Per RCRI, the patient has a 0.4% risk of cardiac death, nonfatal MI, nonfatal cardiac arrest based on no risk factors. Per ACC/AHA guidelines, patient is low risk for a(n) intermediate risk surgery and may proceed to planned surgery with the above noted risk.     [place], NP

## 2019-08-05 ENCOUNTER — ANESTHESIA (OUTPATIENT)
Dept: SURGERY | Age: 69
End: 2019-08-05
Payer: MEDICARE

## 2019-08-05 ENCOUNTER — HOSPITAL ENCOUNTER (OUTPATIENT)
Age: 69
Setting detail: OUTPATIENT SURGERY
Discharge: HOME | End: 2019-08-05
Attending: ORTHOPAEDIC SURGERY | Admitting: ORTHOPAEDIC SURGERY
Payer: MEDICARE

## 2019-08-05 ENCOUNTER — APPOINTMENT (OUTPATIENT)
Dept: GENERAL RADIOLOGY | Age: 69
End: 2019-08-05
Attending: ORTHOPAEDIC SURGERY
Payer: MEDICARE

## 2019-08-05 VITALS
RESPIRATION RATE: 14 BRPM | TEMPERATURE: 98.7 F | DIASTOLIC BLOOD PRESSURE: 66 MMHG | HEART RATE: 57 BPM | OXYGEN SATURATION: 96 % | SYSTOLIC BLOOD PRESSURE: 113 MMHG

## 2019-08-05 DIAGNOSIS — M51.26 LUMBAR DISC HERNIATION: Primary | ICD-10-CM

## 2019-08-05 DIAGNOSIS — G89.18 ACUTE POSTOPERATIVE PAIN: ICD-10-CM

## 2019-08-05 PROCEDURE — 74011000250 HC RX REV CODE- 250: Performed by: ORTHOPAEDIC SURGERY

## 2019-08-05 PROCEDURE — 77030002982 HC SUT POLYSRB J&J -A: Performed by: ORTHOPAEDIC SURGERY

## 2019-08-05 PROCEDURE — 77030039266 HC ADH SKN EXOFIN S2SG -A: Performed by: ORTHOPAEDIC SURGERY

## 2019-08-05 PROCEDURE — 77030013708 HC HNDPC SUC IRR PULS STRY –B: Performed by: ORTHOPAEDIC SURGERY

## 2019-08-05 PROCEDURE — 74011250636 HC RX REV CODE- 250/636: Performed by: ORTHOPAEDIC SURGERY

## 2019-08-05 PROCEDURE — 77030002933 HC SUT MCRYL J&J -A: Performed by: ORTHOPAEDIC SURGERY

## 2019-08-05 PROCEDURE — 74011000272 HC RX REV CODE- 272: Performed by: ORTHOPAEDIC SURGERY

## 2019-08-05 PROCEDURE — 77030012935 HC DRSG AQUACEL BMS -B: Performed by: ORTHOPAEDIC SURGERY

## 2019-08-05 PROCEDURE — 77030018836 HC SOL IRR NACL ICUM -A: Performed by: ORTHOPAEDIC SURGERY

## 2019-08-05 PROCEDURE — 74011250636 HC RX REV CODE- 250/636: Performed by: ANESTHESIOLOGY

## 2019-08-05 PROCEDURE — 77030008684 HC TU ET CUF COVD -B: Performed by: ANESTHESIOLOGY

## 2019-08-05 PROCEDURE — 76060000034 HC ANESTHESIA 1.5 TO 2 HR: Performed by: ORTHOPAEDIC SURGERY

## 2019-08-05 PROCEDURE — 77030040361 HC SLV COMPR DVT MDII -B

## 2019-08-05 PROCEDURE — 74011250636 HC RX REV CODE- 250/636

## 2019-08-05 PROCEDURE — 77030003666 HC NDL SPINAL BD -A: Performed by: ORTHOPAEDIC SURGERY

## 2019-08-05 PROCEDURE — 77030031139 HC SUT VCRL2 J&J -A: Performed by: ORTHOPAEDIC SURGERY

## 2019-08-05 PROCEDURE — 77030004391 HC BUR FLUT MEDT -C: Performed by: ORTHOPAEDIC SURGERY

## 2019-08-05 PROCEDURE — 77030018723 HC ELCTRD BLD COVD -A: Performed by: ORTHOPAEDIC SURGERY

## 2019-08-05 PROCEDURE — 76210000020 HC REC RM PH II FIRST 0.5 HR: Performed by: ORTHOPAEDIC SURGERY

## 2019-08-05 PROCEDURE — 74011000250 HC RX REV CODE- 250: Performed by: NURSE ANESTHETIST, CERTIFIED REGISTERED

## 2019-08-05 PROCEDURE — 74011250637 HC RX REV CODE- 250/637: Performed by: ORTHOPAEDIC SURGERY

## 2019-08-05 PROCEDURE — 77030003161 HC GRFT DURA MTRX INLC -E: Performed by: ORTHOPAEDIC SURGERY

## 2019-08-05 PROCEDURE — 74011250636 HC RX REV CODE- 250/636: Performed by: NURSE ANESTHETIST, CERTIFIED REGISTERED

## 2019-08-05 PROCEDURE — 77030014647 HC SEAL FBRN TISSL BAXT -D: Performed by: ORTHOPAEDIC SURGERY

## 2019-08-05 PROCEDURE — 76210000006 HC OR PH I REC 0.5 TO 1 HR: Performed by: ORTHOPAEDIC SURGERY

## 2019-08-05 PROCEDURE — 77030040356 HC CORD BPLR FRCP COVD -A: Performed by: ORTHOPAEDIC SURGERY

## 2019-08-05 PROCEDURE — 76010000162 HC OR TIME 1.5 TO 2 HR INTENSV-TIER 1: Performed by: ORTHOPAEDIC SURGERY

## 2019-08-05 PROCEDURE — 77030020782 HC GWN BAIR PAWS FLX 3M -B

## 2019-08-05 DEVICE — DURAGEN® SUTURABLE DURAL REGENERATION MATRIX, 2 IN X 2 IN (5 CM X 5 CM)
Type: IMPLANTABLE DEVICE | Site: SPINE LUMBAR | Status: FUNCTIONAL
Brand: DURAGEN® SUTURABLE

## 2019-08-05 RX ORDER — ROCURONIUM BROMIDE 10 MG/ML
INJECTION, SOLUTION INTRAVENOUS AS NEEDED
Status: DISCONTINUED | OUTPATIENT
Start: 2019-08-05 | End: 2019-08-05 | Stop reason: HOSPADM

## 2019-08-05 RX ORDER — LIDOCAINE HYDROCHLORIDE 10 MG/ML
0.1 INJECTION, SOLUTION EPIDURAL; INFILTRATION; INTRACAUDAL; PERINEURAL AS NEEDED
Status: DISCONTINUED | OUTPATIENT
Start: 2019-08-05 | End: 2019-08-05 | Stop reason: HOSPADM

## 2019-08-05 RX ORDER — GLYCOPYRROLATE 0.2 MG/ML
INJECTION INTRAMUSCULAR; INTRAVENOUS AS NEEDED
Status: DISCONTINUED | OUTPATIENT
Start: 2019-08-05 | End: 2019-08-05 | Stop reason: HOSPADM

## 2019-08-05 RX ORDER — VANCOMYCIN HYDROCHLORIDE 1 G/20ML
INJECTION, POWDER, LYOPHILIZED, FOR SOLUTION INTRAVENOUS AS NEEDED
Status: DISCONTINUED | OUTPATIENT
Start: 2019-08-05 | End: 2019-08-05 | Stop reason: HOSPADM

## 2019-08-05 RX ORDER — FLUMAZENIL 0.1 MG/ML
0.2 INJECTION INTRAVENOUS
Status: DISCONTINUED | OUTPATIENT
Start: 2019-08-05 | End: 2019-08-05 | Stop reason: HOSPADM

## 2019-08-05 RX ORDER — OXYCODONE HYDROCHLORIDE 5 MG/1
5-10 TABLET ORAL
Qty: 80 TAB | Refills: 0 | Status: SHIPPED | OUTPATIENT
Start: 2019-08-05 | End: 2019-08-12

## 2019-08-05 RX ORDER — SODIUM CHLORIDE, SODIUM LACTATE, POTASSIUM CHLORIDE, CALCIUM CHLORIDE 600; 310; 30; 20 MG/100ML; MG/100ML; MG/100ML; MG/100ML
125 INJECTION, SOLUTION INTRAVENOUS CONTINUOUS
Status: DISCONTINUED | OUTPATIENT
Start: 2019-08-05 | End: 2019-08-05 | Stop reason: HOSPADM

## 2019-08-05 RX ORDER — CYCLOBENZAPRINE HCL 10 MG
10 TABLET ORAL
Qty: 60 TAB | Refills: 2 | Status: SHIPPED | OUTPATIENT
Start: 2019-08-05 | End: 2021-12-27

## 2019-08-05 RX ORDER — DIPHENHYDRAMINE HYDROCHLORIDE 50 MG/ML
12.5 INJECTION, SOLUTION INTRAMUSCULAR; INTRAVENOUS AS NEEDED
Status: DISCONTINUED | OUTPATIENT
Start: 2019-08-05 | End: 2019-08-05 | Stop reason: HOSPADM

## 2019-08-05 RX ORDER — AMOXICILLIN 250 MG
1 CAPSULE ORAL
Qty: 60 TAB | Refills: 2 | Status: SHIPPED | OUTPATIENT
Start: 2019-08-05 | End: 2021-07-27

## 2019-08-05 RX ORDER — TRIAMCINOLONE ACETONIDE 40 MG/ML
INJECTION, SUSPENSION INTRA-ARTICULAR; INTRAMUSCULAR AS NEEDED
Status: DISCONTINUED | OUTPATIENT
Start: 2019-08-05 | End: 2019-08-05 | Stop reason: HOSPADM

## 2019-08-05 RX ORDER — MIDAZOLAM HYDROCHLORIDE 1 MG/ML
2 INJECTION, SOLUTION INTRAMUSCULAR; INTRAVENOUS
Status: DISCONTINUED | OUTPATIENT
Start: 2019-08-05 | End: 2019-08-05 | Stop reason: HOSPADM

## 2019-08-05 RX ORDER — DEXAMETHASONE SODIUM PHOSPHATE 100 MG/10ML
INJECTION INTRAMUSCULAR; INTRAVENOUS AS NEEDED
Status: DISCONTINUED | OUTPATIENT
Start: 2019-08-05 | End: 2019-08-05 | Stop reason: HOSPADM

## 2019-08-05 RX ORDER — ONDANSETRON 2 MG/ML
INJECTION INTRAMUSCULAR; INTRAVENOUS AS NEEDED
Status: DISCONTINUED | OUTPATIENT
Start: 2019-08-05 | End: 2019-08-05 | Stop reason: HOSPADM

## 2019-08-05 RX ORDER — MIDAZOLAM HYDROCHLORIDE 1 MG/ML
INJECTION, SOLUTION INTRAMUSCULAR; INTRAVENOUS AS NEEDED
Status: DISCONTINUED | OUTPATIENT
Start: 2019-08-05 | End: 2019-08-05 | Stop reason: HOSPADM

## 2019-08-05 RX ORDER — OXYCODONE HYDROCHLORIDE 5 MG/1
5 TABLET ORAL
Status: DISCONTINUED | OUTPATIENT
Start: 2019-08-05 | End: 2019-08-05 | Stop reason: HOSPADM

## 2019-08-05 RX ORDER — BUPIVACAINE HYDROCHLORIDE AND EPINEPHRINE 5; 5 MG/ML; UG/ML
INJECTION, SOLUTION EPIDURAL; INTRACAUDAL; PERINEURAL AS NEEDED
Status: DISCONTINUED | OUTPATIENT
Start: 2019-08-05 | End: 2019-08-05 | Stop reason: HOSPADM

## 2019-08-05 RX ORDER — PROPOFOL 10 MG/ML
INJECTION, EMULSION INTRAVENOUS AS NEEDED
Status: DISCONTINUED | OUTPATIENT
Start: 2019-08-05 | End: 2019-08-05 | Stop reason: HOSPADM

## 2019-08-05 RX ORDER — FENTANYL CITRATE 50 UG/ML
INJECTION, SOLUTION INTRAMUSCULAR; INTRAVENOUS AS NEEDED
Status: DISCONTINUED | OUTPATIENT
Start: 2019-08-05 | End: 2019-08-05 | Stop reason: HOSPADM

## 2019-08-05 RX ORDER — NEOSTIGMINE METHYLSULFATE 1 MG/ML
INJECTION, SOLUTION INTRAVENOUS AS NEEDED
Status: DISCONTINUED | OUTPATIENT
Start: 2019-08-05 | End: 2019-08-05 | Stop reason: HOSPADM

## 2019-08-05 RX ORDER — HYDROMORPHONE HYDROCHLORIDE 1 MG/ML
.25-1 INJECTION, SOLUTION INTRAMUSCULAR; INTRAVENOUS; SUBCUTANEOUS
Status: DISCONTINUED | OUTPATIENT
Start: 2019-08-05 | End: 2019-08-05 | Stop reason: HOSPADM

## 2019-08-05 RX ORDER — NALOXONE HYDROCHLORIDE 0.4 MG/ML
0.2 INJECTION, SOLUTION INTRAMUSCULAR; INTRAVENOUS; SUBCUTANEOUS
Status: DISCONTINUED | OUTPATIENT
Start: 2019-08-05 | End: 2019-08-05 | Stop reason: HOSPADM

## 2019-08-05 RX ORDER — CEFAZOLIN SODIUM/WATER 2 G/20 ML
2 SYRINGE (ML) INTRAVENOUS ONCE
Status: COMPLETED | OUTPATIENT
Start: 2019-08-05 | End: 2019-08-05

## 2019-08-05 RX ADMIN — FENTANYL CITRATE 25 MCG: 50 INJECTION, SOLUTION INTRAMUSCULAR; INTRAVENOUS at 11:29

## 2019-08-05 RX ADMIN — Medication 2 G: at 10:00

## 2019-08-05 RX ADMIN — FENTANYL CITRATE 150 MCG: 50 INJECTION, SOLUTION INTRAMUSCULAR; INTRAVENOUS at 10:06

## 2019-08-05 RX ADMIN — SODIUM CHLORIDE, SODIUM LACTATE, POTASSIUM CHLORIDE, AND CALCIUM CHLORIDE 125 ML/HR: 600; 310; 30; 20 INJECTION, SOLUTION INTRAVENOUS at 08:02

## 2019-08-05 RX ADMIN — OXYCODONE HYDROCHLORIDE 5 MG: 5 TABLET ORAL at 12:46

## 2019-08-05 RX ADMIN — PHENYLEPHRINE HYDROCHLORIDE 80 MCG: 10 INJECTION INTRAVENOUS at 10:34

## 2019-08-05 RX ADMIN — FENTANYL CITRATE 25 MCG: 50 INJECTION, SOLUTION INTRAMUSCULAR; INTRAVENOUS at 11:37

## 2019-08-05 RX ADMIN — FENTANYL CITRATE 25 MCG: 50 INJECTION, SOLUTION INTRAMUSCULAR; INTRAVENOUS at 11:25

## 2019-08-05 RX ADMIN — ROCURONIUM BROMIDE 10 MG: 50 INJECTION, SOLUTION INTRAVENOUS at 10:30

## 2019-08-05 RX ADMIN — PROPOFOL 200 MG: 10 INJECTION, EMULSION INTRAVENOUS at 10:06

## 2019-08-05 RX ADMIN — GLYCOPYRROLATE 0.2 MG: 0.2 INJECTION, SOLUTION INTRAMUSCULAR; INTRAVENOUS at 11:20

## 2019-08-05 RX ADMIN — FENTANYL CITRATE 50 MCG: 50 INJECTION, SOLUTION INTRAMUSCULAR; INTRAVENOUS at 10:00

## 2019-08-05 RX ADMIN — Medication 2 MG: at 11:20

## 2019-08-05 RX ADMIN — DEXAMETHASONE SODIUM PHOSPHATE 4 MG: 10 INJECTION INTRAMUSCULAR; INTRAVENOUS at 10:36

## 2019-08-05 RX ADMIN — ONDANSETRON 4 MG: 2 INJECTION INTRAMUSCULAR; INTRAVENOUS at 11:20

## 2019-08-05 RX ADMIN — FENTANYL CITRATE 25 MCG: 50 INJECTION, SOLUTION INTRAMUSCULAR; INTRAVENOUS at 11:35

## 2019-08-05 RX ADMIN — MIDAZOLAM HYDROCHLORIDE 2 MG: 1 INJECTION, SOLUTION INTRAMUSCULAR; INTRAVENOUS at 10:00

## 2019-08-05 RX ADMIN — PHENYLEPHRINE HYDROCHLORIDE 40 MCG: 10 INJECTION INTRAVENOUS at 10:53

## 2019-08-05 RX ADMIN — FENTANYL CITRATE 50 MCG: 50 INJECTION, SOLUTION INTRAMUSCULAR; INTRAVENOUS at 10:03

## 2019-08-05 RX ADMIN — ROCURONIUM BROMIDE 40 MG: 50 INJECTION, SOLUTION INTRAVENOUS at 10:06

## 2019-08-05 RX ADMIN — FENTANYL CITRATE 25 MCG: 50 INJECTION, SOLUTION INTRAMUSCULAR; INTRAVENOUS at 11:38

## 2019-08-05 NOTE — DISCHARGE INSTRUCTIONS
YOU CAN RE-START TAKING YOUR RELAFEN/NABUMETONE TOMORROW     STOP TAKING ANY PREVIOUSLY PRESCRIBED PAIN MEDICATION. YOU WERE GIVEN A NEW PRESCRIPTION FOR PAIN MEDICATION UPON DISCHARGE FROM THE HOSPITAL TODAY    YOUR AQUACEL DRESSING SHOULD REMAIN IN PLACE FOR 1 WEEK. IT IS A WATERPROOF DRESSING AS LONG AS IT'S PLACEMENT IS INTACT. YOU CAN SHOWER AS INDICATED BELOW IN YOUR DISCHARGE INSTRUCTIONS    OFFICE OF DR. Ely Mckeon   [phone]  [place] [id number] [address] [facility] [address], [address], [address], [address] [facility]       Lumbar Surgery Discharge Instructions    Activities  1. You are going home a well person, be as active as possible. Your only exercise should be walking. Start with short frequent walks and increase your walking distance each day. Limit the amount of time you sit to 20-30 minute intervals. Sitting for prolonged periods of time will be uncomfortable for you following your surgery. 2. Do not lift anything over five pounds. 3. Do not do any straining, twisting or bending. 4. When you are in the bed, you may lay on your back or on either side. Do not lay on your stomach. Diet   You may resume your normal diet. If your throat is sore, you may want to eat soft foods for a few days. Be sure to drink plenty of fluids, it is important to keep yourself hydrated.  Avoid alcoholic beverages and tobacco products. Medications  1. Take your pain medication as directed. 2. It is important to have regular bowel movements. Pain medications may cause constipation. Stool softeners, prune juice, and increasing your water and fiber intake may help in preventing constipation. 3. Laxatives should only be used if the above preventative measures have failed and you still have not had a bowel movement after three days. Driving  You may not drive or return to work until instructed by your physician. However, you may ride in the car for short periods of time. Showering  1.  You may shower three days after your surgery if your incision is not draining. 2. Leave the dressing on during your shower allowing the water to run over it. Once you get out of the shower, pat dressing dry. .  3. Do not take a tub bath. Caring for your incision Leave the surgical dressing on for 7 days. Then apply a dry dressing to the incision daily. If it is completely dry with no drainage, leave open to air. Notify your physician if you develop any of the following conditions:  1. Fever above 101 degrees for 24 hours. 2. Nausea or vomiting. 3. Severe headache. 4. Inability to urinate. 5. Loss of bowel or bladder function. 6. Changes in sensation in your extremities (numbness, tingling, loss of color). 7. Severe pain or pain not relieved by medications. 8. Redness, swelling, or drainage from your incision. 9. Persistent pain in the chest or calf of either leg. 10. Increased weakness (if this is greater than before your surgery). DISCHARGE SUMMARY from your Nurse    The following personal items collected during your admission are returned to you:   Dental Appliance:    Vision:    Hearing Aid: Hearing Aid: None  Jewelry: Jewelry: None  Clothing: Clothing: Other (comment)(street clothes and cane to locker)  Other Valuables: Other Valuables: Cane(to Locker)  Valuables sent to safe:      PATIENT INSTRUCTIONS:    After general anesthesia or intravenous sedation, for 24 hours or while taking prescription Narcotics:  · Limit your activities  · Do not drive and operate hazardous machinery  · Do not make important personal or business decisions  · Do  not drink alcoholic beverages  · If you have not urinated within 8 hours after discharge, please contact your surgeon on call.     Report the following to your surgeon:  · Excessive pain, swelling, redness or odor of or around the surgical area  · Temperature over 100.5  · Nausea and vomiting lasting longer than 4 hours or if unable to take medications  · Any signs of decreased circulation or nerve impairment to extremity: change in color, persistent  numbness, tingling, coldness or increase pain  · Any questions    COUGH AND DEEP BREATHE    Breathing deep and coughing are very important exercises to do after surgery. Deep breathing and coughing open the little air tubes and air sacks in your lungs. You take deep breaths every day. You may not even notice - it is just something you do when you sigh or yawn. It is a natural exercise you do to keep these air passages open. After surgery, take deep breaths and cough, on purpose. Coughing and deep breathing help prevent bronchitis and pneumonia after surgery. If you had chest or belly surgery, use a pillow as a \"hug leny\" and hold it tightly to your chest or belly when you cough. DIRECTIONS:  6. Take 10 to 15 slow deep breaths every hour while awake. 7. Breathe in deeply, and hold it for 2 seconds. 8. Exhale slowly through puckered lips, like blowing up a balloon. 9. After every 4th or 5th deep breath, hug your pillow to your chest or belly and give a hard, deep cough. Yes, it will probably hurt. But doing this exercise is very important part of healing after surgery. Take your pain medicine to help you do this exercise without too much pain. IF YOU HAVE BEEN DIAGNOSED WITH SLEEP APNEA, PLEASE USE YOUR SLEEP APNEA DEVICE OR CPAP MACHINE WHEN YOU INTEND TO NAP AFTER TAKING PAIN MEDICATION. Ankle Pumps    Ankle pumps increase the circulation of oxygenated blood to your lower extremities and decrease your risk for circulation problems such as blood clots. They also stretch the muscles, tendons and ligaments in your foot and ankle, and prevent joint contracture in the ankle and foot, especially after surgeries on the legs. It is important to do ankle pump exercises regularly after surgery because immobility increases your risk for developing a blood clot.   Your doctor may also have you take an Aspirin for the next few days as well. If your doctor did not ask you to take an Aspirin, consult with him before starting Aspirin therapy on your own. Slowly point your foot forward, feeling the muscles on the top of your lower leg stretch, and hold this position for 5 seconds. Next, pull your foot back toward you as far as possible, stretching the calf muscles, and hold that position for 5 seconds. Repeat with the other foot. Perform 10 repetitions every hour while awake for both ankles if possible (down and then up with the foot once is one repetition). You should feel gentle stretching of the muscles in your lower leg when doing this exercise. If you feel pain, or your range of motion is limited, don't  Push too hard. Only go the limit your joint and muscles will let you go. If you have increasing pain, progressively worsening leg warmth or swelling, STOP the exercise and call your doctor. Below is information about the medications your doctor is prescribing after your visit:    Other information in your discharge envelope:  []     PRESCRIPTIONS  []     PHYSICAL THERAPY PRESCRIPTION  []     APPOINTMENT CARDS  []     Regional Anesthesia Pamphlet for block or block with On-Q Catheter from Anesthesia Service  []     Medical device information sheets/pamphlets from their    []     School/work excuse note. []     /parent work excuse note. These are general instructions for a healthy lifestyle:    *  Please give a list of your current medications to your Primary Care Provider. *  Please update this list whenever your medications are discontinued, doses are      changed, or new medications (including over-the-counter products) are added. *  Please carry medication information at all times in case of emergency situations.     About Smoking  No smoking / No tobacco products / Avoid exposure to second hand smoke    Surgeon General's Warning:  Quitting smoking now greatly reduces serious risk to your health. Obesity, smoking, and sedentary lifestyle greatly increases your risk for illness and disease. A healthy diet, regular physical exercise & weight monitoring are important for maintaining a healthy lifestyle. Congestive Heart Failure  You may be retaining fluid if you have a history of heart failure or if you experience any of the following symptoms:  Weight gain of 3 pounds or more overnight or 5 pounds in a week, increased swelling in our hands or feet or shortness of breath while lying flat in bed. Please call your doctor as soon as you notice any of these symptoms; do not wait until your next office visit. Recognize signs and symptoms of STROKE:  F - face looks uneven  A - arms unable to move or move even  S - speech slurred or non-existent  T - time-call 911 as soon as signs and symptoms begin-DO NOT go         Back to bed or wait to see if you get better-TIME IS BRAIN. Warning signs of HEART ATTACK  Call 911 if you have these symptoms    · Chest discomfort. Most heart attacks involve discomfort in the center of the chest that lasts more than a few minutes, or that goes away and comes back. It can feel like uncomfortable pressure, squeezing, fullness, or pain. · Discomfort in other areas of the upper body. Symptoms can include pain or discomfort in one or both        Arms, the back, neck, jaw, or stomach. ·  Shortness of breath with or without chest discomfort. · Other signs may include breaking out in a cold sweat, nausea, or lightheadedness    Don't wait more than five minutes to call 911 - MINUTES MATTER! Fast action can save your life. Calling 911 is almost always the fastest way to get lifesaving treatment. Emergency Medical Services staff can begin treatment when they arrive - up to an hour sooner than if someone gets to the hospital by car.       [facility] MEDICATION AND SIDE EFFECT GUIDE    The [facility] MEDICATION AND SIDE EFFECT GUIDE was provided to the PATIENT AND CARE PROVIDER.   Information provided includes instruction about drug purpose and common side effects for the following medications:    · Roxicodone  · Flexeril  · Colace

## 2019-08-05 NOTE — ANESTHESIA POSTPROCEDURE EVALUATION
Procedure(s):  LEFT L5-S1 LAMINECTOMY, DISCECTOMY.     general    Anesthesia Post Evaluation      Multimodal analgesia: multimodal analgesia not used between 6 hours prior to anesthesia start to PACU discharge  Patient location during evaluation: PACU  Patient participation: complete - patient participated  Level of consciousness: awake and alert  Pain score: 1  Pain management: satisfactory to patient  Airway patency: patent  Anesthetic complications: no  Cardiovascular status: acceptable  Respiratory status: acceptable  Hydration status: acceptable  Post anesthesia nausea and vomiting:  none      Vitals Value Taken Time   /64 8/5/2019 12:30 PM   Temp 37.1 °C (98.7 °F) 8/5/2019 11:55 AM   Pulse 57 8/5/2019 12:30 PM   Resp 11 8/5/2019 12:30 PM   SpO2 95 % 8/5/2019 12:30 PM

## 2019-08-05 NOTE — OP NOTES
[facility]  [address], [address] [facility]    OPERATIVE REPORT      NAME: Radha Baxter    AGE: 79 y.o. YOB: 1950    MEDICAL RECORD NUMBER: [id number]    DATE OF SURGERY: 8/5/2019    PREOPERATIVE DIAGNOSIS: Lumbar disk herniation with stenosis    POSTOPERATIVE DIAGNOSIS: Lumbar disk herniation with stenosis    OPERATIVE PROCEDURE: Left-sided L5-S1 hemilaminotomy, partial facetectomy, and excision of herniated disk tissue, with the use of the operating room microscope. SURGEON: Yelitza Santiago MD     ASSISTANT: NADINE Lopez    ANESTHESIA: General    COMPLICATIONS: None     ESTIMATED BLOOD LOSS: 30 cc    Specimens - no    INDICATION FOR PROCEDURE: The patient is a very pleasant 79 y.o. male with debilitating left leg pain who failed conservative measures. He elected to proceed with operative intervention. He was aware of the risks, benefits, and alternatives. He provided informed consent. PROCEDURE: The patient was identified in the preoperative holding area. The lumbar spine was marked by me. He was transferred to the operating room where general anesthesia was given. He was also given perioperative ancef antibiotics. He was placed prone on the Cornel frame. All bony prominences were well padded. The lumbar spine was prepped and draped in the usual standard fashion. We performed a surgical timeout. I made a skin incision over L5-S1. I exposed L5-S1 in standard fashion with electrocautery. I placed retractors and obtained intraoperative fluoroscopy to verify our level. I brought in the microscope. I then performed a left-sided hemilaminotomy with the high-speed bur of L5 and S1. I excised the ligamentum flavum to expose the neurologic elements. I performed a partial medial facetectomy on the left side. I gently retracted the nerve root medially to expose our disk herniation, which was excised in standard fashion.  The nerve root and thecal sac were decompressed and under no undue tension. I copiously irrigated the entire wound. We had good hemostasis. There was no CSF leaking under microscopic inspection. The soft tissues were injected with 0.5% Marcaine. We closed the wound in several layers. A sterile dressing was applied. The patient was extubated and transferred to the recovery room in good medical condition. The PA assisted with retraction and closure. I, Dr. Cassia Amaya, performed the above procedures.      Cassia Amaya MD  8/5/2019

## 2019-08-05 NOTE — H&P
Date of Surgery Update:  Liv Castellanos was seen and examined. History and physical has been reviewed. The patient has been examined.  There have been no significant clinical changes since the completion of the originally dated History and Physical.    Signed By: Nickie Julian MD     August 5, 2019 8:56 AM

## 2019-08-05 NOTE — ANESTHESIA PREPROCEDURE EVALUATION
Anesthetic History          Comments: Needed postop reintubation previously     Review of Systems / Medical History  Patient summary reviewed, nursing notes reviewed and pertinent labs reviewed    Pulmonary  Within defined limits      Sleep apnea: CPAP    Asthma : well controlled       Neuro/Psych             Comments: PAIN = 5/10    Left buttocks to left foot  Numbness RLE Cardiovascular    Hypertension: well controlled        Dysrhythmias : PVC      Exercise tolerance: >4 METS     GI/Hepatic/Renal     GERD: well controlled      Liver disease     Endo/Other        Obesity and arthritis     Other Findings              Physical Exam    Airway  Mallampati: II  TM Distance: > 6 cm  Neck ROM: normal range of motion   Mouth opening: Normal     Cardiovascular  Regular rate and rhythm,  S1 and S2 normal,  no murmur, click, rub, or gallop  Rhythm: regular  Rate: normal         Dental  No notable dental hx       Pulmonary  Breath sounds clear to auscultation               Abdominal         Other Findings            Anesthetic Plan    ASA: 3  Anesthesia type: general          Induction: Intravenous  Anesthetic plan and risks discussed with: Patient      Informed consent obtained.

## 2019-09-04 ENCOUNTER — CLINICAL SUPPORT (OUTPATIENT)
Dept: FAMILY MEDICINE CLINIC | Age: 69
End: 2019-09-04

## 2019-09-04 VITALS — TEMPERATURE: 97.1 F

## 2019-09-04 DIAGNOSIS — Z23 ENCOUNTER FOR IMMUNIZATION: Primary | ICD-10-CM

## 2019-09-04 NOTE — PROGRESS NOTES
Chief Complaint   Patient presents with    Immunization/Injection       Influenza injection ordered by Dr. Castro Model given 9/4/2019 by Zenaida Lees LPN as follows:    Dose amount:  0.5 ml  Injection site:  Left Arm  Route:  IM      Patient tolerated injection well.

## 2019-09-04 NOTE — PATIENT INSTRUCTIONS

## 2019-09-17 RX ORDER — DICLOFENAC SODIUM 10 MG/G
2 GEL TOPICAL
Qty: 100 G | Refills: 4 | Status: SHIPPED | OUTPATIENT
Start: 2019-09-17 | End: 2021-07-07

## 2019-10-07 RX ORDER — RAMIPRIL 10 MG/1
CAPSULE ORAL
Qty: 60 CAP | Refills: 11 | Status: SHIPPED | OUTPATIENT
Start: 2019-10-07 | End: 2020-11-23

## 2019-10-07 NOTE — PATIENT INSTRUCTIONS
October 7, 2019 [address] [place] [id number] Dear Tatyana Farias: Thank you for requesting access to [employer]. Please follow the instructions below to view your test results, access and download parts of your medical record, view details of your past and upcoming appointments, and view your medications online. How Do I Sign Up? 1. In your internet browser, go to [email or web address] 2. Click on the First Time User? Click Here link in the Sign In box. You will see the New Member Sign Up page. 3. Enter your [employer] Access Code exactly as it appears below. You will not need to use this code after youve completed the sign-up process. If you do not sign up before the expiration date, you must request a new code. · [employer] Access Code: [email or web address] · Expires: 11/15/2019 12:01 PM 
 
4. Enter the last four digits of your Social Security Number (xxxx) and Date of Birth (mm/dd/yyyy) as indicated and click Submit. You will be taken to the next sign-up page. 5. Create a [employer] ID. This will be your [employer] login ID and cannot be changed, so think of one that is secure and easy to remember. 6. Create a [employer] password. You can change your password at any time. 7. Enter your Password Reset Question and Answer. This can be used at a later time if you forget your password. 8. Enter your e-mail address. You will receive e-mail notification when new information is available in [address] 9. Click Sign Up. You can now view and download portions of your medical record. 10. Click the Download Summary menu link to download a portable copy of your medical information. Additional Information: If you require any assistance or have any questions, please contact our [employer] [address] at [phone], email at [email or web address] or check online in our Frequently Asked Questions. Remember, MyChart is NOT to be used for urgent needs. For medical emergencies, dial 911. Now available from your iPhone and Android! Sincerely, Talbot Ormond

## 2019-10-09 ENCOUNTER — OFFICE VISIT (OUTPATIENT)
Dept: FAMILY MEDICINE CLINIC | Age: 69
End: 2019-10-09

## 2019-10-09 VITALS
RESPIRATION RATE: 20 BRPM | OXYGEN SATURATION: 96 % | HEIGHT: 70 IN | BODY MASS INDEX: 35.79 KG/M2 | SYSTOLIC BLOOD PRESSURE: 142 MMHG | WEIGHT: 250 LBS | DIASTOLIC BLOOD PRESSURE: 70 MMHG | HEART RATE: 92 BPM | TEMPERATURE: 98.9 F

## 2019-10-09 DIAGNOSIS — Z23 ENCOUNTER FOR IMMUNIZATION: ICD-10-CM

## 2019-10-09 DIAGNOSIS — Z00.00 MEDICARE ANNUAL WELLNESS VISIT, SUBSEQUENT: Primary | ICD-10-CM

## 2019-10-09 NOTE — PROGRESS NOTES
[facility]  [address] [facility], [address] [place] [address]             Date of visit: 10/9/2019       This is a Subsequent Medicare Annual Wellness Visit (AWV), (Performed more than 12 months after effective date of Medicare Part B enrollment and 12 months after last wellness visit)    I have reviewed the patient's medical history in detail and updated the computerized patient record. Leona Vazquez is a 79 y.o. male   History obtained from: the patient. Concerns today   (Patient understands that medical problems addressed today may incur additional notes andcost as this is a preventive visit)      History     Patient Active Problem List   Diagnosis Code    HTN (hypertension) I10    GERD (gastroesophageal reflux disease) K21.9    Colon ulcer, aphthous K63.3    Positive PPD R76.11    RACHEL (obstructive sleep apnea) G47.33    Vitamin D deficiency E55.9    SOB (shortness of breath) R06.02    HLD (hyperlipidemia) E78.5    Knee pain M25.569    B12 deficiency E53.8    PVC's (premature ventricular contractions) I49.3    DDD (degenerative disc disease), lumbar M51.36    Rectus diastasis M62.08    Complex tear of medial meniscus of right knee as current injury S83.231A    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 9) with comorbidity ([address] E66.01    Acute bronchitis J20.9    Inguinal lymphadenopathy R59.0    Lymphadenopathy R59.1     Past Medical History:   Diagnosis Date    Anesthesia complication 0670    APNEA DURING [place]ERNIA [place]PA[place] [facility] [place] Arthritis     Basal cell carcinoma (BCC) in situ of skin     Colon ulcer, aphthous 4/1/2010    COPD, mild ([address]     Does not see anyone for this    Environmental and seasonal allergies     Essential tremor     Fatty liver 06/2019  Frequent episodes of bronchitis 2018    Frequent PVCs     Has been checked by cardiology    GERD (gastroesophageal reflux disease) 4/1/2010    High cholesterol 4/1/2010    HTN (hypertension), benign 04/01/2010    Hx MRSA infection 2013    CYSTS BL LEGS    Sleep apnea     CPAP      Past Surgical History:   Procedure Laterality Date    COLONOSCOPY N/A 4/2/2019    performed by Willian Shen MD at [address] HX AMPUTATION FINGER Right     2nd digit    HX APPENDECTOMY  1959    HX CATARACT REMOVAL Bilateral     HX CERVICAL FUSION N/A 1992    HX HERNIA REPAIR  2001    HX KNEE ARTHROSCOPY Left 1994    HX KNEE REPLACEMENT Bilateral 10/2017    HX MENISCECTOMY Right 01/06/2017    HX TONSIL AND ADENOIDECTOMY       Allergies   Allergen Reactions    Chlorhexidine Towelette Rash     Current Outpatient Medications   Medication Sig Dispense Refill    ramipril (ALTACE) 10 mg capsule TAKE 1 CAPSULE BY MOUTH TWICE DAILY 60 Cap 11    diclofenac (VOLTAREN) 1 % gel Apply 2 g to affected area four (4) times daily as needed (pain). Back and knee pain 100 g 4    cyclobenzaprine (FLEXERIL) 10 mg tablet Take 1 Tab by mouth three (3) times daily as needed for Muscle Spasm(s). 60 Tab 2    senna-docusate (PERICOLACE) 8.6-50 mg per tablet Take 1 Tab by mouth two (2) times daily as needed for Constipation. 60 Tab 2    hydroCHLOROthiazide (HYDRODIURIL) 12.5 mg tablet TAKE 1 TABLET BY MOUTH ONCE DAILY FOR  HYPERTENSION 30 Tab 11    fluticasone propionate (FLONASE ALLERGY RELIEF) 50 mcg/actuation nasal spray 2 Sprays by Both Nostrils route daily as needed for Rhinitis.  mupirocin calcium (BACTROBAN NASAL) 2 % nasal ointment 1 Each by Both Nostrils route two (2) times daily as needed. Indications: Methicillin-Resistant Staphylococcus Aureus Bacteria in Nose      rosuvastatin (CRESTOR) 10 mg tablet Take 1 Tab by mouth nightly.  Indications: excessive fat in the blood 30 Tab 3    gabapentin (NEURONTIN) 300 mg capsule Take 300 mg by mouth three (3) times daily.  raNITIdine (ZANTAC) 150 mg tablet Take 150 mg by mouth two (2) times daily as needed.  cholecalciferol, VITAMIN D3, (VITAMIN D3) 5,000 unit tab tablet Take 5,000 Units by mouth every morning.  metoprolol succinate (TOPROL-XL) 50 mg XL tablet Take 1 Tab by mouth daily. 30 Tab 11    ascorbic acid, vitamin C, (VITAMIN C) 500 mg tablet Take 1,000 mg by mouth every morning.  albuterol (PROVENTIL VENTOLIN) 2.5 mg /3 mL (0.083 %) nebulizer solution Take 2.5 mg by inhalation every four (4) hours as needed.  dextromethorphan-guaiFENesin (MUCINEX DM) 60-1,200 mg Tb12 Take 1 Tab by mouth daily as needed.  albuterol (PROVENTIL HFA, VENTOLIN HFA, PROAIR HFA) 90 mcg/actuation inhaler Take 2 Puffs by inhalation every four (4) hours as needed for Wheezing for up to 360 days. 1 Inhaler 6    nabumetone (RELAFEN) 500 mg tablet Take  by mouth two (2) times a day.  cetirizine (ZYRTEC) 10 mg tablet Take 10 mg by mouth every morning.  POTASSIUM CHLORIDE PO Take 95 mg by mouth every morning.  cyanocobalamin (VITAMIN B-12) 1,000 mcg tablet Take 1,000 mcg by mouth every morning. Family History   Problem Relation Age of Onset    Heart Disease Mother     Dementia Mother     Hypertension Mother     Heart Disease Father     Lung Disease Father     Cancer Brother         SKIN, prostate    No Known Problems Brother     Cancer Brother         prostate    Anesth Problems Neg Hx      Social History     Tobacco Use    Smoking status: Former Smoker     Packs/day: 3.00     Years: 35.00     Pack years: 105.00     Types: Cigarettes     Last attempt to quit: 2000     Years since quittin.7    Smokeless tobacco: Never Used   Substance Use Topics    Alcohol use: Yes     Alcohol/week: 21.0 standard drinks     Types: 21 Shots of liquor per week       Specialists/Care Team   Nurys Clark has established care with the following healthcare providers:  Patient Care Team:  Catherine Bower MD as PCP - Hallie Stevens MD (Cardiology)  Austin Clements MD (Dermatology)  Vidhya Pedersen MD (Urology)  Elam Boas, MD (Gastroenterology)  Estevan Skaggs MD (Surgery)  Santiago Yanes MD (Orthopedic Surgery)  Richie Story MD (Orthopedic Surgery)  Vito Soares NP as Nurse Practitioner (Surgery)  Katie Mir MD (Hematology and Oncology)  Mary Lugo MD (Endocrinology)  Favian Early MD as Physician (Sleep Medicine)      Health Risk Assessment     Demographics   male  79 y.o. General Health Questions   -During the past 4 weeks:   -how would you rate your health in general? fair   -how often have you been bothered by feeling dizzy when standing up? never   -how much have you been bothered by bodily pain? mildly   -Have you noticed any hearing difficulties? yes   -has your physical and emotional health limited your social activities with family or friends? no    Emotional Health Questions   -Do you have a history of depression, anxiety, or emotional problems? no  -Over the past 2 weeks, have you felt down, depressed or hopeless? no  -Over the past 2 weeks, have you felt little interest or pleasure in doing things? no  -Abuse neg. Health Habits   Please describe your diet habits: Self grade D  Do you get 5 servings of fruits or vegetables daily? no  Do you exercise regularly? No, back pain    Alcohol Risk Factor Screening: On any occasion during the past 3 months, have you had more than 4 drinks containing alcohol?no  Do you average more than 14 drinks per week?  yes     Activities of Daily Living and Functional Status   -Do you need help with eating, walking, dressing, bathing, toileting, the phone, transportation, shopping, preparing meals, housework, laundry, medications or managing money? no  -In the past four weeks, was someone available to help you if you needed and wanted help with anything? yes  -Are you confident are you that you can control and manage most of your health problems? yes  -Have you been given information to help you keep track of your medications? yes  -How often do you have trouble taking your medications as prescribed? occasionally    Fall Risk and Home Safety   Have you fallen 2 or more times in the past year? no  Does your home have rugs in the hallway, lack grab bars in the bathroom, lack handrails on the stairs or have poor lighting? no  Do you have smoke detectors and check them regularly? yes  Do you have difficulties driving a car? no  Do you always fasten your seat belt when you are in a car? yes    Review of Systems (if indicated for problems addressed today)   Review of Systems   Constitutional: Negative. Negative for chills, fever, malaise/fatigue and weight loss. HENT: Negative. Negative for hearing loss. Eyes: Negative. Negative for blurred vision and double vision. Respiratory: Negative. Negative for cough, sputum production and shortness of breath. Cardiovascular: Positive for palpitations. Negative for chest pain. Occasional irregular heartbeat. Gastrointestinal: Negative. Negative for abdominal pain, blood in stool, heartburn, nausea and vomiting. Genitourinary: Negative. Negative for dysuria, frequency and urgency. Musculoskeletal: Positive for back pain and joint pain. Negative for falls, myalgias and neck pain. Skin: Negative. Negative for rash. Neurological: Negative. Negative for dizziness, tingling, tremors, weakness and headaches. Endo/Heme/Allergies: Negative. Psychiatric/Behavioral: Negative. Negative for depression. Brother just .          Physical Examination     Wt Readings from Last 3 Encounters:   10/09/19 250 lb (113.4 kg)   19 243 lb 4 oz (110.3 kg)   19 243 lb (110.2 kg)     Temp Readings from Last 3 Encounters:   10/09/19 98.9 °F (37.2 °C) (Oral)   19 97.1 °F (36.2 °C) (Oral)   08/05/19 98.7 °F (37.1 °C)     BP Readings from Last 3 Encounters:   10/09/19 143/86   08/05/19 113/66   07/31/19 118/57     Pulse Readings from Last 3 Encounters:   10/09/19 92   08/05/19 (!) 57   07/31/19 87       Body mass index is 35.87 kg/m². No exam data present  Was the patient's timed Up & Go test unsteady or longer than 10 seconds? no    Evaluation of Cognitive Function   Mood/affect:  happy  Orientation: Person, Place, Time and Situation  Appearance: age appropriate and casually dressed  Family member/caregiver input: no    Additional exam if indicated for problems addressed today:  Physical Exam   Constitutional: He is oriented to person, place, and time. He appears well-developed and well-nourished. No distress. HENT:   Head: Normocephalic and atraumatic. Mouth/Throat: Oropharynx is clear and moist.   Eyes: Conjunctivae are normal. Right eye exhibits no discharge. Left eye exhibits no discharge. No scleral icterus. Neck: No thyromegaly present. No carotid bruit. Cardiovascular: Normal rate, regular rhythm and normal heart sounds. Exam reveals no gallop and no friction rub. No murmur heard. Pulmonary/Chest: Effort normal and breath sounds normal. No respiratory distress. He has no wheezes. He has no rales. He exhibits no tenderness. Abdominal: Soft. Bowel sounds are normal. He exhibits no distension and no mass. There is no tenderness. There is no guarding. Musculoskeletal: He exhibits no edema or tenderness. Lymphadenopathy:     He has no cervical adenopathy. Neurological: He is alert and oriented to person, place, and time. No cranial nerve deficit. Skin: Skin is warm and dry. No rash noted. He is not diaphoretic. No erythema. No pallor. Psychiatric: He has a normal mood and affect.  His behavior is normal.         Advice/Referrals/Counseling (as indicated)       Advance Care Planning 7/31/2019   Patient's Healthcare Decision Maker is: -James Calabrese, wife   Primary Decision Maker Name -Candelaria Bond, wife   Primary Decision Maker Phone Number -   Primary Decision Maker Relationship to Patient -   Confirm Advance Directive discussed   Patient Would Like to Complete Advance Directive No   Does the patient have other document types -         Preventive Services     (Preventive care checklist to be included in patient instructions)  Discussed today Done Previously Not Needed     x  Pneumococcal vaccines    x  Flu vaccine     x Hepatitis B vaccine (if at risk)   x   Shingles vaccine    x  TDAP vaccine    x  Digital rectal exam    x  PSA    x  Colorectal cancer screening    x  Low-dose CT for lung cancer screening    x  Bone density test    x  Glaucoma screening    x  Cholesterol test    x  Diabetes screening test     x  Diabetes self-management class      Nutritionist referral for diabetes or renal disease     Discussion of Advance Directive   Discussed with Theresa Perez. Reji Jennifer his ability to prepare and advance directive in the case that an injury or illness causes him to be unable to make health care decisions. Assessment/Plan   Z00.00    ICD-10-CM ICD-9-CM    1. Medicare annual wellness visit, subsequent Z00.00 V70.0 ACP discussion.        Patient Care Team:  Bacilio Gomez MD as PCP - General  Aneta Barney MD (Cardiology)  Moraima Dinh MD (Dermatology)  Agueda La MD (Urology)  Sidra Rivera MD (Gastroenterology)  Shana Das MD (Surgery)  Nay Nava MD (Orthopedic Surgery)  Dennis Ludwig MD (Orthopedic Surgery)  Alma Pimentel NP as Nurse Practitioner (Surgery)  Ethan Abdi MD (Hematology and Oncology)  Cammie Acevedo MD (Endocrinology)  Justa Mcarthur MD as Physician (Sleep Medicine)        Future Appointments   Date Time Provider Erin Corona   11/1/2019  8:20 Shashi Coombs MD [facility] BARBIE SCHED   7/8/2020 11:00 AM MD Jacek Elias MD

## 2019-10-09 NOTE — PROGRESS NOTES
1. Have you been to the ER, urgent care clinic since your last visit? Hospitalized since your last visit? No    2. Have you seen or consulted any other health care providers outside of the [address] [place] [address] since your last visit? Include any pap smears or colon screening. No    Reviewed record in preparation for visit and have necessary documentation  Goals that were addressed and/or need to be completed during or after this appointment include     Health Maintenance Due   Topic Date Due    Shingrix Vaccine Age 49> (1 of 2) 11/07/2002    GLAUCOMA SCREENING Q2Y  11/07/2017    MEDICARE YEARLY EXAM  12/27/2018       Patient is accompanied by self I have received verbal consent from Sigrid Travis to discuss any/all medical information while they are present in the room.

## 2019-10-22 ENCOUNTER — OFFICE VISIT (OUTPATIENT)
Dept: FAMILY MEDICINE CLINIC | Age: 69
End: 2019-10-22

## 2019-10-22 DIAGNOSIS — M51.36 DDD (DEGENERATIVE DISC DISEASE), LUMBAR: Chronic | ICD-10-CM

## 2019-10-22 DIAGNOSIS — K21.9 GASTROESOPHAGEAL REFLUX DISEASE WITHOUT ESOPHAGITIS: Chronic | ICD-10-CM

## 2019-10-22 DIAGNOSIS — E55.9 VITAMIN D DEFICIENCY: Chronic | ICD-10-CM

## 2019-10-22 DIAGNOSIS — M53.3 COCCYX PAIN: ICD-10-CM

## 2019-10-22 DIAGNOSIS — I10 ESSENTIAL HYPERTENSION: Chronic | ICD-10-CM
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
## 2019-10-22 DIAGNOSIS — G47.33 OSA (OBSTRUCTIVE SLEEP APNEA): Chronic | ICD-10-CM

## 2019-10-22 DIAGNOSIS — E78.00 PURE HYPERCHOLESTEROLEMIA: Chronic | ICD-10-CM

## 2019-10-22 DIAGNOSIS — J45.20 MILD INTERMITTENT ASTHMA WITHOUT COMPLICATION: Chronic | ICD-10-CM

## 2019-10-22 DIAGNOSIS — M53.3 SACRAL PAIN: ICD-10-CM

## 2019-10-22 DIAGNOSIS — R10.2 PELVIC PAIN: ICD-10-CM

## 2019-10-23 ENCOUNTER — TELEPHONE (OUTPATIENT)
Dept: FAMILY MEDICINE CLINIC | Age: 69
End: 2019-10-23

## 2019-10-23 VITALS
HEART RATE: 72 BPM | SYSTOLIC BLOOD PRESSURE: 131 MMHG | RESPIRATION RATE: 20 BRPM | WEIGHT: 250.8 LBS | TEMPERATURE: 98.2 F | DIASTOLIC BLOOD PRESSURE: 68 MMHG | HEIGHT: 70 IN | OXYGEN SATURATION: 94 % | BODY MASS INDEX: 35.9 KG/M2

## 2019-10-23 DIAGNOSIS — R10.2 PELVIC PAIN: ICD-10-CM
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
## 2019-10-23 NOTE — PROGRESS NOTES
[address], [address] [place] [address]  [phone]           Progress Note    Patient: Nita Encarnacion MRN: [id number]  SSN: [id number]    YOB: 1950  Age: 79 y.o. Sex: male        Fall and chronic medical problems. Subjective:     Encounter Diagnoses   Name Primary?  Fall, initial encounter- fell on Friday-4 days ago. Landed on walnuts and a garden sprayer. Just had lumbar fusion and still has numbness in the legs. Yes    Pelvic pain: hurts to sit in most positions, bruised and swollen sacral area and buttocks.  Essential hypertension:  BP Readings from Last 3 Encounters:   10/09/19 142/70   08/05/19 113/66   07/31/19 118/57     The patient reports:  taking medications as instructed, no medication side effects noted, no TIA's, no chest pain on exertion, no dyspnea on exertion, no swelling of ankles. Key CAD CHF Meds             rosuvastatin (CRESTOR) 10 mg tablet TAKE ONE TABLET BY MOUTH NIGHTLY    ramipril (ALTACE) 10 mg capsule TAKE 1 CAPSULE BY MOUTH TWICE DAILY    hydroCHLOROthiazide (HYDRODIURIL) 12.5 mg tablet TAKE 1 TABLET BY MOUTH ONCE DAILY FOR  HYPERTENSION    metoprolol succinate (TOPROL-XL) 50 mg XL tablet Take 1 Tab by mouth daily. Lab Results   Component Value Date/Time    Sodium 137 07/31/2019 01:18 PM    Potassium 4.3 07/31/2019 01:18 PM    Chloride 102 07/31/2019 01:18 PM    CO2 29 07/31/2019 01:18 PM    Anion gap 6 07/31/2019 01:18 PM    Glucose 83 07/31/2019 01:18 PM    BUN 15 07/31/2019 01:18 PM    Creatinine 0.84 07/31/2019 01:18 PM    BUN/Creatinine ratio 18 07/31/2019 01:18 PM    GFR est AA >60 07/31/2019 01:18 PM    GFR est non-AA >60 07/31/2019 01:18 PM    Calcium 8.9 07/31/2019 01:18 PM    Bilirubin, total 0.6 07/31/2019 01:18 PM    AST (SGOT) 18 07/31/2019 01:18 PM    Alk.  phosphatase 65 07/31/2019 01:18 PM    Protein, total 7.0 07/31/2019 01:18 PM    Albumin 4.0 07/31/2019 01:18 PM    Globulin 3.0 07/31/2019 01:18 PM    A-G Ratio 1.3 07/31/2019 01:18 PM    ALT (SGPT) 34 07/31/2019 01:18 PM     Low salt diet? yes  Aerobic exercise? no  Our goal is to normalize the blood pressure to decrease the risks of strokes and heart attacks. The patient is in agreement with the plan.  Gastroesophageal reflux disease without esophagitis:  Current control of Symptoms:good  Hiatal Hernia:no  Current Medications:omeprazole  The patient has no history melena or bright red blood in the stools. The patient avoids high dose aspirin and NSAID therapy. The patient is aware of diet changes needed, elevating the head of the bed and appropriate use of antacids.  RACHEL (obstructive sleep apnea): Controlled on CPAP.  Pure hypercholesterolemia:  Cardiovascular risks for him are: LDL goal is under 100  hypertension  hyperlipidemia. Key Antihyperlipidemia Meds             rosuvastatin (CRESTOR) 10 mg tablet TAKE ONE TABLET BY MOUTH NIGHTLY        Lab Results   Component Value Date/Time    Cholesterol, total 183 07/01/2019 09:15 AM    HDL Cholesterol 48 07/01/2019 09:15 AM    LDL, calculated 107 (H) 07/01/2019 09:15 AM    Triglyceride 142 07/01/2019 09:15 AM    CHOL/HDL Ratio 3.8 04/02/2010 09:09 AM     Lab Results   Component Value Date/Time    ALT (SGPT) 34 07/31/2019 01:18 PM    AST (SGOT) 18 07/31/2019 01:18 PM    Alk. phosphatase 65 07/31/2019 01:18 PM    Bilirubin, total 0.6 07/31/2019 01:18 PM      Myalgias: No   Fatigue: No   Other side effects: no  Wt Readings from Last 3 Encounters:   10/09/19 250 lb (113.4 kg)   07/31/19 243 lb 4 oz (110.3 kg)   07/31/19 243 lb (110.2 kg)     The patient is aware of our goal to reduce or eliminate the long term problems (such as strokes and heart attacks) related to poorly controlled hyperlipidemia.  DDD (degenerative disc disease), lumbar:  Sacral pain after a fall. Fears he may have hurt back repair.          Mild intermittent asthma without complication:  asthma, not currently in exacerbation. Asthma symptoms occur: infrequently. Wheezing when present is described as mild and easily relieved with rescue bronchodilator. Current limitations in activity from asthma: none. SpO2 Readings from Last 3 Encounters:   10/09/19 96%   08/05/19 96%   07/31/19 93%     Frequency of use of quick-relief meds: rare. Medication compliance: Good  Patient does not smoke cigarettes. Monitors Peak Flow at home: no           Coccyx pain: after fall. Cannot sit without pain.  Sacral pain: pain and tenderness after the fall. Fell on rolling walnuts backwards. Current and past medical information:    Current Medications after this visit[de-identified]     Current Outpatient Medications   Medication Sig    rosuvastatin (CRESTOR) 10 mg tablet TAKE ONE TABLET BY MOUTH NIGHTLY    ramipril (ALTACE) 10 mg capsule TAKE 1 CAPSULE BY MOUTH TWICE DAILY    diclofenac (VOLTAREN) 1 % gel Apply 2 g to affected area four (4) times daily as needed (pain). Back and knee pain    cyclobenzaprine (FLEXERIL) 10 mg tablet Take 1 Tab by mouth three (3) times daily as needed for Muscle Spasm(s).  senna-docusate (PERICOLACE) 8.6-50 mg per tablet Take 1 Tab by mouth two (2) times daily as needed for Constipation.  hydroCHLOROthiazide (HYDRODIURIL) 12.5 mg tablet TAKE 1 TABLET BY MOUTH ONCE DAILY FOR  HYPERTENSION    fluticasone propionate (FLONASE ALLERGY RELIEF) 50 mcg/actuation nasal spray 2 Sprays by Both Nostrils route daily as needed for Rhinitis.  mupirocin calcium (BACTROBAN NASAL) 2 % nasal ointment 1 Each by Both Nostrils route two (2) times daily as needed. Indications: Methicillin-Resistant Staphylococcus Aureus Bacteria in Nose    gabapentin (NEURONTIN) 300 mg capsule Take 300 mg by mouth three (3) times daily.  raNITIdine (ZANTAC) 150 mg tablet Take 150 mg by mouth two (2) times daily as needed.     cholecalciferol, VITAMIN D3, (VITAMIN D3) 5,000 unit tab tablet Take 5,000 Units by mouth every morning.  metoprolol succinate (TOPROL-XL) 50 mg XL tablet Take 1 Tab by mouth daily.  ascorbic acid, vitamin C, (VITAMIN C) 500 mg tablet Take 1,000 mg by mouth every morning.  albuterol (PROVENTIL VENTOLIN) 2.5 mg /3 mL (0.083 %) nebulizer solution Take 2.5 mg by inhalation every four (4) hours as needed.  dextromethorphan-guaiFENesin (MUCINEX DM) 60-1,200 mg Tb12 Take 1 Tab by mouth daily as needed.  albuterol (PROVENTIL HFA, VENTOLIN HFA, PROAIR HFA) 90 mcg/actuation inhaler Take 2 Puffs by inhalation every four (4) hours as needed for Wheezing for up to 360 days.  nabumetone (RELAFEN) 500 mg tablet Take  by mouth two (2) times a day.  cetirizine (ZYRTEC) 10 mg tablet Take 10 mg by mouth every morning.  POTASSIUM CHLORIDE PO Take 95 mg by mouth every morning.  cyanocobalamin (VITAMIN B-12) 1,000 mcg tablet Take 1,000 mcg by mouth every morning. No current facility-administered medications for this visit. Patient Active Problem List    Diagnosis Date Noted    Inguinal lymphadenopathy 01/17/2019     Priority: 1 - One    Asthma 10/19/2017     Priority: 1 - One    Complex tear of medial meniscus of right knee as current injury 01/04/2017     Priority: 1 - One    DDD (degenerative disc disease), lumbar 05/16/2016     Priority: 1 - One    PVC's (premature ventricular contractions)      Priority: 1 - One    B12 deficiency 10/17/2014     Priority: 1 - One    HLD (hyperlipidemia) 03/26/2013     Priority: 1 - One    Positive PPD 07/01/2010     Priority: 1 - One    RACHEL (obstructive sleep apnea) 07/01/2010     Priority: 1 - One    HTN (hypertension) 04/01/2010     Priority: 1 - One    GERD (gastroesophageal reflux disease) 04/01/2010     Priority: 1 - One    Lymphadenopathy 02/04/2019    Acute bronchitis 12/31/2018    Severe obesity (BMI 35.0-39. 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 10/13/2017    Rectus diastasis 2016    Knee pain 2014    SOB (shortness of breath) 10/11/2012    Vitamin D deficiency 2012    Colon ulcer, aphthous 2010       Past Medical History:   Diagnosis Date    Anesthesia complication 3377    APNEA DURING [place]ERNIA REPA[place] [facility]    Arthritis     Basal cell carcinoma (BCC) in situ of skin     Colon ulcer, aphthous 2010    COPD, mild ([address]     Does not see anyone for this    Environmental and seasonal allergies     Essential tremor     Fatty liver 2019    Frequent episodes of bronchitis     Frequent PVCs     Has been checked by cardiology    GERD (gastroesophageal reflux disease) 2010    High cholesterol 2010    HTN (hypertension), benign 2010    Hx MRSA infection     CYSTS BL LEGS    Sleep apnea     CPAP       Allergies   Allergen Reactions    Chlorhexidine Towelette Rash       Past Surgical History:   Procedure Laterality Date    COLONOSCOPY N/A 2019    performed by Carmelina Childs MD at [address] HX AMPUTATION FINGER Right     2nd digit    HX APPENDECTOMY      HX CATARACT REMOVAL Bilateral     HX CERVICAL FUSION N/A     HX HERNIA REPAIR      HX KNEE ARTHROSCOPY Left     HX KNEE REPLACEMENT Bilateral 10/2017    HX MENISCECTOMY Right 2017    HX TONSIL AND ADENOIDECTOMY         Social History     Socioeconomic History    Marital status:      Spouse name: Not on file    Number of children: Not on file    Years of education: Not on file    Highest education level: Not on file   Tobacco Use    Smoking status: Former Smoker     Packs/day: 3.00     Years: 35.00     Pack years: 105.00     Types: Cigarettes     Last attempt to quit: 2000     Years since quittin.8    Smokeless tobacco: Never Used Substance and Sexual Activity    Alcohol use: Yes     Alcohol/week: 21.0 standard drinks     Types: 21 Shots of liquor per week    Drug use: No    Sexual activity: Yes     Partners: Female     Birth control/protection: None       Review of Systems   Constitutional: Negative. Negative for chills, fever, malaise/fatigue and weight loss. HENT: Negative. Negative for hearing loss. Eyes: Negative. Negative for blurred vision and double vision. Respiratory: Negative. Negative for cough, sputum production and shortness of breath. Cardiovascular: Negative. Negative for chest pain and palpitations. Gastrointestinal: Negative. Negative for abdominal pain, blood in stool, heartburn, nausea and vomiting. Genitourinary: Negative. Negative for dysuria, frequency and urgency. Musculoskeletal: Negative. Negative for back pain, falls, myalgias and neck pain. Skin: Negative. Negative for rash. Neurological: Negative. Negative for dizziness, tingling, tremors, weakness and headaches. Endo/Heme/Allergies: Negative. Psychiatric/Behavioral: Negative. Negative for depression. Objective: There were no vitals filed for this visit. There is no height or weight on file to calculate BMI. Physical Exam   Constitutional: He is oriented to person, place, and time and well-developed, well-nourished, and in no distress. HENT:   Head: Normocephalic and atraumatic. Eyes: Conjunctivae are normal.   Cardiovascular: Normal rate and regular rhythm. Pulmonary/Chest: Effort normal.   Abdominal: He exhibits no distension. Musculoskeletal: He exhibits tenderness. He exhibits no edema. Swelling and bruises on sacrum and buttocks. Neurological: He is alert and oriented to person, place, and time. Skin: No rash noted. No erythema.    Psychiatric: Mood and affect normal.         Health Maintenance Due   Topic Date Due    GLAUCOMA SCREENING Q2Y  11/07/2017         Assessment and orders: Encounter Diagnoses     ICD-10-CM ICD-9-CM   1. Fall, initial encounter [address] XXXA E888.9   2. Pelvic pain R10.2 [id number]   3. Essential hypertension I10 401.9   4. Gastroesophageal reflux disease without esophagitis K21.9 530.81   5. RACHEL (obstructive sleep apnea) G47.33 327.23   6. Pure hypercholesterolemia E78.00 272.0   7. DDD (degenerative disc disease), lumbar M51.36 722.52   8. Mild intermittent asthma without complication U65.66 487.24   9. Coccyx pain M53.3 724.79   10. Sacral pain M53.3 724.6     Diagnoses and all orders for this visit:    1. Fall, initial encounter  -     XR PELV 3 V; Future  -     XR SPINE LUMB 2 OR 3 V; Future  -     XR SACRUM AND COCCYX; Future    2. Pelvic pain  -     XR PELV 3 V; Future  -     XR SPINE LUMB 2 OR 3 V; Future  -     XR SACRUM AND COCCYX; Future    3. Essential hypertension  -     T4, FREE  -     METABOLIC PANEL, COMPREHENSIVE  -     MICROALBUMIN, UR, RAND W/ MICROALB/CREAT RATIO  -     HEMOGLOBIN  -     LIPID PANEL    4. Gastroesophageal reflux disease without esophagitis  -     HEMOGLOBIN    5. RACHEL (obstructive sleep apnea)- controlled    6. Pure hypercholesterolemia  -     METABOLIC PANEL, COMPREHENSIVE  -     LIPID PANEL    7. DDD (degenerative disc disease), lumbar- needs x-ray. 8. Mild intermittent asthma without complication    9. Coccyx pain  -     XR SACRUM AND COCCYX; Future    10. Sacral pain  -     XR PELV 3 V; Future  -     XR SPINE LUMB 2 OR 3 V; Future  -     XR SACRUM AND COCCYX; Future            Plan of care:  Discussed diagnoses in detail with patient. Medication risks/benefits/side effects discussed with patient. All of the patient's questions were addressed. The patient understands and agrees with our plan of care. The patient knows to call back if they are unsure of or forget any changes we discussed today or if the symptoms change.      The patient received an After-Visit Summary which contains VS, orders, medication list and allergy list. This can be used as a \"mini-medical record\" should they have to seek medical care while out of town. Patient Care Team:  Sheri Cifuentes MD as PCP - General  Avila Gupta MD (Cardiology)  John Cano MD (Dermatology)  Mary Robin MD (Urology)  Hong Garcias MD (Gastroenterology)  Lorne Wong MD (Surgery)  Minh Oliveira MD (Orthopedic Surgery)  Elin Lam MD (Orthopedic Surgery)  Day Salgado NP as Nurse Practitioner (Surgery)  Edgar Cr MD (Hematology and Oncology)  René Cedillo MD (Endocrinology)  Dann Manuel MD as Physician (Sleep Medicine)        Future Appointments   Date Time Provider [facility]   11/1/2019  8:20 AM Sheri Cifuentes MD [address]   7/8/2020 11:00 AM Dann Manuel  [address]       Signed By: Carlos Abad MD     October 22, 2019      ATTENTION:   This medical record was transcribed using an electronic medical records/speech recognition system. Although proofread, it may and can contain electronic, spelling and other errors. Corrections may be executed at a later time. Please feel free to contact me for any clarifications as needed.

## 2019-10-23 NOTE — PROGRESS NOTES
1. Have you been to the ER, urgent care clinic since your last visit? Hospitalized since your last visit? No    2. Have you seen or consulted any other health care providers outside of the [address] [place] [address] since your last visit? Include any pap smears or colon screening. No    Reviewed record in preparation for visit and have necessary documentation  Goals that were addressed and/or need to be completed during or after this appointment include     Health Maintenance Due   Topic Date Due    GLAUCOMA SCREENING Q2Y  11/07/2017       Patient is accompanied by self I have received verbal consent from Damon Troy to discuss any/all medical information while they are present in the room.

## 2019-10-23 NOTE — TELEPHONE ENCOUNTER
Call made to pt. Per Dr. Migdalia Foss:     Has a new compression FX L1. This is why he is in so much pain. [employer] is trying to move his appt up with Dr. Ellard Kawasaki. He should take the half of hydrocodone for pain as tylenol is not likely to help. Limit activity x 3 weeks, no tractor work. Thank you,  Dr. Migdalia Foss    Pt verbalized understanding and stated that he was going to the orthopedic/spine doctor tomorrow afternoon. It was not Dr Ellard Kawasaki but an associate of his.

## 2019-10-24 LAB
ALBUMIN SERPL-MCNC: 4.2 G/DL (ref 3.6–4.8)
ALBUMIN/CREAT UR: 2.3 MG/G CREAT (ref 0–30)
ALBUMIN/GLOB SERPL: 1.5 {RATIO} (ref 1.2–2.2)
ALP SERPL-CCNC: 51 IU/L (ref 39–117)
ALT SERPL-CCNC: 29 IU/L (ref 0–44)
AST SERPL-CCNC: 25 IU/L (ref 0–40)
BILIRUB SERPL-MCNC: 0.4 MG/DL (ref 0–1.2)
BUN SERPL-MCNC: 13 MG/DL (ref 8–27)
BUN/CREAT SERPL: 15 (ref 10–24)
CALCIUM SERPL-MCNC: 9.5 MG/DL (ref 8.6–10.2)
CHLORIDE SERPL-SCNC: 100 MMOL/L (ref 96–106)
CHOLEST SERPL-MCNC: 119 MG/DL (ref 100–199)
CO2 SERPL-SCNC: 28 MMOL/L (ref 20–29)
CREAT SERPL-MCNC: 0.89 MG/DL (ref 0.76–1.27)
CREAT UR-MCNC: 154.4 MG/DL
GLOBULIN SER CALC-MCNC: 2.8 G/DL (ref 1.5–4.5)
GLUCOSE SERPL-MCNC: 91 MG/DL (ref 65–99)
HDLC SERPL-MCNC: 34 MG/DL
HGB BLD-MCNC: 15.7 G/DL (ref 13–17.7)
LDLC SERPL CALC-MCNC: 50 MG/DL (ref 0–99)
MICROALBUMIN UR-MCNC: 3.5 UG/ML
POTASSIUM SERPL-SCNC: 4.9 MMOL/L (ref 3.5–5.2)
PROT SERPL-MCNC: 7 G/DL (ref 6–8.5)
SODIUM SERPL-SCNC: 144 MMOL/L (ref 134–144)
T4 FREE SERPL-MCNC: 1.66 NG/DL (ref 0.82–1.77)
TRIGL SERPL-MCNC: 174 MG/DL (ref 0–149)
VLDLC SERPL CALC-MCNC: 35 MG/DL (ref 5–40)

## 2019-10-25 ENCOUNTER — OFFICE VISIT (OUTPATIENT)
Dept: FAMILY MEDICINE CLINIC | Age: 69
End: 2019-10-25

## 2019-10-25 VITALS
OXYGEN SATURATION: 97 % | TEMPERATURE: 97.7 F | RESPIRATION RATE: 18 BRPM | DIASTOLIC BLOOD PRESSURE: 77 MMHG | SYSTOLIC BLOOD PRESSURE: 120 MMHG | BODY MASS INDEX: 35.8 KG/M2 | WEIGHT: 250.1 LBS | HEART RATE: 96 BPM | HEIGHT: 70 IN

## 2019-10-25 DIAGNOSIS — Z86.14 HX MRSA INFECTION: ICD-10-CM

## 2019-10-25 DIAGNOSIS — L24.89 IRRITANT CONTACT DERMATITIS DUE TO OTHER AGENTS: Primary | ICD-10-CM

## 2019-10-25 DIAGNOSIS — R21 RASH: ICD-10-CM

## 2019-10-25 RX ORDER — TRIAMCINOLONE ACETONIDE 5 MG/G
CREAM TOPICAL
Qty: 454 G | Refills: 1 | Status: SHIPPED | OUTPATIENT
Start: 2019-10-25 | End: 2021-07-27

## 2019-10-25 RX ORDER — TRIAMCINOLONE ACETONIDE 5 MG/G
CREAM TOPICAL 2 TIMES DAILY
Qty: 15 G | Refills: 0 | Status: SHIPPED | OUTPATIENT
Start: 2019-10-25 | End: 2021-01-04 | Stop reason: SDUPTHER

## 2019-10-25 NOTE — PROGRESS NOTES
Chief Complaint   Patient presents with    Rash     left side of shoulder, back, and hip     Visit Vitals  /77 (BP 1 Location: Left arm, BP Patient Position: Sitting)   Pulse 96   Temp 97.7 °F (36.5 °C) (Oral)   Resp 18   Ht 5' 10\" (1.778 m)   Wt 250 lb 1.6 oz (113.4 kg)   SpO2 97%   BMI 35.89 kg/m²     1. Have you been to the ER, urgent care clinic since your last visit? Hospitalized since your last visit? No    2. Have you seen or consulted any other health care providers outside of the [address] [place] [address] since your last visit? Include any pap smears or colon screening.  No    Reviewed record in preparation for visit and have necessary documentation  Pt did not bring medication to office visit for review  opportunity was given for questions  Goals that were addressed and/or need to be completed during or after this appointment include  Health Maintenance Due   Topic Date Due    GLAUCOMA SCREENING Q2Y  11/07/2017

## 2019-10-25 NOTE — PROGRESS NOTES
Leona Vazquez  79 y.o. male  1950  [address] [address]  [id number]     [facility]: Progress Note       Encounter Date: 10/25/2019    Chief Complaint   Patient presents with    Rash     left side of shoulder, back, and hip     History of Present Illness   Leona Vazquez is a 79 y.o. male who presents to clinic today for:    Rash-  Rash noted only on left side for the past ~2 days. States for the past few months he has been sleeping in the recliner due to back pain; a few days ago he slept in bed and was exposed to Ger Nils. Noted pet dander in the bed. Rash areas are not painful but does itch. Denies-fevers, pustules, crusting, drainage or any other s/s of infection. Showered this AM in luke warm water and dial antibacterial soap. Treatment-OTC Calamine clear which seemed to provide some relief. Health Maintenance    Health Maintenance Due   Topic Date Due    GLAUCOMA SCREENING Q2Y  11/07/2017     Review of Systems   Review of Systems   Constitutional: Negative. HENT: Negative. Eyes: Negative. Respiratory: Negative. Cardiovascular: Negative. Gastrointestinal: Negative. Genitourinary: Negative. Musculoskeletal: Negative. Skin: Positive for itching and rash. Neurological: Negative. Endo/Heme/Allergies: Negative. Psychiatric/Behavioral: Negative. Vitals/Objective:     Vitals:    10/25/19 1051   BP: 120/77   Pulse: 96   Resp: 18   Temp: 97.7 °F (36.5 °C)   TempSrc: Oral   SpO2: 97%   Weight: 250 lb 1.6 oz (113.4 kg)   Height: 5' 10\" (1.778 m)     Body mass index is 35.89 kg/m². Physical Exam   Skin: Skin is warm, dry and intact. Burn and rash noted. Rash is pustular. Rash is not papular, not vesicular and not urticarial. There is erythema. No results found for this or any previous visit (from the past 24 hour(s)). Assessment and Plan:   1.  Irritant contact dermatitis due to other agents    - triamcinolone (ARISTOCORT) 0.5 % topical cream; Apply  to affected area two (2) times daily as needed for Skin Irritation or Itching. use thin layer on chest and back. Dispense: 454 g; Refill: 1  - triamcinolone (ARISTOCORT) 0.5 % topical cream; Apply  to affected area two (2) times a day. use thin layer on chest and back  Dispense: 15 g; Refill: 0    Discussed using high potency cream on irritated skin and not on face. Discussed itch/scratch cycle and trying to prevent a secondary infection. Diff dx shingles (rash is along the dermatone) -not likely since areas are not painful/tingling, raised, crusted pustules. Hx of MRSA but doesn't present like a typical MRSA infection. RTC for worsening symptoms. I have discussed the diagnosis with the patient and the intended plan as seen in the above orders. he has expressed understanding. The patient has received an after-visit summary and questions were answered concerning future plans. I have discussed medication side effects and warnings with the patient as well. Electronically Signed: Kelley Trammell NP     History/Allergies   Patients past medical, surgical and family histories were reviewed and updated.     Past Medical History:   Diagnosis Date    Anesthesia complication 8544    APNEA DURING HERNIA REPAIR, POST OPERATIVE INTUBATION AT [facility]    Arthritis     Basal cell carcinoma (BCC) in situ of skin     Colon ulcer, aphthous 4/1/2010    COPD, mild ([address]     Does not see anyone for this    Environmental and seasonal allergies     Essential tremor     Fatty liver 06/2019    Frequent episodes of bronchitis 2018    Frequent PVCs     Has been checked by cardiology    GERD (gastroesophageal reflux disease) 4/1/2010    High cholesterol 4/1/2010    HTN (hypertension), benign 04/01/2010    Hx MRSA infection 2013    CYSTS BL LEGS    Sleep apnea     CPAP      Past Surgical History:   Procedure Laterality Date    COLONOSCOPY N/A 4/2/2019    performed by Daryl Acevedo MD at [facility] HX AMPUTATION FINGER Right     2nd digit    HX APPENDECTOMY      HX CATARACT REMOVAL Bilateral     HX CERVICAL FUSION N/A     [address]      HX KNEE ARTHROSCOPY Left     HX KNEE REPLACEMENT Bilateral 10/2017    HX MENISCECTOMY Right 2017    HX TONSIL AND ADENOIDECTOMY       Family History   Problem Relation Age of Onset    Heart Disease Mother     Dementia Mother     Hypertension Mother     Heart Disease Father     Lung Disease Father     Cancer Brother         SKIN, prostate    No Known Problems Brother     Cancer Brother         prostate    Anesth Problems Neg Hx      Social History     Socioeconomic History    Marital status:      Spouse name: Not on file    Number of children: Not on file    Years of education: Not on file    Highest education level: Not on file   Occupational History    Not on file   Social Needs    Financial resource strain: Not on file    Food insecurity:     Worry: Not on file     Inability: Not on file    Transportation needs:     Medical: Not on file     Non-medical: Not on file   Tobacco Use    Smoking status: Former Smoker     Packs/day: 3.00     Years: 35.00     Pack years: 105.00     Types: Cigarettes     Last attempt to quit: 2000     Years since quittin.8    Smokeless tobacco: Never Used   Substance and Sexual Activity    Alcohol use:  Yes     Alcohol/week: 21.0 standard drinks     Types: 21 Shots of liquor per week    Drug use: No    Sexual activity: Yes     Partners: Female     Birth control/protection: None   Lifestyle    Physical activity:     Days per week: Not on file     Minutes per session: Not on file    Stress: Not on file   Relationships    Social connections:     Talks on phone: Not on file     Gets together: Not on file     Attends Cheondoism service: Not on file     Active member of club or organization: Not on file     Attends meetings of clubs or organizations: Not on file     Relationship status: Not on file    Intimate partner violence:     Fear of current or ex partner: Not on file     Emotionally abused: Not on file     Physically abused: Not on file     Forced sexual activity: Not on file   Other Topics Concern    Not on file   Social History Narrative    Not on file         Allergies   Allergen Reactions    Chlorhexidine Towelette Rash       Disposition         Future Appointments   Date Time Provider Erin Corona   1/22/2020  8:40 AM Catherine Bower MD [facility] [place] [id number]   7/8/2020 11:00 AM Favian Early  [address]            Current Medications after this visit     Current Outpatient Medications   Medication Sig    triamcinolone (ARISTOCORT) 0.5 % topical cream Apply  to affected area two (2) times daily as needed for Skin Irritation or Itching. use thin layer on chest and back.  triamcinolone (ARISTOCORT) 0.5 % topical cream Apply  to affected area two (2) times a day. use thin layer on chest and back    rosuvastatin (CRESTOR) 10 mg tablet TAKE ONE TABLET BY MOUTH NIGHTLY    ramipril (ALTACE) 10 mg capsule TAKE 1 CAPSULE BY MOUTH TWICE DAILY    diclofenac (VOLTAREN) 1 % gel Apply 2 g to affected area four (4) times daily as needed (pain). Back and knee pain    cyclobenzaprine (FLEXERIL) 10 mg tablet Take 1 Tab by mouth three (3) times daily as needed for Muscle Spasm(s).  senna-docusate (PERICOLACE) 8.6-50 mg per tablet Take 1 Tab by mouth two (2) times daily as needed for Constipation.  hydroCHLOROthiazide (HYDRODIURIL) 12.5 mg tablet TAKE 1 TABLET BY MOUTH ONCE DAILY FOR  HYPERTENSION    fluticasone propionate (FLONASE ALLERGY RELIEF) 50 mcg/actuation nasal spray 2 Sprays by Both Nostrils route daily as needed for Rhinitis.  mupirocin calcium (BACTROBAN NASAL) 2 % nasal ointment 1 Each by Both Nostrils route two (2) times daily as needed.  Indications: Methicillin-Resistant Staphylococcus Aureus Bacteria in Nose    gabapentin (NEURONTIN) 300 mg capsule Take 300 mg by mouth three (3) times daily.  raNITIdine (ZANTAC) 150 mg tablet Take 150 mg by mouth two (2) times daily as needed.  cholecalciferol, VITAMIN D3, (VITAMIN D3) 5,000 unit tab tablet Take 5,000 Units by mouth every morning.  metoprolol succinate (TOPROL-XL) 50 mg XL tablet Take 1 Tab by mouth daily.  ascorbic acid, vitamin C, (VITAMIN C) 500 mg tablet Take 1,000 mg by mouth every morning.  albuterol (PROVENTIL VENTOLIN) 2.5 mg /3 mL (0.083 %) nebulizer solution Take 2.5 mg by inhalation every four (4) hours as needed.  dextromethorphan-guaiFENesin (MUCINEX DM) 60-1,200 mg Tb12 Take 1 Tab by mouth daily as needed.  albuterol (PROVENTIL HFA, VENTOLIN HFA, PROAIR HFA) 90 mcg/actuation inhaler Take 2 Puffs by inhalation every four (4) hours as needed for Wheezing for up to 360 days.  nabumetone (RELAFEN) 500 mg tablet Take  by mouth two (2) times a day.  cetirizine (ZYRTEC) 10 mg tablet Take 10 mg by mouth every morning.  POTASSIUM CHLORIDE PO Take 95 mg by mouth every morning.  cyanocobalamin (VITAMIN B-12) 1,000 mcg tablet Take 1,000 mcg by mouth every morning. No current facility-administered medications for this visit. There are no discontinued medications.

## 2019-10-25 NOTE — PATIENT INSTRUCTIONS

## 2019-11-06 ENCOUNTER — HOSPITAL ENCOUNTER (OUTPATIENT)
Dept: MRI IMAGING | Age: 69
Discharge: HOME | End: 2019-11-06
Attending: ORTHOPAEDIC SURGERY
Payer: MEDICARE

## 2019-11-06 DIAGNOSIS — S32.010A COMPRESSION FRACTURE OF L1 LUMBAR VERTEBRA (HCC): ICD-10-CM

## 2019-11-06 PROCEDURE — 72148 MRI LUMBAR SPINE W/O DYE: CPT

## 2019-12-02 ENCOUNTER — TELEPHONE (OUTPATIENT)
Dept: FAMILY MEDICINE CLINIC | Age: 69
End: 2019-12-02

## 2019-12-02 RX ORDER — ALBUTEROL SULFATE 0.83 MG/ML
2.5 SOLUTION RESPIRATORY (INHALATION)
Qty: 50 NEBULE | Refills: 4 | Status: SHIPPED | OUTPATIENT
Start: 2019-12-02 | End: 2019-12-03 | Stop reason: SDUPTHER

## 2019-12-02 NOTE — TELEPHONE ENCOUNTER
Patient came into office this morning stating that he has bronchitis that his OTC meds are not helping. He is requesting a Z-landy and a refill on albuterol for nebulizer. Also, He states that he thinks he may have shingles but is unsure. No history of shingles. I offered him appt with another provider but he request to wait to see Dr. Sid Foley. I advised him Dr. Sid Foley is out of office today. Patient states he would get on schedule for next available appt with Dr. Sid Foley.

## 2019-12-03 ENCOUNTER — OFFICE VISIT (OUTPATIENT)
Dept: FAMILY MEDICINE CLINIC | Age: 69
End: 2019-12-03

## 2019-12-03 VITALS
DIASTOLIC BLOOD PRESSURE: 76 MMHG | HEIGHT: 70 IN | TEMPERATURE: 99.3 F | RESPIRATION RATE: 17 BRPM | BODY MASS INDEX: 35.5 KG/M2 | WEIGHT: 248 LBS | HEART RATE: 85 BPM | OXYGEN SATURATION: 93 % | SYSTOLIC BLOOD PRESSURE: 119 MMHG

## 2019-12-03 DIAGNOSIS — J20.9 ACUTE BRONCHITIS, UNSPECIFIED ORGANISM: Primary | ICD-10-CM

## 2019-12-03 DIAGNOSIS — N28.1 RENAL CYST, LEFT: ICD-10-CM

## 2019-12-03 DIAGNOSIS — L30.9 DERMATITIS: ICD-10-CM

## 2019-12-03 DIAGNOSIS — J45.41 MODERATE PERSISTENT ASTHMA WITH EXACERBATION: ICD-10-CM

## 2019-12-03 DIAGNOSIS — I10 ESSENTIAL HYPERTENSION: ICD-10-CM

## 2019-12-03 RX ORDER — ALBUTEROL SULFATE 0.83 MG/ML
2.5 SOLUTION RESPIRATORY (INHALATION)
Qty: 50 NEBULE | Refills: 4 | Status: SHIPPED | OUTPATIENT
Start: 2019-12-03 | End: 2021-12-27 | Stop reason: SDUPTHER

## 2019-12-03 RX ORDER — AZITHROMYCIN 250 MG/1
TABLET, FILM COATED ORAL
Qty: 6 TAB | Refills: 0 | Status: SHIPPED | OUTPATIENT
Start: 2019-12-03 | End: 2019-12-08

## 2019-12-03 NOTE — PROGRESS NOTES
1. Have you been to the ER, urgent care clinic, or been hospitalized since your last visit? No      2. Have you seen or consulted any other health care providers outside of the [address] [place] [address] since your last visit? No     Reviewed record in preparation for visit and have necessary documentation  Goals that were addressed and/or need to be completed during or after this appointment include   Health Maintenance Due   Topic Date Due    GLAUCOMA SCREENING Q2Y  11/07/2017       I have received verbal consent from Maria Esther Cedillo to discuss any/all medical information while others present in the room.

## 2019-12-03 NOTE — PATIENT INSTRUCTIONS
Bronchitis: Care Instructions Your Care Instructions Bronchitis is inflammation of the bronchial tubes, which carry air to the lungs. The tubes swell and produce mucus, or phlegm. The mucus and inflamed bronchial tubes make you cough. You may have trouble breathing. Most cases of bronchitis are caused by viruses like those that cause colds. Antibiotics usually do not help and they may be harmful. Bronchitis usually develops rapidly and lasts about 2 to 3 weeks in otherwise healthy people. Follow-up care is a key part of your treatment and safety. Be sure to make and go to all appointments, and call your doctor if you are having problems. It's also a good idea to know your test results and keep a list of the medicines you take. How can you care for yourself at home? · Take all medicines exactly as prescribed. Call your doctor if you think you are having a problem with your medicine. · Get some extra rest. 
· Take an over-the-counter pain medicine, such as acetaminophen (Tylenol), ibuprofen (Advil, Motrin), or naproxen (Aleve) to reduce fever and relieve body aches. Read and follow all instructions on the label. · Do not take two or more pain medicines at the same time unless the doctor told you to. Many pain medicines have acetaminophen, which is Tylenol. Too much acetaminophen (Tylenol) can be harmful. · Take an over-the-counter cough medicine that contains dextromethorphan to help quiet a dry, hacking cough so that you can sleep. Avoid cough medicines that have more than one active ingredient. Read and follow all instructions on the label. · Breathe moist air from a humidifier, hot shower, or sink filled with hot water. The heat and moisture will thin mucus so you can cough it out. · Do not smoke. Smoking can make bronchitis worse. If you need help quitting, talk to your doctor about stop-smoking programs and medicines. These can increase your chances of quitting for good. When should you call for help? Call 911 anytime you think you may need emergency care. For example, call if: 
  · You have severe trouble breathing.  
 Call your doctor now or seek immediate medical care if: 
  · You have new or worse trouble breathing.  
  · You cough up dark brown or bloody mucus (sputum).  
  · You have a new or higher fever.  
  · You have a new rash.  
 Watch closely for changes in your health, and be sure to contact your doctor if: 
  · You cough more deeply or more often, especially if you notice more mucus or a change in the color of your mucus.  
  · You are not getting better as expected. Where can you learn more? Go to http://kurt-lin.info/. Enter H333 in the search box to learn more about \"Bronchitis: Care Instructions. \" Current as of: June 9, 2019 Content Version: 12.2 © 4327-0134 [employer]. Care instructions adapted under license by [employer] (which disclaims liability or warranty for this information). If you have questions about a medical condition or this instruction, always ask your healthcare professional. [address] any warranty or liability for your use of this information.

## 2019-12-03 NOTE — PROGRESS NOTES
[address], [address] [place] [address]  [phone]           Progress Note    Patient: Lakia Riggins MRN: [id number]  SSN: [id number]    YOB: 1950  Age: 81 y.o. Sex: male        Chief Complaint   Patient presents with    Cold Symptoms     bronchitis, feeling bad since Friday night    Other     discuss other things with MD (cyst on kidney)         Subjective:     Encounter Diagnoses   Name Primary?  Acute bronchitis, unspecified organism:   Duration of symptoms:4 days   Smoker: No   Fever: No   Chills: No   Sweats: No   Shortness of breath: No   Wheezing: Yes   History of asthma: Yes with infections   Sputum: Green       Yes    Dermatitis: Located on both legs near her buttocks. Annular red rash with advancing border. Also has a patch on his right lower abdomen. This looks like tinea to me. He was worried that it was shingles but this has no appearance of shingles. He says he has been putting steroid cream on it which would make it worse. I will offer him 2 options empiric trial with Lotrimin or have him call his dermatologist to be worked in. He chose to try to get in with and with his dermatologist for definitive diagnosis. The advancing borders are more raised and red than usual so I would like to have the dermatologist see this rash untreated.  Moderate persistent asthma with exacerbation:  asthma, currently in exacerbation due to acute bronchitis. .   Asthma symptoms occur: daily. Wheezing when present is described as moderate and easily relieved with rescue bronchodilator. Current limitations in activity from asthma: Any strenuous exercise causes wheezing. .    SpO2 Readings from Last 3 Encounters:   12/03/19 93%   10/25/19 97%   10/23/19 94%     Frequency of use of quick-relief meds: rare. Medication compliance: Good  Patient does not smoke cigarettes.   Monitors Peak Flow at home: no           Essential hypertension:  BP Readings from Last 3 Encounters:   12/03/19 119/76   10/25/19 120/77   10/23/19 131/68     The patient reports:  taking medications as instructed, no medication side effects noted, no TIA's, no chest pain on exertion, no dyspnea on exertion, no swelling of ankles. Key CAD CHF Meds             rosuvastatin (CRESTOR) 10 mg tablet (Taking) TAKE ONE TABLET BY MOUTH NIGHTLY    ramipril (ALTACE) 10 mg capsule (Taking) TAKE 1 CAPSULE BY MOUTH TWICE DAILY    hydroCHLOROthiazide (HYDRODIURIL) 12.5 mg tablet (Taking) TAKE 1 TABLET BY MOUTH ONCE DAILY FOR  HYPERTENSION    metoprolol succinate (TOPROL-XL) 50 mg XL tablet (Taking) Take 1 Tab by mouth daily. Lab Results   Component Value Date/Time    Sodium 144 10/23/2019 08:29 AM    Potassium 4.9 10/23/2019 08:29 AM    Chloride 100 10/23/2019 08:29 AM    CO2 28 10/23/2019 08:29 AM    Anion gap 6 07/31/2019 01:18 PM    Glucose 91 10/23/2019 08:29 AM    BUN 13 10/23/2019 08:29 AM    Creatinine 0.89 10/23/2019 08:29 AM    BUN/Creatinine ratio 15 10/23/2019 08:29 AM    GFR est  10/23/2019 08:29 AM    GFR est non-AA 89 10/23/2019 08:29 AM    Calcium 9.5 10/23/2019 08:29 AM    Bilirubin, total 0.4 10/23/2019 08:29 AM    AST (SGOT) 25 10/23/2019 08:29 AM    Alk. phosphatase 51 10/23/2019 08:29 AM    Protein, total 7.0 10/23/2019 08:29 AM    Albumin 4.2 10/23/2019 08:29 AM    Globulin 3.0 07/31/2019 01:18 PM    A-G Ratio 1.5 10/23/2019 08:29 AM    ALT (SGPT) 29 10/23/2019 08:29 AM     Low salt diet? yes  Aerobic exercise? no  Our goal is to normalize the blood pressure to decrease the risks of strokes and heart attacks. The patient is in agreement with the plan.  Renal cyst, left:  I pulled up his MRI in the radiologist did not mention the size of the cyst on the left side or whether or not it had changed. I will try to get the radiologist to look at this again for patient's reassurance.   He saw the urologist previously and was told it was nothing to worry about. Current and past medical information:    Current Medications after this visit[de-identified]     Current Outpatient Medications   Medication Sig    albuterol (PROVENTIL VENTOLIN) 2.5 mg /3 mL (0.083 %) nebu Take 3 mL by inhalation every four (4) hours as needed (cough/wheezing).  albuterol sulfate (PROAIR RESPICLICK) 90 mcg/actuation aepb Take 2 Puffs by inhalation every four (4) hours as needed (wheezing).  azithromycin (ZITHROMAX) 250 mg tablet Take 2 tablets today, then take 1 tablet daily    triamcinolone (ARISTOCORT) 0.5 % topical cream Apply  to affected area two (2) times daily as needed for Skin Irritation or Itching. use thin layer on chest and back.  triamcinolone (ARISTOCORT) 0.5 % topical cream Apply  to affected area two (2) times a day. use thin layer on chest and back    rosuvastatin (CRESTOR) 10 mg tablet TAKE ONE TABLET BY MOUTH NIGHTLY    ramipril (ALTACE) 10 mg capsule TAKE 1 CAPSULE BY MOUTH TWICE DAILY    diclofenac (VOLTAREN) 1 % gel Apply 2 g to affected area four (4) times daily as needed (pain). Back and knee pain    cyclobenzaprine (FLEXERIL) 10 mg tablet Take 1 Tab by mouth three (3) times daily as needed for Muscle Spasm(s).  senna-docusate (PERICOLACE) 8.6-50 mg per tablet Take 1 Tab by mouth two (2) times daily as needed for Constipation.  hydroCHLOROthiazide (HYDRODIURIL) 12.5 mg tablet TAKE 1 TABLET BY MOUTH ONCE DAILY FOR  HYPERTENSION    fluticasone propionate (FLONASE ALLERGY RELIEF) 50 mcg/actuation nasal spray 2 Sprays by Both Nostrils route daily as needed for Rhinitis.  mupirocin calcium (BACTROBAN NASAL) 2 % nasal ointment 1 Each by Both Nostrils route two (2) times daily as needed. Indications: Methicillin-Resistant Staphylococcus Aureus Bacteria in Nose    gabapentin (NEURONTIN) 300 mg capsule Take 300 mg by mouth three (3) times daily.  raNITIdine (ZANTAC) 150 mg tablet Take 150 mg by mouth two (2) times daily as needed.  cholecalciferol, VITAMIN D3, (VITAMIN D3) 5,000 unit tab tablet Take 5,000 Units by mouth every morning.  metoprolol succinate (TOPROL-XL) 50 mg XL tablet Take 1 Tab by mouth daily.  ascorbic acid, vitamin C, (VITAMIN C) 500 mg tablet Take 1,000 mg by mouth every morning.  dextromethorphan-guaiFENesin (MUCINEX DM) 60-1,200 mg Tb12 Take 1 Tab by mouth daily as needed.  nabumetone (RELAFEN) 500 mg tablet Take  by mouth two (2) times a day.  cetirizine (ZYRTEC) 10 mg tablet Take 10 mg by mouth every morning.  POTASSIUM CHLORIDE PO Take 95 mg by mouth every morning.  cyanocobalamin (VITAMIN B-12) 1,000 mcg tablet Take 1,000 mcg by mouth every morning. No current facility-administered medications for this visit. Patient Active Problem List    Diagnosis Date Noted    Inguinal lymphadenopathy 01/17/2019     Priority: 1 - One    Asthma 10/19/2017     Priority: 1 - One    Complex tear of medial meniscus of right knee as current injury 01/04/2017     Priority: 1 - One    DDD (degenerative disc disease), lumbar 05/16/2016     Priority: 1 - One    PVC's (premature ventricular contractions)      Priority: 1 - One    B12 deficiency 10/17/2014     Priority: 1 - One    HLD (hyperlipidemia) 03/26/2013     Priority: 1 - One    Positive PPD 07/01/2010     Priority: 1 - One    RACHEL (obstructive sleep apnea) 07/01/2010     Priority: 1 - One    HTN (hypertension) 04/01/2010     Priority: 1 - One    GERD (gastroesophageal reflux disease) 04/01/2010     Priority: 1 - One    Lymphadenopathy 02/04/2019    Acute bronchitis 12/31/2018    Severe obesity (BMI 35.0-39. 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 10/13/2017    Rectus diastasis 06/03/2016    Knee pain 08/04/2014    SOB (shortness of breath) 10/11/2012    Vitamin D deficiency 2012    Colon ulcer, aphthous 2010       Past Medical History:   Diagnosis Date    Anesthesia complication     APNEA DURING HERNIA REPAIR, POST OPERATIVE INTUBATION AT [facility]    Arthritis     Basal cell carcinoma (BCC) in situ of skin     Colon ulcer, aphthous 2010    COPD, mild ([address]     Does not see anyone for this    Environmental and seasonal allergies     Essential tremor     Fatty liver 2019    Frequent episodes of bronchitis 2018    Frequent PVCs     Has been checked by cardiology    GERD (gastroesophageal reflux disease) 2010    High cholesterol 2010    HTN (hypertension), benign 2010    Hx MRSA infection     CYSTS BL LEGS    Sleep apnea     CPAP       Allergies   Allergen Reactions    Chlorhexidine Towelette Rash       Past Surgical History:   Procedure Laterality Date    COLONOSCOPY N/A 2019    performed by Yvonne Onrelas MD at [facility] HX AMPUTATION FINGER Right     2nd digit    HX APPENDECTOMY      HX CATARACT REMOVAL Bilateral     HX CERVICAL FUSION N/A     HX HERNIA REPAIR      HX KNEE ARTHROSCOPY Left     HX KNEE REPLACEMENT Bilateral 10/2017    HX MENISCECTOMY Right 2017    HX TONSIL AND ADENOIDECTOMY         Social History     Socioeconomic History    Marital status:      Spouse name: Not on file    Number of children: Not on file    Years of education: Not on file    Highest education level: Not on file   Tobacco Use    Smoking status: Former Smoker     Packs/day: 3.00     Years: 35.00     Pack years: 105.00     Types: Cigarettes     Last attempt to quit: 2000     Years since quittin.9    Smokeless tobacco: Never Used   Substance and Sexual Activity    Alcohol use:  Yes     Alcohol/week: 21.0 standard drinks     Types: 21 Shots of liquor per week    Drug use: No    Sexual activity: Yes     Partners: Female     Birth control/protection: None       Review of Systems Constitutional: Negative. Negative for chills, fever, malaise/fatigue and weight loss. HENT: Negative. Negative for hearing loss. Eyes: Negative. Negative for blurred vision and double vision. Respiratory: Positive for cough, sputum production and wheezing. Negative for hemoptysis and shortness of breath. Cardiovascular: Negative. Negative for chest pain and palpitations. Gastrointestinal: Negative. Negative for abdominal pain, blood in stool, heartburn, nausea and vomiting. Genitourinary: Negative. Negative for dysuria, frequency and urgency. Musculoskeletal: Negative. Negative for back pain, falls, myalgias and neck pain. Skin: Positive for rash. Neurological: Negative. Negative for dizziness, tingling, tremors, weakness and headaches. Endo/Heme/Allergies: Negative. Psychiatric/Behavioral: Negative. Negative for depression. Objective:     Vitals:    12/03/19 1146   BP: 119/76   Pulse: 85   Resp: 17   Temp: 99.3 °F (37.4 °C)   TempSrc: Oral   SpO2: 93%   Weight: 248 lb (112.5 kg)   Height: 5' 10\" (1.778 m)      Body mass index is 35.58 kg/m². Physical Exam  Vitals signs and nursing note reviewed. Constitutional:       General: He is not in acute distress. Appearance: Normal appearance. He is well-developed. He is not toxic-appearing. HENT:      Head: Normocephalic and atraumatic. Nose: No congestion. Mouth/Throat:      Pharynx: No oropharyngeal exudate or posterior oropharyngeal erythema. Eyes:      General: No scleral icterus. Right eye: No discharge. Left eye: No discharge. Neck:      Comments: No bruit. Cardiovascular:      Rate and Rhythm: Normal rate and regular rhythm. Heart sounds: Normal heart sounds. No murmur. No friction rub. No gallop. Pulmonary:      Effort: Pulmonary effort is normal. No respiratory distress. Breath sounds: Wheezing and rhonchi present. No rales.    Abdominal:      General: There is no distension. Musculoskeletal:         General: No swelling. Legs:       Comments: Rash distribution. Skin:     General: Skin is warm. Coloration: Skin is not jaundiced. Findings: Erythema and rash present. Neurological:      Mental Status: He is alert. Health Maintenance Due   Topic Date Due    GLAUCOMA SCREENING Q2Y  11/07/2017         Assessment and orders:     Encounter Diagnoses     ICD-10-CM ICD-9-CM   1. Acute bronchitis, unspecified organism J20.9 466.0   2. Dermatitis L30.9 692.9   3. Moderate persistent asthma with exacerbation J45.41 493.92   4. Essential hypertension I10 401.9   5. Renal cyst, left N28.1 753.10     Diagnoses and all orders for this visit:    1. Acute bronchitis, unspecified organism-is very susceptible to wheezy bronchitis. -     albuterol (PROVENTIL VENTOLIN) 2.5 mg /3 mL (0.083 %) nebu; Take 3 mL by inhalation every four (4) hours as needed (cough/wheezing). -     albuterol sulfate (PROAIR RESPICLICK) 90 mcg/actuation aepb; Take 2 Puffs by inhalation every four (4) hours as needed (wheezing). -     azithromycin (ZITHROMAX) 250 mg tablet; Take 2 tablets today, then take 1 tablet daily    2. Dermatitis-I think this is very inflamed tinea. He says the dog sometimes sleeps on his bed. This rash is in an area that would be more moist from sweating. 3. Moderate persistent asthma with exacerbation-former smoker. -     albuterol (PROVENTIL VENTOLIN) 2.5 mg /3 mL (0.083 %) nebu; Take 3 mL by inhalation every four (4) hours as needed (cough/wheezing). -     albuterol sulfate (PROAIR RESPICLICK) 90 mcg/actuation aepb; Take 2 Puffs by inhalation every four (4) hours as needed (wheezing). -     azithromycin (ZITHROMAX) 250 mg tablet; Take 2 tablets today, then take 1 tablet daily    4. Essential hypertension-controlled    5. Renal cyst, left- sse HPI    Other orders            Plan of care:  Discussed diagnoses in detail with patient.      Medication risks/benefits/side effects discussed with patient. All of the patient's questions were addressed. The patient understands and agrees with our plan of care. The patient knows to call back if they are unsure of or forget any changes we discussed today or if the symptoms change. The patient received an After-Visit Summary which contains VS, orders, medication list and allergy list. This can be used as a \"mini-medical record\" should they have to seek medical care while out of town. Patient Care Team:  Hannah Mendiola MD as PCP - Tristin Khan MD as PCP - [facility] Empaneled Provider  Terrance Acosta MD (Cardiology)  Melissa Diaz MD (Dermatology)  Liz Quinones MD (Urology)  Yasmeen Mckeon MD (Gastroenterology)  Ya Castrejon MD (Surgery)  Kartik Goldman MD (Orthopedic Surgery)  Alexia Good MD (Orthopedic Surgery)  Nikko Suraez NP as Nurse Practitioner (Surgery)  Bart Littlejohn MD (Hematology and Oncology)  Barbie Elder MD (Endocrinology)  Richardo Carrel, MD as Physician (Sleep Medicine)    Follow-up and Dispositions    · Return if symptoms worsen or fail to improve. Future Appointments   Date Time Provider Erin Corona   1/22/2020  8:40 AM Hannah Mendiola MD [facility]   7/8/2020 11:00 AM Richardo Carrel,  [address]       Signed By: Krishan Beard MD     December 3, 2019      ATTENTION:   This medical record was transcribed using an electronic medical records/speech recognition system. Although proofread, it may and can contain electronic, spelling and other errors. Corrections may be executed at a later time. Please feel free to contact me for any clarifications as needed.

## 2020-01-22 ENCOUNTER — OFFICE VISIT (OUTPATIENT)
Dept: FAMILY MEDICINE CLINIC | Age: 70
End: 2020-01-22

## 2020-01-22 VITALS
DIASTOLIC BLOOD PRESSURE: 85 MMHG | HEIGHT: 70 IN | OXYGEN SATURATION: 96 % | RESPIRATION RATE: 20 BRPM | SYSTOLIC BLOOD PRESSURE: 126 MMHG | HEART RATE: 73 BPM | TEMPERATURE: 97.9 F | WEIGHT: 253.4 LBS | BODY MASS INDEX: 36.28 KG/M2

## 2020-01-22 DIAGNOSIS — K21.9 GASTROESOPHAGEAL REFLUX DISEASE WITHOUT ESOPHAGITIS: Chronic | ICD-10-CM

## 2020-01-22 DIAGNOSIS — E78.00 PURE HYPERCHOLESTEROLEMIA: Chronic | ICD-10-CM

## 2020-01-22 DIAGNOSIS — G47.33 OSA (OBSTRUCTIVE SLEEP APNEA): Chronic | ICD-10-CM

## 2020-01-22 DIAGNOSIS — S32.010A CLOSED COMPRESSION FRACTURE OF BODY OF L1 VERTEBRA (HCC): ICD-10-CM

## 2020-01-22 DIAGNOSIS — M51.36 DDD (DEGENERATIVE DISC DISEASE), LUMBAR: Chronic | ICD-10-CM

## 2020-01-22 DIAGNOSIS — I10 ESSENTIAL HYPERTENSION: Primary | Chronic | ICD-10-CM

## 2020-01-22 NOTE — PROGRESS NOTES
[address]  [phone]           Progress Note    Patient: Naomi Viera MRN: [id number]  SSN: [id number]    YOB: 1950  Age: 81 y.o. Sex: male        Chief Complaint   Patient presents with    Hypertension         Subjective:     Encounter Diagnoses   Name Primary?  Essential hypertension:  BP Readings from Last 3 Encounters:   01/22/20 126/85   12/03/19 119/76   10/25/19 120/77     The patient reports:  taking medications as instructed, no medication side effects noted, no TIA's, no chest pain on exertion, no dyspnea on exertion, no swelling of ankles. Key CAD CHF Meds             metoprolol succinate (TOPROL-XL) 50 mg XL tablet (Taking) TAKE 1 TABLET BY MOUTH ONCE DAILY    rosuvastatin (CRESTOR) 10 mg tablet (Taking) TAKE ONE TABLET BY MOUTH NIGHTLY    ramipril (ALTACE) 10 mg capsule (Taking) TAKE 1 CAPSULE BY MOUTH TWICE DAILY    hydroCHLOROthiazide (HYDRODIURIL) 12.5 mg tablet (Taking) TAKE 1 TABLET BY MOUTH ONCE DAILY FOR  HYPERTENSION           Lab Results   Component Value Date/Time    Sodium 144 10/23/2019 08:29 AM    Potassium 4.9 10/23/2019 08:29 AM    Chloride 100 10/23/2019 08:29 AM    CO2 28 10/23/2019 08:29 AM    Anion gap 6 07/31/2019 01:18 PM    Glucose 91 10/23/2019 08:29 AM    BUN 13 10/23/2019 08:29 AM    Creatinine 0.89 10/23/2019 08:29 AM    BUN/Creatinine ratio 15 10/23/2019 08:29 AM    GFR est  10/23/2019 08:29 AM    GFR est non-AA 89 10/23/2019 08:29 AM    Calcium 9.5 10/23/2019 08:29 AM    Bilirubin, total 0.4 10/23/2019 08:29 AM    AST (SGOT) 25 10/23/2019 08:29 AM    Alk. phosphatase 51 10/23/2019 08:29 AM    Protein, total 7.0 10/23/2019 08:29 AM    Albumin 4.2 10/23/2019 08:29 AM    Globulin 3.0 07/31/2019 01:18 PM    A-G Ratio 1.5 10/23/2019 08:29 AM    ALT (SGPT) 29 10/23/2019 08:29 AM     Low salt diet? yes  Aerobic exercise? no  Our goal is to normalize the blood pressure to decrease the risks of strokes and heart attacks. The patient is in agreement with the plan. Yes    Gastroesophageal reflux disease without esophagitis:  Current control of Symptoms:good  Hiatal Hernia:no  Current Medications: PRN  The patient has no history melena or bright red blood in the stools. The patient avoids high dose aspirin and NSAID therapy. The patient is aware of diet changes needed, elevating the head of the bed and appropriate use of antacids.  RACHEL (obstructive sleep apnea): His CPAP machine has been broken. He will start using it again today. He has been repaired.  Pure hypercholesterolemia:  Cardiovascular risks for him are: LDL goal is under 100  hypertension  hyperlipidemia. Key Antihyperlipidemia Meds             rosuvastatin (CRESTOR) 10 mg tablet (Taking) TAKE ONE TABLET BY MOUTH NIGHTLY        Lab Results   Component Value Date/Time    Cholesterol, total 119 10/23/2019 08:29 AM    HDL Cholesterol 34 (L) 10/23/2019 08:29 AM    LDL, calculated 50 10/23/2019 08:29 AM    Triglyceride 174 (H) 10/23/2019 08:29 AM    CHOL/HDL Ratio 3.8 04/02/2010 09:09 AM     Lab Results   Component Value Date/Time    ALT (SGPT) 29 10/23/2019 08:29 AM    AST (SGOT) 25 10/23/2019 08:29 AM    Alk. phosphatase 51 10/23/2019 08:29 AM    Bilirubin, total 0.4 10/23/2019 08:29 AM      Myalgias: No   Fatigue: No   Other side effects: no  Wt Readings from Last 3 Encounters:   01/22/20 253 lb 6.4 oz (114.9 kg)   12/03/19 248 lb (112.5 kg)   10/25/19 250 lb 1.6 oz (113.4 kg)     The patient is aware of our goal to reduce or eliminate the long term problems (such as strokes and heart attacks) related to poorly controlled hyperlipidemia.  DDD (degenerative disc disease), lumbar:  Chronic low-grade pain. Wearing back brace.  Closed compression fracture of body of L1 vertebra ([address]): pain related to this fall has dramatically decreased.   The injury occurred in his postoperative period from back surgery. Current and past medical information:    Current Medications after this visit[de-identified]     Current Outpatient Medications   Medication Sig    metoprolol succinate (TOPROL-XL) 50 mg XL tablet TAKE 1 TABLET BY MOUTH ONCE DAILY    albuterol (PROVENTIL VENTOLIN) 2.5 mg /3 mL (0.083 %) nebu Take 3 mL by inhalation every four (4) hours as needed (cough/wheezing).  albuterol sulfate (PROAIR RESPICLICK) 90 mcg/actuation aepb Take 2 Puffs by inhalation every four (4) hours as needed (wheezing).  triamcinolone (ARISTOCORT) 0.5 % topical cream Apply  to affected area two (2) times daily as needed for Skin Irritation or Itching. use thin layer on chest and back.  triamcinolone (ARISTOCORT) 0.5 % topical cream Apply  to affected area two (2) times a day. use thin layer on chest and back    rosuvastatin (CRESTOR) 10 mg tablet TAKE ONE TABLET BY MOUTH NIGHTLY    ramipril (ALTACE) 10 mg capsule TAKE 1 CAPSULE BY MOUTH TWICE DAILY    diclofenac (VOLTAREN) 1 % gel Apply 2 g to affected area four (4) times daily as needed (pain). Back and knee pain    cyclobenzaprine (FLEXERIL) 10 mg tablet Take 1 Tab by mouth three (3) times daily as needed for Muscle Spasm(s).  senna-docusate (PERICOLACE) 8.6-50 mg per tablet Take 1 Tab by mouth two (2) times daily as needed for Constipation.  hydroCHLOROthiazide (HYDRODIURIL) 12.5 mg tablet TAKE 1 TABLET BY MOUTH ONCE DAILY FOR  HYPERTENSION    fluticasone propionate (FLONASE ALLERGY RELIEF) 50 mcg/actuation nasal spray 2 Sprays by Both Nostrils route daily as needed for Rhinitis.  mupirocin calcium (BACTROBAN NASAL) 2 % nasal ointment 1 Each by Both Nostrils route two (2) times daily as needed. Indications: Methicillin-Resistant Staphylococcus Aureus Bacteria in Nose    gabapentin (NEURONTIN) 300 mg capsule Take 300 mg by mouth three (3) times daily.     raNITIdine (ZANTAC) 150 mg tablet Take 150 mg by mouth two (2) times daily as needed.  cholecalciferol, VITAMIN D3, (VITAMIN D3) 5,000 unit tab tablet Take 5,000 Units by mouth every morning.  ascorbic acid, vitamin C, (VITAMIN C) 500 mg tablet Take 1,000 mg by mouth every morning.  dextromethorphan-guaiFENesin (MUCINEX DM) 60-1,200 mg Tb12 Take 1 Tab by mouth daily as needed.  nabumetone (RELAFEN) 500 mg tablet Take  by mouth two (2) times a day.  cetirizine (ZYRTEC) 10 mg tablet Take 10 mg by mouth every morning.  POTASSIUM CHLORIDE PO Take 95 mg by mouth every morning.  cyanocobalamin (VITAMIN B-12) 1,000 mcg tablet Take 1,000 mcg by mouth every morning. No current facility-administered medications for this visit. Patient Active Problem List    Diagnosis Date Noted    Inguinal lymphadenopathy 01/17/2019     Priority: 1 - One    Asthma 10/19/2017     Priority: 1 - One    Complex tear of medial meniscus of right knee as current injury 01/04/2017     Priority: 1 - One    DDD (degenerative disc disease), lumbar 05/16/2016     Priority: 1 - One    PVC's (premature ventricular contractions)      Priority: 1 - One    B12 deficiency 10/17/2014     Priority: 1 - One    HLD (hyperlipidemia) 03/26/2013     Priority: 1 - One    Positive PPD 07/01/2010     Priority: 1 - One    RACHEL (obstructive sleep apnea) 07/01/2010     Priority: 1 - One    HTN (hypertension) 04/01/2010     Priority: 1 - One    GERD (gastroesophageal reflux disease) 04/01/2010     Priority: 1 - One    Lymphadenopathy 02/04/2019    Acute bronchitis 12/31/2018    Severe obesity (BMI 35.0-39. 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 10/13/2017    Rectus diastasis 06/03/2016    Knee pain 08/04/2014    SOB (shortness of breath) 10/11/2012    Vitamin D deficiency 04/27/2012    Colon ulcer, aphthous 04/01/2010       Past Medical History:   Diagnosis Date    Anesthesia complication 8864    APNEA DURING HERNIA REPAIR, POST OPERATIVE INTUBATION AT [facility]    Arthritis     Basal cell carcinoma (BCC) in situ of skin     Colon ulcer, aphthous 2010    COPD, mild ([address]     Does not see anyone for this    Environmental and seasonal allergies     Essential tremor     Fatty liver 2019    Frequent episodes of bronchitis 2018    Frequent PVCs     Has been checked by cardiology    GERD (gastroesophageal reflux disease) 2010    High cholesterol 2010    HTN (hypertension), benign 2010    Hx MRSA infection     CYSTS BL LEGS    Sleep apnea     CPAP       Allergies   Allergen Reactions    Chlorhexidine Towelette Rash       Past Surgical History:   Procedure Laterality Date    COLONOSCOPY N/A 2019    performed by Romel Mcdonald MD at [facility] HX AMPUTATION FINGER Right     2nd digit    HX APPENDECTOMY      HX CATARACT REMOVAL Bilateral     HX CERVICAL FUSION N/A     HX HERNIA REPAIR      HX KNEE ARTHROSCOPY Left     HX KNEE REPLACEMENT Bilateral 10/2017    HX MENISCECTOMY Right 2017    HX TONSIL AND ADENOIDECTOMY         Social History     Socioeconomic History    Marital status:      Spouse name: Not on file    Number of children: Not on file    Years of education: Not on file    Highest education level: Not on file   Tobacco Use    Smoking status: Former Smoker     Packs/day: 3.00     Years: 35.00     Pack years: 105.00     Types: Cigarettes     Last attempt to quit: 2000     Years since quittin.0    Smokeless tobacco: Never Used   Substance and Sexual Activity    Alcohol use: Yes     Alcohol/week: 21.0 standard drinks     Types: 21 Shots of liquor per week    Drug use: No    Sexual activity: Yes     Partners: Female     Birth control/protection: None       Review of Systems   Constitutional: Negative.   Negative for chills, fever, malaise/fatigue and weight loss. HENT: Negative. Negative for hearing loss. Eyes: Negative. Negative for blurred vision and double vision. Respiratory: Negative. Negative for cough, sputum production and shortness of breath. Cardiovascular: Negative. Negative for chest pain and palpitations. Gastrointestinal: Negative. Negative for abdominal pain, blood in stool, heartburn, nausea and vomiting. Genitourinary: Negative. Negative for dysuria, frequency and urgency. Musculoskeletal: Positive for back pain. Negative for falls, myalgias and neck pain. Skin: Negative. Negative for rash. Neurological: Negative. Negative for dizziness, tingling, tremors, weakness and headaches. Endo/Heme/Allergies: Negative. Psychiatric/Behavioral: Negative. Negative for depression. Objective:     Vitals:    01/22/20 0841   BP: 126/85   Pulse: 73   Resp: 20   Temp: 97.9 °F (36.6 °C)   TempSrc: Oral   SpO2: 96%   Weight: 253 lb 6.4 oz (114.9 kg)   Height: 5' 10\" (1.778 m)      Body mass index is 36.36 kg/m². Physical Exam  Vitals signs and nursing note reviewed. Constitutional:       General: He is not in acute distress. Appearance: Normal appearance. He is well-developed. He is not toxic-appearing. HENT:      Head: Normocephalic and atraumatic. Nose: No congestion. Mouth/Throat:      Pharynx: No oropharyngeal exudate or posterior oropharyngeal erythema. Eyes:      General: No scleral icterus. Right eye: No discharge. Left eye: No discharge. Neck:      Comments: No bruit. Cardiovascular:      Rate and Rhythm: Normal rate and regular rhythm. Heart sounds: Normal heart sounds. No murmur. No friction rub. No gallop. Pulmonary:      Effort: Pulmonary effort is normal. No respiratory distress. Breath sounds: Normal breath sounds. No wheezing, rhonchi or rales. Abdominal:      General: There is no distension. Musculoskeletal:         General: No swelling.    Skin: General: Skin is warm. Coloration: Skin is not jaundiced. Findings: No erythema or rash. Neurological:      Mental Status: He is alert. Health Maintenance Due   Topic Date Due    GLAUCOMA SCREENING Q2Y  11/07/2017         Assessment and orders:     Encounter Diagnoses     ICD-10-CM ICD-9-CM   1. Essential hypertension I10 401.9   2. Gastroesophageal reflux disease without esophagitis K21.9 530.81   3. RACHEL (obstructive sleep apnea) G47.33 327.23   4. Pure hypercholesterolemia E78.00 272.0   5. DDD (degenerative disc disease), lumbar M51.36 722.52   6. Closed compression fracture of body of L1 vertebra (HCC) S32.010A 805.4     Diagnoses and all orders for this visit:    1. Essential hypertension-controlled    2. Gastroesophageal reflux disease without esophagitis-treated    3. RACHEL (obstructive sleep apnea)-he is getting ready to restart his CPAP. It has been broken. 4. Pure hypercholesterolemia-treated-    5. DDD (degenerative disc disease), lumbar chronic mild pain. 6. Closed compression fracture of body of L1 vertebra (HCC)-pain is much improved. He is wearing a back brace to prevent exertional discomfort. Plan of care:  Discussed diagnoses in detail with patient. Medication risks/benefits/side effects discussed with patient. All of the patient's questions were addressed. The patient understands and agrees with our plan of care. The patient knows to call back if they are unsure of or forget any changes we discussed today or if the symptoms change. The patient received an After-Visit Summary which contains VS, orders, medication list and allergy list. This can be used as a \"mini-medical record\" should they have to seek medical care while out of town.     Patient Care Team:  Tad Severe, MD as PCP - Leighann eBach MD as PCP - [facility] Empaneled Provider  Lalit Villaseñor MD (Cardiology)  Morgan Kilpatrick MD (Dermatology)  Kamryn Calvillo MD (Urology)  William Harris MD (Gastroenterology)  Vijaya Gonzales MD (Surgery)  Wes Bellamy MD (Orthopedic Surgery)  Molly Montoya MD (Orthopedic Surgery)  Elvira Ballesteros NP as Nurse Practitioner (Surgery)  Zhang Frederick MD (Hematology and Oncology)  Rhodia Gaucher, MD (Endocrinology)  Khris Suarez MD as Physician (Sleep Medicine)    Follow-up and Dispositions    · Return in about 3 months (around 4/22/2020) for fasting. Signed By: Alfa Boo MD     January 22, 2020      ATTENTION:   This medical record was transcribed using an electronic medical records/speech recognition system. Although proofread, it may and can contain electronic, spelling and other errors. Corrections may be executed at a later time. Please feel free to contact me for any clarifications as needed.

## 2020-01-22 NOTE — PATIENT INSTRUCTIONS

## 2020-01-22 NOTE — PROGRESS NOTES
1. Have you been to the ER, urgent care clinic since your last visit? Hospitalized since your last visit? No    2. Have you seen or consulted any other health care providers outside of the [address] [place] [address] since your last visit? Include any pap smears or colon screening. No    Reviewed record in preparation for visit and have necessary documentation  Goals that were addressed and/or need to be completed during or after this appointment include     Health Maintenance Due   Topic Date Due    GLAUCOMA SCREENING Q2Y  11/07/2017       Patient is accompanied by self I have received verbal consent from Kiley Middleton to discuss any/all medical information while they are present in the room.

## 2020-02-12 ENCOUNTER — DOCUMENTATION ONLY (OUTPATIENT)
Dept: SLEEP MEDICINE | Age: 70
End: 2020-02-12

## 2020-02-12 ENCOUNTER — TELEPHONE (OUTPATIENT)
Dept: SLEEP MEDICINE | Age: 70
End: 2020-02-12

## 2020-02-12 DIAGNOSIS — G47.33 OSA (OBSTRUCTIVE SLEEP APNEA): Primary | ICD-10-CM

## 2020-02-12 NOTE — PROGRESS NOTES
Supplies order faxed [facility] per patient's request [facility]

## 2020-02-12 NOTE — PROGRESS NOTES
Per patient's request, all future orders should be faxed to [facility], [facility].

## 2020-07-08 ENCOUNTER — VIRTUAL VISIT (OUTPATIENT)
Dept: SLEEP MEDICINE | Age: 70
End: 2020-07-08

## 2020-07-08 DIAGNOSIS — G47.33 OSA (OBSTRUCTIVE SLEEP APNEA): Primary | ICD-10-CM

## 2020-07-08 NOTE — PROGRESS NOTES
Your BMI is Body mass index is 25.09 kg/(m^2).  Weight management is a personal decision.  If you are interested in exploring weight loss strategies, the following discussion covers the approaches that may be successful. Body mass index (BMI) is one way to tell whether you are at a healthy weight, overweight, or obese. It measures your weight in relation to your height.  A BMI of 18.5 to 24.9 is in the healthy range. A person with a BMI of 25 to 29.9 is considered overweight, and someone with a BMI of 30 or greater is considered obese. More than two-thirds of American adults are considered overweight or obese.  Being overweight or obese increases the risk for further weight gain. Excess weight may lead to heart disease and diabetes.  Creating and following plans for healthy eating and physical activity may help you improve your health.  Weight control is part of healthy lifestyle and includes exercise, emotional health, and healthy eating habits. Careful eating habits lifelong are the mainstay of weight control. Though there are significant health benefits from weight loss, long-term weight loss with diet alone may be very difficult to achieve- studies show long-term success with dietary management in less than 10% of people. Attaining a healthy weight may be especially difficult to achieve in those with severe obesity. In some cases, medications, devices and surgical management might be considered.  What can you do?  If you are overweight or obese and are interested in methods for weight loss, you should discuss this with your provider.     Consider reducing daily calorie intake by 500 calories.     Keep a food journal.     Avoiding skipping meals, consider cutting portions instead.    Diet combined with exercise helps maintain muscle while optimizing fat loss. Strength training is particularly important for building and maintaining muscle mass. Exercise helps reduce stress, increase energy, and improves fitness.  [address] [place]. [place], [address]  Tel.  [phone]  Fax. [address] [facility] [address]  [place], [address] [facility]  Tel.  [phone]  Fax. [phone] [address] [address]  Tel.  [phone]  Fax. [phone]       Kenan Rodriguez is a 81 y.o. male who was scheduled for a synchronous (real-time) audio-video technology on 7/8/2020. Patient did not have connection allowing visual component; study continued at telephone encounter. Consent:  He and/or his healthcare decision maker is aware that this patient-initiated Telehealth encounter is a billable service, with coverage as determined by his insurance carrier. He is aware that he may receive a bill and has provided verbal consent to proceed: Yes    I was in the office while conducting this encounter. Chief Complaint       No chief complaint on file. HPI        Kenan Rodriguez is a 81 y.o. male seen for follow-up. He [facility] with a sleep study which demonstrated obstructive sleep apnea characterized by an AHI of 19.2 per hour associated with arterial desaturations to 78%. Events were more prominent in REM sleep with the REM-related AHI of 82.6 per hour. During a second study,  CPAP  of 6 cm associated with AHI 0.4 per hour and SaO2 91%. Due to a number of respiratory-related arousals, CPAP initiated at 8 cm. CPAP currently 9 cm.     Compliance data downloaded and reviewed in detail with the patient today. During the past 30 days, CPAP used during 30 days with the average daily use of 7.8 hours. CMS compliance criteria 100%. AHI 1.9 per hour. He notes he is doing well without nocturnal awakening, non-restorative sleep or daytime fatigue.      Allergies   Allergen Reactions    Chlorhexidine Towelette Rash       Current Outpatient Medications   Medication Sig Dispense Refill    rosuvastatin (CRESTOR) 10 mg tablet TAKE ONE TABLET BY MOUTH NIGHTLY 30 Tab 5 Increasing exercise without diet control, however, may not burn enough calories to loose weight.       Start walking three days a week 10-20 minutes at a time    Work towards walking thirty minutes five days a week     Eventually, increase the speed of your walking for 1-2 minutes at time    In addition, we recommend that you review healthy lifestyles and methods for weight loss available through the National Institutes of Health patient information sites:  http://win.niddk.nih.gov/publications/index.htm    And look into health and wellness programs that may be available through your health insurance provider, employer, local community center, or mary club.            metoprolol succinate (TOPROL-XL) 50 mg XL tablet TAKE 1 TABLET BY MOUTH ONCE DAILY 30 Tab 6    albuterol (PROVENTIL VENTOLIN) 2.5 mg /3 mL (0.083 %) nebu Take 3 mL by inhalation every four (4) hours as needed (cough/wheezing). 50 Nebule 4    albuterol sulfate (PROAIR RESPICLICK) 90 mcg/actuation aepb Take 2 Puffs by inhalation every four (4) hours as needed (wheezing). 1 Inhaler 4    triamcinolone (ARISTOCORT) 0.5 % topical cream Apply  to affected area two (2) times daily as needed for Skin Irritation or Itching. use thin layer on chest and back. 454 g 1    triamcinolone (ARISTOCORT) 0.5 % topical cream Apply  to affected area two (2) times a day. use thin layer on chest and back 15 g 0    ramipril (ALTACE) 10 mg capsule TAKE 1 CAPSULE BY MOUTH TWICE DAILY 60 Cap 11    diclofenac (VOLTAREN) 1 % gel Apply 2 g to affected area four (4) times daily as needed (pain). Back and knee pain 100 g 4    cyclobenzaprine (FLEXERIL) 10 mg tablet Take 1 Tab by mouth three (3) times daily as needed for Muscle Spasm(s). 60 Tab 2    senna-docusate (PERICOLACE) 8.6-50 mg per tablet Take 1 Tab by mouth two (2) times daily as needed for Constipation. 60 Tab 2    hydroCHLOROthiazide (HYDRODIURIL) 12.5 mg tablet TAKE 1 TABLET BY MOUTH ONCE DAILY FOR  HYPERTENSION 30 Tab 11    fluticasone propionate (FLONASE ALLERGY RELIEF) 50 mcg/actuation nasal spray 2 Sprays by Both Nostrils route daily as needed for Rhinitis.  mupirocin calcium (BACTROBAN NASAL) 2 % nasal ointment 1 Each by Both Nostrils route two (2) times daily as needed. Indications: Methicillin-Resistant Staphylococcus Aureus Bacteria in Nose      gabapentin (NEURONTIN) 300 mg capsule Take 300 mg by mouth three (3) times daily.  raNITIdine (ZANTAC) 150 mg tablet Take 150 mg by mouth two (2) times daily as needed.  cholecalciferol, VITAMIN D3, (VITAMIN D3) 5,000 unit tab tablet Take 5,000 Units by mouth every morning.       ascorbic acid, vitamin C, (VITAMIN C) 500 mg tablet Take 1,000 mg by mouth every morning.  dextromethorphan-guaiFENesin (MUCINEX DM) 60-1,200 mg Tb12 Take 1 Tab by mouth daily as needed.  nabumetone (RELAFEN) 500 mg tablet Take  by mouth two (2) times a day.  cetirizine (ZYRTEC) 10 mg tablet Take 10 mg by mouth every morning.  POTASSIUM CHLORIDE PO Take 95 mg by mouth every morning.  cyanocobalamin (VITAMIN B-12) 1,000 mcg tablet Take 1,000 mcg by mouth every morning. He  has a past medical history of Anesthesia complication (4284), Arthritis, Basal cell carcinoma (BCC) in situ of skin, Colon ulcer, aphthous (4/1/2010), COPD, mild ([address] Environmental and seasonal allergies, Essential tremor, Fatty liver (06/2019), Frequent episodes of bronchitis (2018), Frequent PVCs, GERD (gastroesophageal reflux disease) (4/1/2010), High cholesterol (4/1/2010), HTN (hypertension), benign (04/01/2010), MRSA infection (2013), and Sleep apnea. He also has no past medical history of CAD (coronary artery disease), Diabetes ([address] Heart failure ([address] or History of abuse. He  has a past surgical history that includes hx appendectomy (1959); hx tonsil and adenoidectomy; hx knee replacement (Bilateral, 10/2017); colonoscopy (N/A, 4/2/2019); hx knee arthroscopy (Left, 1994); hx meniscectomy (Right, 01/06/2017); hx cervical fusion (N/A, 1992); hx hernia repair (2001); hx amputation finger (Right); and hx cataract removal (Bilateral). He family history includes Cancer in his brother and brother; Dementia in his mother; Heart Disease in his father and mother; Hypertension in his mother; Lung Disease in his father; No Known Problems in his brother. He  reports that he quit smoking about 20 years ago. His smoking use included cigarettes. He has a 105.00 pack-year smoking history. He has never used smokeless tobacco. He reports current alcohol use of about 21.0 standard drinks of alcohol per week.  He reports that he does not use drugs. Review of Systems:  Unchanged per patient    Due to this being a telemedicine evaluation, certain elements of the physical examination are unable to be assessed. Objective:     Height: 5'10\"  Weight:236  BMI:  33.7    General:   Conversant, cooperative                                Neuro:  Speech fluent             Assessment:       ICD-10-CM ICD-9-CM    1. RACHEL (obstructive sleep apnea) G47.33 327.23 AMB SUPPLY ORDER     Sleep disordered breathing responding well to CPAP. He will continue at the current pressure settings. Follow up appointment will be scheduled. he is compliant with PAP therapy and PAP continues to benefit patient and remains necessary for control of his sleep apnea. Plan:     Orders Placed This Encounter    AMB SUPPLY ORDER     Diagnosis: Obstructive Sleep Apnea ICD-10 Code (G47.33)     CPAP mask and supplies -  Patient preference, headgear, heated tubing, and filter;  heated humidifier. Wireless modem. Remote monitoring enrollment.  Oral/Nasal Combo Mask 1 every 3 months.  Oral Cushion Combo Mask (Replace) 2 per month.  Nasal Pillows Combo Mask (Replace) 2 per month.  Full Face Mask 1 every 3 months.  Full Face Mask Cushion 1 per month.  Nasal Cushion (Replace) 2 per month.  Nasal Pillows (Replace) 2 per month.  Nasal Interface Mask 1 every 3 months.  Headgear 1 every 6 months.  Chinstrap 1 every 6 months.  Tubing 1 every 3 months.  Filter(s) Disposable 2 per month.  Filter(s) Non-Disposable 1 every 6 months.  Oral Interface 1 every 3 months. [address] [facility] [address] (Replace) 1 every 6 months.    Tubing with heating element 1 every 3 months.                 Adela Foss MD, [facility]  Diplomate, American Board of Sleep Medicine  NPI [id number]  Electronically signed 7/8/20       * Patient has a history and examination consistent with the diagnosis of sleep apnea.   * He was provided information on sleep apnea including corresponding risk factors and the importance of proper treatment. * Treatment options if indicated were reviewed today. *  Potential benefit of weight reduction       Marisela Wright MD, [facility]  Electronically signed 07/08/20    Pursuant to the emergency declaration under the [address] [place] [address], [facility] waiver authority and the [employer] Act, this Virtual  Visit was conducted, with patient's consent, to reduce the patient's risk of exposure to COVID-19 and provide continuity of care for an established patient. Services were provided through a video synchronous discussion virtually to substitute for in-person clinic visit. Luz Dennis MD         This note was created using voice recognition software. Despite editing, there may be syntax errors. This note will not be viewable in [address]

## 2020-07-09 ENCOUNTER — DOCUMENTATION ONLY (OUTPATIENT)
Dept: SLEEP MEDICINE | Age: 70
End: 2020-07-09

## 2020-08-17 ENCOUNTER — TELEPHONE (OUTPATIENT)
Dept: FAMILY MEDICINE CLINIC | Age: 70
End: 2020-08-17

## 2020-08-17 DIAGNOSIS — R06.02 SOB (SHORTNESS OF BREATH): Primary | ICD-10-CM

## 2020-08-17 NOTE — TELEPHONE ENCOUNTER
Patient came in and scheduled a virtual appointment for September 2. He wanted to see if you could order his lab work prior to the appointment so that it can be discussed during the appointment. He also wanted to check and see if it was time to order his routine chest x-ray so he could have that done at the same time.

## 2020-08-18 DIAGNOSIS — E78.00 PURE HYPERCHOLESTEROLEMIA: Chronic | ICD-10-CM

## 2020-08-18 DIAGNOSIS — E55.9 VITAMIN D DEFICIENCY: Primary | ICD-10-CM

## 2020-08-18 DIAGNOSIS — E55.9 VITAMIN D DEFICIENCY: ICD-10-CM

## 2020-08-18 DIAGNOSIS — I10 ESSENTIAL HYPERTENSION: Primary | Chronic | ICD-10-CM

## 2020-08-18 DIAGNOSIS — I10 ESSENTIAL HYPERTENSION: Chronic | ICD-10-CM

## 2020-08-19 DIAGNOSIS — R06.02 SOB (SHORTNESS OF BREATH): ICD-10-CM

## 2020-08-19 NOTE — TELEPHONE ENCOUNTER
Returned phone call to patient. Advised him that Dr. Oniel Bray has order a CXR and his lab work. Lab appointment made for patient for tomorrow morning.

## 2020-08-20 LAB
ALBUMIN SERPL-MCNC: 4.1 G/DL (ref 3.5–5)
ALBUMIN/GLOB SERPL: 1.2 {RATIO} (ref 1.1–2.2)
ALP SERPL-CCNC: 66 U/L (ref 45–117)
ALT SERPL-CCNC: 45 U/L (ref 12–78)
ANION GAP SERPL CALC-SCNC: 8 MMOL/L (ref 5–15)
AST SERPL-CCNC: 27 U/L (ref 15–37)
BILIRUB SERPL-MCNC: 0.6 MG/DL (ref 0.2–1)
BUN SERPL-MCNC: 15 MG/DL (ref 6–20)
BUN/CREAT SERPL: 15 (ref 12–20)
CALCIUM SERPL-MCNC: 9.3 MG/DL (ref 8.5–10.1)
CHLORIDE SERPL-SCNC: 103 MMOL/L (ref 97–108)
CHOLEST SERPL-MCNC: 130 MG/DL
CO2 SERPL-SCNC: 26 MMOL/L (ref 21–32)
CREAT SERPL-MCNC: 0.99 MG/DL (ref 0.7–1.3)
GLOBULIN SER CALC-MCNC: 3.3 G/DL (ref 2–4)
GLUCOSE SERPL-MCNC: 95 MG/DL (ref 65–100)
HDLC SERPL-MCNC: 32 MG/DL
HDLC SERPL: 4.1 {RATIO} (ref 0–5)
HGB BLD-MCNC: 15.8 G/DL (ref 12.1–17)
LDLC SERPL CALC-MCNC: 22.4 MG/DL (ref 0–100)
LIPID PROFILE,FLP: ABNORMAL
POTASSIUM SERPL-SCNC: 4.8 MMOL/L (ref 3.5–5.1)
PROT SERPL-MCNC: 7.4 G/DL (ref 6.4–8.2)
SODIUM SERPL-SCNC: 137 MMOL/L (ref 136–145)
T4 FREE SERPL-MCNC: 1.3 NG/DL (ref 0.8–1.5)
TRIGL SERPL-MCNC: 378 MG/DL (ref ?–150)
VLDLC SERPL CALC-MCNC: 75.6 MG/DL

## 2020-08-21 LAB — 25(OH)D3 SERPL-MCNC: 40.5 NG/ML (ref 30–100)

## 2020-08-26 ENCOUNTER — TELEPHONE (OUTPATIENT)
Dept: FAMILY MEDICINE CLINIC | Age: 70
End: 2020-08-26

## 2020-08-26 DIAGNOSIS — I10 ESSENTIAL HYPERTENSION: Chronic | ICD-10-CM

## 2020-08-27 RX ORDER — HYDROCHLOROTHIAZIDE 12.5 MG/1
12.5 TABLET ORAL DAILY
Qty: 90 TAB | Refills: 1 | Status: SHIPPED | OUTPATIENT
Start: 2020-08-27 | End: 2021-02-24

## 2020-08-27 RX ORDER — METOPROLOL SUCCINATE 50 MG/1
TABLET, EXTENDED RELEASE ORAL
Qty: 90 TAB | Refills: 1 | Status: SHIPPED | OUTPATIENT
Start: 2020-08-27 | End: 2020-08-31

## 2020-09-02 ENCOUNTER — CLINICAL SUPPORT (OUTPATIENT)
Dept: FAMILY MEDICINE CLINIC | Age: 70
End: 2020-09-02
Payer: MEDICARE

## 2020-09-02 ENCOUNTER — VIRTUAL VISIT (OUTPATIENT)
Dept: FAMILY MEDICINE CLINIC | Age: 70
End: 2020-09-02
Payer: MEDICARE

## 2020-09-02 VITALS — TEMPERATURE: 97.9 F

## 2020-09-02 DIAGNOSIS — I10 ESSENTIAL HYPERTENSION: Primary | Chronic | ICD-10-CM

## 2020-09-02 DIAGNOSIS — K21.9 GASTROESOPHAGEAL REFLUX DISEASE WITHOUT ESOPHAGITIS: Chronic | ICD-10-CM

## 2020-09-02 DIAGNOSIS — M51.36 DDD (DEGENERATIVE DISC DISEASE), LUMBAR: Chronic | ICD-10-CM

## 2020-09-02 DIAGNOSIS — Z23 ENCOUNTER FOR IMMUNIZATION: Primary | ICD-10-CM

## 2020-09-02 DIAGNOSIS — E55.9 VITAMIN D DEFICIENCY: Chronic | ICD-10-CM

## 2020-09-02 DIAGNOSIS — E78.00 PURE HYPERCHOLESTEROLEMIA: Chronic | ICD-10-CM

## 2020-09-02 DIAGNOSIS — E66.01 SEVERE OBESITY (BMI 35.0-39.9) WITH COMORBIDITY (HCC): ICD-10-CM

## 2020-09-02 PROCEDURE — G0008 ADMIN INFLUENZA VIRUS VAC: HCPCS | Performed by: FAMILY MEDICINE

## 2020-09-02 PROCEDURE — 99442 PR PHYS/QHP TELEPHONE EVALUATION 11-20 MIN: CPT | Performed by: FAMILY MEDICINE

## 2020-09-02 PROCEDURE — 90653 IIV ADJUVANT VACCINE IM: CPT | Performed by: FAMILY MEDICINE

## 2020-09-02 NOTE — PROGRESS NOTES
[address]  [phone]           Progress Note    Patient: Jaret Meyers MRN: [id number]  SSN: [id number]    YOB: 1950  Age: 81 y.o. Sex: male        Chief Complaint   Patient presents with    Follow Up Chronic Condition         Jaret Meyers is a 81 y.o. male evaluated via telephone on 9/2/2020. Nurse involved in care:Nurse Borlittle  Location of patient:Home  Location of physician:My office    Consent:  He and/or health care decision maker is aware that that he may receive a bill for this telephone service, depending on his insurance coverage, and has provided verbal consent to proceed: Yes      Documentation:  I communicated with the patient and/or health care decision maker about multiple problems. Details of this discussion including any medical advice provided: See Below. I affirm this is a Patient Initiated Episode with an Established Patient who has not had a related appointment within my department in the past 7 days or scheduled within the next 24 hours. Total Time: minutes: 11-20 minutes    Note: not billable if this call serves to triage the patient into an appointment for the relevant concern      Rajani Manriquez MD   __________________________________________________________________________________________    Subjective:     Encounter Diagnoses   Name Primary?  Essential hypertension:  BP Readings from Last 3 Encounters:   01/22/20 126/85   12/03/19 119/76   10/25/19 120/77     The patient reports:  taking medications as instructed, no medication side effects noted, no TIA's, no chest pain on exertion, no dyspnea on exertion, no swelling of ankles. Key CAD CHF Meds             metoprolol succinate (TOPROL-XL) 50 mg XL tablet Take 1 tablet by mouth once daily    hydroCHLOROthiazide (HYDRODIURIL) 12.5 mg tablet Take 1 Tab by mouth daily.     rosuvastatin (CRESTOR) 10 mg tablet TAKE ONE TABLET BY MOUTH NIGHTLY    ramipril (ALTACE) 10 mg capsule TAKE 1 CAPSULE BY MOUTH TWICE DAILY           Lab Results   Component Value Date/Time    Sodium 137 08/20/2020 08:33 AM    Potassium 4.8 08/20/2020 08:33 AM    Chloride 103 08/20/2020 08:33 AM    CO2 26 08/20/2020 08:33 AM    Anion gap 8 08/20/2020 08:33 AM    Glucose 95 08/20/2020 08:33 AM    BUN 15 08/20/2020 08:33 AM    Creatinine 0.99 08/20/2020 08:33 AM    BUN/Creatinine ratio 15 08/20/2020 08:33 AM    GFR est AA >60 08/20/2020 08:33 AM    GFR est non-AA >60 08/20/2020 08:33 AM    Calcium 9.3 08/20/2020 08:33 AM    Bilirubin, total 0.6 08/20/2020 08:33 AM    Alk. phosphatase 66 08/20/2020 08:33 AM    Protein, total 7.4 08/20/2020 08:33 AM    Albumin 4.1 08/20/2020 08:33 AM    Globulin 3.3 08/20/2020 08:33 AM    A-G Ratio 1.2 08/20/2020 08:33 AM    ALT (SGPT) 45 08/20/2020 08:33 AM     Low salt diet? yes  Aerobic exercise? no  Our goal is to normalize the blood pressure to decrease the risks of strokes and heart attacks. The patient is in agreement with the plan. Yes    Severe obesity (BMI 35.0-39. 9) with comorbidity [facility]): He thinks he has gained about 10 pounds since COVID isolation. He is craving sweets. Discussed low sweet and low starch diet with. Wt Readings from Last 3 Encounters:   01/22/20 253 lb 6.4 oz (114.9 kg)   12/03/19 248 lb (112.5 kg)   10/25/19 250 lb 1.6 oz (113.4 kg)          Gastroesophageal reflux disease without esophagitis:  Current control of Symptoms:good  Hiatal Hernia:no  Current Medications: PRN  The patient has no history melena or bright red blood in the stools. The patient avoids high dose aspirin and NSAID therapy. The patient is aware of diet changes needed, elevating the head of the bed and appropriate use of antacids.  Pure hypercholesterolemia:  Cardiovascular risks for him are: LDL goal is under 100  hypertension  hyperlipidemia.    Key Antihyperlipidemia Meds             rosuvastatin (CRESTOR) 10 mg tablet TAKE ONE TABLET BY MOUTH NIGHTLY        Lab Results   Component Value Date/Time    Cholesterol, total 130 08/20/2020 08:33 AM    HDL Cholesterol 32 08/20/2020 08:33 AM    LDL, calculated 22.4 08/20/2020 08:33 AM    Triglyceride 378 (H) 08/20/2020 08:33 AM    CHOL/HDL Ratio 4.1 08/20/2020 08:33 AM     Lab Results   Component Value Date/Time    ALT (SGPT) 45 08/20/2020 08:33 AM    Alk. phosphatase 66 08/20/2020 08:33 AM    Bilirubin, total 0.6 08/20/2020 08:33 AM      Myalgias: No   Fatigue: No   Other side effects: no  Wt Readings from Last 3 Encounters:   01/22/20 253 lb 6.4 oz (114.9 kg)   12/03/19 248 lb (112.5 kg)   10/25/19 250 lb 1.6 oz (113.4 kg)     The patient is aware of our goal to reduce or eliminate the long term problems (such as strokes and heart attacks) related to poorly controlled hyperlipidemia.  DDD (degenerative disc disease), lumbar: He sees Dr. Crow Shaver on a regular basis. He still has a right leg radiculopathy but it does not go all the way to the foot now. In addition to his degenerative changes he also had a compression fracture of L1 due to a fall. Overall his back pain is stable but his right leg goes numb with any significant activity.  Vitamin D deficiency:  No sx. Due for testing. Lab Results   Component Value Date/Time    Vitamin D 25-Hydroxy 40.5 08/20/2020 08:33 AM               Current and past medical information:    Current Medications after this visit[de-identified]     Current Outpatient Medications   Medication Sig    metoprolol succinate (TOPROL-XL) 50 mg XL tablet Take 1 tablet by mouth once daily    hydroCHLOROthiazide (HYDRODIURIL) 12.5 mg tablet Take 1 Tab by mouth daily.     fluticasone propionate (FLONASE) 50 mcg/actuation nasal spray Use 2 spray(s) in each nostril once daily    rosuvastatin (CRESTOR) 10 mg tablet TAKE ONE TABLET BY MOUTH NIGHTLY    albuterol (PROVENTIL VENTOLIN) 2.5 mg /3 mL (0.083 %) nebu Take 3 mL by inhalation every four (4) hours as needed (cough/wheezing).  albuterol sulfate (PROAIR RESPICLICK) 90 mcg/actuation aepb Take 2 Puffs by inhalation every four (4) hours as needed (wheezing).  triamcinolone (ARISTOCORT) 0.5 % topical cream Apply  to affected area two (2) times daily as needed for Skin Irritation or Itching. use thin layer on chest and back.  triamcinolone (ARISTOCORT) 0.5 % topical cream Apply  to affected area two (2) times a day. use thin layer on chest and back    ramipril (ALTACE) 10 mg capsule TAKE 1 CAPSULE BY MOUTH TWICE DAILY    diclofenac (VOLTAREN) 1 % gel Apply 2 g to affected area four (4) times daily as needed (pain). Back and knee pain    cyclobenzaprine (FLEXERIL) 10 mg tablet Take 1 Tab by mouth three (3) times daily as needed for Muscle Spasm(s).  senna-docusate (PERICOLACE) 8.6-50 mg per tablet Take 1 Tab by mouth two (2) times daily as needed for Constipation.  mupirocin calcium (BACTROBAN NASAL) 2 % nasal ointment 1 Each by Both Nostrils route two (2) times daily as needed. Indications: Methicillin-Resistant Staphylococcus Aureus Bacteria in Nose    gabapentin (NEURONTIN) 300 mg capsule Take 300 mg by mouth three (3) times daily.  raNITIdine (ZANTAC) 150 mg tablet Take 150 mg by mouth two (2) times daily as needed.  cholecalciferol, VITAMIN D3, (VITAMIN D3) 5,000 unit tab tablet Take 5,000 Units by mouth every morning.  ascorbic acid, vitamin C, (VITAMIN C) 500 mg tablet Take 1,000 mg by mouth every morning.  dextromethorphan-guaiFENesin (MUCINEX DM) 60-1,200 mg Tb12 Take 1 Tab by mouth daily as needed.  nabumetone (RELAFEN) 500 mg tablet Take  by mouth two (2) times a day.  cetirizine (ZYRTEC) 10 mg tablet Take 10 mg by mouth every morning.  POTASSIUM CHLORIDE PO Take 95 mg by mouth every morning.  cyanocobalamin (VITAMIN B-12) 1,000 mcg tablet Take 1,000 mcg by mouth every morning. No current facility-administered medications for this visit. Patient Active Problem List    Diagnosis Date Noted    Inguinal lymphadenopathy 01/17/2019     Priority: 1 - One    Asthma 10/19/2017     Priority: 1 - One    Complex tear of medial meniscus of right knee as current injury 01/04/2017     Priority: 1 - One    DDD (degenerative disc disease), lumbar 05/16/2016     Priority: 1 - One    PVC's (premature ventricular contractions)      Priority: 1 - One    B12 deficiency 10/17/2014     Priority: 1 - One    HLD (hyperlipidemia) 03/26/2013     Priority: 1 - One    Positive PPD 07/01/2010     Priority: 1 - One    RACHEL (obstructive sleep apnea) 07/01/2010     Priority: 1 - One    HTN (hypertension) 04/01/2010     Priority: 1 - One    GERD (gastroesophageal reflux disease) 04/01/2010     Priority: 1 - One    Lymphadenopathy 02/04/2019    Acute bronchitis 12/31/2018    Severe obesity (BMI 35.0-39. 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    Arthritis     Basal cell carcinoma (BCC) in situ of skin     Colon ulcer, aphthous 4/1/2010    COPD, mild ([address]     Does not see anyone for this    Environmental and seasonal allergies     Essential tremor     Fatty liver 06/2019    Frequent episodes of bronchitis 2018    Frequent PVCs     Has been checked by cardiology    GERD (gastroesophageal reflux disease) 4/1/2010    High cholesterol 4/1/2010    HTN (hypertension), benign 04/01/2010    Hx MRSA infection 2013    CYSTS BL LEGS    Sleep apnea     CPAP       Allergies   Allergen Reactions  Chlorhexidine Towelette Rash       Past Surgical History:   Procedure Laterality Date    COLONOSCOPY N/A 2019    performed by Yasmeen Mckeon MD at [facility] HX AMPUTATION FINGER Right     2nd digit    HX APPENDECTOMY      HX CATARACT REMOVAL Bilateral     HX CERVICAL FUSION N/A     HX HERNIA REPAIR      HX KNEE ARTHROSCOPY Left     HX KNEE REPLACEMENT Bilateral 10/2017    HX MENISCECTOMY Right 2017    HX TONSIL AND ADENOIDECTOMY         Social History     Socioeconomic History    Marital status:      Spouse name: Not on file    Number of children: Not on file    Years of education: Not on file    Highest education level: Not on file   Tobacco Use    Smoking status: Former Smoker     Packs/day: 3.00     Years: 35.00     Pack years: 105.00     Types: Cigarettes     Last attempt to quit: 2000     Years since quittin.6    Smokeless tobacco: Never Used   Substance and Sexual Activity    Alcohol use: Yes     Alcohol/week: 21.0 standard drinks     Types: 21 Shots of liquor per week    Drug use: No    Sexual activity: Yes     Partners: Female     Birth control/protection: None       Review of Systems   Constitutional: Negative. Negative for chills, fever, malaise/fatigue and weight loss. HENT: Negative. Negative for hearing loss. Eyes: Negative. Negative for blurred vision and double vision. Respiratory: Negative. Negative for cough, sputum production and shortness of breath. Cardiovascular: Negative. Negative for chest pain and palpitations. Gastrointestinal: Negative. Negative for abdominal pain, blood in stool, heartburn, nausea and vomiting. Genitourinary: Negative. Negative for dysuria, frequency and urgency. Musculoskeletal: Positive for back pain and joint pain. Negative for falls, myalgias and neck pain. Skin: Negative. Negative for rash. Neurological: Negative. Negative for weakness.    Endo/Heme/Allergies: Negative. Psychiatric/Behavioral: Negative. Objective: There were no vitals filed for this visit. There is no height or weight on file to calculate BMI. Health Maintenance Due   Topic Date Due    Flu Vaccine (1) 09/01/2020         Assessment and orders:     Encounter Diagnoses     ICD-10-CM ICD-9-CM   1. Essential hypertension  I10 401.9   2. Severe obesity (BMI 35.0-39. 9) with comorbidity ([facility]  E66.01 278.01   3. Gastroesophageal reflux disease without esophagitis  K21.9 530.81   4. Pure hypercholesterolemia  E78.00 272.0   5. DDD (degenerative disc disease), lumbar  M51.36 722.52   6. Vitamin D deficiency  E55.9 268.9     Diagnoses and all orders for this visit:    1. Essential hypertension-controlled. 2. Severe obesity (BMI 35.0-39. 9) with comorbidity (HCC)-reiterated low starch low sweet diet. 3. Gastroesophageal reflux disease without esophagitis- symptoms are stable. 4. Pure hypercholesterolemia-treated    5. DDD (degenerative disc disease), lumbar-severe    6. Vitamin D deficiency- normalized            Plan of care:  Discussed diagnoses in detail with patient. Medication risks/benefits/side effects discussed with patient. All of the patient's questions were addressed. The patient understands and agrees with our plan of care. The patient knows to call back if they are unsure of or forget any changes we discussed today or if the symptoms change. The patient received an After-Visit Summary which contains VS, orders, medication list and allergy list. This can be used as a \"mini-medical record\" should they have to seek medical care while out of town.     Patient Care Team:  Nils Palafox MD as PCP - General (Family Medicine)  Prasanna Scruggs MD as PCP - [address] [place] [address] Dr Lópezled Provider  Erlin Fisher MD (Cardiology)  Olga Martinez MD (Dermatology)  Duarte Grover MD (Urology)  Donaldo Walker MD (Gastroenterology)  Marlee Jimenez MD (Surgery)  Santiago Yanes MD (Orthopedic Surgery)  Richie Story MD (Orthopedic Surgery)  Vito Soares NP as Nurse Practitioner (Surgery)  Katie Mir MD (Hematology and Oncology)  Mary Lugo MD (Endocrinology)  Favian Early MD as Physician (Sleep Medicine)          Signed By: Andre Lehman MD     September 2, 2020      ATTENTION:   This medical record was transcribed using an electronic medical records/speech recognition system. Although proofread, it may and can contain electronic, spelling and other errors. Corrections may be executed at a later time. Please feel free to contact me for any clarifications as needed.

## 2020-11-25 ENCOUNTER — TELEPHONE (OUTPATIENT)
Dept: FAMILY MEDICINE CLINIC | Age: 70
End: 2020-11-25

## 2020-11-25 NOTE — TELEPHONE ENCOUNTER
----- Message from Eri Damon sent at 11/25/2020  9:35 AM EST -----  Regarding: Dr Walls/Telephone  Jody Stein first and last name: Angela Media w/[employer]   Reason for call: Prior auth needed, today if possible  Callback required yes/no and why: Yes, prior auth  Best contact number(s): [phone] (ask for [employer])  Details to clarify the request: Prior auth needed for pt inhaler - pt is currently out of their med. \"Pro Air Respi Click\" 17 day supply. A prior auth was sent on 11/23 with no response.

## 2020-12-01 RX ORDER — ALBUTEROL SULFATE 90 UG/1
2 AEROSOL, METERED RESPIRATORY (INHALATION)
Qty: 1 INHALER | Refills: 4 | Status: SHIPPED | OUTPATIENT
Start: 2020-12-01 | End: 2021-11-26

## 2021-01-04 ENCOUNTER — VIRTUAL VISIT (OUTPATIENT)
Dept: FAMILY MEDICINE CLINIC | Age: 71
End: 2021-01-04
Payer: MEDICARE

## 2021-01-04 DIAGNOSIS — J45.20 MILD INTERMITTENT ASTHMA WITHOUT COMPLICATION: Chronic | ICD-10-CM

## 2021-01-04 DIAGNOSIS — K21.9 GASTROESOPHAGEAL REFLUX DISEASE WITHOUT ESOPHAGITIS: Chronic | ICD-10-CM

## 2021-01-04 DIAGNOSIS — G47.33 OSA (OBSTRUCTIVE SLEEP APNEA): Chronic | ICD-10-CM

## 2021-01-04 DIAGNOSIS — E55.9 VITAMIN D DEFICIENCY: Chronic | ICD-10-CM

## 2021-01-04 DIAGNOSIS — E53.8 B12 DEFICIENCY: ICD-10-CM

## 2021-01-04 DIAGNOSIS — M51.36 DDD (DEGENERATIVE DISC DISEASE), LUMBAR: Chronic | ICD-10-CM

## 2021-01-04 DIAGNOSIS — I10 ESSENTIAL HYPERTENSION: Primary | Chronic | ICD-10-CM

## 2021-01-04 DIAGNOSIS — E78.00 PURE HYPERCHOLESTEROLEMIA: Chronic | ICD-10-CM

## 2021-01-04 PROCEDURE — 99442 PR PHYS/QHP TELEPHONE EVALUATION 11-20 MIN: CPT | Performed by: FAMILY MEDICINE

## 2021-01-04 NOTE — PROGRESS NOTES
Magalis Mendoza is a 78 y.o. male evaluated via Telephone on 21. Patient Identity confirmed by . Consent:  He and/or health care decision maker is aware that that he may receive a bill for this telephone service, depending on his insurance coverage, and has provided verbal consent to proceed: Yes    Physician Location: Office  Patient Location: Home  Nurse Assisting with Encounter: Teresa Blanton LPN    Chief Complaint   Patient presents with    Follow-up      Information gathered from patient and/or health care decision maker. HPI:   Hypertension Follow up:  Currently Taking Ramipril 10 mg, Metoprolol 50 mg daily, HCTZ 12.5 mg. The patient reports:  taking medications as instructed, no medication side effects noted, home BP monitoring in range of 542'M systolic over 95'I diastolic, no TIA's, no chest pain on exertion, no dyspnea on exertion, no swelling of ankles. BP Readings from Last 3 Encounters:   20 126/85   19 119/76   10/25/19 120/77      Hypertriglyceridemia Follow up:   Cardiovascular risks for him are: hypertension  hyperlipidemia  former smoker. Currently he takes Crestor , 10 mg. Myalgias: NO   Fatigue: NO   Other side effects: NO     Wt Readings from Last 3 Encounters:   20 253 lb 6.4 oz (114.9 kg)   19 248 lb (112.5 kg)   10/25/19 250 lb 1.6 oz (113.4 kg)     DDD: Patient gets shots with Dr. Lindsey Sanabria, avoiding surgery as able. Has bilateral Knee replacement. Uses Gabapentin PRN. Also has Diclofenac gel, Flexeril, Relafen PRN. Asthma/Allergies: Uses Albuterol and Zyrtec. GERD: Takes Omeprazole 20 mg daily. Derm: Starting cream per derm for Skin cancer    Review of Systems   Constitutional: Negative for chills and fever. HENT: Negative for congestion. Respiratory: Negative for cough and hemoptysis. Cardiovascular: Negative for chest pain and palpitations. Gastrointestinal: Negative for abdominal pain, nausea and vomiting. Musculoskeletal: Negative for myalgias and neck pain. Neurological: Negative for dizziness. Limited Exam:  Due to this being a TeleHealth evaluation, many elements of the physical examination are unable to be assessed. Constitutional: Appears well-developed and well-nourished in no apparent distress   Mental status: Alert and awake, Oriented to person/place/time, Able to follow commands  Psychiatric: Normal affect, normal judgment/insight. No hallucinations     Current Outpatient Medications on File Prior to Visit   Medication Sig Dispense Refill    albuterol (PROVENTIL HFA, VENTOLIN HFA, PROAIR HFA) 90 mcg/actuation inhaler Take 2 Puffs by inhalation every four (4) hours as needed for Wheezing for up to 360 days. 1 Inhaler 4    ramipriL (ALTACE) 10 mg capsule Take 1 capsule by mouth twice daily 180 Cap 1    metoprolol succinate (TOPROL-XL) 50 mg XL tablet Take 1 tablet by mouth once daily 30 Tab 5    hydroCHLOROthiazide (HYDRODIURIL) 12.5 mg tablet Take 1 Tab by mouth daily. 90 Tab 1    fluticasone propionate (FLONASE) 50 mcg/actuation nasal spray Use 2 spray(s) in each nostril once daily 16 g 6    rosuvastatin (CRESTOR) 10 mg tablet TAKE ONE TABLET BY MOUTH NIGHTLY 30 Tab 5    albuterol (PROVENTIL VENTOLIN) 2.5 mg /3 mL (0.083 %) nebu Take 3 mL by inhalation every four (4) hours as needed (cough/wheezing). 50 Nebule 4    triamcinolone (ARISTOCORT) 0.5 % topical cream Apply  to affected area two (2) times daily as needed for Skin Irritation or Itching. use thin layer on chest and back. 454 g 1    diclofenac (VOLTAREN) 1 % gel Apply 2 g to affected area four (4) times daily as needed (pain). Back and knee pain 100 g 4    cyclobenzaprine (FLEXERIL) 10 mg tablet Take 1 Tab by mouth three (3) times daily as needed for Muscle Spasm(s). 60 Tab 2    senna-docusate (PERICOLACE) 8.6-50 mg per tablet Take 1 Tab by mouth two (2) times daily as needed for Constipation.  60 Tab 2    mupirocin calcium (BACTROBAN NASAL) 2 % nasal ointment 1 Each by Both Nostrils route two (2) times daily as needed. Indications: Methicillin-Resistant Staphylococcus Aureus Bacteria in Nose      gabapentin (NEURONTIN) 300 mg capsule Take 300 mg by mouth three (3) times daily.  cholecalciferol, VITAMIN D3, (VITAMIN D3) 5,000 unit tab tablet Take 5,000 Units by mouth every morning.  ascorbic acid, vitamin C, (VITAMIN C) 500 mg tablet Take 1,000 mg by mouth every morning.  dextromethorphan-guaiFENesin (MUCINEX DM) 60-1,200 mg Tb12 Take 1 Tab by mouth daily as needed.  nabumetone (RELAFEN) 500 mg tablet Take  by mouth two (2) times a day.  cetirizine (ZYRTEC) 10 mg tablet Take 10 mg by mouth every morning.  POTASSIUM CHLORIDE PO Take 95 mg by mouth every morning.  cyanocobalamin (VITAMIN B-12) 1,000 mcg tablet Take 1,000 mcg by mouth every morning.  [DISCONTINUED] triamcinolone (ARISTOCORT) 0.5 % topical cream Apply  to affected area two (2) times a day. use thin layer on chest and back 15 g 0    [DISCONTINUED] raNITIdine (ZANTAC) 150 mg tablet Take 150 mg by mouth two (2) times daily as needed. No current facility-administered medications on file prior to visit. Allergies   Allergen Reactions    Chlorhexidine Towelette Rash        Patient Active Problem List    Diagnosis Date Noted    PVC's (premature ventricular contractions)      Priority: 1 - One    Lymphadenopathy 02/04/2019    Inguinal lymphadenopathy 01/17/2019    Acute bronchitis 12/31/2018    Severe obesity (BMI 35.0-39. 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 10/13/2017    Complex tear of medial meniscus of right knee as current injury 01/04/2017    Rectus diastasis 06/03/2016    DDD (degenerative disc disease), lumbar 05/16/2016    B12 deficiency 10/17/2014    Knee pain 08/04/2014    HLD (hyperlipidemia) 03/26/2013    SOB (shortness of breath) 10/11/2012    Vitamin D deficiency 04/27/2012    Positive PPD 07/01/2010    RACHEL (obstructive sleep apnea) 07/01/2010    HTN (hypertension) 04/01/2010    GERD (gastroesophageal reflux disease) 04/01/2010    Colon ulcer, aphthous 04/01/2010        Health Maintenance Due   Topic Date Due    Shingrix Vaccine Age 50> (2 of 2) 09/29/2020    Medicare Yearly Exam  10/09/2020        Assessment/Plan:  Details of this discussion including any medical advice provided: Ordering Labs, follow up in 1-2 weeks for [address] in office to discuss labs    ICD-10-CM ICD-9-CM    1. Essential hypertension  K32 465.5 METABOLIC PANEL, COMPREHENSIVE      HGB & HCT   2. Gastroesophageal reflux disease without esophagitis  K21.9 530.81    3. RACHEL (obstructive sleep apnea)  G47.33 327.23    4. Mild intermittent asthma without complication  M44.31 670.13    5. DDD (degenerative disc disease), lumbar  M51.36 722.52    6. Pure hypercholesterolemia  E78.00 272.0 LIPID PANEL   7. B12 deficiency  E53.8 266.2 VITAMIN B12   8. Vitamin D deficiency  E55.9 268.9 VITAMIN D, 25 HYDROXY     Follow-up and Dispositions    · Return in about 1 week (around 1/11/2021) for In office [address] Total Time: minutes: 11-20 minutes was spent on telemedicine encounter discussing above problems and plans. Patient Problem list, medications, and Allergies were reviewed during this encounter. Pursuant to the emergency declaration under the [facility] [address] waiver authority and the [employer] Act, this Telephone Visit was conducted, with patient's consent, to reduce the patient's risk of exposure to COVID-19 and provide continuity of care for an established patient.      I affirm this is a Patient Initiated Episode with an Established Patient who has not had a related appointment within my department in the past 7 days or scheduled within the next 24 hours. Discussed diagnoses in detail with patient. Medication risks/benefits/side effects discussed with patient. All of the patient's questions were addressed. The patient understands and agrees with our plan of care. The patient knows to call back if they are unsure of or forget any changes we discussed today or if the symptoms change.     Note: not billable if this call serves to triage the patient into an appointment for the relevant concern    MD ARTUR [facility]ell [facility]  01/04/21

## 2021-01-04 NOTE — PROGRESS NOTES
Chief Complaint   Patient presents with    Follow-up     1. Have you been to the ER, urgent care clinic since your last visit? Hospitalized since your last visit? No    2. Have you seen or consulted any other health care providers outside of the [address] [place] [address] since your last visit? Include any pap smears or colon screening.  No    Reviewed record in preparation for visit and have necessary documentation  Pt did not bring medication to office visit for review  opportunity was given for questions  Goals that were addressed and/or need to be completed during or after this appointment include   Health Maintenance Due   Topic Date Due    Shingrix Vaccine Age 50> (2 of 2) 09/29/2020    Medicare Yearly Exam  10/09/2020

## 2021-01-05 LAB
25(OH)D3 SERPL-MCNC: 37.3 NG/ML (ref 30–100)
ALBUMIN SERPL-MCNC: 4 G/DL (ref 3.5–5)
ALBUMIN/GLOB SERPL: 1.3 {RATIO} (ref 1.1–2.2)
ALP SERPL-CCNC: 81 U/L (ref 45–117)
ALT SERPL-CCNC: 48 U/L (ref 12–78)
ANION GAP SERPL CALC-SCNC: 5 MMOL/L (ref 5–15)
AST SERPL-CCNC: 23 U/L (ref 15–37)
BILIRUB SERPL-MCNC: 0.3 MG/DL (ref 0.2–1)
BUN SERPL-MCNC: 18 MG/DL (ref 6–20)
BUN/CREAT SERPL: 19 (ref 12–20)
CALCIUM SERPL-MCNC: 9.5 MG/DL (ref 8.5–10.1)
CHLORIDE SERPL-SCNC: 103 MMOL/L (ref 97–108)
CHOLEST SERPL-MCNC: 125 MG/DL
CO2 SERPL-SCNC: 29 MMOL/L (ref 21–32)
CREAT SERPL-MCNC: 0.97 MG/DL (ref 0.7–1.3)
GLOBULIN SER CALC-MCNC: 3.2 G/DL (ref 2–4)
GLUCOSE SERPL-MCNC: 96 MG/DL (ref 65–100)
HCT VFR BLD AUTO: 48.3 % (ref 36.6–50.3)
HDLC SERPL-MCNC: 26 MG/DL
HDLC SERPL: 4.8 {RATIO} (ref 0–5)
HGB BLD-MCNC: 15.4 G/DL (ref 12.1–17)
LDLC SERPL CALC-MCNC: 33.4 MG/DL (ref 0–100)
LIPID PROFILE,FLP: ABNORMAL
POTASSIUM SERPL-SCNC: 4.7 MMOL/L (ref 3.5–5.1)
PROT SERPL-MCNC: 7.2 G/DL (ref 6.4–8.2)
SODIUM SERPL-SCNC: 137 MMOL/L (ref 136–145)
TRIGL SERPL-MCNC: 328 MG/DL (ref ?–150)
VIT B12 SERPL-MCNC: 873 PG/ML (ref 193–986)
VLDLC SERPL CALC-MCNC: 65.6 MG/DL

## 2021-05-26 RX ORDER — RAMIPRIL 10 MG/1
CAPSULE ORAL
Qty: 180 CAPSULE | Refills: 0 | Status: SHIPPED | OUTPATIENT
Start: 2021-05-26 | End: 2021-09-01

## 2021-06-03 RX ORDER — ROSUVASTATIN CALCIUM 10 MG/1
TABLET, COATED ORAL
Qty: 90 TABLET | Refills: 0 | Status: SHIPPED | OUTPATIENT
Start: 2021-06-03 | End: 2021-12-27 | Stop reason: SDUPTHER

## 2021-07-07 ENCOUNTER — OFFICE VISIT (OUTPATIENT)
Dept: SLEEP MEDICINE | Age: 71
End: 2021-07-07
Payer: MEDICARE

## 2021-07-07 VITALS
BODY MASS INDEX: 33.64 KG/M2 | WEIGHT: 235 LBS | HEART RATE: 66 BPM | DIASTOLIC BLOOD PRESSURE: 68 MMHG | OXYGEN SATURATION: 95 % | HEIGHT: 70 IN | SYSTOLIC BLOOD PRESSURE: 117 MMHG | RESPIRATION RATE: 16 BRPM

## 2021-07-07 DIAGNOSIS — G47.33 OSA (OBSTRUCTIVE SLEEP APNEA): Primary | ICD-10-CM

## 2021-07-07 DIAGNOSIS — I10 ESSENTIAL HYPERTENSION: ICD-10-CM

## 2021-07-07 PROCEDURE — G8427 DOCREV CUR MEDS BY ELIG CLIN: HCPCS | Performed by: SPECIALIST

## 2021-07-07 PROCEDURE — G8754 DIAS BP LESS 90: HCPCS | Performed by: SPECIALIST

## 2021-07-07 PROCEDURE — G8417 CALC BMI ABV UP PARAM F/U: HCPCS | Performed by: SPECIALIST

## 2021-07-07 PROCEDURE — 99213 OFFICE O/P EST LOW 20 MIN: CPT | Performed by: SPECIALIST

## 2021-07-07 PROCEDURE — G8752 SYS BP LESS 140: HCPCS | Performed by: SPECIALIST

## 2021-07-07 PROCEDURE — G8536 NO DOC ELDER MAL SCRN: HCPCS | Performed by: SPECIALIST

## 2021-07-07 PROCEDURE — G8432 DEP SCR NOT DOC, RNG: HCPCS | Performed by: SPECIALIST

## 2021-07-07 PROCEDURE — 1101F PT FALLS ASSESS-DOCD LE1/YR: CPT | Performed by: SPECIALIST

## 2021-07-07 PROCEDURE — 3017F COLORECTAL CA SCREEN DOC REV: CPT | Performed by: SPECIALIST

## 2021-07-07 NOTE — PATIENT INSTRUCTIONS
[address] [facility] [place], [address]  Tel.  [phone]  Fax. [address]  [place], [address] [facility]  Tel.  [phone]  Fax. [phone] [address]  Tel.  [phone]  Fax. [phone]     Learning About CPAP for Sleep Apnea  What is CPAP? CPAP is a small machine that you use at home every night while you sleep. It increases air pressure in your throat to keep your airway open. When you have sleep apnea, this can help you sleep better so you feel much better. CPAP stands for \"continuous positive airway pressure. \"  The CPAP machine will have one of the following:  · A mask that covers your nose and mouth  · Prongs that fit into your nose  · A mask that covers your nose only, the most common type. This type is called NCPAP. The N stands for \"nasal.\"  Why is it done? CPAP is usually the best treatment for obstructive sleep apnea. It is the first treatment choice and the most widely used. Your doctor may suggest CPAP if you have:  · Moderate to severe sleep apnea. · Sleep apnea and coronary artery disease (CAD) or heart failure. How does it help? · CPAP can help you have more normal sleep, so you feel less sleepy and more alert during the daytime. · CPAP may help keep heart failure or other heart problems from getting worse. · NCPAP may help lower your blood pressure. · If you use CPAP, your bed partner may also sleep better because you are not snoring or restless. What are the side effects? Some people who use CPAP have:  · A dry or stuffy nose and a sore throat. · Irritated skin on the face. · Sore eyes. · Bloating. If you have any of these problems, work with your doctor to fix them. Here are some things you can try:  · Be sure the mask or nasal prongs fit well. · See if your doctor can adjust the pressure of your CPAP. · If your nose is dry, try a humidifier.   · If your nose is runny or stuffy, try decongestant medicine or a steroid nasal spray. If these things do not help, you might try a different type of machine. Some machines have air pressure that adjusts on its own. Others have air pressures that are different when you breathe in than when you breathe out. This may reduce discomfort caused by too much pressure in your nose. Where can you learn more? Go to [employer]  Enter Christian Binh in the search box to learn more about \"Learning About CPAP for Sleep Apnea. \"   © 3925-3957 Healthwise, Incorporated. Care instructions adapted under license by [facility] [employer] (which disclaims liability or warranty for this information). This care instruction is for use with your licensed healthcare professional. If you have questions about a medical condition or this instruction, always ask your healthcare professional. [address] any warranty or liability for your use of this information. Content Version: 2.8.02319; Last Revised: January 11, 2010  PROPER SLEEP HYGIENE    What to avoid  · Do not have drinks with caffeine, such as coffee or black tea, for 8 hours before bed. · Do not smoke or use other types of tobacco near bedtime. Nicotine is a stimulant and can keep you awake. · Avoid drinking alcohol late in the evening, because it can cause you to wake in the middle of the night. · Do not eat a big meal close to bedtime. If you are hungry, eat a light snack. · Do not drink a lot of water close to bedtime, because the need to urinate may wake you up during the night. · Do not read or watch TV in bed. Use the bed only for sleeping and sexual activity. What to try  · Go to bed at the same time every night, and wake up at the same time every morning. Do not take naps during the day. · Keep your bedroom quiet, dark, and cool. · Get regular exercise, but not within 3 to 4 hours of your bedtime. .  · Sleep on a comfortable pillow and mattress.   · If watching the clock makes you anxious, turn it facing away from you so you cannot see the time. · If you worry when you lie down, start a worry book. Well before bedtime, write down your worries, and then set the book and your concerns aside. · Try meditation or other relaxation techniques before you go to bed. · If you cannot fall asleep, get up and go to another room until you feel sleepy. Do something relaxing. Repeat your bedtime routine before you go to bed again. · Make your house quiet and calm about an hour before bedtime. Turn down the lights, turn off the TV, log off the computer, and turn down the volume on music. This can help you relax after a busy day. Drowsy Driving: The [facility] cites drowsiness as a causing factor in more than 889,720 police reported crashes annually, resulting in 76,000 injuries and 1,500 deaths. Other surveys suggest 55% of people polled have driven while drowsy in the past year, 23% had fallen asleep but not crashed, 3% crashed, and 2% had and accident due to drowsy driving. Who is at risk? Young Drivers: One study of drowsy driving accidents states that 55% of the drivers were under 25 years. Of those, 75% were male. Shift Workers and Travelers: People who work overnight or travel across time zones frequently are at higher risk of experiencing Circadian Rhythm Disorders. They are trying to work and function when their body is programed to sleep. Sleep Deprived: Lack of sleep has a serious impact on your ability to pay attention or focus on a task. Consistently getting less than the average of 8 hours your body needs creates partial or cumulative sleep deprivation. Untreated Sleep Disorders: Sleep Apnea, Narcolepsy, R.L.S., and other sleep disorders (untreated) prevent a person from getting enough restful sleep. This leads to excessive daytime sleepiness and increases the risk for drowsy driving accidents by up to 7 times.   Medications / Alcohol: Even over the counter medications can cause drowsiness. Medications that impair a drivers attention should have a warning label. Alcohol naturally makes you sleepy and on its own can cause accidents. Combined with excessive drowsiness its effects are amplified. Signs of Drowsy Driving:   * You don't remember driving the last few miles   * You may drift out of your kandi   * You are unable to focus and your thoughts wander   * You may yawn more often than normal   * You have difficulty keeping your eyes open / nodding off   * Missing traffic signs, speeding, or tailgating  Prevention-   Good sleep hygiene, lifestyle and behavioral choices have the most impact on drowsy driving. There is no substitute for sleep and the average person requires 8 hours nightly. If you find yourself driving drowsy, stop and sleep. Consider the sleep hygiene tips provided during your visit as well. Medication Refill Policy: Refills for all medications require 1 week advance notice. Please have your pharmacy fax a refill request. We are unable to fax, or call in \"controled substance\" medications and you will need to pick these prescriptions up from our office. [facility] Activation    Thank you for requesting access to [facility]. Please follow the instructions below to securely access and download your online medical record. [facility] allows you to send messages to your doctor, view your test results, renew your prescriptions, schedule appointments, and more. How Do I Sign Up? 1. In your internet browser, go to [email or web address]. 2. Click on the First Time User? Click Here link in the Sign In box. You will see the New Member Sign Up page. 3. Enter your [facility] Access Code exactly as it appears below. You will not need to use this code after youve completed the sign-up process. If you do not sign up before the expiration date, you must request a new code.     [facility] Access Code: [email or web address]  Expires: 2021 10:42 AM (This is the date your [employer] access code will )    4. Enter the last four digits of your Social Security Number (xxxx) and Date of Birth (mm/dd/yyyy) as indicated and click Submit. You will be taken to the next sign-up page. 5. Create a [employer] ID. This will be your [employer] login ID and cannot be changed, so think of one that is secure and easy to remember. 6. Create a [employer] password. You can change your password at any time. 7. Enter your Password Reset Question and Answer. This can be used at a later time if you forget your password. 8. Enter your e-mail address. You will receive e-mail notification when new information is available in [address] 9. Click Sign Up. You can now view and download portions of your medical record. 10. Click the Download Summary menu link to download a portable copy of your medical information. Additional Information    If you have questions, please call [phone]. Remember, [employer] is NOT to be used for urgent needs. For medical emergencies, dial 911.

## 2021-07-07 NOTE — PROGRESS NOTES
[address] [facility] [place]. [place], [address]   Tel.  [phone]   Fax. [address] [facility]   [place], [address] [facility]   Tel.  [phone]   Fax. [phone] [address]   Tel.  [phone]   Fax. [phone] [facility] (: 1950) is a 78 y.o. male, established patient, seen for positive airway pressure follow-up and evaluation. He was last seen by Dr. Adan Nelson on 2020, prior notes reviewed in detail. In lab sleep test 10/9/2008 showed AHI of 19.2/hr with a lowest SaO2 of 78%. He is seen today for his yearly evaluation. ASSESSMENT/PLAN:    ICD-10-CM ICD-9-CM    1. RACHEL (obstructive sleep apnea)  G47.33 327.23 AMB SUPPLY ORDER   2. Essential hypertension  I10 401.9    3. BMI 33.0-33.9,adult  Z68.33 V85.33      AHI = 19.2 ()  On CPAP :  9 cmH2O. Set up 2019. He is adherent with PAP therapy and PAP continues to benefit patient and remains necessary for control of his sleep apnea. Follow-up and Dispositions    · Return in about 1 year (around 2022), or if symptoms worsen or fail to improve. 1. Sleep Apnea - Continue on current pressures: CPAP 9 cm H2O    He notes he has had some difficulty with his machine malfunctioning and \"not cutting on or off\" occasionally. He states he can maneuver the cord and get it functioning again, however it has cut off in the middle of the night at times. He states he has taken it by the DME company and they found no issue, however these events keep happening. I will order a repair/replace. Orders Placed This Encounter    AMB SUPPLY ORDER     Diagnosis: (G47.33) RACHEL (obstructive sleep apnea)  (primary encounter diagnosis)     Replacement Supplies for Positive Airway Pressure Therapy Device:   Duration of need: 99 months.  Nasal Pillows (Replace) 2 per month.  Nasal Cushion (Replace) 2 per month.    Nasal Interface Mask 1 every 3 months.  Full Face Mask 1 every 3 months.  Full Face Mask Cushion 1 per month.  Nasal Pillows Combo Mask (Replace) 2 per month.  Headgear 1 every 6 months.  Positive Airway Pressure chinstrap 1 every 6 months.  Tubing without heating element 1 every 3 months.  Filter(s) Disposable 2 per month.  Filter(s) Non-Disposable 1 every 6 months. .   [address] [facility] [address] for Humidifier (Replace) 1 every 6 months. Elizabeth Mak, [facility] NPI: [id number]    Electronically signed. Date:- 07/07/21       * Counseling was provided regarding the importance of regular PAP use with emphasis on ensuring sufficient total sleep time, proper sleep hygiene, and safe driving. * Re-enforced proper and regular cleaning for the device. * He was asked to contact our office for any problems regarding PAP therapy. 2. Hypertension -  continue on his current regimen, he will continue to monitor his BP and follow up with his PMD for reevaluation/adjustment of medications if warranted. I have reviewed the relationship between hypertension as it relates to sleep-disordered breathing. 3. Recommended a dedicated weight loss program through appropriate diet and exercise regimen as significant weight reduction has been shown to reduce severity of obstructive sleep apnea. SUBJECTIVE/OBJECTIVE:    He  is seen today for follow up on PAP device and reports no problems using the device. The following concerns identified:    Drowsiness no Problems exhaling no   Snoring no Forget to put on no   Mask Comfortable yes Can't fall asleep no   Dry Mouth no Mask falls off no   Air Leaking no Frequent awakenings no     He admits that his sleep has significantly improved on PAP therapy using nasal mask mask and non heated tubing. Review of device download indicated:  CPAP pressure: 9 cmH2O;   95th Percentile Leak: 28.7 L/Min     % Used Days >= 4 hours: 99. Avg hours used:  8.18.     Therapy Apnea Index averaged over PAP use: 1.8 /hr which reflects significantly improved sleep breathing condition. [place] Sleepiness Score: 5 and Modified F.O.S.Q. Score Total / 2: 19 which reflects improved sleep quality over therapy time. Sleep Review of Systems: notable for Negative difficulty falling asleep; Negative awakenings at night; Negative early morning headaches; Negative memory problems; Negative concentration issues; Negative chest pain; Negative shortness of breath; Negative significant joint pain at night; Negative significant muscle pain at night; Negative rashes or itching; Negative heartburn; Negative significant mood issues; Visit Vitals  /68 (BP 1 Location: Left upper arm, BP Patient Position: Sitting, BP Cuff Size: Large adult)   Pulse 66   Resp 16   Ht 5' 10\" (1.778 m)   Wt 235 lb (106.6 kg)   SpO2 95%   BMI 33.72 kg/m²          General:   Alert, oriented, not in acute distress   Eyes:  Anicteric Sclerae; no obvious strabismus   Nose:  No obvious nasal septum deviation    Neck:   Midline trachea   Chest/Lungs:  Symmetrical lung expansion, clear lung fields on auscultation    CVS:  Normal rate, regular rhythm,  no JVD   Extremities:  No obvious rashes, no edema    Neuro:  No focal deficits; No obvious tremor    Psych:  Normal affect,  normal countenance     Patient's phone number [phone] (home)  was reviewed and confirmed for accuracy. He gives permission for messages regarding results and appointments to be left at that number. On this date 07/07/2021 I have spent 20 minutes reviewing previous notes, test results and face to face with the patient discussing the diagnosis and importance of compliance with the treatment plan as well as documenting on the day of the visit. An electronic signature was used to authenticate this note.     -- Jayce Zheng NP, Pending sale to [facility]  07/07/21     Reviewed, agree with assessment  Jennifer Hernandez M.D.  7/7/21

## 2021-07-08 ENCOUNTER — DOCUMENTATION ONLY (OUTPATIENT)
Dept: SLEEP MEDICINE | Age: 71
End: 2021-07-08

## 2021-07-19 ENCOUNTER — TELEPHONE (OUTPATIENT)
Dept: FAMILY MEDICINE CLINIC | Age: 71
End: 2021-07-19

## 2021-07-19 DIAGNOSIS — E53.8 B12 DEFICIENCY: ICD-10-CM

## 2021-07-19 DIAGNOSIS — E55.9 VITAMIN D DEFICIENCY: Primary | ICD-10-CM

## 2021-07-19 DIAGNOSIS — E78.00 PURE HYPERCHOLESTEROLEMIA: ICD-10-CM

## 2021-07-19 NOTE — TELEPHONE ENCOUNTER
Labs Ordered.     MD ARTUR Jeffries & RICHARD REESE [facility]  07/19/21

## 2021-07-19 NOTE — TELEPHONE ENCOUNTER
Pt would like for lab orders in system before appointment on 7/27/2021. Please call pt when order is ready. .    Pt best contact number is [phone]

## 2021-07-27 ENCOUNTER — OFFICE VISIT (OUTPATIENT)
Dept: FAMILY MEDICINE CLINIC | Age: 71
End: 2021-07-27
Payer: MEDICARE

## 2021-07-27 VITALS
TEMPERATURE: 97 F | OXYGEN SATURATION: 97 % | RESPIRATION RATE: 18 BRPM | HEIGHT: 70 IN | BODY MASS INDEX: 33.5 KG/M2 | DIASTOLIC BLOOD PRESSURE: 75 MMHG | SYSTOLIC BLOOD PRESSURE: 125 MMHG | HEART RATE: 87 BPM | WEIGHT: 234 LBS

## 2021-07-27 DIAGNOSIS — E66.01 SEVERE OBESITY (BMI 35.0-39.9) WITH COMORBIDITY (HCC): ICD-10-CM

## 2021-07-27 DIAGNOSIS — I10 ESSENTIAL HYPERTENSION: Primary | ICD-10-CM

## 2021-07-27 DIAGNOSIS — M51.36 DDD (DEGENERATIVE DISC DISEASE), LUMBAR: ICD-10-CM

## 2021-07-27 DIAGNOSIS — K21.9 GASTROESOPHAGEAL REFLUX DISEASE WITHOUT ESOPHAGITIS: ICD-10-CM

## 2021-07-27 DIAGNOSIS — I49.3 PVC'S (PREMATURE VENTRICULAR CONTRACTIONS): ICD-10-CM

## 2021-07-27 DIAGNOSIS — E55.9 VITAMIN D DEFICIENCY: ICD-10-CM

## 2021-07-27 DIAGNOSIS — E78.00 PURE HYPERCHOLESTEROLEMIA: ICD-10-CM

## 2021-07-27 DIAGNOSIS — E53.8 B12 DEFICIENCY: ICD-10-CM

## 2021-07-27 DIAGNOSIS — G47.33 OSA (OBSTRUCTIVE SLEEP APNEA): ICD-10-CM

## 2021-07-27 PROCEDURE — G8510 SCR DEP NEG, NO PLAN REQD: HCPCS | Performed by: FAMILY MEDICINE

## 2021-07-27 PROCEDURE — G8754 DIAS BP LESS 90: HCPCS | Performed by: FAMILY MEDICINE

## 2021-07-27 PROCEDURE — G8417 CALC BMI ABV UP PARAM F/U: HCPCS | Performed by: FAMILY MEDICINE

## 2021-07-27 PROCEDURE — 1101F PT FALLS ASSESS-DOCD LE1/YR: CPT | Performed by: FAMILY MEDICINE

## 2021-07-27 PROCEDURE — G8752 SYS BP LESS 140: HCPCS | Performed by: FAMILY MEDICINE

## 2021-07-27 PROCEDURE — 3017F COLORECTAL CA SCREEN DOC REV: CPT | Performed by: FAMILY MEDICINE

## 2021-07-27 PROCEDURE — G8427 DOCREV CUR MEDS BY ELIG CLIN: HCPCS | Performed by: FAMILY MEDICINE

## 2021-07-27 PROCEDURE — G8536 NO DOC ELDER MAL SCRN: HCPCS | Performed by: FAMILY MEDICINE

## 2021-07-27 PROCEDURE — 99214 OFFICE O/P EST MOD 30 MIN: CPT | Performed by: FAMILY MEDICINE

## 2021-07-27 RX ORDER — HYDROCHLOROTHIAZIDE 12.5 MG/1
TABLET ORAL
Qty: 90 TABLET | Refills: 1 | Status: SHIPPED | OUTPATIENT
Start: 2021-07-27 | End: 2021-09-01

## 2021-07-27 RX ORDER — METOPROLOL SUCCINATE 50 MG/1
50 TABLET, EXTENDED RELEASE ORAL DAILY
Qty: 90 TABLET | Refills: 1 | Status: SHIPPED | OUTPATIENT
Start: 2021-07-27 | End: 2021-12-27 | Stop reason: SDUPTHER

## 2021-07-27 NOTE — PROGRESS NOTES
1. Have you been to the ER, urgent care clinic since your last visit? Hospitalized since your last visit? no    2. Have you seen or consulted any other health care providers outside of the [address] [place] [address] since your last visit? Including any pap smears or colon screening. no    Reviewed record in preparation for visit and have necessary documentation    Pt did not bring medication to office visit for review    Opportunity was given for questions    Goals that were addressed and/or need to be completed during or after this appointment include   Health Maintenance Due   Topic Date Due    COVID-19 Vaccine (1) Never done    Medicare Yearly Exam  10/09/2020     Body mass index is 33.58 kg/m².

## 2021-07-27 NOTE — PROGRESS NOTES
[facility]    History of Present Illness:   John Moraes is a 78 y.o. male with history of HTN, GERD, RACHEL, PVC, asthma, DDD  CC: Follow up Chronic conditions  History provided by patient and Records    HPI:  Hypertension Follow up:  Currently Taking:   Key CAD CHF Meds             ramipriL (ALTACE) 10 mg capsule (Taking) Take 1 capsule by mouth twice daily    hydroCHLOROthiazide (HYDRODIURIL) 12.5 mg tablet (Taking) Take 1 tablet by mouth once daily    metoprolol succinate (TOPROL-XL) 50 mg XL tablet (Taking) Take 1 tablet by mouth once daily         The patient reports:  taking medications as instructed, no medication side effects noted, home BP monitoring in range of 098'P systolic over 45'B diastolic, no TIA's, no chest pain on exertion, no dyspnea on exertion, no swelling of ankles. BP Readings from Last 3 Encounters:   07/27/21 125/75   07/07/21 117/68   01/22/20 126/85      Hypertriglyceridemia Follow up:   Cardiovascular risks for him are: hypertension  hyperlipidemia. Current Medications:  Key Antihyperlipidemia Meds             rosuvastatin (CRESTOR) 10 mg tablet (Taking) TAKE ONE TABLET BY MOUTH NIGHTLY          Compliance: YES   Myalgias: NO   Fatigue: NO   Other side effects: NO     Wt Readings from Last 3 Encounters:   07/27/21 234 lb (106.1 kg)   07/07/21 235 lb (106.6 kg)   01/22/20 253 lb 6.4 oz (114.9 kg)     Lab Results   Component Value Date/Time    Cholesterol, total 127 07/21/2021 08:48 AM    HDL Cholesterol 44 07/21/2021 08:48 AM    LDL, calculated 58.2 07/21/2021 08:48 AM    VLDL, calculated 24.8 07/21/2021 08:48 AM    Triglyceride 124 07/21/2021 08:48 AM    CHOL/HDL Ratio 2.9 07/21/2021 08:48 AM      Lab Results   Component Value Date/Time    ALT (SGPT) 46 07/21/2021 08:48 AM    AST (SGOT) 22 07/21/2021 08:48 AM    Alk.  phosphatase 72 07/21/2021 08:48 AM    Bilirubin, total 0.4 07/21/2021 08:48 AM       DDD: Patient gets shots as needed with Dr. Leonor Avery, avoiding surgery as able. Has bilateral Knee replacement. Uses Gabapentin PRN. Also has Diclofenac gel, Flexeril, Relafen PRN. Asthma/Allergies: Uses Albuterol and Zyrtec.     GERD: Takes Omeprazole 20 mg daily as neded.      Derm: Starting cream per derm for Skin cancer    3 most recent PHQ Screens 7/27/2021   Little interest or pleasure in doing things Not at all   Feeling down, depressed, irritable, or hopeless Not at all   Total Score PHQ 2 0        Health Maintenance  Health Maintenance Due   Topic Date Due    COVID-19 Vaccine (1) Never done    Medicare Yearly Exam  10/09/2020       Past Medical, Family, and Social History:     Current Outpatient Medications on File Prior to Visit   Medication Sig Dispense Refill    rosuvastatin (CRESTOR) 10 mg tablet TAKE ONE TABLET BY MOUTH NIGHTLY 90 Tablet 0    ramipriL (ALTACE) 10 mg capsule Take 1 capsule by mouth twice daily 180 Capsule 0    albuterol (PROVENTIL HFA, VENTOLIN HFA, PROAIR HFA) 90 mcg/actuation inhaler Take 2 Puffs by inhalation every four (4) hours as needed for Wheezing for up to 360 days. 1 Inhaler 4    fluticasone propionate (FLONASE) 50 mcg/actuation nasal spray Use 2 spray(s) in each nostril once daily 16 g 6    albuterol (PROVENTIL VENTOLIN) 2.5 mg /3 mL (0.083 %) nebu Take 3 mL by inhalation every four (4) hours as needed (cough/wheezing). 50 Nebule 4    cyclobenzaprine (FLEXERIL) 10 mg tablet Take 1 Tab by mouth three (3) times daily as needed for Muscle Spasm(s). 60 Tab 2    cholecalciferol, VITAMIN D3, (VITAMIN D3) 5,000 unit tab tablet Take 5,000 Units by mouth every morning.  cetirizine (ZYRTEC) 10 mg tablet Take 10 mg by mouth every morning.  cyanocobalamin (VITAMIN B-12) 1,000 mcg tablet Take 1,000 mcg by mouth two (2) times a day.       [DISCONTINUED] hydroCHLOROthiazide (HYDRODIURIL) 12.5 mg tablet Take 1 tablet by mouth once daily 90 Tab 1    [DISCONTINUED] metoprolol succinate (TOPROL-XL) 50 mg XL tablet Take 1 tablet by mouth once daily 30 Tab 5    [DISCONTINUED] triamcinolone (ARISTOCORT) 0.5 % topical cream Apply  to affected area two (2) times daily as needed for Skin Irritation or Itching. use thin layer on chest and back. (Patient not taking: Reported on 7/27/2021) 454 g 1    [DISCONTINUED] senna-docusate (PERICOLACE) 8.6-50 mg per tablet Take 1 Tab by mouth two (2) times daily as needed for Constipation. (Patient not taking: Reported on 7/27/2021) 60 Tab 2    [DISCONTINUED] mupirocin calcium (BACTROBAN NASAL) 2 % nasal ointment 1 Each by Both Nostrils route two (2) times daily as needed. Indications: Methicillin-Resistant Staphylococcus Aureus Bacteria in Nose (Patient not taking: Reported on 7/27/2021)      [DISCONTINUED] dextromethorphan-guaiFENesin (MUCINEX DM) 60-1,200 mg Tb12 Take 1 Tab by mouth daily as needed. (Patient not taking: Reported on 7/27/2021)      [DISCONTINUED] nabumetone (RELAFEN) 500 mg tablet Take  by mouth two (2) times a day.  [DISCONTINUED] POTASSIUM CHLORIDE PO Take 95 mg by mouth every morning. No current facility-administered medications on file prior to visit. 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 9) with comorbidity ([facility] [address] E66.01    Acute bronchitis J20.9    Inguinal lymphadenopathy R59.0    Lymphadenopathy R59.1    BMI 33.0-33.9,adult Z68.33       Social History     Socioeconomic History    Marital status:      Spouse name: Not on file    Number of children: Not on file    Years of education: Not on file    Highest education level: Not on file   Occupational History    Not on file   Tobacco Use    Smoking status: Former Smoker     Packs/day: 3.00     Years: 35.00     Pack years: 105.00     Types: Cigarettes     Quit date:      Years since quittin.5    Smokeless tobacco: Never Used   Vaping Use    Vaping Use: Never used   Substance and Sexual Activity    Alcohol use: Yes     Alcohol/week: 21.0 standard drinks     Types: 21 Shots of liquor per week    Drug use: No    Sexual activity: Yes     Partners: Female     Birth control/protection: None   Other Topics Concern     Service Not Asked    Blood Transfusions Not Asked    Caffeine Concern Not Asked    Occupational Exposure Not Asked    Hobby Hazards Not Asked    Sleep Concern Not Asked    Stress Concern Not Asked    Weight Concern Not Asked    Special Diet Not Asked    Back Care Not Asked    Exercise Not Asked    Bike Helmet Not Asked    [address] Not Asked    Self-Exams Not Asked   Social History Narrative    Not on file     Social Determinants of Health     Financial Resource Strain:     Difficulty of Paying Living Expenses:    Food Insecurity:     Worried About Running Out of Food in the Last Year:     [address] in the Last Year:    Transportation Needs:     Lack of Transportation (Medical):      Lack of Transportation (Non-Medical):    Physical Activity:     Days of Exercise per Week:     Minutes of Exercise per Session:    Stress:     Feeling of Stress :    Social Connections:     Frequency of Communication with Friends and Family:     Frequency of Social Gatherings with Friends and Family:     Attends Oriental orthodox Services:     Active Member of Clubs or Organizations:     Attends Club or Organization Meetings:     Marital Status:    Intimate Partner Violence:     Fear of Current or Ex-Partner:     Emotionally Abused:     Physically Abused:     Sexually Abused:        Review of Systems   Review of Systems   Constitutional: Negative for chills and fever. HENT: Negative for congestion. Cardiovascular: Negative for chest pain and palpitations. Gastrointestinal: Negative for nausea and vomiting. Objective:     Visit Vitals  /75 (BP 1 Location: Right arm, BP Patient Position: Sitting)   Pulse 87   Temp 97 °F (36.1 °C) (Temporal)   Resp 18   Ht 5' 10\" (1.778 m)   Wt 234 lb (106.1 kg)   SpO2 97%   BMI 33.58 kg/m²        Physical Exam  Vitals and nursing note reviewed. Constitutional:       Appearance: Normal appearance. HENT:      Head: Normocephalic and atraumatic. Cardiovascular:      Rate and Rhythm: Normal rate and regular rhythm. Pulses: Normal pulses. Heart sounds: Normal heart sounds. No murmur heard. No friction rub. No gallop. Pulmonary:      Effort: Pulmonary effort is normal.      Breath sounds: Normal breath sounds. Abdominal:      General: Abdomen is flat. Bowel sounds are normal.      Palpations: Abdomen is soft. Musculoskeletal:      Cervical back: Normal range of motion and neck supple. Skin:     General: Skin is warm and dry. Neurological:      Mental Status: He is alert. Pertinent Labs/Studies:      Assessment and orders:       ICD-10-CM ICD-9-CM    1. Essential hypertension  I10 401.9 hydroCHLOROthiazide (HYDRODIURIL) 12.5 mg tablet      metoprolol succinate (TOPROL-XL) 50 mg XL tablet      CANCELED: CBC W/O DIFF      CANCELED: METABOLIC PANEL, COMPREHENSIVE   2. PVC's (premature ventricular contractions)  I49.3 427.69    3. Gastroesophageal reflux disease without esophagitis  K21.9 530.81    4. RACHEL (obstructive sleep apnea)  G47.33 327.23    5.  DDD (degenerative disc disease), lumbar  M51.36 722.52    6. Pure hypercholesterolemia  E78.00 272.0 CANCELED: LIPID PANEL   7. B12 deficiency  E53.8 266.2 CANCELED: VITAMIN B12 & FOLATE   8. Severe obesity (BMI 35.0-39. 9) with comorbidity ([address]  E66.01 278.01    9. Vitamin D deficiency  E55.9 268.9 CANCELED: VITAMIN D, 25 HYDROXY     Diagnoses and all orders for this visit:    1. Essential hypertension: Our goal is to normalize the blood pressure to decrease the risks of strokes and heart attacks. The patient is in agreement with the plan. -     hydroCHLOROthiazide (HYDRODIURIL) 12.5 mg tablet; Take 1 tablet by mouth once daily  -     metoprolol succinate (TOPROL-XL) 50 mg XL tablet; Take 1 Tablet by mouth daily. 2. PVC's (premature ventricular contractions)    3. Gastroesophageal reflux disease without esophagitis: Controlled    4. RACHEL (obstructive sleep apnea): Uses c-pap    5. DDD (degenerative disc disease), lumbar: Stable    6. Pure hypercholesterolemia: The patient is aware of our goal to reduce or eliminate the long term problems (such as strokes and heart attacks) related to poorly controlled Triglycerides, LDL, Cholesterol. 7. B12 deficiency    8. Severe obesity (BMI 35.0-39. 9) with comorbidity ([facility] [address]    9. Vitamin D deficiency      Follow-up and Dispositions    · Return in about 3 months (around 10/27/2021) for Follow up MWE. I have discussed the diagnosis with the patient and the intended plan as seen in the above orders. Social history, medical history, and labs were reviewed. The patient has received an after-visit summary and questions were answered concerning future plans. I have discussed medication side effects and warnings with the patient as well.     MD ARTUR Cardona & RICHARD REESE [facility]  07/27/21

## 2021-08-10 ENCOUNTER — TELEPHONE (OUTPATIENT)
Dept: FAMILY MEDICINE CLINIC | Age: 71
End: 2021-08-10

## 2021-08-10 NOTE — TELEPHONE ENCOUNTER
Spoke to patient about Disability paperwork. No long term paperwork in record. Advised see if able to get copies from [facility] now and will follow up with specialists as well.     MD ARTUR Rodríguez & RICHARD REESE [facility]  08/10/21

## 2021-10-27 ENCOUNTER — OFFICE VISIT (OUTPATIENT)
Dept: FAMILY MEDICINE CLINIC | Age: 71
End: 2021-10-27
Payer: MEDICARE

## 2021-10-27 VITALS
SYSTOLIC BLOOD PRESSURE: 117 MMHG | RESPIRATION RATE: 20 BRPM | OXYGEN SATURATION: 96 % | HEART RATE: 71 BPM | HEIGHT: 70 IN | WEIGHT: 235 LBS | DIASTOLIC BLOOD PRESSURE: 77 MMHG | TEMPERATURE: 98.3 F | BODY MASS INDEX: 33.64 KG/M2

## 2021-10-27 DIAGNOSIS — Z23 ENCOUNTER FOR IMMUNIZATION: ICD-10-CM

## 2021-10-27 DIAGNOSIS — Z00.00 MEDICARE ANNUAL WELLNESS VISIT, SUBSEQUENT: Primary | ICD-10-CM

## 2021-10-27 PROCEDURE — G0008 ADMIN INFLUENZA VIRUS VAC: HCPCS | Performed by: FAMILY MEDICINE

## 2021-10-27 PROCEDURE — G8417 CALC BMI ABV UP PARAM F/U: HCPCS | Performed by: FAMILY MEDICINE

## 2021-10-27 PROCEDURE — 90694 VACC AIIV4 NO PRSRV 0.5ML IM: CPT | Performed by: FAMILY MEDICINE

## 2021-10-27 PROCEDURE — 3017F COLORECTAL CA SCREEN DOC REV: CPT | Performed by: FAMILY MEDICINE

## 2021-10-27 PROCEDURE — G8510 SCR DEP NEG, NO PLAN REQD: HCPCS | Performed by: FAMILY MEDICINE

## 2021-10-27 PROCEDURE — G8754 DIAS BP LESS 90: HCPCS | Performed by: FAMILY MEDICINE

## 2021-10-27 PROCEDURE — G8536 NO DOC ELDER MAL SCRN: HCPCS | Performed by: FAMILY MEDICINE

## 2021-10-27 PROCEDURE — G8427 DOCREV CUR MEDS BY ELIG CLIN: HCPCS | Performed by: FAMILY MEDICINE

## 2021-10-27 PROCEDURE — 1101F PT FALLS ASSESS-DOCD LE1/YR: CPT | Performed by: FAMILY MEDICINE

## 2021-10-27 PROCEDURE — G8752 SYS BP LESS 140: HCPCS | Performed by: FAMILY MEDICINE

## 2021-10-27 PROCEDURE — G0439 PPPS, SUBSEQ VISIT: HCPCS | Performed by: FAMILY MEDICINE

## 2021-10-27 NOTE — PROGRESS NOTES
This is the Subsequent Medicare Annual Wellness Exam, performed 12 months or more after the Initial AWV or the last Subsequent AWV    I have reviewed the patient's medical history in detail and updated the computerized patient record. History     Well Male exam:  Eliaan Vargas is a 78 y.o. Male presenting for well male exam.     How would you rate your health in generally over the last year? Good  Has your physical and emotional health limited your social activities with family or friends? no    Current Complaints? Noting some left sided abdominal and back pain after replacing tire. Improving already. Sexual History:  Sexually active: No  Number of sexual partners: 1  Type of sexual partners: Wife    Prostate symptoms: Negative    Diet/Exercise History:  Diet: Favorite food \"You Bring it, I eat it! \". - Does patient eat at least 5 servings of Fruits/vegetables daily? No   - Is patient currently dieting? No    Exercise: Patient does not have structured exercise  - Does patient get 150 minutes of structured exercise weekly? No  - Type of work patient has? retired  - Limitations to exercise? None    Healthcare maintenance:   Health Maintenance Due   Topic Date Due    COVID-19 Vaccine (1) Never done    Flu Vaccine (1) 09/01/2021    DTaP/Tdap/Td series (2 - Td or Tdap) 09/02/2021     Colonoscopy indicated? No   DEXA scan indicated? No   HIV/STI testing indicated? No   Hepatitis C testing indicated? No   Lung cancer screening indicated? No   AAA screening indicated?   No     Immunization History   Administered Date(s) Administered    Influenza Vaccine 09/13/2013, 11/07/2015, 10/04/2016    Influenza Vaccine (Quad) PF (>6 Mo Flulaval, Fluarix, and >3 Yrs Afluria, Fluzone 27902) 10/17/2014    Influenza Vaccine (Tri) Adjuvanted (>65 Yrs FLUAD TRI 75400) 09/24/2018, 09/04/2019, 09/02/2020    Influenza Vaccine Split 01/01/2006    Pneumococcal Polysaccharide (PPSV-23) 11/19/2015, 02/13/2019    TD Vaccine 1999    TDAP Vaccine 2011    Zoster Recombinant 2020, 2020    Zoster Vaccine, Live 12/10/2013     Flu indicated? Yes  Tdap indicated? Yes  Pneumovax indicated? No   Zostervax indicated? No   Meningococcal indicated?  No      Past Medical History:   Diagnosis Date    Anesthesia complication 1177    APNEA DURING HERNIA REPAIR, POST OPERATIVE INTUBATION [facility]    Arthritis     Basal cell carcinoma (BCC) in situ of skin     Colon ulcer, aphthous 2010    COPD, mild ([address]     Does not see anyone for this    Environmental and seasonal allergies     Essential tremor     Fatty liver 2019    Frequent episodes of bronchitis     Frequent PVCs     Has been checked by cardiology    GERD (gastroesophageal reflux disease) 2010    High cholesterol 2010    HTN (hypertension), benign 2010    Hx MRSA infection     CYSTS BL LEGS    Sleep apnea     CPAP      Past Surgical History:   Procedure Laterality Date    COLONOSCOPY N/A 2019    performed by Kathy Leon MD at [address] [facility] HX AMPUTATION FINGER Right     2nd digit    HX APPENDECTOMY      HX CATARACT REMOVAL Bilateral     HX CERVICAL FUSION N/A     HX HERNIA REPAIR      HX KNEE ARTHROSCOPY Left     HX KNEE REPLACEMENT Bilateral 10/2017    HX MENISCECTOMY Right 2017    HX TONSIL AND ADENOIDECTOMY       Allergies   Allergen Reactions    Chlorhexidine Towelette Rash     Family History   Problem Relation Age of Onset    Heart Disease Mother     Dementia Mother     Hypertension Mother     Heart Disease Father     Lung Disease Father     Cancer Brother         SKIN, prostate    No Known Problems Brother     Cancer Brother         prostate    Anesth Problems Neg Hx      Social History     Tobacco Use    Smoking status: Former Smoker     Packs/day: 3.00     Years: 35.00     Pack years: 105.00     Types: Cigarettes     Quit date:      Years since quittin.8  Smokeless tobacco: Never Used   Substance Use Topics    Alcohol use: Yes     Alcohol/week: 21.0 standard drinks     Types: 21 Shots of liquor per week     Depression Risk Factor Screening:     3 most recent PHQ Screens 10/27/2021   Little interest or pleasure in doing things Not at all   Feeling down, depressed, irritable, or hopeless Not at all   Total Score PHQ 2 0     Alcohol Risk Factor Screening:    Do you average more than 1 drink per night or more than 7 drinks a week: Yes    In the past three months have you have had more than 4 drinks containing alcohol on one occasion: Yes  Functional Ability and Level of Safety:   Hearing Loss  Hearing is good. Activities of Daily Living  The home contains: no safety equipment. Patient does total self care  No flowsheet data found. Ambulation: with no difficulty    Fall Risk  Fall Risk Assessment, last 12 mths 10/27/2021   Able to walk? Yes   Fall in past 12 months? 0   Do you feel unsteady? 0   Are you worried about falling 0   Number of falls in past 12 months -   Fall with injury? -       Abuse Screen  Abuse Screening Questionnaire 7/27/2021   Do you ever feel afraid of your partner? N   Are you in a relationship with someone who physically or mentally threatens you? N   Is it safe for you to go home? Y     Cognitive Screening   Evaluation of Cognitive Function:  Has your family/caregiver stated any concerns about your memory: no  Normal  No flowsheet data found.   Patient Care Team   Patient Care Team:  Kane Ireland MD as PCP - General (Family Medicine)  Kane Ireland MD as PCP - REHABILITATION [facility]  Naun Esparza MD (Cardiology)  Warden Henrique MD (Dermatology)  Swetha Quinteros MD (Urology)  Gabino Scott MD (Gastroenterology)  Darío Dawkins MD (Surgery)  Shannon Rosen MD (Orthopedic Surgery)  Garth Moraes MD (Orthopedic Surgery)  Austin Lowry NP as Nurse Practitioner (Surgery)  Magui Mitchell MD (Hematology and Oncology)  Nataly Roque MD (Endocrinology)  Zulema Schafer MD as Physician (Sleep Medicine)  Assessment/Plan   Education and counseling provided:  Are appropriate based on today's review and evaluation  End-of-Life planning (with patient's consent)    Diagnoses and all orders for this visit:    1. Medicare annual wellness visit, subsequent: Doing Well    2.  Encounter for immunization  -     FLU (FLUAD QUAD INFLUENZA VACCINE,QUAD,ADJUVANTED)  -     [address] [place] [address] Topics with due status: Overdue       Topic Date Due    COVID-19 Vaccine Never done    Flu Vaccine 09/01/2021    DTaP/Tdap/Td series 09/02/2021     Health Maintenance Topics with due status: Not Due       Topic Last Completion Date    Colorectal Cancer Screening Combo 04/09/2019    Lipid Screen 07/21/2021    Medicare Yearly Exam 10/27/2021     Health Maintenance Topics with due status: Completed       Topic Last Completion Date    Hepatitis C Screening 02/13/2019    Pneumococcal 65+ years 02/13/2019    AAA Screening 73-67 YO Male Smoking Patients 03/12/2019    Shingrix Vaccine Age 50> 08/04/2020

## 2021-10-27 NOTE — PATIENT INSTRUCTIONS
Medicare Wellness Visit, Male    The best way to live healthy is to have a lifestyle where you eat a well-balanced diet, exercise regularly, limit alcohol use, and quit all forms of tobacco/nicotine, if applicable. Regular preventive services are another way to keep healthy. Preventive services (vaccines, screening tests, monitoring & exams) can help personalize your care plan, which helps you manage your own care. Screening tests can find health problems at the earliest stages, when they are easiest to treat. [facility] follows the current, evidence-based guidelines published by the [facility] Steven Polo ([facility]) when recommending preventive services for our patients. Because we follow these guidelines, sometimes recommendations change over time as research supports it. (For example, a prostate screening blood test is no longer routinely recommended for men with no symptoms). Of course, you and your doctor may decide to screen more often for some diseases, based on your risk and co-morbidities (chronic disease you are already diagnosed with). Preventive services for you include:  - Medicare offers their members a free annual wellness visit, which is time for you and your primary care provider to discuss and plan for your preventive service needs. Take advantage of this benefit every year!  -All adults over age 72 should receive the recommended pneumonia vaccines. Current USPSTF guidelines recommend a series of two vaccines for the best pneumonia protection.   -All adults should have a flu vaccine yearly and tetanus vaccine every 10 years.  -All adults age 48 and older should receive the shingles vaccines (series of two vaccines).        -All adults age 38-68 who are overweight should have a diabetes screening test once every three years.   -Other screening tests & preventive services for persons with diabetes include: an eye exam to screen for diabetic retinopathy, a kidney function test, a foot exam, and stricter control over your cholesterol.   -Cardiovascular screening for adults with routine risk involves an electrocardiogram (ECG) at intervals determined by the provider.   -Colorectal cancer screening should be done for adults age 54-65 with no increased risk factors for colorectal cancer. There are a number of acceptable methods of screening for this type of cancer. Each test has its own benefits and drawbacks. Discuss with your provider what is most appropriate for you during your annual wellness visit. The different tests include: colonoscopy (considered the best screening method), a fecal occult blood test, a fecal DNA test, and sigmoidoscopy.  -All adults born between [facility] should be screened once for Hepatitis C.  -An Abdominal Aortic Aneurysm (AAA) Screening is recommended for men age 73-68 who has ever smoked in their lifetime.      Here is a list of your current Health Maintenance items (your personalized list of preventive services) with a due date:  Health Maintenance Due   Topic Date Due    COVID-19 Vaccine (1) Never done    Yearly Flu Vaccine (1) 09/01/2021    DTaP/Tdap/Td  (2 - Td or Tdap) 09/02/2021

## 2021-10-27 NOTE — PROGRESS NOTES
1. Have you been to the ER, urgent care clinic since your last visit? Hospitalized since your last visit? No    2. Have you seen or consulted any other health care providers outside of the [address] [place] [address] since your last visit? Include any pap smears or colon screening.  No  Reviewed record in preparation for visit and have necessary documentation  Pt did not bring medication to office visit for review    Goals that were addressed and/or need to be completed during or after this appointment include   Health Maintenance Due   Topic Date Due    COVID-19 Vaccine (1) Never done    Medicare Yearly Exam  10/09/2020    Flu Vaccine (1) 09/01/2021    DTaP/Tdap/Td series (2 - Td or Tdap) 09/02/2021

## 2021-12-27 ENCOUNTER — OFFICE VISIT (OUTPATIENT)
Dept: FAMILY MEDICINE CLINIC | Age: 71
End: 2021-12-27
Payer: MEDICARE

## 2021-12-27 VITALS
OXYGEN SATURATION: 98 % | HEIGHT: 70 IN | DIASTOLIC BLOOD PRESSURE: 78 MMHG | BODY MASS INDEX: 34.7 KG/M2 | SYSTOLIC BLOOD PRESSURE: 112 MMHG | HEART RATE: 90 BPM | WEIGHT: 242.4 LBS | TEMPERATURE: 98 F | RESPIRATION RATE: 16 BRPM

## 2021-12-27 DIAGNOSIS — J45.20 MILD INTERMITTENT ASTHMA WITHOUT COMPLICATION: ICD-10-CM

## 2021-12-27 DIAGNOSIS — M17.11 PRIMARY OSTEOARTHRITIS OF RIGHT KNEE: ICD-10-CM

## 2021-12-27 DIAGNOSIS — E53.8 B12 DEFICIENCY: ICD-10-CM

## 2021-12-27 DIAGNOSIS — I10 ESSENTIAL HYPERTENSION: Primary | ICD-10-CM

## 2021-12-27 DIAGNOSIS — G89.29 CHRONIC PAIN OF RIGHT KNEE: ICD-10-CM

## 2021-12-27 DIAGNOSIS — E55.9 VITAMIN D DEFICIENCY: ICD-10-CM

## 2021-12-27 DIAGNOSIS — G47.33 OSA (OBSTRUCTIVE SLEEP APNEA): Chronic | ICD-10-CM

## 2021-12-27 DIAGNOSIS — M25.561 CHRONIC PAIN OF RIGHT KNEE: ICD-10-CM

## 2021-12-27 DIAGNOSIS — E78.00 PURE HYPERCHOLESTEROLEMIA: ICD-10-CM

## 2021-12-27 PROCEDURE — 99214 OFFICE O/P EST MOD 30 MIN: CPT | Performed by: FAMILY MEDICINE

## 2021-12-27 PROCEDURE — G8752 SYS BP LESS 140: HCPCS | Performed by: FAMILY MEDICINE

## 2021-12-27 PROCEDURE — G8754 DIAS BP LESS 90: HCPCS | Performed by: FAMILY MEDICINE

## 2021-12-27 PROCEDURE — 3017F COLORECTAL CA SCREEN DOC REV: CPT | Performed by: FAMILY MEDICINE

## 2021-12-27 PROCEDURE — G8510 SCR DEP NEG, NO PLAN REQD: HCPCS | Performed by: FAMILY MEDICINE

## 2021-12-27 PROCEDURE — G8417 CALC BMI ABV UP PARAM F/U: HCPCS | Performed by: FAMILY MEDICINE

## 2021-12-27 PROCEDURE — G8536 NO DOC ELDER MAL SCRN: HCPCS | Performed by: FAMILY MEDICINE

## 2021-12-27 PROCEDURE — G8427 DOCREV CUR MEDS BY ELIG CLIN: HCPCS | Performed by: FAMILY MEDICINE

## 2021-12-27 PROCEDURE — 1101F PT FALLS ASSESS-DOCD LE1/YR: CPT | Performed by: FAMILY MEDICINE

## 2021-12-27 RX ORDER — OMEPRAZOLE 20 MG/1
20 CAPSULE, DELAYED RELEASE ORAL DAILY
Qty: 90 CAPSULE | Refills: 1 | Status: SHIPPED | OUTPATIENT
Start: 2021-12-27 | End: 2022-06-27 | Stop reason: SDUPTHER

## 2021-12-27 RX ORDER — PREDNISONE 20 MG/1
20 TABLET ORAL
Qty: 5 TABLET | Refills: 0 | Status: SHIPPED | OUTPATIENT
Start: 2021-12-27 | End: 2022-01-01

## 2021-12-27 RX ORDER — ALBUTEROL SULFATE 90 UG/1
AEROSOL, METERED RESPIRATORY (INHALATION)
COMMUNITY
Start: 2021-11-29

## 2021-12-27 RX ORDER — ALBUTEROL SULFATE 0.83 MG/ML
2.5 SOLUTION RESPIRATORY (INHALATION)
Qty: 50 NEBULE | Refills: 4 | Status: SHIPPED | OUTPATIENT
Start: 2021-12-27

## 2021-12-27 RX ORDER — HYDROCHLOROTHIAZIDE 12.5 MG/1
12.5 TABLET ORAL DAILY
Qty: 90 TABLET | Refills: 1 | Status: SHIPPED | OUTPATIENT
Start: 2021-12-27 | End: 2022-03-01

## 2021-12-27 RX ORDER — ROSUVASTATIN CALCIUM 10 MG/1
10 TABLET, COATED ORAL
Qty: 90 TABLET | Refills: 1 | Status: SHIPPED | OUTPATIENT
Start: 2021-12-27 | End: 2022-06-27 | Stop reason: SDUPTHER

## 2021-12-27 RX ORDER — RAMIPRIL 10 MG/1
10 CAPSULE ORAL 2 TIMES DAILY
Qty: 180 CAPSULE | Refills: 1 | Status: SHIPPED | OUTPATIENT
Start: 2021-12-27 | End: 2022-03-01

## 2021-12-27 RX ORDER — METOPROLOL SUCCINATE 50 MG/1
50 TABLET, EXTENDED RELEASE ORAL DAILY
Qty: 90 TABLET | Refills: 1 | Status: SHIPPED | OUTPATIENT
Start: 2021-12-27 | End: 2022-02-01 | Stop reason: SDUPTHER

## 2021-12-27 RX ORDER — DICLOFENAC SODIUM 10 MG/G
1 GEL TOPICAL 4 TIMES DAILY
Qty: 200 G | Refills: 1 | Status: ON HOLD | OUTPATIENT
Start: 2021-12-27 | End: 2022-06-29 | Stop reason: CLARIF

## 2021-12-27 NOTE — PROGRESS NOTES
1. Have you been to the ER, urgent care clinic since your last visit? Hospitalized since your last visit? No    2. Have you seen or consulted any other health care providers outside of the [address] [place] [address] since your last visit? Include any pap smears or colon screening. Yes When: Dr Erin Carr for injections     Reviewed record in preparation for visit and have necessary documentation  Pt did not bring medication to office visit for review  Patient is accompanied by self I have received verbal consent from Corey Roberson to discuss any/all medical information while they are present in the room.     Goals that were addressed and/or need to be completed during or after this appointment include     Health Maintenance Due   Topic Date Due    COVID-19 Vaccine (1) Never done    DTaP/Tdap/Td series (2 - Td or Tdap) 09/02/2021

## 2021-12-28 LAB
25(OH)D3 SERPL-MCNC: 31.8 NG/ML (ref 30–100)
ALBUMIN SERPL-MCNC: 4 G/DL (ref 3.5–5)
ALBUMIN/GLOB SERPL: 1.3 {RATIO} (ref 1.1–2.2)
ALP SERPL-CCNC: 73 U/L (ref 45–117)
ALT SERPL-CCNC: 37 U/L (ref 12–78)
ANION GAP SERPL CALC-SCNC: 5 MMOL/L (ref 5–15)
AST SERPL-CCNC: 17 U/L (ref 15–37)
BILIRUB SERPL-MCNC: 0.3 MG/DL (ref 0.2–1)
BUN SERPL-MCNC: 18 MG/DL (ref 6–20)
BUN/CREAT SERPL: 18 (ref 12–20)
CALCIUM SERPL-MCNC: 9.2 MG/DL (ref 8.5–10.1)
CHLORIDE SERPL-SCNC: 106 MMOL/L (ref 97–108)
CHOLEST SERPL-MCNC: 136 MG/DL
CO2 SERPL-SCNC: 27 MMOL/L (ref 21–32)
CREAT SERPL-MCNC: 0.99 MG/DL (ref 0.7–1.3)
ERYTHROCYTE [DISTWIDTH] IN BLOOD BY AUTOMATED COUNT: 13.7 % (ref 11.5–14.5)
FOLATE SERPL-MCNC: 9.2 NG/ML (ref 5–21)
GLOBULIN SER CALC-MCNC: 3.1 G/DL (ref 2–4)
GLUCOSE SERPL-MCNC: 95 MG/DL (ref 65–100)
HCT VFR BLD AUTO: 52.7 % (ref 36.6–50.3)
HDLC SERPL-MCNC: 42 MG/DL
HDLC SERPL: 3.2 {RATIO} (ref 0–5)
HGB BLD-MCNC: 16.4 G/DL (ref 12.1–17)
LDLC SERPL CALC-MCNC: 66.4 MG/DL (ref 0–100)
MCH RBC QN AUTO: 30.7 PG (ref 26–34)
MCHC RBC AUTO-ENTMCNC: 31.1 G/DL (ref 30–36.5)
MCV RBC AUTO: 98.7 FL (ref 80–99)
NRBC # BLD: 0 K/UL (ref 0–0.01)
NRBC BLD-RTO: 0 PER 100 WBC
PLATELET # BLD AUTO: 211 K/UL (ref 150–400)
PMV BLD AUTO: 11.7 FL (ref 8.9–12.9)
POTASSIUM SERPL-SCNC: 4.9 MMOL/L (ref 3.5–5.1)
PROT SERPL-MCNC: 7.1 G/DL (ref 6.4–8.2)
RBC # BLD AUTO: 5.34 M/UL (ref 4.1–5.7)
SODIUM SERPL-SCNC: 138 MMOL/L (ref 136–145)
TRIGL SERPL-MCNC: 138 MG/DL (ref ?–150)
VIT B12 SERPL-MCNC: 395 PG/ML (ref 193–986)
VLDLC SERPL CALC-MCNC: 27.6 MG/DL
WBC # BLD AUTO: 7 K/UL (ref 4.1–11.1)

## 2022-02-01 DIAGNOSIS — I10 ESSENTIAL HYPERTENSION: ICD-10-CM

## 2022-02-01 RX ORDER — METOPROLOL SUCCINATE 50 MG/1
50 TABLET, EXTENDED RELEASE ORAL DAILY
Qty: 90 TABLET | Refills: 1 | Status: SHIPPED | OUTPATIENT
Start: 2022-02-01 | End: 2022-06-07 | Stop reason: SDUPTHER

## 2022-03-16 ENCOUNTER — OFFICE VISIT (OUTPATIENT)
Dept: FAMILY MEDICINE CLINIC | Age: 72
End: 2022-03-16
Payer: MEDICARE

## 2022-03-16 VITALS
DIASTOLIC BLOOD PRESSURE: 59 MMHG | RESPIRATION RATE: 20 BRPM | SYSTOLIC BLOOD PRESSURE: 102 MMHG | BODY MASS INDEX: 34.22 KG/M2 | HEART RATE: 74 BPM | TEMPERATURE: 97.4 F | HEIGHT: 70 IN | WEIGHT: 239 LBS

## 2022-03-16 DIAGNOSIS — J45.20 MILD INTERMITTENT ASTHMA WITHOUT COMPLICATION: ICD-10-CM

## 2022-03-16 DIAGNOSIS — M79.671 RIGHT FOOT PAIN: ICD-10-CM

## 2022-03-16 DIAGNOSIS — I10 PRIMARY HYPERTENSION: ICD-10-CM

## 2022-03-16 DIAGNOSIS — M79.671 RIGHT FOOT PAIN: Primary | ICD-10-CM

## 2022-03-16 PROCEDURE — G8510 SCR DEP NEG, NO PLAN REQD: HCPCS | Performed by: FAMILY MEDICINE

## 2022-03-16 PROCEDURE — G8427 DOCREV CUR MEDS BY ELIG CLIN: HCPCS | Performed by: FAMILY MEDICINE

## 2022-03-16 PROCEDURE — 3017F COLORECTAL CA SCREEN DOC REV: CPT | Performed by: FAMILY MEDICINE

## 2022-03-16 PROCEDURE — G8754 DIAS BP LESS 90: HCPCS | Performed by: FAMILY MEDICINE

## 2022-03-16 PROCEDURE — G8752 SYS BP LESS 140: HCPCS | Performed by: FAMILY MEDICINE

## 2022-03-16 PROCEDURE — 99214 OFFICE O/P EST MOD 30 MIN: CPT | Performed by: FAMILY MEDICINE

## 2022-03-16 PROCEDURE — G8417 CALC BMI ABV UP PARAM F/U: HCPCS | Performed by: FAMILY MEDICINE

## 2022-03-16 PROCEDURE — G8536 NO DOC ELDER MAL SCRN: HCPCS | Performed by: FAMILY MEDICINE

## 2022-03-16 PROCEDURE — 1101F PT FALLS ASSESS-DOCD LE1/YR: CPT | Performed by: FAMILY MEDICINE

## 2022-03-16 RX ORDER — DICLOFENAC SODIUM 50 MG/1
50 TABLET, DELAYED RELEASE ORAL 2 TIMES DAILY
Qty: 60 TABLET | Refills: 1 | Status: SHIPPED | OUTPATIENT
Start: 2022-03-16 | End: 2022-05-11

## 2022-03-16 RX ORDER — PREDNISONE 20 MG/1
40 TABLET ORAL
Qty: 10 TABLET | Refills: 0 | Status: SHIPPED | OUTPATIENT
Start: 2022-03-16 | End: 2022-03-21

## 2022-03-16 NOTE — PROGRESS NOTES
1. Have you been to the ER, urgent care clinic since your last visit? Hospitalized since your last visit? No    2. Have you seen or consulted any other health care providers outside of the [address] [place] [address] since your last visit? Include any pap smears or colon screening. No  Reviewed record in preparation for visit and have necessary documentation  Pt did not bring medication to office visit for review  opportunity was given for questions      3. For patients aged 39-70: Has the patient had a colonoscopy / FIT/ Cologuard?  Yes - no Care Gap present      Goals that were addressed and/or need to be completed during or after this appointment include   Health Maintenance Due   Topic Date Due    DTaP/Tdap/Td series (2 - Td or Tdap) 09/02/2021    COVID-19 Vaccine (3 - Booster for Moderna series) 11/02/2021

## 2022-03-16 NOTE — PROGRESS NOTES
[facility]    History of Present Illness:   Cady Blake is a 73 y.o. male with history of  HTN, GERD, RACHEL, PVC, asthma, DDD  CC: Right foot pain  History provided by patient and Records    HPI:  Right Foot Pain: Patient presents with complaint of onset of an acute gout-like attack. Patient does not have history of gout attacks     Location:right forefoot   Duration of sx: 4 day(s)   Pain Scale:pain is perceived as severe (6-8 pain scale)   Symptoms: swelling, redness, tenderness and aching   Fevers/Chills: No   Vomiting/Diarrhea: No   Triggers: None known    Number of flares in the past year:None      Current Medications:Topical agents. Medications have not been effective. Patient has been compliant with medications. Health Maintenance  Health Maintenance Due   Topic Date Due    DTaP/Tdap/Td series (2 - Td or Tdap) 09/02/2021    COVID-19 Vaccine (3 - Booster for Moderna series) 11/02/2021       Past Medical, Family, and Social History:     Current Outpatient Medications on File Prior to Visit   Medication Sig Dispense Refill    hydroCHLOROthiazide (HYDRODIURIL) 12.5 mg tablet Take 1 tablet by mouth once daily 90 Tablet 1    ramipriL (ALTACE) 10 mg capsule Take 1 capsule by mouth twice daily 180 Capsule 1    metoprolol succinate (TOPROL-XL) 50 mg XL tablet Take 1 Tablet by mouth daily. 90 Tablet 1    albuterol (PROVENTIL HFA, VENTOLIN HFA, PROAIR HFA) 90 mcg/actuation inhaler       rosuvastatin (CRESTOR) 10 mg tablet Take 1 Tablet by mouth nightly. 90 Tablet 1    albuterol (PROVENTIL VENTOLIN) 2.5 mg /3 mL (0.083 %) nebu Take 3 mL by inhalation every four (4) hours as needed (cough/wheezing). 50 Nebule 4    diclofenac (VOLTAREN) 1 % gel Apply 1 g to affected area four (4) times daily. 200 g 1    omeprazole (PRILOSEC) 20 mg capsule Take 1 Capsule by mouth daily.  90 Capsule 1    fluticasone propionate (FLONASE) 50 mcg/actuation nasal spray Use 2 spray(s) in each nostril once daily 16 g 6    cholecalciferol, VITAMIN D3, (VITAMIN D3) 5,000 unit tab tablet Take 1,000 Units by mouth every morning.  cetirizine (ZYRTEC) 10 mg tablet Take 10 mg by mouth every morning.  cyanocobalamin (VITAMIN B-12) 1,000 mcg tablet Take 1,000 mcg by mouth two (2) times a day. (Patient not taking: Reported on 2021)       No current facility-administered medications on file prior to visit. 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 9) with comorbidity ([facility] [address] E66.01    Acute bronchitis J20.9    Inguinal lymphadenopathy R59.0    Lymphadenopathy R59.1    BMI 33.0-33.9,adult Z68.33       Social History     Socioeconomic History    Marital status:    Tobacco Use    Smoking status: Former Smoker     Packs/day: 3.00     Years: 35.00     Pack years: 105.00     Types: Cigarettes     Quit date:      Years since quittin.2    Smokeless tobacco: Never Used   Vaping Use    Vaping Use: Never used   Substance and Sexual Activity    Alcohol use:  Yes     Alcohol/week: 21.0 standard drinks     Types: 21 Shots of liquor per week    Drug use: No    Sexual activity: Yes     Partners: Female     Birth control/protection: None        Review of Systems Review of Systems   Constitutional: Negative for chills and fever. Gastrointestinal: Negative for heartburn, nausea and vomiting. Musculoskeletal: Positive for joint pain. Objective:     Visit Vitals  BP (!) 102/59   Pulse 74   Temp 97.4 °F (36.3 °C)   Resp 20   Ht 5' 10\" (1.778 m)   Wt 239 lb (108.4 kg)   BMI 34.29 kg/m²        Physical Exam  Vitals and nursing note reviewed. Constitutional:       Appearance: Normal appearance. HENT:      Head: Normocephalic and atraumatic. Cardiovascular:      Rate and Rhythm: Normal rate and regular rhythm. Pulses: Normal pulses. Heart sounds: Normal heart sounds. No murmur heard. No friction rub. No gallop. Pulmonary:      Effort: Pulmonary effort is normal.      Breath sounds: Normal breath sounds. Abdominal:      General: Abdomen is flat. Bowel sounds are normal.      Palpations: Abdomen is soft. Musculoskeletal:      Cervical back: Normal range of motion and neck supple. Comments: Right foot with erythema and swelling with tenderness in the foot     Neurological:      Mental Status: He is alert. Pertinent Labs/Studies:      Assessment and orders:       ICD-10-CM ICD-9-CM    1. Right foot pain  M79.671 729. 5 predniSONE (DELTASONE) 20 mg tablet      URIC ACID      METABOLIC PANEL, BASIC      diclofenac EC (VOLTAREN) 50 mg EC tablet   2. Mild intermittent asthma without complication  N06.63 189.51    3. Primary hypertension  I10 401.9      Diagnoses and all orders for this visit:    1. Right foot pain: Concern for gout. No significant history. But if therapy not effective will get XR    2. Mild intermittent asthma without complication: Resolved previously with Prednisone, but more than 2 weeks ago. 3. Primary hypertension: Stable              I have discussed the diagnosis with the patient and the intended plan as seen in the above orders. Social history, medical history, and labs were reviewed.   The patient has received an after-visit summary and questions were answered concerning future plans. I have discussed medication side effects and warnings with the patient as well.     MD ARTUR Mack & RICHARD REESE [facility]  03/16/22

## 2022-03-17 ENCOUNTER — TELEPHONE (OUTPATIENT)
Dept: FAMILY MEDICINE CLINIC | Age: 72
End: 2022-03-17

## 2022-03-17 LAB
ANION GAP SERPL CALC-SCNC: 4 MMOL/L (ref 5–15)
BUN SERPL-MCNC: 13 MG/DL (ref 6–20)
BUN/CREAT SERPL: 14 (ref 12–20)
CALCIUM SERPL-MCNC: 9.6 MG/DL (ref 8.5–10.1)
CHLORIDE SERPL-SCNC: 101 MMOL/L (ref 97–108)
CO2 SERPL-SCNC: 31 MMOL/L (ref 21–32)
CREAT SERPL-MCNC: 0.93 MG/DL (ref 0.7–1.3)
GLUCOSE SERPL-MCNC: 77 MG/DL (ref 65–100)
POTASSIUM SERPL-SCNC: 4.4 MMOL/L (ref 3.5–5.1)
SODIUM SERPL-SCNC: 136 MMOL/L (ref 136–145)
URATE SERPL-MCNC: 9 MG/DL (ref 3.5–7.2)

## 2022-03-17 NOTE — TELEPHONE ENCOUNTER
----- Message from Tete Pires MD sent at 3/17/2022  9:36 AM EDT -----  Can you call and get him setup for an appointment next week. He has gout, and we should meet to discuss chronic medications for him.     Dahlia, Bryan Vega  ----- Message -----  From: [facility] [place] [facility] [place]  Sent: 3/17/2022   5:45 AM EDT  To: Tete Pires MD

## 2022-03-17 NOTE — TELEPHONE ENCOUNTER
Called to pt he stated he is able to walk and put shoes on. Has appt scheduled for 4/1/2022 . says he will wait until than to come in.

## 2022-03-18 PROBLEM — J45.909 ASTHMA: Status: ACTIVE | Noted: 2017-10-19

## 2022-03-18 PROBLEM — M17.11 PRIMARY OSTEOARTHRITIS OF RIGHT KNEE: Status: ACTIVE | Noted: 2017-10-17

## 2022-03-18 PROBLEM — R59.1 LYMPHADENOPATHY: Status: ACTIVE | Noted: 2019-02-04

## 2022-03-19 PROBLEM — E66.01 SEVERE OBESITY (BMI 35.0-39.9) WITH COMORBIDITY (HCC): Status: ACTIVE | Noted: 2018-04-30

## 2022-03-19 PROBLEM — M17.12 PRIMARY OSTEOARTHRITIS OF LEFT KNEE: Status: ACTIVE | Noted: 2018-02-02

## 2022-03-19 PROBLEM — Z22.321 MSSA (METHICILLIN-SUSCEPTIBLE STAPH AUREUS) CARRIER: Status: ACTIVE | Noted: 2017-10-13

## 2022-03-19 PROBLEM — R59.0 INGUINAL LYMPHADENOPATHY: Status: ACTIVE | Noted: 2019-01-17

## 2022-03-20 PROBLEM — J20.9 ACUTE BRONCHITIS: Status: ACTIVE | Noted: 2018-12-31

## 2022-03-20 PROBLEM — S83.231A COMPLEX TEAR OF MEDIAL MENISCUS OF RIGHT KNEE AS CURRENT INJURY: Status: ACTIVE | Noted: 2017-01-04

## 2022-04-01 ENCOUNTER — OFFICE VISIT (OUTPATIENT)
Dept: FAMILY MEDICINE CLINIC | Age: 72
End: 2022-04-01
Payer: MEDICARE

## 2022-04-01 VITALS
HEART RATE: 90 BPM | TEMPERATURE: 97.3 F | WEIGHT: 240.6 LBS | DIASTOLIC BLOOD PRESSURE: 81 MMHG | OXYGEN SATURATION: 95 % | SYSTOLIC BLOOD PRESSURE: 122 MMHG | RESPIRATION RATE: 18 BRPM | HEIGHT: 70 IN | BODY MASS INDEX: 34.44 KG/M2

## 2022-04-01 DIAGNOSIS — M10.071 ACUTE IDIOPATHIC GOUT OF RIGHT FOOT: ICD-10-CM

## 2022-04-01 DIAGNOSIS — I10 PRIMARY HYPERTENSION: Primary | Chronic | ICD-10-CM

## 2022-04-01 DIAGNOSIS — R06.02 SOB (SHORTNESS OF BREATH): ICD-10-CM

## 2022-04-01 DIAGNOSIS — E78.00 PURE HYPERCHOLESTEROLEMIA: Chronic | ICD-10-CM

## 2022-04-01 PROCEDURE — G8536 NO DOC ELDER MAL SCRN: HCPCS | Performed by: FAMILY MEDICINE

## 2022-04-01 PROCEDURE — 1101F PT FALLS ASSESS-DOCD LE1/YR: CPT | Performed by: FAMILY MEDICINE

## 2022-04-01 PROCEDURE — 99214 OFFICE O/P EST MOD 30 MIN: CPT | Performed by: FAMILY MEDICINE

## 2022-04-01 PROCEDURE — G8754 DIAS BP LESS 90: HCPCS | Performed by: FAMILY MEDICINE

## 2022-04-01 PROCEDURE — G8417 CALC BMI ABV UP PARAM F/U: HCPCS | Performed by: FAMILY MEDICINE

## 2022-04-01 PROCEDURE — G8427 DOCREV CUR MEDS BY ELIG CLIN: HCPCS | Performed by: FAMILY MEDICINE

## 2022-04-01 PROCEDURE — G8752 SYS BP LESS 140: HCPCS | Performed by: FAMILY MEDICINE

## 2022-04-01 PROCEDURE — G8510 SCR DEP NEG, NO PLAN REQD: HCPCS | Performed by: FAMILY MEDICINE

## 2022-04-01 PROCEDURE — 3017F COLORECTAL CA SCREEN DOC REV: CPT | Performed by: FAMILY MEDICINE

## 2022-04-01 RX ORDER — ALLOPURINOL 100 MG/1
100 TABLET ORAL DAILY
Qty: 90 TABLET | Refills: 1 | Status: SHIPPED | OUTPATIENT
Start: 2022-04-01 | End: 2022-06-01 | Stop reason: SDUPTHER

## 2022-04-01 NOTE — PROGRESS NOTES
[facility]    History of Present Illness:   Juhi Ponce is a 73 y.o. male with history of  HTN, GERD, RACHEL, PVC, asthma, DDD  CC: Follow up Gout  History provided by patient and Records    HPI:  Noting significant improvement in pain in the right foot. Noting some mild persistent issues with pain though still overall improved. Patient had an elevated Uric acid level. Has not taken Diclofenac. Patient also complaining of some issues with feeling dyspneic with bending over and with walking up to 100 yards. Patient noting that he has a history of asthma and bronchitis so he is concerned, and has not seen Dr. Young Boland in the past 3 years. Health Maintenance  Health Maintenance Due   Topic Date Due    DTaP/Tdap/Td series (2 - Td or Tdap) 09/02/2021    COVID-19 Vaccine (3 - Booster for Moderna series) 11/02/2021       Past Medical, Family, and Social History:     Current Outpatient Medications on File Prior to Visit   Medication Sig Dispense Refill    fluticasone propionate (FLONASE) 50 mcg/actuation nasal spray Use 2 spray(s) in each nostril once daily 16 g 2    diclofenac EC (VOLTAREN) 50 mg EC tablet Take 1 Tablet by mouth two (2) times a day. 60 Tablet 1    hydroCHLOROthiazide (HYDRODIURIL) 12.5 mg tablet Take 1 tablet by mouth once daily 90 Tablet 1    ramipriL (ALTACE) 10 mg capsule Take 1 capsule by mouth twice daily 180 Capsule 1    metoprolol succinate (TOPROL-XL) 50 mg XL tablet Take 1 Tablet by mouth daily. 90 Tablet 1    albuterol (PROVENTIL HFA, VENTOLIN HFA, PROAIR HFA) 90 mcg/actuation inhaler       rosuvastatin (CRESTOR) 10 mg tablet Take 1 Tablet by mouth nightly. 90 Tablet 1    albuterol (PROVENTIL VENTOLIN) 2.5 mg /3 mL (0.083 %) nebu Take 3 mL by inhalation every four (4) hours as needed (cough/wheezing). 50 Nebule 4    diclofenac (VOLTAREN) 1 % gel Apply 1 g to affected area four (4) times daily.  200 g 1    omeprazole (PRILOSEC) 20 mg capsule Take 1 Capsule by mouth daily. 90 Capsule 1    cholecalciferol, VITAMIN D3, (VITAMIN D3) 5,000 unit tab tablet Take 1,000 Units by mouth every morning.  cetirizine (ZYRTEC) 10 mg tablet Take 10 mg by mouth every morning.  cyanocobalamin (VITAMIN B-12) 1,000 mcg tablet Take 1,000 mcg by mouth two (2) times a day. (Patient not taking: Reported on 2021)       No current facility-administered medications on file prior to visit. 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 9) with comorbidity ([facility] [address] E66.01    Acute bronchitis J20.9    Inguinal lymphadenopathy R59.0    Lymphadenopathy R59.1    BMI 33.0-33.9,adult Z68.33       Social History     Socioeconomic History    Marital status:    Tobacco Use    Smoking status: Former Smoker     Packs/day: 3.00     Years: 35.00     Pack years: 105.00     Types: Cigarettes     Quit date:      Years since quittin.2    Smokeless tobacco: Never Used   Vaping Use    Vaping Use: Never used   Substance and Sexual Activity    Alcohol use:  Yes     Alcohol/week: 21.0 standard drinks     Types: 21 Shots of liquor per week    Drug use: No    Sexual activity: Yes     Partners: Female     Birth control/protection: None Review of Systems   Review of Systems   Constitutional: Negative for chills and fever. Respiratory: Positive for cough. Cardiovascular: Negative for chest pain and palpitations. Gastrointestinal: Negative for nausea and vomiting. Genitourinary: Negative for dysuria and urgency. Neurological: Negative for dizziness, tingling and headaches. Objective:     Visit Vitals  /81 (BP 1 Location: Right arm, BP Patient Position: Sitting)   Pulse 90   Temp 97.3 °F (36.3 °C) (Oral)   Resp 18   Ht 5' 10\" (1.778 m)   Wt 240 lb 9.6 oz (109.1 kg)   SpO2 95%   BMI 34.52 kg/m²        Physical Exam  Vitals and nursing note reviewed. Constitutional:       Appearance: Normal appearance. HENT:      Head: Normocephalic and atraumatic. Cardiovascular:      Rate and Rhythm: Normal rate and regular rhythm. Pulses: Normal pulses. Heart sounds: Normal heart sounds. Pulmonary:      Effort: Pulmonary effort is normal.      Breath sounds: Normal breath sounds. Abdominal:      General: Abdomen is flat. Bowel sounds are normal.      Palpations: Abdomen is soft. Musculoskeletal:         General: Normal range of motion. Cervical back: Normal range of motion and neck supple. Comments: Right foot: Improved though persistent redness    Skin:     General: Skin is warm and dry. Neurological:      Mental Status: He is alert. Pertinent Labs/Studies:      Assessment and orders:       ICD-10-CM ICD-9-CM    1. Primary hypertension  I10 401.9    2. Pure hypercholesterolemia  E78.00 272.0    3. SOB (shortness of breath)  R06.02 786.05 REFERRAL TO CARDIOLOGY   4. Acute idiopathic gout of right foot  M10.071 274.01 allopurinoL (ZYLOPRIM) 100 mg tablet     Diagnoses and all orders for this visit:    1. Primary hypertension: Our goal is to normalize the blood pressure to decrease the risks of strokes and heart attacks. The patient is in agreement with the plan.  Despite Gout will continue HCTZ now    2. Pure hypercholesterolemia: The patient is aware of our goal to reduce or eliminate the long term problems (such as strokes and heart attacks) related to poorly controlled Triglycerides, LDL, Cholesterol. 3. SOB (shortness of breath)  -     REFERRAL TO CARDIOLOGY    4. Acute idiopathic gout of right foot: Given increased Uric acid ill start Allopurinol  -     allopurinoL (ZYLOPRIM) 100 mg tablet; Take 1 Tablet by mouth daily. Follow-up and Dispositions    · Return in about 3 months (around 7/1/2022). I have discussed the diagnosis with the patient and the intended plan as seen in the above orders. Social history, medical history, and labs were reviewed. The patient has received an after-visit summary and questions were answered concerning future plans. I have discussed medication side effects and warnings with the patient as well.     MD ARTUR Royal & RICHARD REESE [facility]  04/01/22

## 2022-04-01 NOTE — PROGRESS NOTES
1. Have you been to the ER, urgent care clinic since your last visit? Hospitalized since your last visit? No    2. Have you seen or consulted any other health care providers outside of the [address] [place] [address] since your last visit? Including any pap smears or colon screening.  No      Health Maintenance Due   Topic Date Due    DTaP/Tdap/Td series (2 - Td or Tdap) 09/02/2021    COVID-19 Vaccine (3 - Booster for Moderna series) 11/02/2021

## 2022-05-11 ENCOUNTER — TELEPHONE (OUTPATIENT)
Dept: CARDIOLOGY CLINIC | Age: 72
End: 2022-05-11

## 2022-05-11 ENCOUNTER — OFFICE VISIT (OUTPATIENT)
Dept: CARDIOLOGY CLINIC | Facility: CLINIC | Age: 72
End: 2022-05-11
Payer: MEDICARE

## 2022-05-11 VITALS
BODY MASS INDEX: 35.07 KG/M2 | SYSTOLIC BLOOD PRESSURE: 116 MMHG | HEART RATE: 85 BPM | HEIGHT: 70 IN | DIASTOLIC BLOOD PRESSURE: 76 MMHG | TEMPERATURE: 98 F | RESPIRATION RATE: 20 BRPM | OXYGEN SATURATION: 100 % | WEIGHT: 245 LBS

## 2022-05-11 DIAGNOSIS — I49.3 PVC'S (PREMATURE VENTRICULAR CONTRACTIONS): ICD-10-CM

## 2022-05-11 DIAGNOSIS — E78.00 PURE HYPERCHOLESTEROLEMIA: ICD-10-CM

## 2022-05-11 DIAGNOSIS — I10 ESSENTIAL HYPERTENSION: ICD-10-CM

## 2022-05-11 DIAGNOSIS — R07.2 PRECORDIAL CHEST PAIN: ICD-10-CM

## 2022-05-11 DIAGNOSIS — E66.01 SEVERE OBESITY (BMI 35.0-39.9) WITH COMORBIDITY (HCC): ICD-10-CM

## 2022-05-11 DIAGNOSIS — R06.02 SHORTNESS OF BREATH ON EXERTION: ICD-10-CM

## 2022-05-11 DIAGNOSIS — I48.91 NEW ONSET ATRIAL FIBRILLATION (HCC): Primary | ICD-10-CM

## 2022-05-11 PROCEDURE — G8510 SCR DEP NEG, NO PLAN REQD: HCPCS | Performed by: SPECIALIST

## 2022-05-11 PROCEDURE — G8752 SYS BP LESS 140: HCPCS | Performed by: SPECIALIST

## 2022-05-11 PROCEDURE — G8427 DOCREV CUR MEDS BY ELIG CLIN: HCPCS | Performed by: SPECIALIST

## 2022-05-11 PROCEDURE — G8417 CALC BMI ABV UP PARAM F/U: HCPCS | Performed by: SPECIALIST

## 2022-05-11 PROCEDURE — 1123F ACP DISCUSS/DSCN MKR DOCD: CPT | Performed by: SPECIALIST

## 2022-05-11 PROCEDURE — 1101F PT FALLS ASSESS-DOCD LE1/YR: CPT | Performed by: SPECIALIST

## 2022-05-11 PROCEDURE — 93000 ELECTROCARDIOGRAM COMPLETE: CPT | Performed by: SPECIALIST

## 2022-05-11 PROCEDURE — 3017F COLORECTAL CA SCREEN DOC REV: CPT | Performed by: SPECIALIST

## 2022-05-11 PROCEDURE — G8754 DIAS BP LESS 90: HCPCS | Performed by: SPECIALIST

## 2022-05-11 PROCEDURE — G8536 NO DOC ELDER MAL SCRN: HCPCS | Performed by: SPECIALIST

## 2022-05-11 PROCEDURE — 99214 OFFICE O/P EST MOD 30 MIN: CPT | Performed by: SPECIALIST

## 2022-05-11 NOTE — PROGRESS NOTES
1. \"Have you been to the ER, urgent care clinic since your last visit? Hospitalized since your last visit? \" No    2. \"Have you seen or consulted any other health care providers outside of the [address] [place] [address] since your last visit? \" No

## 2022-05-11 NOTE — PROGRESS NOTES
Katherine Thompson      1950   Linda Henley MD  Date of Visit-5/11/2022     [facility],  PCP=Gin Walls MD     [facility] [place]. HPI:   April Aguirre is a 73 y.o. male   Last OV 2019 for HTN, PVC  now here with more shortness of breath on exertion  , for a year  Has 2 TKR and limited  Little chest pain,left side rib margin-infrequent-less than But feels it and  gets numb in right leg  Sob is with heavier exertion and is ok at grocery store  EKG shows Atrial fibrillation     Assessment/Plans:  1. New onset Atrial fibrillation   Has ELLISON, palps and precordial chest pain  Get Holter to see if persistent,current rate is ok on beta blocker   [id number]  score for CVA risk is 2 ( age , HTN) ,consider options for [address] or Aspirin, hx of GERD but no GI bleed  Check echo and nuclear stress test for symptoms    2. Precordial chest pain/ELLISON  At risk for CAD, some cardiovascular disease risk factors and possible angina  Get nuclear stress test     3. PVC's (premature ventricular contractions)  4000 on previous holter, but are benign with then with no structural heart dz    4. HTN,essential   At goal , meds and possible side effects reviewed and patient denies  Key CAD CHF Meds             hydroCHLOROthiazide (HYDRODIURIL) 12.5 mg tablet (Taking) Take 1 tablet by mouth once daily    ramipriL (ALTACE) 10 mg capsule (Taking) Take 1 capsule by mouth twice daily    metoprolol succinate (TOPROL-XL) 50 mg XL tablet (Taking/Discontinued) Take 1 Tablet by mouth daily. rosuvastatin (CRESTOR) 10 mg tablet (Taking) Take 1 Tablet by mouth nightly. BP Readings from Last 6 Encounters:   05/11/22 116/76   04/01/22 122/81   03/16/22 (!) 102/59   12/27/21 112/78   10/27/21 117/77   07/27/21 125/75        5 .  Obesity  cardiovascular disease risk factors   The 10-year ASCVD risk score (Lupis Crabtree, et al., 2013) is: 14.7%    Values used to calculate the score:      Age: 73 years      Sex: Male      Is Non- : No      Diabetic: No      Tobacco smoker: No      Systolic Blood Pressure: 979 mmHg      Is BP treated: Yes      HDL Cholesterol: 42 MG/DL      Total Cholesterol: 136 MG/DL     6. Pure hypercholesterolemia  At goal , denies excess muscle aches or new liver issues  Key Antihyperlipidemia Meds             rosuvastatin (CRESTOR) 10 mg tablet (Taking) Take 1 Tablet by mouth nightly. Lab Results   Component Value Date/Time    LDL, calculated 66.4 12/27/2021 09:25 AM       Patient Instructions   You have been scheduled for a Nuclear Stress Test and an echocardiogram. A 7 day loop monitor has been ordered for you. This will be mailed to the address given to us in the next 5-7 business days. Please read over the instructions given to you about [employer] loop monitors. If you have any insurance questions regarding coverage and out of pocket cost please contact the number below. If you have any billing questions regarding deductible or responsibility call [phone]. If you need additional supplies or issue with your monitor please call [phone]. We will call with results. Nuclear stress testing evaluates blood flow to your heart muscle and assesses cardiac function. There are 2 parts (Rest/Stress) to this procedure and will include exercise on a treadmill    *Please arrive 15 minutes prior to your appointment time   -One day testing will take 4 hours    1. NO CAFFEINE (not even decaffeinated products) 24 HOURS PRIOR TO TESTING. This includes coffee, soda, tea, chocolate, multivitamins, and migraine medication, like Excedrin or Fioricet that contains caffeine. 2. Nothing to eat or drink 4 HOURS prior to testing  3. NO NICOTINE 12 hours prior to testing  4. Hold any medications requested by your cardiologist. Otherwise take medications as directed with a few sips of water.  If you are unsure you may bring your medications with you to take after instructed by your stressing nurse. It is recommended you hold metoprolol 24 hours prior but bring it with you to the appointment. 5. Wear comfortable clothes and shoes (Shirts with no metal, shorts or pants, tennis shoes, no heels or flip flops)            1. New onset atrial fibrillation ([address]    2. Precordial chest pain    3. Shortness of breath on exertion    4. PVC's (premature ventricular contractions)    5. Essential hypertension    6. Severe obesity (BMI 35.0-39. 9) with comorbidity ([address]    7. Pure hypercholesterolemia         Future Appointments   Date Time Provider Erin Blandi   6/16/2022 10:00 AM NUCLEAR, STFRANCIS [facility] BS AMB   6/16/2022 11:00 AM ECHOTWO, STFRANCIS [facility] BS AMB   7/1/2022  8:40 AM [facility]   7/7/2022 11:00 AM Kavitha Ceja NP [facility] BS AMB   7/13/2022 10:00 AM Rommel Rivas MD [facility]  AMB      Patient Care Team:  Jordan Simmons MD as PCP - General (Family Medicine)  Jordan Simmons MD as PCP - [address] [place] [address] Provider  Rommel Rivas MD (Cardiovascular Disease Physician)  Abril De Jesus MD (Dermatology Physician)  Zora Angeles MD (Urology)  Lois Moise MD (Gastroenterology)  Mohini Yap MD (Surgery Physician)  Dominique Medina MD (Orthopedic Surgery)  Pedro Gómez MD (Orthopedic Surgery)  Yris Ponce NP as Nurse Practitioner (Surgery Physician)  Maria Fernanda Lynn MD (Hematology and Oncology)  Cyrus Herrmann MD (Endocrinology Physician)  Kristy Paiz MD as Physician (Sleep Medicine Physician)       Cardiac History:   ECHO 9-16-15=WNL  NUKE exercise nuclear stress test 10-1-15= walked 6 minutes ,EF 64%, no EKG changes or ischemia, rare PVCs    Holter: 7/27/15. Average heart rate 71. Total beats 101,922. No pauses. Ventricular ectopics 3,993, averaging 167 per hour. Bigeminy was about half of those beats. Supraventricular ectopics 213.  SVT the fasted at 4 beats and the longest at 11 beats. ROS:Cardiac complete  as above. Respiratory as above with no wheezing or hemoptysis. He denies  symptoms of unusual weight loss , fevers,  BRBPR, hematuria,  or recent stroke       Past Medical History:   Diagnosis Date    Anesthesia complication 4461    APNEA DURING HERNIA REPAIR, POST OPERATIVE INTUBATION AT [facility]    Arthritis     Basal cell carcinoma (BCC) in situ of skin     Colon ulcer, aphthous 4/1/2010    COPD, mild ([address]     Does not see anyone for this    Environmental and seasonal allergies     Essential tremor     Fatty liver 06/2019    Frequent episodes of bronchitis 2018    Frequent PVCs     Has been checked by cardiology    GERD (gastroesophageal reflux disease) 4/1/2010    High cholesterol 4/1/2010    HTN (hypertension), benign 04/01/2010    Hx MRSA infection 2013    CYSTS BL LEGS    Sleep apnea     CPAP      Exam and Labs:  Visit Vitals  /76 (BP 1 Location: Left upper arm, BP Patient Position: Sitting, BP Cuff Size: Adult)   Pulse 85   Temp 98 °F (36.7 °C) (Oral)   Resp 20   Ht 5' 10\" (1.778 m)   Wt 245 lb (111.1 kg)   SpO2 100%   BMI 35.15 kg/m²     Constitutional:  NAD, comfortable , moist mucous membranesHENT: Head: NC,ATEyes: No scleral icterus. Neck:  Neck supple. No JVD present. No tracheal deviation,mass  Chest: Effort normal & normal respiratory excursion     Lungs:breath sounds normal. No stridor. distress, wheezes or  Rales. Heart:normal rate, regular rhythm, normal S1, S2, no murmurs, rubs, clicks or gallops , PMI non displaced. Edema: Edema is none. Extremities:  no clubbing or cyanosis. Abdominal:  no abnormal distension. Neurological: alert, conversant and oriented . Skin: Skin is not cold. No obvious systemic rash noted. Not diaphoretic. No erythema. Psychiatric:  Grossly normal mood and affect.   Behavior appears normal.     Lab Results   Component Value Date/Time    Cholesterol, total 136 12/27/2021 09:25 AM HDL Cholesterol 42 12/27/2021 09:25 AM    LDL, calculated 66.4 12/27/2021 09:25 AM    Triglyceride 138 12/27/2021 09:25 AM    CHOL/HDL Ratio 3.2 12/27/2021 09:25 AM     Lab Results   Component Value Date/Time    Sodium 136 03/16/2022 04:37 PM    Potassium 4.4 03/16/2022 04:37 PM    Chloride 101 03/16/2022 04:37 PM    CO2 31 03/16/2022 04:37 PM    Anion gap 4 (L) 03/16/2022 04:37 PM    Glucose 77 03/16/2022 04:37 PM    BUN 13 03/16/2022 04:37 PM    Creatinine 0.93 03/16/2022 04:37 PM    BUN/Creatinine ratio 14 03/16/2022 04:37 PM    GFR est AA >60 03/16/2022 04:37 PM    GFR est non-AA >60 03/16/2022 04:37 PM    Calcium 9.6 03/16/2022 04:37 PM      Wt Readings from Last 3 Encounters:   05/11/22 245 lb (111.1 kg)   04/01/22 240 lb 9.6 oz (109.1 kg)   03/16/22 239 lb (108.4 kg)      BP Readings from Last 3 Encounters:   05/11/22 116/76   04/01/22 122/81   03/16/22 (!) 102/59        Current Outpatient Medications   Medication Sig    allopurinoL (ZYLOPRIM) 100 mg tablet Take 1 Tablet by mouth daily.  fluticasone propionate (FLONASE) 50 mcg/actuation nasal spray Use 2 spray(s) in each nostril once daily    hydroCHLOROthiazide (HYDRODIURIL) 12.5 mg tablet Take 1 tablet by mouth once daily    ramipriL (ALTACE) 10 mg capsule Take 1 capsule by mouth twice daily    metoprolol succinate (TOPROL-XL) 50 mg XL tablet Take 1 Tablet by mouth daily.  albuterol (PROVENTIL HFA, VENTOLIN HFA, PROAIR HFA) 90 mcg/actuation inhaler     rosuvastatin (CRESTOR) 10 mg tablet Take 1 Tablet by mouth nightly.  albuterol (PROVENTIL VENTOLIN) 2.5 mg /3 mL (0.083 %) nebu Take 3 mL by inhalation every four (4) hours as needed (cough/wheezing).  diclofenac (VOLTAREN) 1 % gel Apply 1 g to affected area four (4) times daily.  omeprazole (PRILOSEC) 20 mg capsule Take 1 Capsule by mouth daily.  cholecalciferol, VITAMIN D3, (VITAMIN D3) 5,000 unit tab tablet Take 1,000 Units by mouth every morning.     cetirizine (ZYRTEC) 10 mg tablet Take 10 mg by mouth every morning.  diclofenac EC (VOLTAREN) 50 mg EC tablet Take 1 Tablet by mouth two (2) times a day. (Patient not taking: Reported on 5/11/2022)    cyanocobalamin (VITAMIN B-12) 1,000 mcg tablet Take 1,000 mcg by mouth two (2) times a day. (Patient not taking: Reported on 12/27/2021)     No current facility-administered medications for this visit. Past Surgical History:   Procedure Laterality Date    COLONOSCOPY N/A 4/2/2019    performed by Skylar Hope MD at [facility] HX AMPUTATION FINGER Right     2nd digit    HX APPENDECTOMY  1959    HX CATARACT REMOVAL Bilateral     HX CERVICAL FUSION N/A 1992    [address]  [address]    HX KNEE ARTHROSCOPY Left 1994    HX KNEE REPLACEMENT Bilateral 10/2017    HX MENISCECTOMY Right 01/06/2017    HX TONSIL AND ADENOIDECTOMY        Social Hx=  reports that he quit smoking about 22 years ago. His smoking use included cigarettes. He has a 105.00 pack-year smoking history. He has never used smokeless tobacco. He reports current alcohol use of about 21.0 standard drinks of alcohol per week. He reports that he does not use drugs. Family Hx= family history includes Cancer in his brother and brother; Dementia in his mother; Heart Disease in his father and mother; Hypertension in his mother; Lung Disease in his father; No Known Problems in his brother. Impression see above.

## 2022-05-11 NOTE — TELEPHONE ENCOUNTER
----- Message from Nataliia Childress, RADHA sent at 5/11/2022  2:23 PM EDT -----    ----- Message -----  From: Eusebia George MD  Sent: 5/11/2022  12:49 PM EDT  To: Laurie Barraza MD, Nataliia Childress, RN    Add on 7 day loop monitor  He has new onset afib  ----- Message -----  From: Eusebia George MD  Sent: 5/11/2022  12:30 PM EDT  To: Laurie Barraza MD, Nataliia Childress, RN    Needs echo and exercise nuke at [facility] for ELLISON, CP.PVC,s HTN  Call with results  Future Appointments  7/1/2022   8:40 AM    Audrey Jefferson MD   BS[facility]              BS AMB  7/7/2022   11:00 AM   Caridad Billings NP       SDC                 BS AMB

## 2022-05-11 NOTE — PATIENT INSTRUCTIONS
You have been scheduled for a Nuclear Stress Test and an echocardiogram. A 7 day loop monitor has been ordered for you. This will be mailed to the address given to us in the next 5-7 business days. Please read over the instructions given to you about [employer] loop monitors. If you have any insurance questions regarding coverage and out of pocket cost please contact the number below. If you have any billing questions regarding deductible or responsibility call [phone]. If you need additional supplies or issue with your monitor please call [phone]. We will call with results. Nuclear stress testing evaluates blood flow to your heart muscle and assesses cardiac function. There are 2 parts (Rest/Stress) to this procedure and will include exercise on a treadmill    *Please arrive 15 minutes prior to your appointment time   -One day testing will take 4 hours    1. NO CAFFEINE (not even decaffeinated products) 24 HOURS PRIOR TO TESTING. This includes coffee, soda, tea, chocolate, multivitamins, and migraine medication, like Excedrin or Fioricet that contains caffeine. 2. Nothing to eat or drink 4 HOURS prior to testing  3. NO NICOTINE 12 hours prior to testing  4. Hold any medications requested by your cardiologist. Otherwise take medications as directed with a few sips of water. If you are unsure you may bring your medications with you to take after instructed by your stressing nurse. It is recommended you hold metoprolol 24 hours prior but bring it with you to the appointment.   5. Wear comfortable clothes and shoes (Shirts with no metal, shorts or pants, tennis shoes, no heels or flip flops)

## 2022-05-19 ENCOUNTER — TELEPHONE (OUTPATIENT)
Dept: CARDIOLOGY CLINIC | Age: 72
End: 2022-05-19

## 2022-05-19 DIAGNOSIS — I48.91 NEW ONSET A-FIB (HCC): Primary | ICD-10-CM

## 2022-05-19 DIAGNOSIS — I10 ESSENTIAL HYPERTENSION: ICD-10-CM

## 2022-05-31 ENCOUNTER — PATIENT MESSAGE (OUTPATIENT)
Dept: FAMILY MEDICINE CLINIC | Age: 72
End: 2022-05-31

## 2022-05-31 DIAGNOSIS — M10.071 ACUTE IDIOPATHIC GOUT OF RIGHT FOOT: ICD-10-CM

## 2022-06-01 ENCOUNTER — TELEPHONE (OUTPATIENT)
Dept: FAMILY MEDICINE CLINIC | Age: 72
End: 2022-06-01

## 2022-06-01 RX ORDER — ALLOPURINOL 100 MG/1
100 TABLET ORAL DAILY
Qty: 90 TABLET | Refills: 1 | Status: SHIPPED | OUTPATIENT
Start: 2022-06-01 | End: 2022-10-05 | Stop reason: SDUPTHER

## 2022-06-01 RX ORDER — PREDNISONE 20 MG/1
20 TABLET ORAL
Qty: 5 TABLET | Refills: 0 | Status: SHIPPED | OUTPATIENT
Start: 2022-06-01 | End: 2022-09-09

## 2022-06-01 NOTE — TELEPHONE ENCOUNTER
Call placed to pt and informed him per     Will advise to use Prednisone for 5 days then restart Allopurinol if he has not been taking.     Pt verbalized understanding and stated he thinks pervious meds helped with gout

## 2022-06-01 NOTE — TELEPHONE ENCOUNTER
Will advise to use Prednisone for 5 days then restart Allopurinol if he has not been taking.     MD ARTUR Calzada & RICHARD REESE [facility]  06/01/22

## 2022-06-03 NOTE — TELEPHONE ENCOUNTER
Holter /Loop monitor  Duration/Dates: 7 day 5/18/22 to 5/24/22  Average heart rate:103  ( )  Findings:  No sustained wander ( <1%)   Was often achy  Had atrial fibrillation 100%  This is a new finding  Short runs of NSVT , had hx of PVCs, has echo and stress planned  Can we move those tests to before 6-15 at office , if not then keep same dates, since asymptomatic       How did his back surgery go? Increase Toprol to 100 mg daily  Start OAC-Eliquis 5mg bid or Xarelto 20 mg if insurance has lower tier  Instruct on need for stroke prevention   See if he can see me in July at [place] , call us with any issues on meds or symptoms      Lab Results   Component Value Date/Time    Creatinine 0.93 03/16/2022 04:37 PM     Lab Results   Component Value Date/Time    HGB 16.4 12/27/2021 09:25 AM          Future Appointments   Date Time Provider Erin Corona   6/16/2022 10:00 AM NUCLEAR, STFRANCIS [facility] BS AMB   6/16/2022 11:00 AM ECHOTWO, STFRANCIS [facility] BS AMB   7/1/2022  8:40 AM Parish Layne MD B[facility] BS AMB   7/7/2022 11:00 AM Anand Lutz NP SD BS AMB        Current Outpatient Medications   Medication Instructions    albuterol (PROVENTIL HFA, VENTOLIN HFA, PROAIR HFA) 90 mcg/actuation inhaler No dose, route, or frequency recorded.     albuterol (PROVENTIL VENTOLIN) 2.5 mg, Inhalation, EVERY 4 HOURS AS NEEDED    allopurinoL (ZYLOPRIM) 100 mg, Oral, DAILY    cetirizine (ZYRTEC) 10 mg, Oral, 7AM    cholecalciferol (VITAMIN D3) 1,000 Units, Oral, 7AM    diclofenac (VOLTAREN) 1 g, Topical, 4 TIMES DAILY    fluticasone propionate (FLONASE) 50 mcg/actuation nasal spray Use 2 spray(s) in each nostril once daily    hydroCHLOROthiazide (HYDRODIURIL) 12.5 mg tablet Take 1 tablet by mouth once daily    metoprolol succinate (TOPROL-XL) 50 mg, Oral, DAILY    omeprazole (PRILOSEC) 20 mg, Oral, DAILY    predniSONE (DELTASONE) 20 mg, Oral, DAILY WITH BREAKFAST    ramipriL (ALTACE) 10 mg capsule Take 1 capsule by mouth twice daily    rosuvastatin (CRESTOR) 10 mg, Oral, EVERY BEDTIME

## 2022-06-07 RX ORDER — METOPROLOL SUCCINATE 100 MG/1
100 TABLET, EXTENDED RELEASE ORAL DAILY
Qty: 90 TABLET | Refills: 3 | Status: SHIPPED | OUTPATIENT
Start: 2022-06-07

## 2022-06-07 NOTE — TELEPHONE ENCOUNTER
Two patient identifiers verified. Per MD patient called and given results. He will double up on his metoprolol and start Eliquis 5mg BID. We discussed his nuke and echo at [place] next week and reiterated instructions. Scheduled follow up in BS next month. Patient verbalized understanding and denies any further questions or concerns at this time.      Future Appointments   Date Time Provider Erin Corona   6/16/2022 10:00 AM BANDAR LÓPEZ BS AMB   6/16/2022 11:00 AM BANDAR SEGAL BS AMB   7/1/2022  8:40 AM [facility]osillo [facility]   7/7/2022 11:00 AM Shea Reveles NP SDC BS AMB   7/13/2022 10:00 AM Don Tinsley MD CAV BS AMB

## 2022-06-15 ENCOUNTER — TELEPHONE (OUTPATIENT)
Dept: CARDIOLOGY CLINIC | Age: 72
End: 2022-06-15

## 2022-06-15 NOTE — TELEPHONE ENCOUNTER
ID verified per protocol. Confirmed appointment(s) for 6/16/22. Arrive @ [facility] @ [id number]. Patient instructed to be NPO x 4 hrs prior, no caffeine (not even decaf coffee,tea,elyse) x 24 hrs prior, wear comfortable clothes/good walking shoes. Patient also informed the test takes 4 hrs.  Patient verbalized understanding and agrees with prep/test.  Rohini Lay RN

## 2022-06-16 ENCOUNTER — ANCILLARY PROCEDURE (OUTPATIENT)
Dept: CARDIOLOGY CLINIC | Age: 72
End: 2022-06-16
Payer: MEDICARE

## 2022-06-16 VITALS
SYSTOLIC BLOOD PRESSURE: 130 MMHG | WEIGHT: 245 LBS | HEIGHT: 70 IN | BODY MASS INDEX: 35.07 KG/M2 | DIASTOLIC BLOOD PRESSURE: 70 MMHG

## 2022-06-16 VITALS — BODY MASS INDEX: 35.07 KG/M2 | WEIGHT: 245 LBS | HEIGHT: 70 IN

## 2022-06-16 DIAGNOSIS — R06.02 SHORTNESS OF BREATH ON EXERTION: ICD-10-CM

## 2022-06-16 DIAGNOSIS — R07.2 PRECORDIAL CHEST PAIN: ICD-10-CM

## 2022-06-16 DIAGNOSIS — I49.3 PVC'S (PREMATURE VENTRICULAR CONTRACTIONS): ICD-10-CM

## 2022-06-16 DIAGNOSIS — I10 ESSENTIAL HYPERTENSION: ICD-10-CM

## 2022-06-16 PROCEDURE — 93306 TTE W/DOPPLER COMPLETE: CPT | Performed by: SPECIALIST

## 2022-06-16 PROCEDURE — A9500 TC99M SESTAMIBI: HCPCS | Performed by: SPECIALIST

## 2022-06-16 PROCEDURE — 93017 CV STRESS TEST TRACING ONLY: CPT | Performed by: INTERNAL MEDICINE

## 2022-06-16 RX ORDER — TETRAKIS(2-METHOXYISOBUTYLISOCYANIDE)COPPER(I) TETRAFLUOROBORATE 1 MG/ML
24.8 INJECTION, POWDER, LYOPHILIZED, FOR SOLUTION INTRAVENOUS ONCE
Status: COMPLETED | OUTPATIENT
Start: 2022-06-16 | End: 2022-06-16

## 2022-06-16 RX ORDER — TETRAKIS(2-METHOXYISOBUTYLISOCYANIDE)COPPER(I) TETRAFLUOROBORATE 1 MG/ML
8.3 INJECTION, POWDER, LYOPHILIZED, FOR SOLUTION INTRAVENOUS ONCE
Status: COMPLETED | OUTPATIENT
Start: 2022-06-16 | End: 2022-06-16

## 2022-06-16 RX ADMIN — TECHNETIUM TC 99M SESTAMIBI 24.8 MILLICURIE: 1 INJECTION, POWDER, FOR SOLUTION INTRAVENOUS at 12:10

## 2022-06-16 RX ADMIN — TECHNETIUM TC 99M SESTAMIBI 8.3 MILLICURIE: 1 INJECTION, POWDER, FOR SOLUTION INTRAVENOUS at 10:15

## 2022-06-22 LAB
NUC STRESS EJECTION FRACTION: 50 %
STRESS ANGINA INDEX: 0
STRESS BASELINE DIAS BP: 70 MMHG
STRESS BASELINE HR: 112 BPM
STRESS BASELINE SYS BP: 130 MMHG
STRESS ESTIMATED WORKLOAD: 7 METS
STRESS EXERCISE DUR MIN: 4 MIN
STRESS EXERCISE DUR SEC: 5 SEC
STRESS O2 SAT PEAK: 99 %
STRESS O2 SAT REST: 98 %
STRESS PEAK DIAS BP: 80 MMHG
STRESS PEAK SYS BP: 160 MMHG
STRESS PERCENT HR ACHIEVED: 107 %
STRESS POST PEAK HR: 162 BPM
STRESS RATE PRESSURE PRODUCT: NORMAL BPM*MMHG
STRESS TARGET HR: 151 BPM

## 2022-06-22 PROCEDURE — 93018 CV STRESS TEST I&R ONLY: CPT | Performed by: INTERNAL MEDICINE

## 2022-06-22 PROCEDURE — 93016 CV STRESS TEST SUPVJ ONLY: CPT | Performed by: INTERNAL MEDICINE

## 2022-06-22 PROCEDURE — 78452 HT MUSCLE IMAGE SPECT MULT: CPT | Performed by: INTERNAL MEDICINE

## 2022-06-26 LAB
ECHO AO ASC DIAM: 4.1 CM
ECHO AO ASCENDING AORTA INDEX: 1.8 CM/M2
ECHO AO ROOT DIAM: 3.5 CM
ECHO AO ROOT INDEX: 1.54 CM/M2
ECHO AV AREA PEAK VELOCITY: 3.7 CM2
ECHO AV AREA VTI: 3.7 CM2
ECHO AV AREA/BSA PEAK VELOCITY: 1.6 CM2/M2
ECHO AV AREA/BSA VTI: 1.6 CM2/M2
ECHO AV MEAN GRADIENT: 3 MMHG
ECHO AV MEAN VELOCITY: 0.8 M/S
ECHO AV PEAK GRADIENT: 4 MMHG
ECHO AV PEAK VELOCITY: 1 M/S
ECHO AV VELOCITY RATIO: 0.8
ECHO AV VTI: 17.5 CM
ECHO EST RA PRESSURE: 3 MMHG
ECHO LA DIAMETER INDEX: 2.02 CM/M2
ECHO LA DIAMETER: 4.6 CM
ECHO LA TO AORTIC ROOT RATIO: 1.31
ECHO LA VOL 2C: 91 ML (ref 18–58)
ECHO LA VOL 4C: 93 ML (ref 18–58)
ECHO LA VOLUME AREA LENGTH: 98 ML
ECHO LA VOLUME INDEX A2C: 40 ML/M2 (ref 16–34)
ECHO LA VOLUME INDEX A4C: 41 ML/M2 (ref 16–34)
ECHO LA VOLUME INDEX AREA LENGTH: 43 ML/M2 (ref 16–34)
ECHO LV EDV A2C: 113 ML
ECHO LV EDV A4C: 119 ML
ECHO LV EDV BP: 120 ML (ref 67–155)
ECHO LV EDV INDEX A4C: 52 ML/M2
ECHO LV EDV INDEX BP: 53 ML/M2
ECHO LV EDV NDEX A2C: 50 ML/M2
ECHO LV EJECTION FRACTION A2C: 52 %
ECHO LV EJECTION FRACTION A4C: 48 %
ECHO LV EJECTION FRACTION BIPLANE: 50 % (ref 55–100)
ECHO LV ESV A2C: 55 ML
ECHO LV ESV A4C: 62 ML
ECHO LV ESV BP: 60 ML (ref 22–58)
ECHO LV ESV INDEX A2C: 24 ML/M2
ECHO LV ESV INDEX A4C: 27 ML/M2
ECHO LV ESV INDEX BP: 26 ML/M2
ECHO LV FRACTIONAL SHORTENING: 25 % (ref 28–44)
ECHO LV INTERNAL DIMENSION DIASTOLE INDEX: 2.24 CM/M2
ECHO LV INTERNAL DIMENSION DIASTOLIC: 5.1 CM (ref 4.2–5.9)
ECHO LV INTERNAL DIMENSION SYSTOLIC INDEX: 1.67 CM/M2
ECHO LV INTERNAL DIMENSION SYSTOLIC: 3.8 CM
ECHO LV IVSD: 1 CM (ref 0.6–1)
ECHO LV MASS 2D: 175.6 G (ref 88–224)
ECHO LV MASS INDEX 2D: 77 G/M2 (ref 49–115)
ECHO LV POSTERIOR WALL DIASTOLIC: 0.9 CM (ref 0.6–1)
ECHO LV RELATIVE WALL THICKNESS RATIO: 0.35
ECHO LVOT AREA: 4.5 CM2
ECHO LVOT AV VTI INDEX: 0.8
ECHO LVOT DIAM: 2.4 CM
ECHO LVOT MEAN GRADIENT: 1 MMHG
ECHO LVOT PEAK GRADIENT: 3 MMHG
ECHO LVOT PEAK VELOCITY: 0.8 M/S
ECHO LVOT STROKE VOLUME INDEX: 27.8 ML/M2
ECHO LVOT SV: 63.3 ML
ECHO LVOT VTI: 14 CM
ECHO MV AREA VTI: 4.9 CM2
ECHO MV E VELOCITY: 0.86 M/S
ECHO MV LVOT VTI INDEX: 0.92
ECHO MV MAX VELOCITY: 0.8 M/S
ECHO MV MEAN GRADIENT: 1 MMHG
ECHO MV MEAN VELOCITY: 0.5 M/S
ECHO MV PEAK GRADIENT: 2 MMHG
ECHO MV VTI: 12.9 CM
ECHO RIGHT VENTRICULAR SYSTOLIC PRESSURE (RVSP): 22 MMHG
ECHO RV INTERNAL DIMENSION: 4.6 CM
ECHO RV TAPSE: 1.9 CM (ref 1.7–?)
ECHO TV REGURGITANT MAX VELOCITY: 2.2 M/S
ECHO TV REGURGITANT PEAK GRADIENT: 19 MMHG

## 2022-06-26 PROCEDURE — 93306 TTE W/DOPPLER COMPLETE: CPT | Performed by: SPECIALIST

## 2022-06-26 NOTE — PROGRESS NOTES
[place] and saw result of stress test today as abnormal  I spoke to pt just now about stress and echo results  Called and spoke to patient -ID X2   He is having similar symptoms with a hard \"hit\" in left chest 2-3 times a week as a pain not just rhythm or pressure  I have reviewed the static images available, there is equivocal area in distal LAD area with improvement on prone.   the EF on echo and stress is the same at 50%  After discussion with patient and in review of testing , I recommend a coronary cath left heart be done at [facility] in next 0-2 weeks before his appt with me at [facility]    risks and benefits discussed  2/1000 serious life threatening risk includes stroke, heart attack leading to  death  1/100 prolong hospital stay above and bleeding, groin complications, infection, renals possible but  unlikely unless baseline renal dysfunction, tear in cardiac vessel, tamponade  PCI 9-7/711 similar complications but benefits likely outweigh risk     Our office will call him tomorrow to review and schedule for cath

## 2022-06-27 ENCOUNTER — TELEPHONE (OUTPATIENT)
Dept: CARDIOLOGY CLINIC | Age: 72
End: 2022-06-27

## 2022-06-27 ENCOUNTER — PATIENT MESSAGE (OUTPATIENT)
Dept: FAMILY MEDICINE CLINIC | Age: 72
End: 2022-06-27

## 2022-06-27 ENCOUNTER — LAB ONLY (OUTPATIENT)
Dept: FAMILY MEDICINE CLINIC | Age: 72
End: 2022-06-27

## 2022-06-27 DIAGNOSIS — R94.39 ABNORMAL NUCLEAR STRESS TEST: ICD-10-CM

## 2022-06-27 DIAGNOSIS — I49.3 PVC'S (PREMATURE VENTRICULAR CONTRACTIONS): Primary | ICD-10-CM

## 2022-06-27 DIAGNOSIS — E78.00 PURE HYPERCHOLESTEROLEMIA: ICD-10-CM

## 2022-06-27 RX ORDER — SODIUM CHLORIDE 0.9 % (FLUSH) 0.9 %
5-40 SYRINGE (ML) INJECTION EVERY 8 HOURS
Status: CANCELLED | OUTPATIENT
Start: 2022-06-27

## 2022-06-27 RX ORDER — ROSUVASTATIN CALCIUM 10 MG/1
10 TABLET, COATED ORAL
Qty: 90 TABLET | Refills: 1 | Status: SHIPPED | OUTPATIENT
Start: 2022-06-27

## 2022-06-27 RX ORDER — SODIUM CHLORIDE 0.9 % (FLUSH) 0.9 %
5-40 SYRINGE (ML) INJECTION AS NEEDED
Status: CANCELLED | OUTPATIENT
Start: 2022-06-27

## 2022-06-27 RX ORDER — OMEPRAZOLE 20 MG/1
20 CAPSULE, DELAYED RELEASE ORAL DAILY
Qty: 90 CAPSULE | Refills: 1 | Status: SHIPPED | OUTPATIENT
Start: 2022-06-27

## 2022-06-27 RX ORDER — SODIUM CHLORIDE 9 MG/ML
1000 INJECTION, SOLUTION INTRAVENOUS CONTINUOUS
Status: CANCELLED | OUTPATIENT
Start: 2022-06-27 | End: 2022-06-27

## 2022-06-27 NOTE — TELEPHONE ENCOUNTER
----- Message from Brad Alberts.  Liz Becker sent at 6/27/2022  7:54 AM EDT -----  Regarding: refill  need refill for  omeprazole 20 mg

## 2022-06-27 NOTE — TELEPHONE ENCOUNTER
----- Message from Uri Card MD sent at 6/26/2022  7:00 PM EDT -----  [place] and saw result of stress test today as abnormal  I spoke to pt just now about stress and echo results  Called and spoke to patient -ID X2   He is having similar symptoms with a hard \"hit\" in left chest 2-3 times a week as a pain not just rhythm or pressure  I have reviewed the static images available, there is equivocal area in distal LAD area with improvement on prone.   the EF on echo and stress is the same at 50%  After discussion with patient and in review of testing , I recommend a coronary cath left heart be done at [facility] in next 0-2 weeks before his appt with me at [facility]    risks and benefits discussed  2/1000 serious life threatening risk includes stroke, heart attack leading to  death  1/100 prolong hospital stay above and bleeding, groin complications, infection, renals possible but  unlikely unless baseline renal dysfunction, tear in cardiac vessel, tamponade  PCI 6-4/311 similar complications but benefits likely outweigh risk     Our office will call him tomorrow to review and schedule for cath

## 2022-06-28 LAB
ANION GAP SERPL CALC-SCNC: 6 MMOL/L (ref 5–15)
BUN SERPL-MCNC: 14 MG/DL (ref 6–20)
BUN/CREAT SERPL: 16 (ref 12–20)
CALCIUM SERPL-MCNC: 9 MG/DL (ref 8.5–10.1)
CHLORIDE SERPL-SCNC: 106 MMOL/L (ref 97–108)
CO2 SERPL-SCNC: 29 MMOL/L (ref 21–32)
CREAT SERPL-MCNC: 0.87 MG/DL (ref 0.7–1.3)
ERYTHROCYTE [DISTWIDTH] IN BLOOD BY AUTOMATED COUNT: 13.5 % (ref 11.5–14.5)
GLUCOSE SERPL-MCNC: 72 MG/DL (ref 65–100)
HCT VFR BLD AUTO: 49.5 % (ref 36.6–50.3)
HGB BLD-MCNC: 15.9 G/DL (ref 12.1–17)
MCH RBC QN AUTO: 30 PG (ref 26–34)
MCHC RBC AUTO-ENTMCNC: 32.1 G/DL (ref 30–36.5)
MCV RBC AUTO: 93.4 FL (ref 80–99)
NRBC # BLD: 0 K/UL (ref 0–0.01)
NRBC BLD-RTO: 0 PER 100 WBC
PLATELET # BLD AUTO: 246 K/UL (ref 150–400)
PMV BLD AUTO: 11.5 FL (ref 8.9–12.9)
POTASSIUM SERPL-SCNC: 4.2 MMOL/L (ref 3.5–5.1)
RBC # BLD AUTO: 5.3 M/UL (ref 4.1–5.7)
SODIUM SERPL-SCNC: 141 MMOL/L (ref 136–145)
WBC # BLD AUTO: 7.8 K/UL (ref 4.1–11.1)

## 2022-06-29 ENCOUNTER — HOSPITAL ENCOUNTER (OUTPATIENT)
Age: 72
Setting detail: OUTPATIENT SURGERY
Discharge: HOME | End: 2022-06-29
Attending: INTERNAL MEDICINE | Admitting: INTERNAL MEDICINE
Payer: MEDICARE

## 2022-06-29 VITALS
RESPIRATION RATE: 16 BRPM | DIASTOLIC BLOOD PRESSURE: 64 MMHG | SYSTOLIC BLOOD PRESSURE: 108 MMHG | WEIGHT: 228.8 LBS | OXYGEN SATURATION: 93 % | HEIGHT: 70 IN | TEMPERATURE: 97.5 F | BODY MASS INDEX: 32.75 KG/M2 | HEART RATE: 81 BPM

## 2022-06-29 DIAGNOSIS — R94.39 ABNORMAL STRESS TEST: ICD-10-CM

## 2022-06-29 PROCEDURE — 93458 L HRT ARTERY/VENTRICLE ANGIO: CPT | Performed by: INTERNAL MEDICINE

## 2022-06-29 PROCEDURE — C1894 INTRO/SHEATH, NON-LASER: HCPCS | Performed by: INTERNAL MEDICINE

## 2022-06-29 PROCEDURE — 99152 MOD SED SAME PHYS/QHP 5/>YRS: CPT | Performed by: INTERNAL MEDICINE

## 2022-06-29 PROCEDURE — 2709999900 HC NON-CHARGEABLE SUPPLY: Performed by: INTERNAL MEDICINE

## 2022-06-29 PROCEDURE — 77030019569 HC BND COMPR RAD TERU -B: Performed by: INTERNAL MEDICINE

## 2022-06-29 PROCEDURE — 74011000250 HC RX REV CODE- 250: Performed by: INTERNAL MEDICINE

## 2022-06-29 PROCEDURE — 77030041064 HC CATH DX ANGI DXTRTY MEDT -B: Performed by: INTERNAL MEDICINE

## 2022-06-29 PROCEDURE — C1769 GUIDE WIRE: HCPCS | Performed by: INTERNAL MEDICINE

## 2022-06-29 PROCEDURE — 74011000636 HC RX REV CODE- 636: Performed by: INTERNAL MEDICINE

## 2022-06-29 PROCEDURE — 77030029065 HC DRSG HEMO QCLOT ZMED -B

## 2022-06-29 PROCEDURE — 74011250636 HC RX REV CODE- 250/636: Performed by: INTERNAL MEDICINE

## 2022-06-29 RX ORDER — SODIUM CHLORIDE 9 MG/ML
1000 INJECTION, SOLUTION INTRAVENOUS CONTINUOUS
Status: DISCONTINUED | OUTPATIENT
Start: 2022-06-29 | End: 2022-06-29 | Stop reason: HOSPADM

## 2022-06-29 RX ORDER — SODIUM CHLORIDE 0.9 % (FLUSH) 0.9 %
5-40 SYRINGE (ML) INJECTION AS NEEDED
Status: DISCONTINUED | OUTPATIENT
Start: 2022-06-29 | End: 2022-06-29 | Stop reason: HOSPADM

## 2022-06-29 RX ORDER — LIDOCAINE HYDROCHLORIDE 10 MG/ML
INJECTION INFILTRATION; PERINEURAL AS NEEDED
Status: DISCONTINUED | OUTPATIENT
Start: 2022-06-29 | End: 2022-06-29 | Stop reason: HOSPADM

## 2022-06-29 RX ORDER — MIDAZOLAM HYDROCHLORIDE 1 MG/ML
INJECTION, SOLUTION INTRAMUSCULAR; INTRAVENOUS AS NEEDED
Status: DISCONTINUED | OUTPATIENT
Start: 2022-06-29 | End: 2022-06-29 | Stop reason: HOSPADM

## 2022-06-29 RX ORDER — HEPARIN SODIUM 200 [USP'U]/100ML
INJECTION, SOLUTION INTRAVENOUS
Status: COMPLETED | OUTPATIENT
Start: 2022-06-29 | End: 2022-06-29

## 2022-06-29 RX ORDER — SODIUM CHLORIDE 0.9 % (FLUSH) 0.9 %
5-40 SYRINGE (ML) INJECTION EVERY 8 HOURS
Status: DISCONTINUED | OUTPATIENT
Start: 2022-06-29 | End: 2022-06-29 | Stop reason: HOSPADM

## 2022-06-29 RX ORDER — FENTANYL CITRATE 50 UG/ML
INJECTION, SOLUTION INTRAMUSCULAR; INTRAVENOUS AS NEEDED
Status: DISCONTINUED | OUTPATIENT
Start: 2022-06-29 | End: 2022-06-29 | Stop reason: HOSPADM

## 2022-06-29 RX ORDER — VERAPAMIL HYDROCHLORIDE 2.5 MG/ML
INJECTION, SOLUTION INTRAVENOUS AS NEEDED
Status: DISCONTINUED | OUTPATIENT
Start: 2022-06-29 | End: 2022-06-29 | Stop reason: HOSPADM

## 2022-06-29 RX ORDER — SODIUM CHLORIDE 9 MG/ML
INJECTION, SOLUTION INTRAVENOUS
Status: COMPLETED | OUTPATIENT
Start: 2022-06-29 | End: 2022-06-29

## 2022-06-29 RX ORDER — HEPARIN SODIUM 1000 [USP'U]/ML
INJECTION, SOLUTION INTRAVENOUS; SUBCUTANEOUS AS NEEDED
Status: DISCONTINUED | OUTPATIENT
Start: 2022-06-29 | End: 2022-06-29 | Stop reason: HOSPADM

## 2022-06-29 RX ORDER — SODIUM CHLORIDE 9 MG/ML
75 INJECTION, SOLUTION INTRAVENOUS CONTINUOUS
Status: DISCONTINUED | OUTPATIENT
Start: 2022-06-29 | End: 2022-06-29 | Stop reason: HOSPADM

## 2022-06-29 RX ADMIN — SODIUM CHLORIDE 500 ML: 9 INJECTION, SOLUTION INTRAVENOUS at 08:29

## 2022-06-29 NOTE — PROCEDURES
BRIEF PROCEDURE NOTE    Date of Procedure: 6/29/2022   Preoperative Diagnosis: Abnormal stress test  Postoperative Diagnosis:  No sig CAD    Procedure: Left heart cath, LV angiography, coronary angiography  Interventional Cardiologist: Shayne Laguna MD  Assistant : none  Anesthesia: local + IV sedation  I administered moderate sedation throughout this procedure. An independent trained observer pushed medications at my direction, and monitored the patients level of consciousness and physiological status throughout. Estimated Blood Loss: Minimal    Access: R Radial Access - 6 F sheath        Catheters: RCA : JR4                    LCA : Liam Duel    Findings:     L Main: Large; Nml    LAD: Med; MLI; D1/D2 /D3 - small ; MLI    LCflex: Med; MLI; OM1 - Med; MLI    RCA: Dominant; Med to large; MLI; PDA and PLB - small to med; MLI    LVEDP: Nml    LVEF: Not assessed    No significant gradient across aortic valve. PCI: none    Specimens Removed : None    Devices implanted : None    Complications: None    Closure Device: R Radial - TR band        See full cath note.     Complications: none    Shayne Laguna MD

## 2022-06-29 NOTE — ROUTINE PROCESS
7:30 AM  Patient arrived. ID and allergies verified verbally with patient. Pt voices understanding of procedure to be performed. Consent obtained. Pt prepped for procedure. 10:07 AM  TRANSFER - IN REPORT:    Verbal report received from tina mix.(name) on Elis Matthews  being received from cath lab(unit) for routine progression of care      Report consisted of patients Situation, Background, Assessment and   Recommendations(SBAR). Information from the following report(s) Procedure Summary was reviewed with the receiving nurse. Opportunity for questions and clarification was provided. Assessment completed upon patients arrival to unit and care assumed. 11:03 AM  2 ml air released from TR Band. No bleeding or hematoma noted. Radial and Ulnar pulse on r wrist palpable. Pt tolerated well. Will continue to monitor. 11:09 AM  3 ml air released from TR Band. No bleeding or hematoma noted. Radial and Ulnar pulse on r wrist palpable. Pt tolerated well. Will continue to monitor. 11:14 AM  4 ml air released from TR Band. No bleeding or hematoma noted. Radial and Ulnar pulse on r wrist palpable. Pt tolerated well. Will continue to monitor. Air release completed. TR Band removed from r wrist. No bleeding or  Hematoma. Dressing applied. Wrist immobilizer in place. Radial and ulnar pulse remain palpable on affected extremity. Pt tolerated well. Instructions given to pt regarding movement and activity restrictions. Pt voiced understanding. Discharge instructions reviewed with patient and family. Voiced understanding. Patient given copy of discharge instructions to take home. 1200  Patient ambulates in hallway or on unit. 26  Pt discharged via [place]air with brother. Personal belongings with patient upon discharge.

## 2022-06-29 NOTE — Clinical Note
TRANSFER - OUT REPORT:     Verbal report given to: Tin Farooq. Report consisted of patient's Situation, Background, Assessment and   Recommendations(SBAR). Opportunity for questions and clarification was provided. Patient transported with a Registered Nurse. Patient transported to: [facility].    [facility]

## 2022-06-29 NOTE — DISCHARGE INSTRUCTIONS
Transradial Cardiac Catheterization/Angiography Discharge Instructions    It is normal to feel tired the first couple days. Take it easy and follow the physician's instructions. Wear wrist splint for first 24 hours to keep the wrist stable. No repetitive flexing of wrist.       CHECK THE CATHETER INSERTION SITE DAILY:  Remove the wrist dressing 24 hours after the procedure. You may shower 24 hours after the procedure. Wash with soap and water and pat dry. Gentle cleaning of the site with soap and water is sufficient, cover with a dry clean dressing or bandage. Do not apply creams or powders to the area. No soaking the wrist for 5 days. Leave the puncture site open to air after 24 hours post-procedure. CALL THE PHYSICIANS:   If you have signs of infection, such as:            - A fever  If the site becomes red, swollen or feels warm to the touch  If there is drainage or if there is unusual pain at the radial site. MONITOR FOR BLEEDING:    If there is any minor oozing, you may apply a band-aid and remove after 12 hours. If the bleeding continues or if there is a lot of bleeding hold pressure with the middle finger against the puncture site and the thumb against the back of the wrist,call 911 to be transported to the hospital.  DO NOT DRIVE YOURSELF, [place] [address]. ACTIVITY:   For the first 24 hours do not manipulate the wrist.  No lifting, pushing or pulling over 3-5 pounds with the affected wrist for 7 daysand no straining the insertion site. Do not life grocery bags or the garbage can, do not run the vacuum  or  for 7 days. Start with short walks as in the hospital and gradually increase as tolerated each day. It is recommended to walk 30 minutes 5-7 days per week. Follow your physician's instructions on activity. Avoid walking outside in extremes of heat or cold. Walk inside when it is cold and windy or hot and humid.      Things to keep in mind:  No driving for at least 24 hours, or as designated by your physician. Limit the number of times you go up and down the stairs  Take rests and pace yourself with activity. Be careful and do not strain with bowel movements. Diet:  Drink plenty of fluids for the next 24 - 48 hours to help your body flush out the dye. If you have kidney, heart, or liver disease and have to limit fluids, talk with your doctor before you increase the amount of fluids you drink.   Keep eating a heart-healthy, low-fat diet that has lots of fruits, vegetables, and whole grains

## 2022-06-29 NOTE — PROGRESS NOTES
Pre cath labs all WNL  Cath today, Dr Meggan Best to do, more than 30 days from last OV so defer HP to Dr Meggan Best  7/13/2022  10:00 AM   Don Tinsley MD       [facility]               BS AMB

## 2022-06-29 NOTE — H&P
H and P as per office note by Dr Robby Choudhury 5/11/22 ( see below)    Pt personally seen and examined. Chart reviewed. Agree with advanced NP's history, exam and  A/P with changes/additons. Blood pressure 113/85, pulse 87, temperature 97.5 °F (36.4 °C), resp. rate 14, height 5' 10\" (1.778 m), weight 228 lb 12.8 oz (103.8 kg), SpO2 97 %. Alert/Not in acute distress  Neck - no JVD  CVS - S1 S2 +; Reg;  RS : CTAB;  Abd: Soft/BS+  LE - no edema    06/16/22    NUCLEAR CARDIAC STRESS TEST 06/22/2022 6/23/2022    Interpretation Summary    Nuclear Findings: LVEF measures 50%. There is a moderate severity left ventricular stress perfusion defect that is small to medium in size present in the mid to distal apex segment(s) that is predominantly reversible.   Nuclear Findings: The study is most consistent with myocardial ischemia. Findings suggest a moderate risk of myocardial ischemia. LVEF measures 50%.   ECG: Resting ECG demonstrates atrial fibrillation.   ECG: Stress ECG was positive for ischemia.   Stress Test: A Robert protocol stress test was performed. Overall, the patient's exercise capacity was below average for their age. The patient reached stage 2 of the protocol after exercising for 4 min and 5 sec. Hemodynamics are adequate for diagnosis. Blood pressure demonstrated a normal response at rest and to stress. The heart rate demonstrated a normal response to stress. The patient's heart rate recovery was normal. The patient reported dyspnea and no chest pain during the stress test.    Signed by: Kyleigh Lyons MD on 6/22/2022 12:08 AM      A/P :    CP/Abnormal stress test - C .     LHC +/- PCI/RHC consent    The risks (including but not limited to death, myocardial infarction, cerebrovascular accident, dysrhythmia, renal failure +/-dialysis, vascular complication, allergy, and/or need for emergency surgery), indications, benefits, and alternatives of cardiac catheterization +/- PCI have been explained in detail to the patient and family. Patient and family informed that if patient needs surgery  - it will be at [facility] as [facility] does not have onsite surgery. All questions answered to patient's satisfaction. Patient agrees to proceed with the procedure and  informed consent was obtained. Carl's R - normal    ASA 2    AW 2    Arley Veliz MD., [facility]     Discussed with patient/nursing/family    Yusra Mireles MD, [facility]      5/11/22    CYNDIE UMANA      1950   Juan Victoria MD  Date of Visit-5/11/2022      [place] Office,  PCP=Alexa Walls MD      [facility] [address] [place] [address] [facility] [place].       HPI:   Ra Rojo is a 73 y.o. male   Last OV 2019 for HTN, PVC  now here with more shortness of breath on exertion  , for a year  Has 2 TKR and limited  Little chest pain,left side rib margin-infrequent-less than But feels it and  gets numb in right leg  Sob is with heavier exertion and is ok at grocery store  EKG shows Atrial fibrillation      Assessment/Plans:  1. New onset Atrial fibrillation   Has ELLISON, palps and precordial chest pain  Get Holter to see if persistent,current rate is ok on beta blocker   [id number]  score for CVA risk is 2 ( age , HTN) ,consider options for [address] or Aspirin, hx of GERD but no GI bleed  Check echo and nuclear stress test for symptoms     2. Precordial chest pain/ELLISON  At risk for CAD, some cardiovascular disease risk factors and possible angina  Get nuclear stress test      3. PVC's (premature ventricular contractions)  4000 on previous holter, but are benign with then with no structural heart dz     4.  HTN,essential   At goal , meds and possible side effects reviewed and patient denies       Key CAD CHF Meds                 hydroCHLOROthiazide (HYDRODIURIL) 12.5 mg tablet (Taking) Take 1 tablet by mouth once daily     ramipriL (ALTACE) 10 mg capsule (Taking) Take 1 capsule by mouth twice daily     metoprolol succinate (TOPROL-XL) 50 mg XL tablet (Taking/Discontinued) Take 1 Tablet by mouth daily.     rosuvastatin (CRESTOR) 10 mg tablet (Taking) Take 1 Tablet by mouth nightly.              BP Readings from Last 6 Encounters:   05/11/22 116/76   04/01/22 122/81   03/16/22 (!) 102/59   12/27/21 112/78   10/27/21 117/77   07/27/21 125/75        5 . Obesity  cardiovascular disease risk factors   The 10-year ASCVD risk score (Kaitlin Panchal, et al., 2013) is: 14.7%    Values used to calculate the score:      Age: 73 years      Sex: Male      Is Non- : No      Diabetic: No      Tobacco smoker: No      Systolic Blood Pressure: 598 mmHg      Is BP treated: Yes      HDL Cholesterol: 42 MG/DL      Total Cholesterol: 136 MG/DL      6. Pure hypercholesterolemia  At goal , denies excess muscle aches or new liver issues       Key Antihyperlipidemia Meds                 rosuvastatin (CRESTOR) 10 mg tablet (Taking) Take 1 Tablet by mouth nightly.                  Lab Results   Component Value Date/Time     LDL, calculated 66.4 12/27/2021 09:25 AM       Patient Instructions   You have been scheduled for a Nuclear Stress Test and an echocardiogram. A 7 day loop monitor has been ordered for you. This will be mailed to the address given to us in the next 5-7 business days. Please read over the instructions given to you about [employer] loop monitors. If you have any insurance questions regarding coverage and out of pocket cost please contact the number below. If you have any billing questions regarding deductible or responsibility call [phone]. If you need additional supplies or issue with your monitor please call [phone]. We will call with results.      Nuclear stress testing evaluates blood flow to your heart muscle and assesses cardiac function.  There are 2 parts (Rest/Stress) to this procedure and will include exercise on a treadmill     *Please arrive 15 minutes prior to your appointment time              -One day testing will take 4 hours     1. NO CAFFEINE (not even decaffeinated products) 24 HOURS PRIOR TO TESTING. This includes coffee, soda, tea, chocolate, multivitamins, and migraine medication, like Excedrin or Fioricet that contains caffeine. 2. Nothing to eat or drink 4 HOURS prior to testing  3. NO NICOTINE 12 hours prior to testing  4. Hold any medications requested by your cardiologist. Otherwise take medications as directed with a few sips of water. If you are unsure you may bring your medications with you to take after instructed by your stressing nurse. It is recommended you hold metoprolol 24 hours prior but bring it with you to the appointment. 5. Wear comfortable clothes and shoes (Shirts with no metal, shorts or pants, tennis shoes, no heels or flip flops)             1. New onset atrial fibrillation ([address]    2. Precordial chest pain    3. Shortness of breath on exertion    4. PVC's (premature ventricular contractions)    5. Essential hypertension    6. Severe obesity (BMI 35.0-39. 9) with comorbidity ([address]    7.  Pure hypercholesterolemia                 Future Appointments   Date Time Provider Erin Corona   6/16/2022 10:00 AM NUCLEAR, STFRANCIS [facility] BS AMB   6/16/2022 11:00 AM ECHOTWO, STFRANCIS [facility] BS AMB   7/1/2022  8:40 AM Danyel Prince MD [facility] BS AMB   7/7/2022 11:00 AM Dany Kaiser NP [facility] BS AMB   7/13/2022 10:00 AM Abel West MD [facility] BS AMB      Patient Care Team:  Danyel Prince MD as PCP - General (Family Medicine)  Danyel Prince MD as PCP - [address] [place] [address] Provider  Abel West MD (Cardiovascular Disease Physician)  Jasmin Bellamy MD (Dermatology Physician)  Ari Quiros MD (Urology)  Tomi Ramsey MD (Gastroenterology)  Stuart Hickman MD (Surgery Physician)  Oralia Calzada MD (Orthopedic Surgery)  Anusha Sherwood MD (Orthopedic Surgery)  Shonda Sanford NP as Nurse Practitioner (Surgery Physician)  Esmer Bhatt MD (Hematology and Oncology)  Damaris Jules MD (Endocrinology Physician)  Angelique Vick MD as Physician (Sleep Medicine Physician)         Cardiac History:   ECHO 9-16-15=WNL  NUKE exercise nuclear stress test 10-1-15= walked 6 minutes ,EF 64%, no EKG changes or ischemia, rare PVCs     Holter: 7/27/15. Average heart rate 71. Total beats 101,922. No pauses. Ventricular ectopics 3,993, averaging 167 per hour. Bigeminy was about half of those beats. Supraventricular ectopics 213. SVT the fasted at 4 beats and the longest at 11 beats.       ROS:Cardiac complete  as above. Respiratory as above with no wheezing or hemoptysis. He denies  symptoms of unusual weight loss , fevers,  BRBPR, hematuria,  or recent stroke             Past Medical History:   Diagnosis Date    Anesthesia complication 2092     APNEA DURING HERNIA REPAIR, [place] [facility]    Arthritis      Basal cell carcinoma (BCC) in situ of skin      Colon ulcer, aphthous 4/1/2010    COPD, mild ([address]       Does not see anyone for this    Environmental and seasonal allergies      Essential tremor      Fatty liver 06/2019    Frequent episodes of bronchitis 2018    Frequent PVCs       Has been checked by cardiology    GERD (gastroesophageal reflux disease) 4/1/2010    High cholesterol 4/1/2010    HTN (hypertension), benign 04/01/2010    Hx MRSA infection 2013     CYSTS BL LEGS    Sleep apnea       CPAP      Exam and Labs:  Visit Vitals  /76 (BP 1 Location: Left upper arm, BP Patient Position: Sitting, BP Cuff Size: Adult)   Pulse 85   Temp 98 °F (36.7 °C) (Oral)   Resp 20   Ht 5' 10\" (1.778 m)   Wt 245 lb (111.1 kg)   SpO2 100%   BMI 35.15 kg/m²     Constitutional:  NAD, comfortable , moist mucous membranesHENT: Head: NC,ATEyes: No scleral icterus. Neck:  Neck supple. No JVD present.  No tracheal deviation,mass  Chest: Effort normal & normal respiratory excursion      Lungs:breath sounds normal. No stridor. distress, wheezes or  Rales. Heart:normal rate, regular rhythm, normal S1, S2, no murmurs, rubs, clicks or gallops , PMI non displaced. Edema: Edema is none.     Extremities:  no clubbing or cyanosis. Abdominal:  no abnormal distension. Neurological: alert, conversant and oriented . Skin: Skin is not cold. No obvious systemic rash noted. Not diaphoretic. No erythema. Psychiatric:  Grossly normal mood and affect. Behavior appears normal.            Lab Results   Component Value Date/Time     Cholesterol, total 136 12/27/2021 09:25 AM     HDL Cholesterol 42 12/27/2021 09:25 AM     LDL, calculated 66.4 12/27/2021 09:25 AM     Triglyceride 138 12/27/2021 09:25 AM     CHOL/HDL Ratio 3.2 12/27/2021 09:25 AM            Lab Results   Component Value Date/Time     Sodium 136 03/16/2022 04:37 PM     Potassium 4.4 03/16/2022 04:37 PM     Chloride 101 03/16/2022 04:37 PM     CO2 31 03/16/2022 04:37 PM     Anion gap 4 (L) 03/16/2022 04:37 PM     Glucose 77 03/16/2022 04:37 PM     BUN 13 03/16/2022 04:37 PM     Creatinine 0.93 03/16/2022 04:37 PM     BUN/Creatinine ratio 14 03/16/2022 04:37 PM     GFR est AA >60 03/16/2022 04:37 PM     GFR est non-AA >60 03/16/2022 04:37 PM     Calcium 9.6 03/16/2022 04:37 PM          Wt Readings from Last 3 Encounters:   05/11/22 245 lb (111.1 kg)   04/01/22 240 lb 9.6 oz (109.1 kg)   03/16/22 239 lb (108.4 kg)          BP Readings from Last 3 Encounters:   05/11/22 116/76   04/01/22 122/81   03/16/22 (!) 102/59              Current Outpatient Medications   Medication Sig    allopurinoL (ZYLOPRIM) 100 mg tablet Take 1 Tablet by mouth daily.     fluticasone propionate (FLONASE) 50 mcg/actuation nasal spray Use 2 spray(s) in each nostril once daily    hydroCHLOROthiazide (HYDRODIURIL) 12.5 mg tablet Take 1 tablet by mouth once daily    ramipriL (ALTACE) 10 mg capsule Take 1 capsule by mouth twice daily    metoprolol succinate (TOPROL-XL) 50 mg XL tablet Take 1 Tablet by mouth daily.  albuterol (PROVENTIL HFA, VENTOLIN HFA, PROAIR HFA) 90 mcg/actuation inhaler      rosuvastatin (CRESTOR) 10 mg tablet Take 1 Tablet by mouth nightly.  albuterol (PROVENTIL VENTOLIN) 2.5 mg /3 mL (0.083 %) nebu Take 3 mL by inhalation every four (4) hours as needed (cough/wheezing).  diclofenac (VOLTAREN) 1 % gel Apply 1 g to affected area four (4) times daily.  omeprazole (PRILOSEC) 20 mg capsule Take 1 Capsule by mouth daily.  cholecalciferol, VITAMIN D3, (VITAMIN D3) 5,000 unit tab tablet Take 1,000 Units by mouth every morning.  cetirizine (ZYRTEC) 10 mg tablet Take 10 mg by mouth every morning.  diclofenac EC (VOLTAREN) 50 mg EC tablet Take 1 Tablet by mouth two (2) times a day. (Patient not taking: Reported on 5/11/2022)    cyanocobalamin (VITAMIN B-12) 1,000 mcg tablet Take 1,000 mcg by mouth two (2) times a day. (Patient not taking: Reported on 12/27/2021)      No current facility-administered medications for this visit. Past Surgical History:   Procedure Laterality Date    COLONOSCOPY N/A 4/2/2019     [facility] HX AMPUTATION FINGER Right       2nd digit    HX APPENDECTOMY   1959    HX CATARACT REMOVAL Bilateral      HX CERVICAL FUSION N/A 1992    HX HERNIA REPAIR   2001    HX KNEE ARTHROSCOPY Left 1994    HX KNEE REPLACEMENT Bilateral 10/2017    HX MENISCECTOMY Right 01/06/2017    HX TONSIL AND ADENOIDECTOMY          Social Hx=  reports that he quit smoking about 22 years ago. His smoking use included cigarettes. He has a 105.00 pack-year smoking history. He has never used smokeless tobacco. He reports current alcohol use of about 21.0 standard drinks of alcohol per week. He reports that he does not use drugs.    Family Hx= family history includes Cancer in his brother and brother; Dementia in his mother; Heart Disease in his father and mother; Hypertension in his mother; Lung Disease in his father; No Known Problems in his brother. Impression see above.

## 2022-06-29 NOTE — Clinical Note
TRANSFER - IN REPORT:     Verbal report received from: Raymond Pizano. Report consisted of patient's Situation, Background, Assessment and   Recommendations(SBAR). Opportunity for questions and clarification was provided. Assessment completed upon patient's arrival to unit and care assumed. Patient transported with a Registered Nurse.  ARTHUR

## 2022-07-01 ENCOUNTER — OFFICE VISIT (OUTPATIENT)
Dept: FAMILY MEDICINE CLINIC | Age: 72
End: 2022-07-01
Payer: MEDICARE

## 2022-07-01 VITALS
WEIGHT: 238.2 LBS | BODY MASS INDEX: 34.1 KG/M2 | DIASTOLIC BLOOD PRESSURE: 66 MMHG | RESPIRATION RATE: 18 BRPM | TEMPERATURE: 98.4 F | SYSTOLIC BLOOD PRESSURE: 102 MMHG | HEART RATE: 97 BPM | OXYGEN SATURATION: 98 % | HEIGHT: 70 IN

## 2022-07-01 DIAGNOSIS — I25.9 CHRONIC MYOCARDIAL ISCHEMIA: ICD-10-CM

## 2022-07-01 DIAGNOSIS — E78.00 PURE HYPERCHOLESTEROLEMIA: Chronic | ICD-10-CM

## 2022-07-01 DIAGNOSIS — J45.20 MILD INTERMITTENT ASTHMA WITHOUT COMPLICATION: Chronic | ICD-10-CM

## 2022-07-01 DIAGNOSIS — K21.9 GASTROESOPHAGEAL REFLUX DISEASE WITHOUT ESOPHAGITIS: Chronic | ICD-10-CM

## 2022-07-01 DIAGNOSIS — Z23 ENCOUNTER FOR IMMUNIZATION: ICD-10-CM

## 2022-07-01 DIAGNOSIS — I10 PRIMARY HYPERTENSION: Primary | Chronic | ICD-10-CM

## 2022-07-01 DIAGNOSIS — M51.36 DDD (DEGENERATIVE DISC DISEASE), LUMBAR: Chronic | ICD-10-CM

## 2022-07-01 DIAGNOSIS — G47.33 OSA (OBSTRUCTIVE SLEEP APNEA): Chronic | ICD-10-CM

## 2022-07-01 DIAGNOSIS — I48.0 PAROXYSMAL ATRIAL FIBRILLATION (HCC): Chronic | ICD-10-CM

## 2022-07-01 PROCEDURE — G8417 CALC BMI ABV UP PARAM F/U: HCPCS | Performed by: FAMILY MEDICINE

## 2022-07-01 PROCEDURE — G8427 DOCREV CUR MEDS BY ELIG CLIN: HCPCS | Performed by: FAMILY MEDICINE

## 2022-07-01 PROCEDURE — 99214 OFFICE O/P EST MOD 30 MIN: CPT | Performed by: FAMILY MEDICINE

## 2022-07-01 PROCEDURE — 90677 PCV20 VACCINE IM: CPT | Performed by: FAMILY MEDICINE

## 2022-07-01 PROCEDURE — G8536 NO DOC ELDER MAL SCRN: HCPCS | Performed by: FAMILY MEDICINE

## 2022-07-01 PROCEDURE — 90715 TDAP VACCINE 7 YRS/> IM: CPT | Performed by: FAMILY MEDICINE

## 2022-07-01 PROCEDURE — 1101F PT FALLS ASSESS-DOCD LE1/YR: CPT | Performed by: FAMILY MEDICINE

## 2022-07-01 PROCEDURE — 1123F ACP DISCUSS/DSCN MKR DOCD: CPT | Performed by: FAMILY MEDICINE

## 2022-07-01 PROCEDURE — 3017F COLORECTAL CA SCREEN DOC REV: CPT | Performed by: FAMILY MEDICINE

## 2022-07-01 PROCEDURE — G8752 SYS BP LESS 140: HCPCS | Performed by: FAMILY MEDICINE

## 2022-07-01 PROCEDURE — G8754 DIAS BP LESS 90: HCPCS | Performed by: FAMILY MEDICINE

## 2022-07-01 PROCEDURE — G8510 SCR DEP NEG, NO PLAN REQD: HCPCS | Performed by: FAMILY MEDICINE

## 2022-07-01 NOTE — PROGRESS NOTES
[facility]    History of Present Illness:   Roma Tucker is a 73 y.o. male with history of HTN, Paroxysmal Afib, RACHEL, Asthma, DDD, Osteoarthritis  CC: Follow up Chronic conditons  History provided by patient and Records    HPI:  \"Interval hx Per Gloria: Trever Woodall is a 72 yo male with a history of hypertension and recently diagnosed atrial fibrillation. He has been having symptoms of a fib for years, and was diagnosed officially in June 2022. He reports having a thumping in his chest that feels like a hard-hit against his chest wall, which occurs variably, between once a month to twice a day. Otherwise, he does not complain of headaches, dizziness, chest pain, leg swelling. On Wednesday 6/29, he had an outpatient procedure to check for clots as a possible etiology, and reports that the test came back clear. He has scheduled follow up with the cardiologist on 7/13. He reports otherwise feeling well, taking his medications as prescribed, and complains of no muscle cramping or adverse effects. He endorses some leg pain, but describes it as stiffness rather than cramping. 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 He is concerned about the cost of his Eliquis, which he expects to cost him ~$500 a month. \"    Gastroesophageal Reflux:  Current control of Symptoms: Stable  Primary symptoms: heartburn  Hiatal Hernia: No  Current Medications: Prilosec  The patient has no history melena or bright red blood in the stools. The patient avoids high dose aspirin and NSAID therapy. The patient is aware of diet changes needed, elevating the head of the bed and appropriate use of antacids.        Hx of gout: Controlled on Allopurinol at this time    RACHEL: Using CPAP    Asthma: Stable    Health Maintenance  Health Maintenance Due   Topic Date Due    Pneumococcal 65+ years (2 - PCV) 02/13/2020    DTaP/Tdap/Td series (2 - Td or Tdap) 09/02/2021    COVID-19 Vaccine (3 - Booster for Moderna series) 11/02/2021       Past Medical, Family, and Social History:     Current Outpatient Medications on File Prior to Visit   Medication Sig Dispense Refill    rosuvastatin (CRESTOR) 10 mg tablet Take 1 Tablet by mouth nightly. 90 Tablet 1    omeprazole (PRILOSEC) 20 mg capsule Take 1 Capsule by mouth daily. 90 Capsule 1    apixaban (ELIQUIS) 5 mg tablet Take 1 Tablet by mouth two (2) times a day. 60 Tablet 3    metoprolol succinate (TOPROL-XL) 100 mg tablet Take 1 Tablet by mouth daily. 90 Tablet 3    allopurinoL (ZYLOPRIM) 100 mg tablet Take 1 Tablet by mouth daily. 90 Tablet 1    fluticasone propionate (FLONASE) 50 mcg/actuation nasal spray Use 2 spray(s) in each nostril once daily 16 g 2    hydroCHLOROthiazide (HYDRODIURIL) 12.5 mg tablet Take 1 tablet by mouth once daily 90 Tablet 1    ramipriL (ALTACE) 10 mg capsule Take 1 capsule by mouth twice daily 180 Capsule 1    albuterol (PROVENTIL HFA, VENTOLIN HFA, PROAIR HFA) 90 mcg/actuation inhaler       albuterol (PROVENTIL VENTOLIN) 2.5 mg /3 mL (0.083 %) nebu Take 3 mL by inhalation every four (4) hours as needed (cough/wheezing). 50 Nebule 4    cholecalciferol, VITAMIN D3, (VITAMIN D3) 5,000 unit tab tablet Take 1,000 Units by mouth every morning.  cetirizine (ZYRTEC) 10 mg tablet Take 10 mg by mouth every morning. No current facility-administered medications on file prior to visit.        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 9) with comorbidity (HCC) E66.01    Acute bronchitis J20.9    Inguinal lymphadenopathy R59.0    Lymphadenopathy R59.1    BMI 33.0-33.9,adult Z68.33    Paroxysmal atrial fibrillation (HCC) I48.0    Chronic myocardial ischemia I25.9       Social History     Socioeconomic History    Marital status:    Tobacco Use    Smoking status: Former Smoker     Packs/day: 3.00     Years: 35.00     Pack years: 105.00     Types: Cigarettes     Quit date:      Years since quittin.5    Smokeless tobacco: Never Used   Vaping Use    Vaping Use: Never used   Substance and Sexual Activity    Alcohol use: Yes     Alcohol/week: 21.0 standard drinks     Types: 21 Shots of liquor per week    Drug use: No    Sexual activity: Yes     Partners: Female     Birth control/protection: None        Review of Systems   Review of Systems   Constitutional: Negative for chills and fever. HENT: Negative for congestion. Cardiovascular: Negative for chest pain and palpitations. Gastrointestinal: Negative for heartburn, nausea and vomiting. Objective:     Visit Vitals  /66 (BP 1 Location: Left arm, BP Patient Position: Sitting, BP Cuff Size: Large adult)   Pulse 97   Temp 98.4 °F (36.9 °C) (Oral)   Resp 18   Ht 5' 10\" (1.778 m)   Wt 238 lb 3.2 oz (108 kg)   SpO2 98%   BMI 34.18 kg/m²        Physical Exam  Vitals and nursing note reviewed. Constitutional:       Appearance: Normal appearance. HENT:      Head: Normocephalic and atraumatic. Cardiovascular:      Rate and Rhythm: Normal rate and regular rhythm. Pulses: Normal pulses. Heart sounds: Normal heart sounds. No murmur heard. No friction rub. No gallop.     Pulmonary: Effort: Pulmonary effort is normal.      Breath sounds: Normal breath sounds. Abdominal:      General: Abdomen is flat. Bowel sounds are normal.      Palpations: Abdomen is soft. Musculoskeletal:      Cervical back: Normal range of motion and neck supple. Neurological:      Mental Status: He is alert. Pertinent Labs/Studies:      Assessment and orders:       ICD-10-CM ICD-9-CM    1. Primary hypertension  I10 401.9    2. Chronic myocardial ischemia  I25.9 414.8    3. RACHEL (obstructive sleep apnea)  G47.33 327.23    4. Mild intermittent asthma without complication  U92.85 447.53    5. Gastroesophageal reflux disease without esophagitis  K21.9 530.81    6. DDD (degenerative disc disease), lumbar  M51.36 722.52    7. Pure hypercholesterolemia  E78.00 272.0    8. Paroxysmal atrial fibrillation (HCC)  I48.0 427.31    9. Encounter for immunization  Z23 V03.89 NM IMMUNIZ ADMIN,1 SINGLE/COMB VAC/TOXOID      TDAP, BOOSTRIX, (AGE 10 YRS+), IM      PNEUMOCOCCAL, PCV20, PREVNAR 20, (AGE 18 YRS+), IM, PF      NM IMMUNIZ,ADMIN,EACH ADDL     Diagnoses and all orders for this visit:    1. Primary hypertension: Our goal is to normalize the blood pressure to decrease the risks of strokes and heart attacks. The patient is in agreement with the plan. 2. Chronic myocardial ischemia: Stable Cath    3. RACHEL (obstructive sleep apnea)    4. Mild intermittent asthma without complication    5. Gastroesophageal reflux disease without esophagitis    6. DDD (degenerative disc disease), lumbar    7. Pure hypercholesterolemia: The patient is aware of our goal to reduce or eliminate the long term problems (such as strokes and heart attacks) related to poorly controlled Triglycerides, LDL, Cholesterol. 8. Paroxysmal atrial fibrillation (HCC):b Follow up with Cardiology    9.  Encounter for immunization  -     NM IMMUNIZ ADMIN,1 SINGLE/COMB VAC/TOXOID  -     TDAP, BOOSTRIX, (AGE 10 YRS+), IM  -     PNEUMOCOCCAL, PCV20, PREVNAR 20, (AGE 18 YRS+), IM, PF  -     ND IMMUNIZ,ADMIN,EACH ADDL      Follow-up and Dispositions    · Return in about 3 months (around 10/1/2022). I have discussed the diagnosis with the patient and the intended plan as seen in the above orders. Social history, medical history, and labs were reviewed. The patient has received an after-visit summary and questions were answered concerning future plans. I have discussed medication side effects and warnings with the patient as well.     MD ARTUR Bolaños & RICHARD REESE [facility]  07/01/22

## 2022-07-01 NOTE — PROGRESS NOTES
1. Have you been to the ER, urgent care clinic since your last visit? Hospitalized since your last visit? No    2. Have you seen or consulted any other health care providers outside of the [address] [place] [address] since your last visit? Including any pap smears or colon screening.  No      Health Maintenance Due   Topic Date Due    Pneumococcal 65+ years (2 - PCV) 02/13/2020    DTaP/Tdap/Td series (2 - Td or Tdap) 09/02/2021    COVID-19 Vaccine (3 - Booster for Melina Pedlar series) 11/02/2021

## 2022-07-07 ENCOUNTER — OFFICE VISIT (OUTPATIENT)
Dept: SLEEP MEDICINE | Age: 72
End: 2022-07-07
Payer: MEDICARE

## 2022-07-07 VITALS
HEIGHT: 70 IN | SYSTOLIC BLOOD PRESSURE: 118 MMHG | HEART RATE: 87 BPM | DIASTOLIC BLOOD PRESSURE: 71 MMHG | WEIGHT: 235.7 LBS | BODY MASS INDEX: 33.74 KG/M2 | OXYGEN SATURATION: 96 %

## 2022-07-07 DIAGNOSIS — I48.0 PAROXYSMAL ATRIAL FIBRILLATION (HCC): Chronic | ICD-10-CM

## 2022-07-07 DIAGNOSIS — G47.33 OSA (OBSTRUCTIVE SLEEP APNEA): ICD-10-CM

## 2022-07-07 PROCEDURE — G8752 SYS BP LESS 140: HCPCS | Performed by: SPECIALIST

## 2022-07-07 PROCEDURE — 1123F ACP DISCUSS/DSCN MKR DOCD: CPT | Performed by: SPECIALIST

## 2022-07-07 PROCEDURE — G8536 NO DOC ELDER MAL SCRN: HCPCS | Performed by: SPECIALIST

## 2022-07-07 PROCEDURE — 99213 OFFICE O/P EST LOW 20 MIN: CPT | Performed by: SPECIALIST

## 2022-07-07 PROCEDURE — 1101F PT FALLS ASSESS-DOCD LE1/YR: CPT | Performed by: SPECIALIST

## 2022-07-07 PROCEDURE — G8432 DEP SCR NOT DOC, RNG: HCPCS | Performed by: SPECIALIST

## 2022-07-07 PROCEDURE — G8754 DIAS BP LESS 90: HCPCS | Performed by: SPECIALIST

## 2022-07-07 PROCEDURE — G8427 DOCREV CUR MEDS BY ELIG CLIN: HCPCS | Performed by: SPECIALIST

## 2022-07-07 PROCEDURE — G8417 CALC BMI ABV UP PARAM F/U: HCPCS | Performed by: SPECIALIST

## 2022-07-07 PROCEDURE — 3017F COLORECTAL CA SCREEN DOC REV: CPT | Performed by: SPECIALIST

## 2022-07-07 NOTE — PROGRESS NOTES
[address] [facility]. [place], [address]  Tel.  [phone]  Fax. [address] [facility]  [place], [address] [facility]  Tel.  [phone]  Fax. [phone] [address]   Tel.  [phone]  Fax. [phone]         Chief Complaint       Chief Complaint   Patient presents with    Sleep Problem     yearly f/u         HPI        Lanette Hanna is a 73 y.o. male seen for follow-up.  was evaluated at [address] a sleep study which demonstrated obstructive sleep apnea characterized by an AHI of 19.2 per hour associated with arterial desaturations to 78%. Events were more prominent in REM sleep with the REM-related AHI of 82.6 per hour. During a second study,  CPAP  of 6 cm associated with AHI 0.4 per hour and SaO2 91%. Due to a number of respiratory-related arousals, CPAP initiated at 8 cm. CPAP currently 9 cm.     Compliance data downloaded and reviewed in detail with the patient today. During the past 30 days, CPAP used during 30 days with the average daily use of 8.05 hours. CMS compliance criteria 100%. AHI 1.5 per hour. Using nasal mask, mouth dry at times. Notes recent diagnosis of atrial fibrillation. Is not experiencing nocturnal awakening, nonrestorative sleep or excessive daytime sleepiness. Allergies   Allergen Reactions    Chlorhexidine Towelette Rash       Current Outpatient Medications   Medication Sig Dispense Refill    rosuvastatin (CRESTOR) 10 mg tablet Take 1 Tablet by mouth nightly. 90 Tablet 1    omeprazole (PRILOSEC) 20 mg capsule Take 1 Capsule by mouth daily. 90 Capsule 1    apixaban (ELIQUIS) 5 mg tablet Take 1 Tablet by mouth two (2) times a day. 60 Tablet 3    metoprolol succinate (TOPROL-XL) 100 mg tablet Take 1 Tablet by mouth daily. 90 Tablet 3    allopurinoL (ZYLOPRIM) 100 mg tablet Take 1 Tablet by mouth daily.  90 Tablet 1    fluticasone propionate (FLONASE) 50 mcg/actuation nasal spray Use 2 spray(s) in each nostril once daily 16 g 2    hydroCHLOROthiazide (HYDRODIURIL) 12.5 mg tablet Take 1 tablet by mouth once daily 90 Tablet 1    ramipriL (ALTACE) 10 mg capsule Take 1 capsule by mouth twice daily 180 Capsule 1    albuterol (PROVENTIL HFA, VENTOLIN HFA, PROAIR HFA) 90 mcg/actuation inhaler       albuterol (PROVENTIL VENTOLIN) 2.5 mg /3 mL (0.083 %) nebu Take 3 mL by inhalation every four (4) hours as needed (cough/wheezing). 50 Nebule 4    cholecalciferol, VITAMIN D3, (VITAMIN D3) 5,000 unit tab tablet Take 1,000 Units by mouth every morning.  cetirizine (ZYRTEC) 10 mg tablet Take 10 mg by mouth every morning. He  has a past medical history of Anesthesia complication (7806), Arthritis, Basal cell carcinoma (BCC) in situ of skin, Chronic myocardial ischemia (7/1/2022), Colon ulcer, aphthous (4/1/2010), COPD, mild ([address] Environmental and seasonal allergies, Essential tremor, Fatty liver (06/2019), Frequent episodes of bronchitis (2018), Frequent PVCs, GERD (gastroesophageal reflux disease) (4/1/2010), High cholesterol (4/1/2010), HTN (hypertension), benign (04/01/2010), MRSA infection (2013), and Sleep apnea. He has no past medical history of Diabetes ([facility] Heart failure ([facility] [address] or History of abuse. He  has a past surgical history that includes hx appendectomy (1959); hx tonsil and adenoidectomy; hx knee replacement (Bilateral, 10/2017); colonoscopy (N/A, 4/2/2019); hx knee arthroscopy (Left, 1994); hx meniscectomy (Right, 01/06/2017); hx cervical fusion (N/A, 1992); hx hernia repair (2001); hx amputation finger (Right); and hx cataract removal (Bilateral). He family history includes Cancer in his brother and brother; Dementia in his mother; Heart Disease in his father and mother; Hypertension in his mother; Lung Disease in his father; No Known Problems in his brother. He  reports that he quit smoking about 22 years ago. His smoking use included cigarettes.  He has a 105.00 pack-year smoking history. He has never used smokeless tobacco. He reports current alcohol use of about 21.0 standard drinks of alcohol per week. He reports that he does not use drugs. Review of Systems:  Unchanged per patient      Objective:     Visit Vitals  /71   Pulse 87   Ht 5' 10\" (1.778 m)   Wt 235 lb 11.2 oz (106.9 kg)   SpO2 96%   BMI 33.82 kg/m²     Body mass index is 33.82 kg/m². General:   Conversant, cooperative   Eyes:  no nystagmus                            Neuro:  Speech fluent, face symmetrical             Assessment:       ICD-10-CM ICD-9-CM    1. RACHEL (obstructive sleep apnea)  G47.33 327.23 AMB SUPPLY ORDER   2. Paroxysmal atrial fibrillation (HCC)  I48.0 427.31 AMB SUPPLY ORDER       he is compliant with PAP therapy and PAP continues to benefit patient and remains necessary for control of his sleep apnea. Importance of consistent sleep apnea treatment given diagnosis of atrial fibrillation reviewed. Patient notes some mouth dryness, has been using a nasal mask. Suggested adding a chinstrap. Discussed upgrading unit. Prefer replacing with ResMed; availability limited at present. Follow-up in 6 months. Patient will contact the office for specific problems. Plan:     Orders Placed This Encounter    AMB SUPPLY ORDER     Diagnosis: Obstructive Sleep Apnea ICD-10 Code (G47.33)     CPAP mask and supplies-  Patient preference, headgear, heated tubing, and filter;  heated humidifier. Wireless modem. Remote monitoring enrollment.  Oral/Nasal Combo Mask 1 every 3 months.  Oral Cushion Combo Mask (Replace) 2 per month.  Nasal Pillows Combo Mask (Replace) 2 per month.  Full Face Mask 1 every 3 months.  Full Face Mask Cushion 1 per month.  Nasal Cushion (Replace) 2 per month.  Nasal Pillows (Replace) 2 per month.  Nasal Interface Mask 1 every 3 months.  Headgear 1 every 6 months.   [address] 1 every 6 months.  Tubing 1 every 3 months.  Filter(s) Disposable 2 per month.  Filter(s) Non-Disposable 1 every 6 months.  Oral Interface 1 every 3 months. [address] for Cherise Jauregui (Replace) 1 every 6 months.  Tubing with heating element 1 every 3 months.                 Sammy Blood MD, [facility]  Diplomate, American Board of Sleep Medicine  NPI [id number]  Electronically signed 7/7/22       *A copy of compliance data was provided to the patient and reviewed in detail. *CPAP will be  continued at the above pressure settings. The patient is to contact the office if there are problems with either mask or pressure settings. Follow-up will be scheduled at which time compliance data will be reviewed. * Patient has a history and examination consistent with the diagnosis of sleep apnea. * He was provided information on sleep apnea including corresponding risk factors and the importance of proper treatment. * Treatment options if indicated were reviewed today. * Potential benefit of weight reduction       Sammy Blood MD, [facility]  Electronically signed 07/07/22        This note was created using voice recognition software. Despite editing, there may be syntax errors. This note will not be viewable in [address]

## 2022-07-13 ENCOUNTER — OFFICE VISIT (OUTPATIENT)
Dept: CARDIOLOGY CLINIC | Facility: CLINIC | Age: 72
End: 2022-07-13
Payer: MEDICARE

## 2022-07-13 VITALS
TEMPERATURE: 97.2 F | BODY MASS INDEX: 33.93 KG/M2 | WEIGHT: 237 LBS | HEART RATE: 82 BPM | OXYGEN SATURATION: 97 % | RESPIRATION RATE: 16 BRPM | DIASTOLIC BLOOD PRESSURE: 75 MMHG | HEIGHT: 70 IN | SYSTOLIC BLOOD PRESSURE: 116 MMHG

## 2022-07-13 DIAGNOSIS — E78.00 PURE HYPERCHOLESTEROLEMIA: ICD-10-CM

## 2022-07-13 DIAGNOSIS — I10 ESSENTIAL HYPERTENSION: ICD-10-CM

## 2022-07-13 DIAGNOSIS — E66.9 OBESITY (BMI 30.0-34.9): ICD-10-CM

## 2022-07-13 DIAGNOSIS — I42.8 NICM (NONISCHEMIC CARDIOMYOPATHY) (HCC): ICD-10-CM

## 2022-07-13 DIAGNOSIS — I48.11 LONGSTANDING PERSISTENT ATRIAL FIBRILLATION (HCC): Primary | ICD-10-CM

## 2022-07-13 DIAGNOSIS — I49.3 PVC'S (PREMATURE VENTRICULAR CONTRACTIONS): ICD-10-CM

## 2022-07-13 PROCEDURE — 1101F PT FALLS ASSESS-DOCD LE1/YR: CPT | Performed by: SPECIALIST

## 2022-07-13 PROCEDURE — G8536 NO DOC ELDER MAL SCRN: HCPCS | Performed by: SPECIALIST

## 2022-07-13 PROCEDURE — 1123F ACP DISCUSS/DSCN MKR DOCD: CPT | Performed by: SPECIALIST

## 2022-07-13 PROCEDURE — G8752 SYS BP LESS 140: HCPCS | Performed by: SPECIALIST

## 2022-07-13 PROCEDURE — G8417 CALC BMI ABV UP PARAM F/U: HCPCS | Performed by: SPECIALIST

## 2022-07-13 PROCEDURE — G8510 SCR DEP NEG, NO PLAN REQD: HCPCS | Performed by: SPECIALIST

## 2022-07-13 PROCEDURE — G8754 DIAS BP LESS 90: HCPCS | Performed by: SPECIALIST

## 2022-07-13 PROCEDURE — 3017F COLORECTAL CA SCREEN DOC REV: CPT | Performed by: SPECIALIST

## 2022-07-13 PROCEDURE — 99214 OFFICE O/P EST MOD 30 MIN: CPT | Performed by: SPECIALIST

## 2022-07-13 PROCEDURE — G8427 DOCREV CUR MEDS BY ELIG CLIN: HCPCS | Performed by: SPECIALIST

## 2022-07-13 NOTE — PROGRESS NOTES
1. \"Have you been to the ER, urgent care clinic since your last visit? Hospitalized since your last visit? \" No    2. \"Have you seen or consulted any other health care providers outside of the [address] [place] [address] since your last visit? \" No

## 2022-07-13 NOTE — PATIENT INSTRUCTIONS
Keep active  Your labs are all acceptable. Continue the current treatment plan and keep up the good work!     See me in 6 months

## 2022-07-13 NOTE — PROGRESS NOTES
Myna Leak      1950   Monica Martinez MD  Date of Visit-7/13/2022     [facility],  PCP=Farzad Walls MD     [facility]. HPI:   Pastor Torres is a 73 y.o. 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 Persistent  Atrial fibrillation   Initially had shortness of breath palpitations and chest pain. On the beta-blocker he is now well rate controlled. He is on NOAC with no bleeding issues. He can stop aspirin. EF is 50%. [id number]  score for CVA risk is 2 ( age , HTN) ,- [facility]   hx of GERD but no GI bleed    2. Non-ischemic cardiomyopathy (NICM)   . mild SOB, EF 50%  No edema  06/16/22 ECHO ADULT COMPLETE   Left Ventricle: Mildly reduced left ventricular systolic function. EF by 2D Simpsons Biplane is 50%. Left ventricle size is normal. Normal wall thickness. Mild global hypokinesis present.   Right Ventricle: Right ventricle is dilated. Low normal systolic function.   Left Atrium: Left atrium is moderately dilated.   Right Atrium: Right atrium is dilated.   Mitral Valve: Mild regurgitation.   Tricuspid Valve: Mild regurgitation. The estimated RVSP is 22 mmHg.   Aorta: Ao Ascending diameter is 4.1 cm. Ao Ascending Index is 1.80 cm/m2. 3. PVC's (premature ventricular contractions)  4000 on previous holter, but are benign with then with no structural heart dz    4.  HTN,essential   At goal , meds and possible side effects reviewed and patient denies  Key CAD CHF Meds             rosuvastatin (CRESTOR) 10 mg tablet (Taking) Take 1 Tablet by mouth nightly. apixaban (ELIQUIS) 5 mg tablet (Taking) Take 1 Tablet by mouth two (2) times a day. metoprolol succinate (TOPROL-XL) 100 mg tablet (Taking) Take 1 Tablet by mouth daily. hydroCHLOROthiazide (HYDRODIURIL) 12.5 mg tablet (Taking) Take 1 tablet by mouth once daily    ramipriL (ALTACE) 10 mg capsule (Taking) Take 1 capsule by mouth twice daily         BP Readings from Last 6 Encounters:   07/13/22 116/75   07/07/22 118/71   07/01/22 102/66   06/29/22 108/64   06/16/22 130/70   05/11/22 116/76        5 . Obesity  cardiovascular disease risk factors   The 10-year ASCVD risk score (Lisbet Ribera, et al., 2013) is: 14.7%    Values used to calculate the score:      Age: 73 years      Sex: Male      Is Non- : No      Diabetic: No      Tobacco smoker: No      Systolic Blood Pressure: 304 mmHg      Is BP treated: Yes      HDL Cholesterol: 42 MG/DL      Total Cholesterol: 136 MG/DL     6. Pure hypercholesterolemia  At goal , denies excess muscle aches or new liver issues  Key Antihyperlipidemia Meds             rosuvastatin (CRESTOR) 10 mg tablet (Taking) Take 1 Tablet by mouth nightly. Lab Results   Component Value Date/Time    LDL, calculated 66.4 12/27/2021 09:25 AM       Patient Instructions   Keep active  Your labs are all acceptable. Continue the current treatment plan and keep up the good work! See me in 6 months            1. Longstanding persistent atrial fibrillation ([address]    2. NICM (nonischemic cardiomyopathy) ([address]    3. PVC's (premature ventricular contractions)    4. Essential hypertension    5. Obesity (BMI 30.0-34.9)    6. Pure hypercholesterolemia       06/16/22    ECHO ADULT COMPLETE 06/26/2022 6/26/2022    Interpretation Summary    Left Ventricle: Mildly reduced left ventricular systolic function.  EF by 2D Simpsons Biplane is 50%. Left ventricle size is normal. Normal wall thickness. Mild global hypokinesis present.   Right Ventricle: Right ventricle is dilated. Low normal systolic function.   Left Atrium: Left atrium is moderately dilated.   Right Atrium: Right atrium is dilated.   Mitral Valve: Mild regurgitation.   Tricuspid Valve: Mild regurgitation. The estimated RVSP is 22 mmHg.   Aorta: Ao Ascending diameter is 4.1 cm. Ao Ascending Index is 1.80 cm/m2. Signed by: Sandhya De Anda MD on 6/26/2022  6:49 PM       Future Appointments   Date Time Provider Erin Corona   11/10/2022  8:00 AM Audi Lockwood MD [facility] AMB   1/10/2023  1:40 PM Inga Leary MD [facility]      Patient Care Team:  Audi Lockwood MD as PCP - General (Family Medicine)  Audi Lockwood MD as PCP - [facility] Empaneled Provider  Sandhya De Anda MD (Cardiovascular Disease Physician)  Jacqueline Denney MD (Dermatology Physician)  Kira Terrell MD (Urology)  Ngoc Caal MD (Gastroenterology)  Raymond Westbrook MD (Surgery Physician)  Regina Zaldivar MD (Orthopedic Surgery)  Vasyl Covington MD (Orthopedic Surgery)  Summer Cee NP as Nurse Practitioner (Surgery Physician)  Fabienne Martin MD (Hematology and Oncology)  Joan De Anda MD (Endocrinology Physician)  Inga Leary MD as Physician (Sleep Medicine Physician)       Cardiac History:   ECHO 9-16-15=WNL  NUKE exercise nuclear stress test 10-1-15= walked 6 minutes ,EF 64%, no EKG changes or ischemia, rare PVCs    Holter: 7/27/15. Average heart rate 71. Total beats 101,922. No pauses. Ventricular ectopics 3,993, averaging 167 per hour. Bigeminy was about half of those beats. Supraventricular ectopics 213. SVT the fasted at 4 beats and the longest at 11 beats. ROS:Cardiac complete  as above. Respiratory as above with no wheezing or hemoptysis.    He denies  symptoms of unusual weight loss , fevers,  BRBPR, hematuria,  or recent stroke       Past Medical History:   Diagnosis Date    Anesthesia complication 3624    APNEA DURING HERNIA REPAIR, POST OPERATIVE INTUBATION AT [facility]    Arthritis     Basal cell carcinoma (BCC) in situ of skin     Chronic myocardial ischemia 7/1/2022    Colon ulcer, aphthous 4/1/2010    COPD, mild ([address]     Does not see anyone for this    Environmental and seasonal allergies     Essential tremor     Fatty liver 06/2019    Frequent episodes of bronchitis 2018    Frequent PVCs     Has been checked by cardiology    GERD (gastroesophageal reflux disease) 4/1/2010    High cholesterol 4/1/2010    HTN (hypertension), benign 04/01/2010    Hx MRSA infection 2013    CYSTS BL LEGS    Sleep apnea     CPAP      Exam and Labs:  Visit Vitals  /75 (BP 1 Location: Left upper arm, BP Patient Position: Sitting, BP Cuff Size: Adult)   Pulse 82   Temp 97.2 °F (36.2 °C) (Oral)   Resp 16   Ht 5' 10\" (1.778 m)   Wt 237 lb (107.5 kg)   SpO2 97%   BMI 34.01 kg/m²     Constitutional:  NAD, comfortable , moist mucous membranesHENT: Head: NC,ATEyes: No scleral icterus. Neck:  Neck supple. No JVD present. No tracheal deviation,mass  Chest: Effort normal & normal respiratory excursion     Lungs:breath sounds normal. No stridor. distress, wheezes or  Rales. Heart:normal rate, regular rhythm, normal S1, S2, no murmurs, rubs, clicks or gallops , PMI non displaced. Edema: Edema is none. Extremities:  no clubbing or cyanosis. Abdominal:  no abnormal distension. Neurological: alert, conversant and oriented . Skin: Skin is not cold. No obvious systemic rash noted. Not diaphoretic. No erythema. Psychiatric:  Grossly normal mood and affect.   Behavior appears normal.     Lab Results   Component Value Date/Time    Cholesterol, total 136 12/27/2021 09:25 AM    HDL Cholesterol 42 12/27/2021 09:25 AM    LDL, calculated 66.4 12/27/2021 09:25 AM    Triglyceride 138 12/27/2021 09:25 AM    CHOL/HDL Ratio 3.2 12/27/2021 09:25 AM     Lab Results   Component Value Date/Time    Sodium 141 06/27/2022 10:00 AM    Potassium 4.2 06/27/2022 10:00 AM    Chloride 106 06/27/2022 10:00 AM    CO2 29 06/27/2022 10:00 AM    Anion gap 6 06/27/2022 10:00 AM    Glucose 72 06/27/2022 10:00 AM    BUN 14 06/27/2022 10:00 AM    Creatinine 0.87 06/27/2022 10:00 AM    BUN/Creatinine ratio 16 06/27/2022 10:00 AM    GFR est AA >60 06/27/2022 10:00 AM    GFR est non-AA >60 06/27/2022 10:00 AM    Calcium 9.0 06/27/2022 10:00 AM      Wt Readings from Last 3 Encounters:   07/13/22 237 lb (107.5 kg)   07/07/22 235 lb 11.2 oz (106.9 kg)   07/01/22 238 lb 3.2 oz (108 kg)      BP Readings from Last 3 Encounters:   07/13/22 116/75   07/07/22 118/71   07/01/22 102/66        Current Outpatient Medications   Medication Sig    rosuvastatin (CRESTOR) 10 mg tablet Take 1 Tablet by mouth nightly.  omeprazole (PRILOSEC) 20 mg capsule Take 1 Capsule by mouth daily.  apixaban (ELIQUIS) 5 mg tablet Take 1 Tablet by mouth two (2) times a day.  metoprolol succinate (TOPROL-XL) 100 mg tablet Take 1 Tablet by mouth daily.  allopurinoL (ZYLOPRIM) 100 mg tablet Take 1 Tablet by mouth daily.  fluticasone propionate (FLONASE) 50 mcg/actuation nasal spray Use 2 spray(s) in each nostril once daily    hydroCHLOROthiazide (HYDRODIURIL) 12.5 mg tablet Take 1 tablet by mouth once daily    ramipriL (ALTACE) 10 mg capsule Take 1 capsule by mouth twice daily    albuterol (PROVENTIL HFA, VENTOLIN HFA, PROAIR HFA) 90 mcg/actuation inhaler     albuterol (PROVENTIL VENTOLIN) 2.5 mg /3 mL (0.083 %) nebu Take 3 mL by inhalation every four (4) hours as needed (cough/wheezing).  cholecalciferol, VITAMIN D3, (VITAMIN D3) 5,000 unit tab tablet Take 1,000 Units by mouth every morning.  cetirizine (ZYRTEC) 10 mg tablet Take 10 mg by mouth every morning. No current facility-administered medications for this visit.       Past Surgical History:   Procedure Laterality Date    COLONOSCOPY N/A 4/2/2019    performed [facility] Right     2nd digit    HX APPENDECTOMY  1959    HX CATARACT REMOVAL Bilateral     HX CERVICAL FUSION N/A 1992    HX HERNIA REPAIR  2001    HX KNEE ARTHROSCOPY Left 1994    HX KNEE REPLACEMENT Bilateral 10/2017    HX MENISCECTOMY Right 01/06/2017    HX TONSIL AND ADENOIDECTOMY        Social Hx=  reports that he quit smoking about 22 years ago. His smoking use included cigarettes. He has a 105.00 pack-year smoking history. He has never used smokeless tobacco. He reports current alcohol use of about 21.0 standard drinks of alcohol per week. He reports that he does not use drugs. Family Hx= family history includes Cancer in his brother and brother; Dementia in his mother; Heart Disease in his father and mother; Hypertension in his mother; Lung Disease in his father; No Known Problems in his brother. Impression see above.

## 2022-09-08 DIAGNOSIS — M10.071 ACUTE IDIOPATHIC GOUT OF RIGHT FOOT: ICD-10-CM

## 2022-09-08 DIAGNOSIS — I10 ESSENTIAL HYPERTENSION: ICD-10-CM

## 2022-09-09 RX ORDER — HYDROCHLOROTHIAZIDE 12.5 MG/1
TABLET ORAL
Qty: 90 TABLET | Refills: 1 | Status: SHIPPED | OUTPATIENT
Start: 2022-09-09

## 2022-09-09 RX ORDER — PREDNISONE 20 MG/1
TABLET ORAL
Qty: 5 TABLET | Refills: 0 | Status: SHIPPED | OUTPATIENT
Start: 2022-09-09 | End: 2022-10-05 | Stop reason: SDUPTHER

## 2022-09-09 RX ORDER — RAMIPRIL 10 MG/1
CAPSULE ORAL
Qty: 180 CAPSULE | Refills: 1 | Status: SHIPPED | OUTPATIENT
Start: 2022-09-09

## 2022-09-22 ENCOUNTER — TELEPHONE (OUTPATIENT)
Dept: CARDIOLOGY CLINIC | Age: 72
End: 2022-09-22

## 2022-09-22 NOTE — TELEPHONE ENCOUNTER
Letter received from [facility]. [place] for Ranolazine  mg tab was approved from 06- to 09-.

## 2022-10-05 ENCOUNTER — OFFICE VISIT (OUTPATIENT)
Dept: FAMILY MEDICINE CLINIC | Age: 72
End: 2022-10-05
Payer: MEDICARE

## 2022-10-05 VITALS
BODY MASS INDEX: 34.36 KG/M2 | WEIGHT: 240 LBS | TEMPERATURE: 97.2 F | SYSTOLIC BLOOD PRESSURE: 109 MMHG | RESPIRATION RATE: 20 BRPM | OXYGEN SATURATION: 97 % | HEIGHT: 70 IN | HEART RATE: 91 BPM | DIASTOLIC BLOOD PRESSURE: 74 MMHG

## 2022-10-05 DIAGNOSIS — M10.071 ACUTE IDIOPATHIC GOUT OF RIGHT FOOT: ICD-10-CM

## 2022-10-05 DIAGNOSIS — Z23 ENCOUNTER FOR IMMUNIZATION: ICD-10-CM

## 2022-10-05 DIAGNOSIS — I48.0 PAROXYSMAL ATRIAL FIBRILLATION (HCC): ICD-10-CM

## 2022-10-05 DIAGNOSIS — M51.36 DDD (DEGENERATIVE DISC DISEASE), LUMBAR: Primary | ICD-10-CM

## 2022-10-05 PROCEDURE — 1101F PT FALLS ASSESS-DOCD LE1/YR: CPT | Performed by: FAMILY MEDICINE

## 2022-10-05 PROCEDURE — G8510 SCR DEP NEG, NO PLAN REQD: HCPCS | Performed by: FAMILY MEDICINE

## 2022-10-05 PROCEDURE — 3017F COLORECTAL CA SCREEN DOC REV: CPT | Performed by: FAMILY MEDICINE

## 2022-10-05 PROCEDURE — G8752 SYS BP LESS 140: HCPCS | Performed by: FAMILY MEDICINE

## 2022-10-05 PROCEDURE — G0008 ADMIN INFLUENZA VIRUS VAC: HCPCS | Performed by: FAMILY MEDICINE

## 2022-10-05 PROCEDURE — 1123F ACP DISCUSS/DSCN MKR DOCD: CPT | Performed by: FAMILY MEDICINE

## 2022-10-05 PROCEDURE — 90694 VACC AIIV4 NO PRSRV 0.5ML IM: CPT | Performed by: FAMILY MEDICINE

## 2022-10-05 PROCEDURE — G8536 NO DOC ELDER MAL SCRN: HCPCS | Performed by: FAMILY MEDICINE

## 2022-10-05 PROCEDURE — G8417 CALC BMI ABV UP PARAM F/U: HCPCS | Performed by: FAMILY MEDICINE

## 2022-10-05 PROCEDURE — G8427 DOCREV CUR MEDS BY ELIG CLIN: HCPCS | Performed by: FAMILY MEDICINE

## 2022-10-05 PROCEDURE — 99214 OFFICE O/P EST MOD 30 MIN: CPT | Performed by: FAMILY MEDICINE

## 2022-10-05 PROCEDURE — G8754 DIAS BP LESS 90: HCPCS | Performed by: FAMILY MEDICINE

## 2022-10-05 RX ORDER — PREDNISONE 20 MG/1
40 TABLET ORAL
Qty: 10 TABLET | Refills: 0 | Status: SHIPPED | OUTPATIENT
Start: 2022-10-05 | End: 2022-10-10

## 2022-10-05 RX ORDER — ALLOPURINOL 100 MG/1
100 TABLET ORAL DAILY
Qty: 90 TABLET | Refills: 1 | Status: SHIPPED | OUTPATIENT
Start: 2022-10-05

## 2022-10-05 RX ORDER — LANOLIN ALCOHOL/MO/W.PET/CERES
1000 CREAM (GRAM) TOPICAL DAILY
COMMUNITY

## 2022-10-05 RX ORDER — DICLOFENAC SODIUM 10 MG/G
1 GEL TOPICAL
COMMUNITY

## 2022-10-05 RX ORDER — TIZANIDINE 4 MG/1
4 TABLET ORAL
Qty: 60 TABLET | Refills: 1 | Status: SHIPPED | OUTPATIENT
Start: 2022-10-05

## 2022-10-05 RX ORDER — CYCLOBENZAPRINE HCL 10 MG
10 TABLET ORAL
COMMUNITY
End: 2022-10-05

## 2022-10-05 NOTE — PROGRESS NOTES
[facility]    History of Present Illness:   Kassy Bryant is a 73 y.o. male with history of HTN, Paroxysmal Afib, RACHEL, Asthma, DDD, Osteoarthritis  CC: Low back Pain  History provided by patient and Records    HPI:  Patient noting he has acute on chronic back pains. Patient reports a sensation of a pinched nerve and some numbness radiating to the right leg. Patient states has tried a muscle relaxer with minimal improvement. Patient has used Prednisone in the past as well. Patient is considering the injections. Patient notes started after standing a prolonged period to cook. Afib: Overall stable    Gout: Needs refill of Allopurinol. No recent flares. Health Maintenance  Health Maintenance Due   Topic Date Due    COVID-19 Vaccine (3 - Booster for Moderna series) 11/02/2021    Medicare Yearly Exam  10/28/2022       Past Medical, Family, and Social History:     Current Outpatient Medications on File Prior to Visit   Medication Sig Dispense Refill    cyanocobalamin 1,000 mcg tablet Take 1,000 mcg by mouth daily. diclofenac (VOLTAREN) 1 % gel Apply 1 g to affected area daily as needed for Pain.      hydroCHLOROthiazide (HYDRODIURIL) 12.5 mg tablet Take 1 tablet by mouth once daily 90 Tablet 1    ramipriL (ALTACE) 10 mg capsule Take 1 capsule by mouth twice daily 180 Capsule 1    rosuvastatin (CRESTOR) 10 mg tablet Take 1 Tablet by mouth nightly. 90 Tablet 1    omeprazole (PRILOSEC) 20 mg capsule Take 1 Capsule by mouth daily. 90 Capsule 1    apixaban (ELIQUIS) 5 mg tablet Take 1 Tablet by mouth two (2) times a day. 60 Tablet 3    metoprolol succinate (TOPROL-XL) 100 mg tablet Take 1 Tablet by mouth daily.  90 Tablet 3    fluticasone propionate (FLONASE) 50 mcg/actuation nasal spray Use 2 spray(s) in each nostril once daily 16 g 2    albuterol (PROVENTIL HFA, VENTOLIN HFA, PROAIR HFA) 90 mcg/actuation inhaler       albuterol (PROVENTIL VENTOLIN) 2.5 mg /3 mL (0.083 %) nebu Take 3 mL by inhalation every four (4) hours as needed (cough/wheezing). 50 Nebule 4    cholecalciferol, VITAMIN D3, (VITAMIN D3) 5,000 unit tab tablet Take 1,000 Units by mouth every morning. cetirizine (ZYRTEC) 10 mg tablet Take 10 mg by mouth every morning. [DISCONTINUED] cyclobenzaprine (FLEXERIL) 10 mg tablet Take 10 mg by mouth three (3) times daily as needed for Muscle Spasm(s). [DISCONTINUED] predniSONE (DELTASONE) 20 mg tablet TAKE 1 TABLET BY MOUTH ONCE DAILY WITH BREAKFAST FOR 5 DAYS 5 Tablet 0    [DISCONTINUED] allopurinoL (ZYLOPRIM) 100 mg tablet Take 1 Tablet by mouth daily. 90 Tablet 1     No current facility-administered medications on file prior to visit. 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 9) with comorbidity (HCC) E66.01    Acute bronchitis J20.9    Inguinal lymphadenopathy R59.0    Lymphadenopathy R59.1    BMI 33.0-33.9,adult Z68.33    Paroxysmal atrial fibrillation (HCC) I48.0    Chronic myocardial ischemia I25.9       Social History     Socioeconomic History    Marital status:    Tobacco Use    Smoking status: Former     Packs/day: 3.00     Years: 35.00     Pack years: 105.00     Types: Cigarettes     Quit date:      Years since quittin.7 Smokeless tobacco: Never   Vaping Use    Vaping Use: Never used   Substance and Sexual Activity    Alcohol use: Yes     Alcohol/week: 21.0 standard drinks     Types: 21 Shots of liquor per week    Drug use: No    Sexual activity: Yes     Partners: Female     Birth control/protection: None     Social Determinants of Health     Financial Resource Strain: Low Risk     Difficulty of Paying Living Expenses: Not hard at all   Food Insecurity: No Food Insecurity    Worried About Running Out of Food in the Last Year: Never true    Ran Out of Food in the Last Year: Never true        Review of Systems   Review of Systems   Constitutional:  Negative for chills and fever. Cardiovascular:  Negative for chest pain and palpitations. Gastrointestinal:  Negative for nausea and vomiting. Neurological:  Negative for dizziness, tingling and headaches. Objective:   Visit Vitals  /74 (BP 1 Location: Right arm, BP Patient Position: Sitting, BP Cuff Size: Adult)   Pulse 91   Temp 97.2 °F (36.2 °C) (Oral)   Resp 20   Ht 5' 10\" (1.778 m)   Wt 240 lb (108.9 kg)   SpO2 97%   BMI 34.44 kg/m²        Physical Exam  Vitals and nursing note reviewed. Constitutional:       Appearance: Normal appearance. HENT:      Head: Normocephalic and atraumatic. Cardiovascular:      Rate and Rhythm: Normal rate and regular rhythm. Pulses: Normal pulses. Heart sounds: Normal heart sounds. No murmur heard. No friction rub. No gallop. Pulmonary:      Effort: Pulmonary effort is normal.      Breath sounds: Normal breath sounds. Abdominal:      General: Abdomen is flat. Bowel sounds are normal.      Palpations: Abdomen is soft. Musculoskeletal:         General: No swelling or tenderness. Normal range of motion. Cervical back: Normal range of motion and neck supple. Skin:     General: Skin is warm and dry. Neurological:      Mental Status: He is alert. Motor: No weakness.       Gait: Gait normal.       Pertinent Labs/Studies:      Assessment and orders:       ICD-10-CM ICD-9-CM    1. DDD (degenerative disc disease), lumbar  M51.36 722. 52 predniSONE (DELTASONE) 20 mg tablet      tiZANidine (ZANAFLEX) 4 mg tablet      2. Encounter for immunization  Z23 V03.89 INFLUENZA, FLUAD, (AGE 72 Y+), IM, PF, 0.5 ML      ADMIN INFLUENZA VIRUS VAC      3. Paroxysmal atrial fibrillation (HCC)  I48.0 427.31       4. Acute idiopathic gout of right foot  M10.071 274.01 allopurinoL (ZYLOPRIM) 100 mg tablet        Diagnoses and all orders for this visit:    1. DDD (degenerative disc disease), lumbar: Trial of Prednisone and changed to Muscle relaxer  -     predniSONE (DELTASONE) 20 mg tablet; Take 2 Tablets by mouth daily (with breakfast) for 5 days. -     tiZANidine (ZANAFLEX) 4 mg tablet; Take 1 Tablet by mouth three (3) times daily as needed for Muscle Spasm(s). 2. Encounter for immunization  -     INFLUENZA, FLUAD, (AGE 65 Y+), IM, PF, 0.5 ML  -     ADMIN INFLUENZA VIRUS VAC    3. Paroxysmal atrial fibrillation ([employer] [facility] [address]): stable    4. Acute idiopathic gout of right foot  -     allopurinoL (ZYLOPRIM) 100 mg tablet; Take 1 Tablet by mouth daily. Follow-up and Dispositions    Return in about 4 weeks (around 11/2/2022). I have discussed the diagnosis with the patient and the intended plan as seen in the above orders. Social history, medical history, and labs were reviewed. The patient has received an after-visit summary and questions were answered concerning future plans. I have discussed medication side effects and warnings with the patient as well.     Everlyn Burkitt, MD [facility]  10/05/22

## 2022-10-05 NOTE — PROGRESS NOTES
Chief Complaint   Patient presents with    Back Pain     Right lower back, 10 days duration. Pain decreasing since initial.     1. \"Have you been to the ER, urgent care clinic since your last visit? Hospitalized since your last visit? \" No    2. \"Have you seen or consulted any other health care providers outside of the [address] [place] [address] since your last visit? \" No     3. For patients aged 39-70: Has the patient had a colonoscopy / FIT/ Cologuard? Yes - no Care Gap present      If the patient is female:    4. For patients aged 41-77: Has the patient had a mammogram within the past 2 years? NA - based on age or sex      11. For patients aged 21-65: Has the patient had a pap smear?  NA - based on age or sex    Health Maintenance Due   Topic Date Due    COVID-19 Vaccine (3 - Booster for Moderna series) 11/02/2021    Flu Vaccine (1) 08/01/2022    Medicare Yearly Exam  10/28/2022

## 2022-10-24 ENCOUNTER — TELEPHONE (OUTPATIENT)
Dept: CARDIOLOGY CLINIC | Age: 72
End: 2022-10-24

## 2022-10-24 DIAGNOSIS — I48.91 NEW ONSET A-FIB (HCC): ICD-10-CM

## 2022-11-10 ENCOUNTER — OFFICE VISIT (OUTPATIENT)
Dept: FAMILY MEDICINE CLINIC | Age: 72
End: 2022-11-10
Payer: MEDICARE

## 2022-11-10 VITALS
BODY MASS INDEX: 34.07 KG/M2 | DIASTOLIC BLOOD PRESSURE: 78 MMHG | SYSTOLIC BLOOD PRESSURE: 115 MMHG | RESPIRATION RATE: 18 BRPM | TEMPERATURE: 98.1 F | HEART RATE: 78 BPM | OXYGEN SATURATION: 98 % | HEIGHT: 70 IN | WEIGHT: 238 LBS

## 2022-11-10 DIAGNOSIS — E55.9 VITAMIN D DEFICIENCY: ICD-10-CM

## 2022-11-10 DIAGNOSIS — Z99.89 OBSTRUCTIVE SLEEP APNEA ON CPAP: ICD-10-CM

## 2022-11-10 DIAGNOSIS — K21.9 GASTROESOPHAGEAL REFLUX DISEASE WITHOUT ESOPHAGITIS: ICD-10-CM

## 2022-11-10 DIAGNOSIS — E78.00 PURE HYPERCHOLESTEROLEMIA: ICD-10-CM

## 2022-11-10 DIAGNOSIS — J45.20 MILD INTERMITTENT ASTHMA WITHOUT COMPLICATION: Chronic | ICD-10-CM

## 2022-11-10 DIAGNOSIS — I10 PRIMARY HYPERTENSION: ICD-10-CM

## 2022-11-10 DIAGNOSIS — M51.36 DDD (DEGENERATIVE DISC DISEASE), LUMBAR: ICD-10-CM

## 2022-11-10 DIAGNOSIS — G47.33 OBSTRUCTIVE SLEEP APNEA ON CPAP: ICD-10-CM

## 2022-11-10 DIAGNOSIS — Z00.00 MEDICARE ANNUAL WELLNESS VISIT, SUBSEQUENT: Primary | ICD-10-CM

## 2022-11-10 DIAGNOSIS — E66.01 SEVERE OBESITY (BMI 35.0-39.9) WITH COMORBIDITY (HCC): ICD-10-CM

## 2022-11-10 DIAGNOSIS — I48.0 PAROXYSMAL ATRIAL FIBRILLATION (HCC): ICD-10-CM

## 2022-11-10 PROBLEM — J20.9 ACUTE BRONCHITIS: Status: RESOLVED | Noted: 2018-12-31 | Resolved: 2022-11-10

## 2022-11-10 PROCEDURE — G8427 DOCREV CUR MEDS BY ELIG CLIN: HCPCS | Performed by: FAMILY MEDICINE

## 2022-11-10 PROCEDURE — 1123F ACP DISCUSS/DSCN MKR DOCD: CPT | Performed by: FAMILY MEDICINE

## 2022-11-10 PROCEDURE — G0439 PPPS, SUBSEQ VISIT: HCPCS | Performed by: FAMILY MEDICINE

## 2022-11-10 PROCEDURE — G8754 DIAS BP LESS 90: HCPCS | Performed by: FAMILY MEDICINE

## 2022-11-10 PROCEDURE — G8510 SCR DEP NEG, NO PLAN REQD: HCPCS | Performed by: FAMILY MEDICINE

## 2022-11-10 PROCEDURE — 3074F SYST BP LT 130 MM HG: CPT | Performed by: FAMILY MEDICINE

## 2022-11-10 PROCEDURE — 1101F PT FALLS ASSESS-DOCD LE1/YR: CPT | Performed by: FAMILY MEDICINE

## 2022-11-10 PROCEDURE — 3078F DIAST BP <80 MM HG: CPT | Performed by: FAMILY MEDICINE

## 2022-11-10 PROCEDURE — 3017F COLORECTAL CA SCREEN DOC REV: CPT | Performed by: FAMILY MEDICINE

## 2022-11-10 PROCEDURE — G8752 SYS BP LESS 140: HCPCS | Performed by: FAMILY MEDICINE

## 2022-11-10 PROCEDURE — 99214 OFFICE O/P EST MOD 30 MIN: CPT | Performed by: FAMILY MEDICINE

## 2022-11-10 PROCEDURE — G8536 NO DOC ELDER MAL SCRN: HCPCS | Performed by: FAMILY MEDICINE

## 2022-11-10 PROCEDURE — G8417 CALC BMI ABV UP PARAM F/U: HCPCS | Performed by: FAMILY MEDICINE

## 2022-11-10 RX ORDER — DICLOFENAC SODIUM 10 MG/G
1 GEL TOPICAL
Qty: 200 G | Refills: 1 | Status: SHIPPED | OUTPATIENT
Start: 2022-11-10

## 2022-11-10 RX ORDER — TIZANIDINE 4 MG/1
4 TABLET ORAL
Qty: 60 TABLET | Refills: 1 | Status: SHIPPED | OUTPATIENT
Start: 2022-11-10

## 2022-11-10 NOTE — PROGRESS NOTES
This is the Subsequent Medicare Annual Wellness Exam, performed 12 months or more after the Initial AWV or the last Subsequent AWV    I have reviewed the patient's medical history in detail and updated the computerized patient record.    History     Past Medical History:   Diagnosis Date    Anesthesia complication 7639    APNEA DURING HERNIA REPAIR, [place] [facility] [place]     Basal cell carcinoma (BCC) in situ of skin     Chronic myocardial ischemia 2022    Colon ulcer, aphthous 2010    COPD, mild ([address]     Does not see anyone for this    Environmental and seasonal allergies     Essential tremor     Fatty liver 2019    Frequent episodes of bronchitis 2018    Frequent PVCs     Has been checked by cardiology    GERD (gastroesophageal reflux disease) 2010    High cholesterol 2010    HTN (hypertension), benign 2010    Hx MRSA infection     CYSTS BL LEGS    Sleep apnea     CPAP      Past Surgical History:   Procedure Laterality Date    COLONOSCOPY N/A 2019    performed by Nikki Jeffers MD at [address] Right     2nd digit    HX APPENDECTOMY      HX CATARACT REMOVAL Bilateral     HX CERVICAL FUSION N/A     HX HERNIA REPAIR      HX KNEE ARTHROSCOPY Left     HX KNEE REPLACEMENT Bilateral 10/2017    HX MENISCECTOMY Right 2017    HX TONSIL AND ADENOIDECTOMY       Allergies   Allergen Reactions    Chlorhexidine Towelette Rash     Family History   Problem Relation Age of Onset    Heart Disease Mother     Dementia Mother     Hypertension Mother     Heart Disease Father     Lung Disease Father     Cancer Brother         SKIN, prostate    No Known Problems Brother     Cancer Brother         prostate    Anesth Problems Neg Hx      Social History     Tobacco Use    Smoking status: Former     Packs/day: 3.00     Years: 35.00     Pack years: 105.00     Types: Cigarettes     Quit date:      Years since quittin.8 Smokeless tobacco: Never   Substance Use Topics    Alcohol use: Yes     Alcohol/week: 21.0 standard drinks     Types: 21 Shots of liquor per week     Depression Risk Factor Screening:     3 most recent PHQ Screens 11/10/2022   Little interest or pleasure in doing things Not at all   Feeling down, depressed, irritable, or hopeless Not at all   Total Score PHQ 2 0     Alcohol Risk Factor Screening:    Do you average more than 1 drink per night or more than 7 drinks a week: No    In the past three months have you have had more than 4 drinks containing alcohol on one occasion: Yes  Functional Ability and Level of Safety:   Hearing Loss  Hearing is good. Activities of Daily Living  The home contains: no safety equipment. Patient does total self care  No flowsheet data found. Ambulation: with no difficulty    Fall Risk  Fall Risk Assessment, last 12 mths 11/10/2022   Able to walk? Yes   Fall in past 12 months? 0   Do you feel unsteady? 0   Are you worried about falling 0   Number of falls in past 12 months -   Fall with injury? -       Abuse Screen  Abuse Screening Questionnaire 5/11/2022   Do you ever feel afraid of your partner? N   Are you in a relationship with someone who physically or mentally threatens you? N   Is it safe for you to go home? Y     Cognitive Screening   Evaluation of Cognitive Function:  Has your family/caregiver stated any concerns about your memory: no  Normal, MMSE  No flowsheet data found.   Patient Care Team   Patient Care Team:  Michael De La O MD as PCP - General (Family Medicine)  Michael De La O MD as PCP - [facility] Empaneled Provider  Lyubov Batista MD (Cardiovascular Disease Physician)  Ann Casper MD (Dermatology Physician)  Jesus Kessler MD (Urology)  Cammie Lazcano MD (Gastroenterology)  Bere Etienne MD (Surgery Physician)  Yudi Gaffney MD (Orthopedic Surgery)  Denson Mohs, MD (Orthopedic Surgery)  Janette Kline NP as Nurse Practitioner (Surgery Physician)  Immanuel Cano MD (Hematology and Oncology)  Shannon Stevens MD (Endocrinology Physician)  Deshawn Brown MD as Physician (Sleep Medicine Physician)  Assessment/Plan   Education and counseling provided:  Are appropriate based on today's review and evaluation  End-of-Life planning (with patient's consent)    Diagnoses and all orders for this visit:    1. Medicare annual wellness visit, subsequent    2. DDD (degenerative disc disease), lumbar  -     diclofenac (VOLTAREN) 1 % gel; Apply 1 g to affected area daily as needed for Pain. -     tiZANidine (ZANAFLEX) 4 mg tablet; Take 1 Tablet by mouth three (3) times daily as needed for Muscle Spasm(s). 3. Primary hypertension  -     CBC W/O DIFF; Future  -     METABOLIC PANEL, COMPREHENSIVE; Future    4. Paroxysmal atrial fibrillation (HCC)    5. Gastroesophageal reflux disease without esophagitis    6. Pure hypercholesterolemia  -     LIPID PANEL; Future    7. Vitamin D deficiency    8. Obstructive sleep apnea on CPAP    9. Severe obesity (BMI 35.0-39. 9) with comorbidity ([address]    10.  Mild intermittent asthma without complication      Health Maintenance Topics with due status: Overdue       Topic Date Due    COVID-19 Vaccine 07/28/2021     Health Maintenance Topics with due status: Not Due       Topic Last Completion Date    Colorectal Cancer Screening Combo 04/09/2019    Lipid Screen 12/27/2021    DTaP/Tdap/Td series 07/01/2022    Depression Screen 10/05/2022    Medicare Yearly Exam 11/10/2022     Health Maintenance Topics with due status: Completed       Topic Last Completion Date    Hepatitis C Screening 02/13/2019    AAA Screening 73-67 YO Male Smoking Patients 03/12/2019    Shingrix Vaccine Age 50> 08/04/2020    Pneumococcal 65+ years 07/01/2022    Flu Vaccine 10/05/2022

## 2022-11-10 NOTE — PROGRESS NOTES
[facility]    History of Present Illness:   Brayan Groves is a 81 y.o. male with history of HTN, Paroxysmal Afib, RACHEL, Asthma, DDD, Osteoarthritis  CC: Follow up  History provided by patient and Records    HPI:  Hypertension Follow up:  Currently Taking:   Key CAD CHF Meds               apixaban (ELIQUIS) 5 mg tablet (Taking) Take 1 Tablet by mouth two (2) times a day. hydroCHLOROthiazide (HYDRODIURIL) 12.5 mg tablet (Taking) Take 1 tablet by mouth once daily    ramipriL (ALTACE) 10 mg capsule (Taking) Take 1 capsule by mouth twice daily    rosuvastatin (CRESTOR) 10 mg tablet (Taking) Take 1 Tablet by mouth nightly. metoprolol succinate (TOPROL-XL) 100 mg tablet (Taking) Take 1 Tablet by mouth daily. The patient reports:  taking medications as instructed, no medication side effects noted, no TIA's, no chest pain on exertion, no dyspnea on exertion, no swelling of ankles, no orthostatic dizziness or lightheadedness, no orthopnea or paroxysmal nocturnal dyspnea. BP Readings from Last 3 Encounters:   11/10/22 115/78   10/05/22 109/74   07/13/22 116/75      Atrial Fibrillation Review: Patient is overall doing well. Denies chest pain, edema, dyspnea or dizziness. Patient is not aware of irregular heartbeats. Patient denies neurologic sx. Currently he avoids caffeine. Risk Factor Modification:  Patient avoids excessive caffeine, Alcohol use, Tobacco products, and OTC cough suppressants. Stays hydrated  Current Medications: B-blocker  Currently anticoagulated: Yes    Back Pain: Significantly improved. Gastroesophageal Reflux:  Current control of Symptoms: Stable  Primary symptoms: heartburn  Hiatal Hernia: No  Current Medications: Prilosec  The patient has no history melena or bright red blood in the stools. The patient avoids high dose aspirin and NSAID therapy.   The patient is aware of diet changes needed, elevating the head of the bed and appropriate use of antacids. Asthma: Well controlled at this time    Health Maintenance  Health Maintenance Due   Topic Date Due    COVID-19 Vaccine (3 - Booster for Moderna series) 07/28/2021    Medicare Yearly Exam  10/28/2022       Past Medical, Family, and Social History:     Current Outpatient Medications on File Prior to Visit   Medication Sig Dispense Refill    apixaban (ELIQUIS) 5 mg tablet Take 1 Tablet by mouth two (2) times a day. 180 Tablet 3    cyanocobalamin 1,000 mcg tablet Take 1,000 mcg by mouth daily. allopurinoL (ZYLOPRIM) 100 mg tablet Take 1 Tablet by mouth daily. 90 Tablet 1    hydroCHLOROthiazide (HYDRODIURIL) 12.5 mg tablet Take 1 tablet by mouth once daily 90 Tablet 1    ramipriL (ALTACE) 10 mg capsule Take 1 capsule by mouth twice daily 180 Capsule 1    rosuvastatin (CRESTOR) 10 mg tablet Take 1 Tablet by mouth nightly. 90 Tablet 1    omeprazole (PRILOSEC) 20 mg capsule Take 1 Capsule by mouth daily. 90 Capsule 1    metoprolol succinate (TOPROL-XL) 100 mg tablet Take 1 Tablet by mouth daily. 90 Tablet 3    fluticasone propionate (FLONASE) 50 mcg/actuation nasal spray Use 2 spray(s) in each nostril once daily 16 g 2    albuterol (PROVENTIL HFA, VENTOLIN HFA, PROAIR HFA) 90 mcg/actuation inhaler       albuterol (PROVENTIL VENTOLIN) 2.5 mg /3 mL (0.083 %) nebu Take 3 mL by inhalation every four (4) hours as needed (cough/wheezing). 50 Nebule 4    cholecalciferol, VITAMIN D3, (VITAMIN D3) 5,000 unit tab tablet Take 1,000 Units by mouth every morning. cetirizine (ZYRTEC) 10 mg tablet Take 10 mg by mouth every morning. [DISCONTINUED] diclofenac (VOLTAREN) 1 % gel Apply 1 g to affected area daily as needed for Pain. [DISCONTINUED] tiZANidine (ZANAFLEX) 4 mg tablet Take 1 Tablet by mouth three (3) times daily as needed for Muscle Spasm(s). 60 Tablet 1     No current facility-administered medications on file prior to visit.        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 9) with comorbidity ([facility] E66.01    Inguinal lymphadenopathy R59.0    Lymphadenopathy R59.1    BMI 33.0-33.9,adult Z68.33    Paroxysmal atrial fibrillation (HCC) I48.0    Chronic myocardial ischemia I25.9       Social History     Socioeconomic History    Marital status:    Tobacco Use    Smoking status: Former     Packs/day: 3.00     Years: 35.00     Pack years: 105.00     Types: Cigarettes     Quit date:      Years since quittin.8    Smokeless tobacco: Never   Vaping Use    Vaping Use: Never used   Substance and Sexual Activity    Alcohol use: Yes     Alcohol/week: 21.0 standard drinks     Types: 21 Shots of liquor per week    Drug use: No    Sexual activity: Yes     Partners: Female     Birth control/protection: None     Social Determinants of Health     Financial Resource Strain: Low Risk     Difficulty of Paying Living Expenses: Not hard at all   Food Insecurity: No Food Insecurity    Worried About Running Out of Food in the Last Year: Never true    Ran Out of Food in the Last Year: Never true        Review of Systems   Review of Systems   Constitutional:  Negative for chills and fever. Cardiovascular:  Negative for chest pain and palpitations.    Gastrointestinal:  Negative for nausea and vomiting. Neurological:  Negative for dizziness, tingling and headaches. Objective:   Visit Vitals  /78 (BP 1 Location: Right arm, BP Patient Position: Sitting, BP Cuff Size: Adult)   Pulse 78   Temp 98.1 °F (36.7 °C) (Oral)   Resp 18   Ht 5' 10\" (1.778 m)   Wt 238 lb (108 kg)   SpO2 98%   BMI 34.15 kg/m²        Physical Exam  Vitals and nursing note reviewed. Constitutional:       Appearance: Normal appearance. HENT:      Head: Normocephalic and atraumatic. Cardiovascular:      Rate and Rhythm: Normal rate and regular rhythm. Pulses: Normal pulses. Heart sounds: Normal heart sounds. No murmur heard. No friction rub. No gallop. Pulmonary:      Effort: Pulmonary effort is normal.      Breath sounds: Normal breath sounds. Abdominal:      General: Abdomen is flat. Bowel sounds are normal.      Palpations: Abdomen is soft. Musculoskeletal:         General: Normal range of motion. Cervical back: Normal range of motion and neck supple. Skin:     General: Skin is warm and dry. Neurological:      General: No focal deficit present. Mental Status: He is alert and oriented to person, place, and time. Pertinent Labs/Studies:      Assessment and orders:       ICD-10-CM ICD-9-CM    1. Primary hypertension  I10 401.9 CBC W/O DIFF      METABOLIC PANEL, COMPREHENSIVE      2. DDD (degenerative disc disease), lumbar  M51.36 722.52 diclofenac (VOLTAREN) 1 % gel      tiZANidine (ZANAFLEX) 4 mg tablet      3. Paroxysmal atrial fibrillation (HCC)  I48.0 427.31       4. Gastroesophageal reflux disease without esophagitis  K21.9 530.81       5. Pure hypercholesterolemia  E78.00 272.0 LIPID PANEL      6. Vitamin D deficiency  E55.9 268.9       7. Obstructive sleep apnea on CPAP  G47.33 327.23     Z99.89 V46.8       8. Severe obesity (BMI 35.0-39. 9) with comorbidity ([address]  E66.01 278.01       9.  Mild intermittent asthma without complication  C61.40 418.18         Diagnoses and all orders for this visit:    1. Primary hypertension: Our goal is to normalize the blood pressure to decrease the risks of strokes and heart attacks. The patient is in agreement with the plan. -     CBC W/O DIFF; Future  -     METABOLIC PANEL, COMPREHENSIVE; Future    2. DDD (degenerative disc disease), lumbar  -     diclofenac (VOLTAREN) 1 % gel; Apply 1 g to affected area daily as needed for Pain. -     tiZANidine (ZANAFLEX) 4 mg tablet; Take 1 Tablet by mouth three (3) times daily as needed for Muscle Spasm(s). 3. Paroxysmal atrial fibrillation ([address]): Condition and symptoms are well controlled at this time. No changes to therapy at this time. 4. Gastroesophageal reflux disease without esophagitis: Condition and symptoms are well controlled at this time. No changes to therapy at this time. 5. Pure hypercholesterolemia: The patient is aware of our goal to reduce or eliminate the long term problems (such as strokes and heart attacks) related to poorly controlled Triglycerides, LDL, Cholesterol.   -     LIPID PANEL; Future    6. Vitamin D deficiency    7. Obstructive sleep apnea on CPAP    8. Severe obesity (BMI 35.0-39. 9) with comorbidity ([address]    9. Mild intermittent asthma without complication    Follow-up and Dispositions    Return in about 3 months (around 2/10/2023). I have discussed the diagnosis with the patient and the intended plan as seen in the above orders. Social history, medical history, and labs were reviewed. The patient has received an after-visit summary and questions were answered concerning future plans. I have discussed medication side effects and warnings with the patient as well.     MD ARTUR Chan & RICHARD REESE [facility]  11/10/22

## 2022-11-10 NOTE — PROGRESS NOTES
Chief Complaint   Patient presents with    Follow-up     1. \"Have you been to the ER, urgent care clinic since your last visit? Hospitalized since your last visit? \" No    2. \"Have you seen or consulted any other health care providers outside of the [address] [place] [address] since your last visit? \" Yes Derm      3. For patients aged 39-70: Has the patient had a colonoscopy / FIT/ Cologuard? Yes - no Care Gap present      If the patient is female:    4. For patients aged 41-77: Has the patient had a mammogram within the past 2 years? NA - based on age or sex      11. For patients aged 21-65: Has the patient had a pap smear?  NA - based on age or sex    Health Maintenance Due   Topic Date Due    COVID-19 Vaccine (3 - Booster for Moderna series) 07/28/2021    Medicare Yearly Exam  10/28/2022

## 2022-11-11 LAB
ALBUMIN SERPL-MCNC: 4 G/DL (ref 3.5–5)
ALBUMIN/GLOB SERPL: 1.3 {RATIO} (ref 1.1–2.2)
ALP SERPL-CCNC: 68 U/L (ref 45–117)
ALT SERPL-CCNC: 55 U/L (ref 12–78)
ANION GAP SERPL CALC-SCNC: 5 MMOL/L (ref 5–15)
AST SERPL-CCNC: 27 U/L (ref 15–37)
BILIRUB SERPL-MCNC: 0.5 MG/DL (ref 0.2–1)
BUN SERPL-MCNC: 12 MG/DL (ref 6–20)
BUN/CREAT SERPL: 13 (ref 12–20)
CALCIUM SERPL-MCNC: 8.9 MG/DL (ref 8.5–10.1)
CHLORIDE SERPL-SCNC: 106 MMOL/L (ref 97–108)
CHOLEST SERPL-MCNC: 116 MG/DL
CO2 SERPL-SCNC: 28 MMOL/L (ref 21–32)
CREAT SERPL-MCNC: 0.94 MG/DL (ref 0.7–1.3)
ERYTHROCYTE [DISTWIDTH] IN BLOOD BY AUTOMATED COUNT: 13.9 % (ref 11.5–14.5)
GLOBULIN SER CALC-MCNC: 3.1 G/DL (ref 2–4)
GLUCOSE SERPL-MCNC: 100 MG/DL (ref 65–100)
HCT VFR BLD AUTO: 53.3 % (ref 36.6–50.3)
HDLC SERPL-MCNC: 35 MG/DL
HDLC SERPL: 3.3 {RATIO} (ref 0–5)
HGB BLD-MCNC: 16.9 G/DL (ref 12.1–17)
LDLC SERPL CALC-MCNC: 60 MG/DL (ref 0–100)
MCH RBC QN AUTO: 30.9 PG (ref 26–34)
MCHC RBC AUTO-ENTMCNC: 31.7 G/DL (ref 30–36.5)
MCV RBC AUTO: 97.4 FL (ref 80–99)
NRBC # BLD: 0 K/UL (ref 0–0.01)
NRBC BLD-RTO: 0 PER 100 WBC
PLATELET # BLD AUTO: 261 K/UL (ref 150–400)
PMV BLD AUTO: 11.5 FL (ref 8.9–12.9)
POTASSIUM SERPL-SCNC: 4.5 MMOL/L (ref 3.5–5.1)
PROT SERPL-MCNC: 7.1 G/DL (ref 6.4–8.2)
RBC # BLD AUTO: 5.47 M/UL (ref 4.1–5.7)
SODIUM SERPL-SCNC: 139 MMOL/L (ref 136–145)
TRIGL SERPL-MCNC: 105 MG/DL (ref ?–150)
VLDLC SERPL CALC-MCNC: 21 MG/DL
WBC # BLD AUTO: 6.4 K/UL (ref 4.1–11.1)

## 2022-12-09 ENCOUNTER — PATIENT MESSAGE (OUTPATIENT)
Dept: FAMILY MEDICINE CLINIC | Age: 72
End: 2022-12-09

## 2022-12-12 RX ORDER — PREDNISONE 20 MG/1
20 TABLET ORAL
Qty: 5 TABLET | Refills: 0 | Status: SHIPPED | OUTPATIENT
Start: 2022-12-12

## 2022-12-12 NOTE — TELEPHONE ENCOUNTER
From: Cady Blake  To: Yenny Centeno MD  Sent: 12/9/2022 9:38 AM EST  Subject: BRONCHITIS    I WOULD LIKE A Z PACK AND PREDNISONE FOR MY BRONCHITIS . ARE A APPIONTMENT .  PHONE [phone] Ricardo Armendariz

## 2022-12-12 NOTE — TELEPHONE ENCOUNTER
Spoke to patient and he is continuing Mucinex DM, and will give Prednisone for if needed.     MD ARTUR Morton & RICHARD REESE [facility]  12/12/22

## 2023-01-10 ENCOUNTER — OFFICE VISIT (OUTPATIENT)
Dept: SLEEP MEDICINE | Age: 73
End: 2023-01-10
Payer: MEDICARE

## 2023-01-10 VITALS
HEART RATE: 83 BPM | DIASTOLIC BLOOD PRESSURE: 65 MMHG | HEIGHT: 70 IN | BODY MASS INDEX: 34.36 KG/M2 | WEIGHT: 240 LBS | SYSTOLIC BLOOD PRESSURE: 105 MMHG | OXYGEN SATURATION: 96 %

## 2023-01-10 DIAGNOSIS — G47.33 OSA (OBSTRUCTIVE SLEEP APNEA): Primary | ICD-10-CM

## 2023-01-10 PROCEDURE — G8536 NO DOC ELDER MAL SCRN: HCPCS | Performed by: SPECIALIST

## 2023-01-10 PROCEDURE — G8417 CALC BMI ABV UP PARAM F/U: HCPCS | Performed by: SPECIALIST

## 2023-01-10 PROCEDURE — 3017F COLORECTAL CA SCREEN DOC REV: CPT | Performed by: SPECIALIST

## 2023-01-10 PROCEDURE — G8427 DOCREV CUR MEDS BY ELIG CLIN: HCPCS | Performed by: SPECIALIST

## 2023-01-10 PROCEDURE — 1123F ACP DISCUSS/DSCN MKR DOCD: CPT | Performed by: SPECIALIST

## 2023-01-10 PROCEDURE — 3078F DIAST BP <80 MM HG: CPT | Performed by: SPECIALIST

## 2023-01-10 PROCEDURE — 99213 OFFICE O/P EST LOW 20 MIN: CPT | Performed by: SPECIALIST

## 2023-01-10 PROCEDURE — G8432 DEP SCR NOT DOC, RNG: HCPCS | Performed by: SPECIALIST

## 2023-01-10 PROCEDURE — 1101F PT FALLS ASSESS-DOCD LE1/YR: CPT | Performed by: SPECIALIST

## 2023-01-10 PROCEDURE — 3074F SYST BP LT 130 MM HG: CPT | Performed by: SPECIALIST

## 2023-01-10 NOTE — PROGRESS NOTES
[address] [facility]. [place], [address]  Tel.  [phone]  Fax. [address] [facility] [address]  [place], [address] [facility]  Tel.  [phone]  Fax. [phone] [address] [facility]   Tel.  [phone]  Fax. [phone]         Chief Complaint       Chief Complaint   Patient presents with    Sleep Problem     Yearly follow up         HPI        Jeff Villela is a 81 y.o. male seen for follow-up. [facility] with a sleep study which demonstrated obstructive sleep apnea characterized by an AHI of 19.2 per hour associated with arterial desaturations to 78%. Events were more prominent in REM sleep with the REM-related AHI of 82.6 per hour. During a second study,  CPAP  of 6 cm associated with AHI 0.4 per hour and SaO2 91%. Due to a number of respiratory-related arousals, CPAP initiated at 8 cm. CPAP currently 9 cm. Compliance data downloaded and reviewed in detail with the patient today. During the past 30 days, CPAP used during 30 days with the average daily use of 8.6 hours. CMS compliance criteria 100%. AHI 1.5 per hour. He continues doing well without significant nocturnal awakening, nonrestorative sleep or excessive daytime sleepiness. CPAP unit should be up gradable  . Allergies   Allergen Reactions    Chlorhexidine Towelette Rash       Current Outpatient Medications   Medication Sig Dispense Refill    rosuvastatin (CRESTOR) 10 mg tablet Take 1 tablet by mouth nightly 90 Tablet 1    omeprazole (PRILOSEC) 20 mg capsule Take 1 capsule by mouth once daily 90 Capsule 1    diclofenac (VOLTAREN) 1 % gel Apply 1 g to affected area daily as needed for Pain. 200 g 1    apixaban (ELIQUIS) 5 mg tablet Take 1 Tablet by mouth two (2) times a day. 180 Tablet 3    cyanocobalamin 1,000 mcg tablet Take 1,000 mcg by mouth daily. allopurinoL (ZYLOPRIM) 100 mg tablet Take 1 Tablet by mouth daily.  90 Tablet 1 hydroCHLOROthiazide (HYDRODIURIL) 12.5 mg tablet Take 1 tablet by mouth once daily 90 Tablet 1    ramipriL (ALTACE) 10 mg capsule Take 1 capsule by mouth twice daily 180 Capsule 1    metoprolol succinate (TOPROL-XL) 100 mg tablet Take 1 Tablet by mouth daily. 90 Tablet 3    fluticasone propionate (FLONASE) 50 mcg/actuation nasal spray Use 2 spray(s) in each nostril once daily 16 g 2    albuterol (PROVENTIL HFA, VENTOLIN HFA, PROAIR HFA) 90 mcg/actuation inhaler       albuterol (PROVENTIL VENTOLIN) 2.5 mg /3 mL (0.083 %) nebu Take 3 mL by inhalation every four (4) hours as needed (cough/wheezing). 50 Nebule 4    cholecalciferol, VITAMIN D3, (VITAMIN D3) 5,000 unit tab tablet Take 1,000 Units by mouth every morning. cetirizine (ZYRTEC) 10 mg tablet Take 10 mg by mouth every morning. predniSONE (DELTASONE) 20 mg tablet Take 1 Tablet by mouth daily (with breakfast). 5 Tablet 0    tiZANidine (ZANAFLEX) 4 mg tablet Take 1 Tablet by mouth three (3) times daily as needed for Muscle Spasm(s). (Patient not taking: Reported on 1/10/2023) 60 Tablet 1        He  has a past medical history of Anesthesia complication (9504), Arthritis, Basal cell carcinoma (BCC) in situ of skin, Chronic myocardial ischemia (7/1/2022), Colon ulcer, aphthous (4/1/2010), COPD, mild ([address] Environmental and seasonal allergies, Essential tremor, Fatty liver (06/2019), Frequent episodes of bronchitis (2018), Frequent PVCs, GERD (gastroesophageal reflux disease) (4/1/2010), High cholesterol (4/1/2010), HTN (hypertension), benign (04/01/2010), MRSA infection (2013), and Sleep apnea. He has no past medical history of Diabetes ([address] Heart failure ([address] or History of abuse.     He  has a past surgical history that includes hx appendectomy (1959); hx tonsil and adenoidectomy; hx knee replacement (Bilateral, 10/2017); colonoscopy (N/A, 4/2/2019); hx knee arthroscopy (Left, 1994); hx meniscectomy (Right, 01/06/2017); hx cervical fusion (N/A, 1992); hx hernia repair (2001); hx amputation finger (Right); and hx cataract removal (Bilateral). He family history includes Cancer in his brother and brother; Dementia in his mother; Heart Disease in his father and mother; Hypertension in his mother; Lung Disease in his father; No Known Problems in his brother. He  reports that he quit smoking about 23 years ago. His smoking use included cigarettes. He has a 105.00 pack-year smoking history. He has never used smokeless tobacco. He reports current alcohol use of about 21.0 standard drinks per week. He reports that he does not use drugs. Review of Systems:  Unchanged per patient      Objective:   Visit Vitals  /65 (BP 1 Location: Left upper arm, BP Patient Position: Sitting)   Pulse 83   Ht 5' 10\" (1.778 m)   Wt 240 lb (108.9 kg)   SpO2 96%   BMI 34.44 kg/m²     Body mass index is 34.44 kg/m². General:   Conversant, cooperative   Eyes:   no nystagmus                   CVS:  Normal rate, regular rhythm        Neuro:  Speech fluent, face symmetrical             Assessment:       ICD-10-CM ICD-9-CM    1. RACHEL (obstructive sleep apnea)  G47.33 327.23 AMB SUPPLY ORDER          he is compliant with PAP therapy and PAP continues to benefit patient and remains necessary for control of his sleep apnea. Unit will be upgraded. Plan:     Orders Placed This Encounter    AMB SUPPLY ORDER     Diagnosis: Obstructive Sleep Apnea ICD-10 Code (G47.33)     Positive Airway Pressure Therapy: Duration of need: 99 months. ResMed S11 CPAP Device or per patient preference:  Pressure: 9 cmH2O,    CPAP mask -  Patient preference, headgear, heated tubing, and filter;  heated humidifier. Wireless modem. Remote monitoring enrollment.  Oral/Nasal Combo Mask 1 every 3 months.  Oral Cushion Combo Mask (Replace) 2 per month.  Nasal Pillows Combo Mask (Replace) 2 per month.  Full Face Mask 1 every 3 months.    Full Face Mask Cushion 1 per month.   Nasal Cushion (Replace) 2 per month.  Nasal Pillows (Replace) 2 per month.  Nasal Interface Mask 1 every 3 months.  Headgear 1 every 6 months.  Chinstrap 1 every 6 months.  Tubing 1 every 3 months.  Filter(s) Disposable 2 per month.  Filter(s) Non-Disposable 1 every 6 months.  Oral Interface 1 every 3 months. [address] for [employer] (Replace) 1 every 6 months.  Tubing with heating element 1 every 3 months. Benji Chris MD, FAASM  Diplomate, American Board of Sleep Medicine  NPI [id number]  Electronically signed 1/10/23       *A copy of compliance data was provided to the patient and reviewed in detail. *CPAP will be  continued at the above pressure settings. The patient is to contact the office if there are problems with either mask or pressure settings. Follow-up will be scheduled at which time compliance data will be reviewed. * Patient has a history and examination consistent with the diagnosis of sleep apnea. * He was provided information on sleep apnea including corresponding risk factors and the importance of proper treatment. * Treatment options if indicated were reviewed today. *Potential benefit of weight reduction       Benji Chris MD, FAASM  Electronically signed 01/10/23        This note was created using voice recognition software. Despite editing, there may be syntax errors. This note will not be viewable in [address]

## 2023-01-12 ENCOUNTER — DOCUMENTATION ONLY (OUTPATIENT)
Dept: SLEEP MEDICINE | Age: 73
End: 2023-01-12

## 2023-01-12 ENCOUNTER — PATIENT MESSAGE (OUTPATIENT)
Dept: SLEEP MEDICINE | Age: 73
End: 2023-01-12

## 2023-02-08 ENCOUNTER — LAB ONLY (OUTPATIENT)
Dept: FAMILY MEDICINE CLINIC | Age: 73
End: 2023-02-08

## 2023-02-08 ENCOUNTER — OFFICE VISIT (OUTPATIENT)
Dept: CARDIOLOGY CLINIC | Facility: CLINIC | Age: 73
End: 2023-02-08

## 2023-02-08 ENCOUNTER — TELEPHONE (OUTPATIENT)
Dept: CARDIOLOGY CLINIC | Age: 73
End: 2023-02-08

## 2023-02-08 VITALS
HEIGHT: 70 IN | OXYGEN SATURATION: 94 % | TEMPERATURE: 97.1 F | RESPIRATION RATE: 20 BRPM | WEIGHT: 244 LBS | DIASTOLIC BLOOD PRESSURE: 72 MMHG | HEART RATE: 66 BPM | BODY MASS INDEX: 34.93 KG/M2 | SYSTOLIC BLOOD PRESSURE: 115 MMHG

## 2023-02-08 DIAGNOSIS — I48.11 LONGSTANDING PERSISTENT ATRIAL FIBRILLATION (HCC): Primary | ICD-10-CM

## 2023-02-08 DIAGNOSIS — I49.3 PVC'S (PREMATURE VENTRICULAR CONTRACTIONS): ICD-10-CM

## 2023-02-08 DIAGNOSIS — I42.8 NICM (NONISCHEMIC CARDIOMYOPATHY) (HCC): ICD-10-CM

## 2023-02-08 DIAGNOSIS — R06.02 SHORTNESS OF BREATH ON EXERTION: ICD-10-CM

## 2023-02-08 DIAGNOSIS — R94.31 ABNORMAL ELECTROCARDIOGRAM (ECG) (EKG): ICD-10-CM

## 2023-02-08 DIAGNOSIS — E66.01 SEVERE OBESITY (BMI 35.0-35.9 WITH COMORBIDITY) (HCC): ICD-10-CM

## 2023-02-08 DIAGNOSIS — I48.11 LONGSTANDING PERSISTENT ATRIAL FIBRILLATION (HCC): ICD-10-CM

## 2023-02-08 DIAGNOSIS — E78.00 PURE HYPERCHOLESTEROLEMIA: ICD-10-CM

## 2023-02-08 DIAGNOSIS — I10 ESSENTIAL HYPERTENSION: ICD-10-CM

## 2023-02-08 NOTE — PROGRESS NOTES
1. \"Have you been to the ER, urgent care clinic since your last visit? Hospitalized since your last visit? \" No    2. \"Have you seen or consulted any other health care providers outside of the [address] [place] [address] since your last visit? \" No     3. For patients aged 39-70: Has the patient had a colonoscopy / FIT/ Cologuard? Yes - no Care Gap present      If the patient is female:    4. For patients aged 41-77: Has the patient had a mammogram within the past 2 years? NA - based on age or sex      11. For patients aged 21-65: Has the patient had a pap smear?  NA - based on age or sex    Health Maintenance Due   Topic Date Due    COVID-19 Vaccine (3 - Booster for Moderna series) 07/28/2021

## 2023-02-08 NOTE — TELEPHONE ENCOUNTER
Mika Lancaster is calling from [facility] Cardiovascular Echo Department at [facility] to speak with the nurse regarding the patient. She is trying to move the patient from the 12:30 slot to the 1:30 pm slot because she already had someone else schedule. Janette would like to know if that 's okay with the nurse.     [phone]

## 2023-02-08 NOTE — PATIENT INSTRUCTIONS
Your cardioversion/LIONEL procedure is scheduled for 2-16-23 @ 1330. You need to arrive about 1.5 hours prior to procedure, so please arrive by 1200 to [address] from the [address] [facility] parking courtyard entrance. Once inside, go to the left to outpatient registration. Bring your insurance information and list of current medications. You may not have anything to eat or drink after Midnight, except for sips of water to take medications. *Have labs drawn prior to procedure at [facility] if possible. *You will need someone to drive you home after the procedure. *Arrange for a responsible adult to help you at home for at least 24 hours,  *Wear comfortable clothing. Leave jewelry, money, and other valuables at home. You may wear dentures, eyeglasses, and/or hearing aids. *Bring an overnight bag with you (just incase you need to spend the night.)     Post Procedure Instructions:  *No driving for 24 hours post procedure.

## 2023-02-08 NOTE — PROGRESS NOTES
Pma Plummer      1950   Ria Loera MD  Date of Visit-2/8/2023     [facility],  PCP=Cindi Walls MD     [facility] [address] [place] [address] Heart and Vascular [place]. HPI:   Jsoie Montilla is a 81 y.o. 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 .. Awaiting patient assistance for [facility] [employer], is processing. Feels ok for the most part. Same occasional right chest pain at rest with deep breathes. Weight remains high  He does not feel palps, sob at rest, or edema  EKG- Atrial fibrillation at 77    06/29/22 Cath Normal    Assessment/Plans:  1. Persistent  Atrial fibrillation   Initially had shortness of breath palpitations and chest pain. On the beta-blocker he is now well rate controlled. He is on NOAC with no bleeding issues. He can stop aspirin. EF is 50%. [id number]  score for CVA risk is 2 ( age , HTN) ,- [address]   hx of GERD but no GI bleed  Will get LIONEL and cardioversion-discussed long term remodeling benefit  Needs weight loss    2. Non-ischemic cardiomyopathy (NICM)   mild SOB, EF 50% normal cors  No edema  06/16/22 ECHO ADULT COMPLETE   Left Ventricle: Mildly reduced left ventricular systolic function. EF by 2D Simpsons Biplane is 50%. Left ventricle size is normal. Normal wall thickness. Mild global hypokinesis present. Right Ventricle: Right ventricle is dilated. Low normal systolic function. Left Atrium: Left atrium is moderately dilated.     Right Atrium: Right atrium is dilated. Mitral Valve: Mild regurgitation. Tricuspid Valve: Mild regurgitation. The estimated RVSP is 22 mmHg. Aorta: Ao Ascending diameter is 4.1 cm. Ao Ascending Index is 1.80 cm/m2. 3. PVC's (premature ventricular contractions)  4000 on previous holter, but are benign with then with no structural heart dz    4. HTN,essential   At goal , meds and possible side effects reviewed and patient denies  Key CAD CHF Meds               apixaban (ELIQUIS) 5 mg tablet (Taking) Take 1 Tablet by mouth two (2) times a day. rosuvastatin (CRESTOR) 10 mg tablet (Taking) Take 1 tablet by mouth nightly    hydroCHLOROthiazide (HYDRODIURIL) 12.5 mg tablet (Taking) Take 1 tablet by mouth once daily    ramipriL (ALTACE) 10 mg capsule (Taking) Take 1 capsule by mouth twice daily    metoprolol succinate (TOPROL-XL) 100 mg tablet (Taking) Take 1 Tablet by mouth daily. BP Readings from Last 6 Encounters:   02/08/23 115/72   01/10/23 105/65   11/10/22 115/78   10/05/22 109/74   07/13/22 116/75   07/07/22 118/71        5 . Severe Obesity with comorbid  cardiovascular disease risk factors   Weight gain , sedentary, losing weight will help his chances to stay in SR post CV     6. Pure hypercholesterolemia  At goal , denies excess muscle aches or new liver issues  Key Antihyperlipidemia Meds               rosuvastatin (CRESTOR) 10 mg tablet (Taking) Take 1 tablet by mouth nightly           Lab Results   Component Value Date/Time    LDL, calculated 60 11/10/2022 09:15 AM                 1. Longstanding persistent atrial fibrillation ([address]    2. NICM (nonischemic cardiomyopathy) ([address]    3. PVC's (premature ventricular contractions)    4. Essential hypertension    5. Severe obesity (BMI 35.0-35.9 with comorbidity) ([address]    6. Pure hypercholesterolemia         06/16/22    ECHO ADULT COMPLETE 06/26/2022 6/26/2022    Interpretation Summary    Left Ventricle: Mildly reduced left ventricular systolic function.  EF by 2D Simpsons Biplane is 50%. Left ventricle size is normal. Normal wall thickness. Mild global hypokinesis present. Right Ventricle: Right ventricle is dilated. Low normal systolic function. Left Atrium: Left atrium is moderately dilated. Right Atrium: Right atrium is dilated. Mitral Valve: Mild regurgitation. Tricuspid Valve: Mild regurgitation. The estimated RVSP is 22 mmHg. Aorta: Ao Ascending diameter is 4.1 cm. Ao Ascending Index is 1.80 cm/m2. Signed by: Ronny Weir MD on 6/26/2022  6:49 PM       Future Appointments   Date Time Provider Erin Corona   2/17/2023  8:20 AM Judy Cronin MD [facility]      Patient Care Team:  Judy Cronin MD as PCP - General (Family Medicine)  Judy Cronin MD as PCP - [facility]  Ronny Weir MD (Cardiovascular Disease Physician)  Elo Ritter MD (Dermatology Physician)  Desiree Wolfe MD (Urology)  Monica Woodard MD (Gastroenterology)  Jayce Coates., MD (Surgery Physician)  Dada Vu MD (Orthopedic Surgery)  Power Jones MD (Orthopedic Surgery)  Mariana Bernardo NP as Nurse Practitioner (Surgery Physician)  Pryor Boast, MD (Hematology and Oncology)  uSsan Sullivan MD (Endocrinology Physician)  Sonia Allison MD as Physician (Sleep Medicine Physician)       Cardiac History:   ECHO 9-16-15=WNL  NUKE exercise nuclear stress test 10-1-15= walked 6 minutes ,EF 64%, no EKG changes or ischemia, rare PVCs    Holter: 7/27/15. Average heart rate 71. Total beats 101,922. No pauses. Ventricular ectopics 3,993, averaging 167 per hour. Bigeminy was about half of those beats. Supraventricular ectopics 213. SVT the fasted at 4 beats and the longest at 11 beats. ROS:Cardiac complete  as above. Respiratory as above with no wheezing or hemoptysis.    He denies  symptoms of unusual weight loss , fevers,  BRBPR, hematuria,  or recent stroke       Past Medical History:   Diagnosis Date Anesthesia complication 7314    APNEA DURING HERNIA REPAIR, POST OPERATIVE INTUBATION AT [facility]    Arthritis     Basal cell carcinoma (BCC) in situ of skin     Chronic myocardial ischemia 7/1/2022    Colon ulcer, aphthous 4/1/2010    COPD, mild ([address]     Does not see anyone for this    Environmental and seasonal allergies     Essential tremor     Fatty liver 06/2019    Frequent episodes of bronchitis 2018    Frequent PVCs     Has been checked by cardiology    GERD (gastroesophageal reflux disease) 4/1/2010    High cholesterol 4/1/2010    HTN (hypertension), benign 04/01/2010    Hx MRSA infection 2013    CYSTS BL LEGS    Sleep apnea     CPAP      Exam and Labs:  Visit Vitals  /72 (BP 1 Location: Left upper arm, BP Patient Position: Sitting, BP Cuff Size: Adult)   Pulse 66   Temp 97.1 °F (36.2 °C) (Oral)   Resp 20   Ht 5' 10\" (1.778 m)   Wt 244 lb (110.7 kg)   SpO2 94%   BMI 35.01 kg/m²     Constitutional:  NAD, comfortable , moist mucous membranesHENT: Head: NC,ATEyes: No scleral icterus. Neck:  Neck supple. No JVD present. No tracheal deviation,mass  Chest: Effort normal & normal respiratory excursion     Lungs:breath sounds normal. No stridor. distress, wheezes or  Rales. Heart: Irregularly irregular rhythm, , normal S1, S2, no murmurs, rubs, clicks or gallops , PMI non displaced. Edema: Edema is none. Extremities:  no clubbing or cyanosis. Abdominal:  no abnormal distension. Neurological: alert, conversant and oriented . Skin: Skin is not cold. No obvious systemic rash noted. Not diaphoretic. No erythema. Psychiatric:  Grossly normal mood and affect.   Behavior appears normal.     Lab Results   Component Value Date/Time    Cholesterol, total 116 11/10/2022 09:15 AM    HDL Cholesterol 35 11/10/2022 09:15 AM    LDL, calculated 60 11/10/2022 09:15 AM    Triglyceride 105 11/10/2022 09:15 AM    CHOL/HDL Ratio 3.3 11/10/2022 09:15 AM     Lab Results   Component Value Date/Time    Sodium 139 11/10/2022 09:15 AM    Potassium 4.5 11/10/2022 09:15 AM    Chloride 106 11/10/2022 09:15 AM    CO2 28 11/10/2022 09:15 AM    Anion gap 5 11/10/2022 09:15 AM    Glucose 100 11/10/2022 09:15 AM    BUN 12 11/10/2022 09:15 AM    Creatinine 0.94 11/10/2022 09:15 AM    BUN/Creatinine ratio 13 11/10/2022 09:15 AM    GFR est AA >60 06/27/2022 10:00 AM    GFR est non-AA >60 06/27/2022 10:00 AM    Calcium 8.9 11/10/2022 09:15 AM      Wt Readings from Last 3 Encounters:   02/08/23 244 lb (110.7 kg)   01/10/23 240 lb (108.9 kg)   11/10/22 238 lb (108 kg)      BP Readings from Last 3 Encounters:   02/08/23 115/72   01/10/23 105/65   11/10/22 115/78        Current Outpatient Medications   Medication Sig    apixaban (ELIQUIS) 5 mg tablet Take 1 Tablet by mouth two (2) times a day. rosuvastatin (CRESTOR) 10 mg tablet Take 1 tablet by mouth nightly    omeprazole (PRILOSEC) 20 mg capsule Take 1 capsule by mouth once daily    diclofenac (VOLTAREN) 1 % gel Apply 1 g to affected area daily as needed for Pain. cyanocobalamin 1,000 mcg tablet Take 1,000 mcg by mouth daily. allopurinoL (ZYLOPRIM) 100 mg tablet Take 1 Tablet by mouth daily. hydroCHLOROthiazide (HYDRODIURIL) 12.5 mg tablet Take 1 tablet by mouth once daily    ramipriL (ALTACE) 10 mg capsule Take 1 capsule by mouth twice daily    metoprolol succinate (TOPROL-XL) 100 mg tablet Take 1 Tablet by mouth daily. fluticasone propionate (FLONASE) 50 mcg/actuation nasal spray Use 2 spray(s) in each nostril once daily    albuterol (PROVENTIL HFA, VENTOLIN HFA, PROAIR HFA) 90 mcg/actuation inhaler     albuterol (PROVENTIL VENTOLIN) 2.5 mg /3 mL (0.083 %) nebu Take 3 mL by inhalation every four (4) hours as needed (cough/wheezing). cholecalciferol, VITAMIN D3, (VITAMIN D3) 5,000 unit tab tablet Take 1,000 Units by mouth every morning. cetirizine (ZYRTEC) 10 mg tablet Take 10 mg by mouth every morning.     predniSONE (DELTASONE) 20 mg tablet Take 1 Tablet by mouth daily (with breakfast). (Patient not taking: Reported on 2/8/2023)    tiZANidine (ZANAFLEX) 4 mg tablet Take 1 Tablet by mouth three (3) times daily as needed for Muscle Spasm(s). (Patient not taking: No sig reported)     No current facility-administered medications for this visit. Past Surgical History:   Procedure Laterality Date    COLONOSCOPY N/A 4/2/2019    performed by Edda Da Silva MD at [address] Right     2nd digit    HX APPENDECTOMY  1959    HX CATARACT REMOVAL Bilateral     HX CERVICAL FUSION N/A 1992    HX HERNIA REPAIR  2001    HX KNEE ARTHROSCOPY Left 1994    HX KNEE REPLACEMENT Bilateral 10/2017    HX MENISCECTOMY Right 01/06/2017    HX TONSIL AND ADENOIDECTOMY        Social Hx=  reports that he quit smoking about 23 years ago. His smoking use included cigarettes. He has a 105.00 pack-year smoking history. He has never used smokeless tobacco. He reports current alcohol use of about 21.0 standard drinks per week. He reports that he does not use drugs. Family Hx= family history includes Cancer in his brother and brother; Dementia in his mother; Heart Disease in his father and mother; Hypertension in his mother; Lung Disease in his father; No Known Problems in his brother. Impression see above.

## 2023-02-08 NOTE — Clinical Note
2/8/2023    Patient: Dian Wall   YOB: 1950   Date of Visit: 2/8/2023     Kishore Mercado MD  [address] [address]  [address] [facility] [address]    Dear Kishore Mercado MD,      Thank you for referring Mr. Alayna Quispe to [address] for evaluation. My notes for this consultation are attached. If you have questions, please do not hesitate to call me. I look forward to following your patient along with you.       Sincerely,    Ruth Ann Benavides MD

## 2023-02-08 NOTE — TELEPHONE ENCOUNTER
Verified patient with two types of identifiers. Scheduled LIONEL/CV at [facility] 2-16-23 at 1330 with 1200 arrival time. NPO after midnight, labs at [facility] if possible, if not can be done same day at [facility].  Shari Yecenia verbalized understanding and will call with any other questions.

## 2023-02-09 LAB
ANION GAP SERPL CALC-SCNC: 5 MMOL/L (ref 5–15)
BUN SERPL-MCNC: 17 MG/DL (ref 6–20)
BUN/CREAT SERPL: 14 (ref 12–20)
CALCIUM SERPL-MCNC: 9.1 MG/DL (ref 8.5–10.1)
CHLORIDE SERPL-SCNC: 105 MMOL/L (ref 97–108)
CO2 SERPL-SCNC: 29 MMOL/L (ref 21–32)
CREAT SERPL-MCNC: 1.25 MG/DL (ref 0.7–1.3)
ERYTHROCYTE [DISTWIDTH] IN BLOOD BY AUTOMATED COUNT: 13.2 % (ref 11.5–14.5)
GLUCOSE SERPL-MCNC: 100 MG/DL (ref 65–100)
HCT VFR BLD AUTO: 50.5 % (ref 36.6–50.3)
HGB BLD-MCNC: 16.5 G/DL (ref 12.1–17)
MAGNESIUM SERPL-MCNC: 2.1 MG/DL (ref 1.6–2.4)
MCH RBC QN AUTO: 30.6 PG (ref 26–34)
MCHC RBC AUTO-ENTMCNC: 32.7 G/DL (ref 30–36.5)
MCV RBC AUTO: 93.7 FL (ref 80–99)
NRBC # BLD: 0 K/UL (ref 0–0.01)
NRBC BLD-RTO: 0 PER 100 WBC
PLATELET # BLD AUTO: 251 K/UL (ref 150–400)
PMV BLD AUTO: 11.4 FL (ref 8.9–12.9)
POTASSIUM SERPL-SCNC: 4.2 MMOL/L (ref 3.5–5.1)
RBC # BLD AUTO: 5.39 M/UL (ref 4.1–5.7)
SODIUM SERPL-SCNC: 139 MMOL/L (ref 136–145)
WBC # BLD AUTO: 7.2 K/UL (ref 4.1–11.1)

## 2023-02-16 ENCOUNTER — HOSPITAL ENCOUNTER (OUTPATIENT)
Dept: NON INVASIVE DIAGNOSTICS | Age: 73
Discharge: HOME | End: 2023-02-16
Attending: SPECIALIST
Payer: MEDICARE

## 2023-02-16 ENCOUNTER — ANESTHESIA EVENT (OUTPATIENT)
Dept: NON INVASIVE DIAGNOSTICS | Age: 73
End: 2023-02-16
Payer: MEDICARE

## 2023-02-16 ENCOUNTER — ANESTHESIA (OUTPATIENT)
Dept: NON INVASIVE DIAGNOSTICS | Age: 73
End: 2023-02-16
Payer: MEDICARE

## 2023-02-16 VITALS
HEIGHT: 70 IN | HEART RATE: 61 BPM | BODY MASS INDEX: 34.93 KG/M2 | TEMPERATURE: 98.4 F | RESPIRATION RATE: 20 BRPM | WEIGHT: 244 LBS | OXYGEN SATURATION: 99 % | SYSTOLIC BLOOD PRESSURE: 125 MMHG | DIASTOLIC BLOOD PRESSURE: 80 MMHG

## 2023-02-16 DIAGNOSIS — D68.69 HYPERCOAGULABLE STATE DUE TO LONGSTANDING PERSISTENT ATRIAL FIBRILLATION (HCC): ICD-10-CM

## 2023-02-16 DIAGNOSIS — I48.11 HYPERCOAGULABLE STATE DUE TO LONGSTANDING PERSISTENT ATRIAL FIBRILLATION (HCC): ICD-10-CM

## 2023-02-16 PROCEDURE — 74011250636 HC RX REV CODE- 250/636: Performed by: NURSE ANESTHETIST, CERTIFIED REGISTERED

## 2023-02-16 PROCEDURE — 93312 ECHO TRANSESOPHAGEAL: CPT | Performed by: SPECIALIST

## 2023-02-16 PROCEDURE — 93312 ECHO TRANSESOPHAGEAL: CPT

## 2023-02-16 PROCEDURE — 93320 DOPPLER ECHO COMPLETE: CPT | Performed by: SPECIALIST

## 2023-02-16 PROCEDURE — 93005 ELECTROCARDIOGRAM TRACING: CPT

## 2023-02-16 PROCEDURE — 93325 DOPPLER ECHO COLOR FLOW MAPG: CPT | Performed by: SPECIALIST

## 2023-02-16 PROCEDURE — 92960 CARDIOVERSION ELECTRIC EXT: CPT

## 2023-02-16 PROCEDURE — 77030039825 HC MSK NSL PAP SUPERNO2VA VYRM -B: Performed by: STUDENT IN AN ORGANIZED HEALTH CARE EDUCATION/TRAINING PROGRAM

## 2023-02-16 PROCEDURE — 76060000032 HC ANESTHESIA 0.5 TO 1 HR

## 2023-02-16 PROCEDURE — 92960 CARDIOVERSION ELECTRIC EXT: CPT | Performed by: SPECIALIST

## 2023-02-16 RX ORDER — PROPOFOL 10 MG/ML
INJECTION, EMULSION INTRAVENOUS AS NEEDED
Status: DISCONTINUED | OUTPATIENT
Start: 2023-02-16 | End: 2023-02-16 | Stop reason: HOSPADM

## 2023-02-16 RX ADMIN — PROPOFOL 100 MG: 10 INJECTION, EMULSION INTRAVENOUS at 14:16

## 2023-02-16 RX ADMIN — PROPOFOL 30 MG: 10 INJECTION, EMULSION INTRAVENOUS at 14:27

## 2023-02-16 RX ADMIN — PROPOFOL 20 MG: 10 INJECTION, EMULSION INTRAVENOUS at 14:19

## 2023-02-16 RX ADMIN — PROPOFOL 20 MG: 10 INJECTION, EMULSION INTRAVENOUS at 14:30

## 2023-02-16 RX ADMIN — PROPOFOL 50 MG: 10 INJECTION, EMULSION INTRAVENOUS at 14:17

## 2023-02-16 RX ADMIN — PROPOFOL 50 MG: 10 INJECTION, EMULSION INTRAVENOUS at 14:25

## 2023-02-16 NOTE — PROGRESS NOTES
Pt to room. Opportunity for questions and clarification was provided. Assessment completed upon patients arrival to unit and care assumed.

## 2023-02-16 NOTE — ANESTHESIA POSTPROCEDURE EVALUATION
Post-Anesthesia Evaluation and Assessment    Patient: Matt Saldivar MRN: [id number]  SSN: [id number]    YOB: 1950  Age: 81 y.o. Sex: male      I have evaluated the patient and they are stable and ready for discharge from the PACU. Cardiovascular Function/Vital Signs  Visit Vitals  /80   Pulse 61   Temp 36.9 °C (98.4 °F)   Resp 20   Ht 5' 10\" (1.778 m)   Wt 110.7 kg (244 lb)   SpO2 99%   BMI 35.01 kg/m²       Patient is status post * No anesthesia type entered * anesthesia for * No procedures listed *. Nausea/Vomiting: None    Postoperative hydration reviewed and adequate. Pain:  Pain Scale 1: Numeric (0 - 10) (02/16/23 1331)  Pain Intensity 1: 0 (02/16/23 1331)   Managed    Neurological Status: At baseline    Mental Status, Level of Consciousness: Alert and  oriented to person, place, and time    Pulmonary Status:   O2 Device: None (Room air) (02/16/23 1505)   Adequate oxygenation and airway patent    Complications related to anesthesia: None    Post-anesthesia assessment completed. No concerns    Signed By: Woo Taveras DO     February 16, 2023                * No procedures listed *. MAC    <BSHSIANPOST>    INITIAL Post-op Vital signs: No vitals data found for the desired time range.

## 2023-02-16 NOTE — PROGRESS NOTES
Anesthesia name:  Caro Manrique, Student CRNA. Anesthesia is present for case. Refer to anesthesia log for vitals.

## 2023-02-16 NOTE — ANESTHESIA PREPROCEDURE EVALUATION
Anesthetic History          Comments: Needed postop reintubation previously     Review of Systems / Medical History  Patient summary reviewed, nursing notes reviewed and pertinent labs reviewed    Pulmonary  Within defined limits      Sleep apnea: CPAP    Asthma : well controlled       Neuro/Psych             Comments: PAIN = 5/10    Left buttocks to left foot  Numbness RLE Cardiovascular    Hypertension: well controlled        Dysrhythmias : PVC      Exercise tolerance: >4 METS     GI/Hepatic/Renal     GERD: well controlled      Liver disease     Endo/Other        Obesity and arthritis     Other Findings              Physical Exam    Airway  Mallampati: II  TM Distance: > 6 cm  Neck ROM: normal range of motion   Mouth opening: Normal     Cardiovascular  Regular rate and rhythm,  S1 and S2 normal,  no murmur, click, rub, or gallop  Rhythm: regular  Rate: normal         Dental  No notable dental hx       Pulmonary  Breath sounds clear to auscultation               Abdominal         Other Findings            Anesthetic Plan    ASA: 3  Anesthesia type: MAC          Induction: Intravenous  Anesthetic plan and risks discussed with: Patient      Informed consent obtained.

## 2023-02-16 NOTE — PROGRESS NOTES
Pt prepared for discharge and educated regarding post-procedure precautions as listed in Educational Materials. Copy of Materials given to patient. Pt voiced understanding. Opportunity for asking questions was provided to patient and questions were answered. Pt was transported to vehicle by Ebenezer Dalal RN via wheelchair.

## 2023-02-17 ENCOUNTER — OFFICE VISIT (OUTPATIENT)
Dept: FAMILY MEDICINE CLINIC | Age: 73
End: 2023-02-17
Payer: MEDICARE

## 2023-02-17 VITALS
DIASTOLIC BLOOD PRESSURE: 62 MMHG | WEIGHT: 243 LBS | BODY MASS INDEX: 34.79 KG/M2 | TEMPERATURE: 98.7 F | HEIGHT: 70 IN | RESPIRATION RATE: 18 BRPM | HEART RATE: 64 BPM | SYSTOLIC BLOOD PRESSURE: 97 MMHG

## 2023-02-17 DIAGNOSIS — E78.00 PURE HYPERCHOLESTEROLEMIA: Chronic | ICD-10-CM

## 2023-02-17 DIAGNOSIS — I25.9 CHRONIC MYOCARDIAL ISCHEMIA: ICD-10-CM

## 2023-02-17 DIAGNOSIS — I48.0 PAROXYSMAL ATRIAL FIBRILLATION (HCC): Chronic | ICD-10-CM

## 2023-02-17 DIAGNOSIS — K21.9 GASTROESOPHAGEAL REFLUX DISEASE WITHOUT ESOPHAGITIS: Chronic | ICD-10-CM

## 2023-02-17 DIAGNOSIS — Z87.891 PERSONAL HISTORY OF TOBACCO USE, PRESENTING HAZARDS TO HEALTH: ICD-10-CM

## 2023-02-17 DIAGNOSIS — Z99.89 OBSTRUCTIVE SLEEP APNEA ON CPAP: ICD-10-CM

## 2023-02-17 DIAGNOSIS — I10 PRIMARY HYPERTENSION: Primary | Chronic | ICD-10-CM

## 2023-02-17 DIAGNOSIS — J45.20 MILD INTERMITTENT ASTHMA WITHOUT COMPLICATION: Chronic | ICD-10-CM

## 2023-02-17 DIAGNOSIS — G47.33 OBSTRUCTIVE SLEEP APNEA ON CPAP: ICD-10-CM

## 2023-02-17 DIAGNOSIS — M51.36 DDD (DEGENERATIVE DISC DISEASE), LUMBAR: Chronic | ICD-10-CM

## 2023-02-17 LAB
ATRIAL RATE: 58 BPM
CALCULATED P AXIS, ECG09: 49 DEGREES
CALCULATED R AXIS, ECG10: 66 DEGREES
CALCULATED T AXIS, ECG11: 40 DEGREES
DIAGNOSIS, 93000: NORMAL
P-R INTERVAL, ECG05: 224 MS
Q-T INTERVAL, ECG07: 438 MS
QRS DURATION, ECG06: 76 MS
QTC CALCULATION (BEZET), ECG08: 429 MS
VENTRICULAR RATE, ECG03: 58 BPM

## 2023-02-17 NOTE — PROGRESS NOTES
1. Have you been to the ER, urgent care clinic since your last visit? Hospitalized since your last visit? No    2. Have you seen or consulted any other health care providers outside of the [address] [place] [address] since your last visit? Including any pap smears or colon screening.  No      Health Maintenance Due   Topic Date Due    COVID-19 Vaccine (3 - Booster for Moderna series) 07/28/2021

## 2023-02-17 NOTE — PATIENT INSTRUCTIONS
Venu Ferrari with [facility]  [address] [place] [address] Drive, [address] Celia Romero [facility]  [phone]    Log blood pressures at home while sitting, relaxed 3-5 times weekly and bring to next visit. Goal BP of 130/80 on average or lower. Call office as soon as possible if BP's over 140/90 or below 110/50 on multiple occasions and/or with symptoms of dizziness, chest pain, shortness of breath, headache or ankle swelling. Recheck Log and BP in office in   Follow-up and Dispositions    Return in about 3 months (around 2023). Blood Pressure Record     Patient Name:  ______________________ :  ______________________    Date/Time BP Reading Pulse                                                                                                                                                                                             Learning About Lung Cancer Screening  What is screening for lung cancer? Lung cancer screening is a way to find some lung cancers early, before a person has any symptoms of the cancer. Lung cancer screening may help those who have the highest risk for lung cancer--people age 48 and older who are or were heavy smokers. For most people, who aren't at increased risk, screening for lung cancer probably isn't helpful. Screening won't prevent cancer. And it may not find all lung cancers. Lung cancer screening may lower the risk of dying from lung cancer in a small number of people. How is it done? Lung cancer screening is done with a low-dose CT (computed tomography) scan. A CT scan uses X-rays, or radiation, to make detailed pictures of your body. Experts recommend that screening be done in medical centers that focus on finding and treating lung cancer. Who is screening recommended for? Lung cancer screening is recommended for people age 48 and older who are or were heavy smokers.  That means people with a smoking history of at least 20 pack years. A pack year is a way to measure how heavy a smoker you are or were. To figure out your pack years, multiply how many packs a day on average (assuming 20 cigarettes per pack) you have smoked by how many years you have smoked. For example: If you smoked 1 pack a day for 20 years, that's 1 times 20. So you have a smoking history of 20 pack years. If you smoked 2 packs a day for 10 years, that's 2 times 10. So you have a smoking history of 20 pack years. Experts agree that screening is for people who have a high risk of lung cancer. But experts don't agree on what high risk means. Some say people age 48 or older with at least a 20-pack-year smoking history are high risk. Others say it's people age 54 or older with a 30-pack-year history. To see if you could benefit from screening, first find out if you are at high risk for lung cancer. Your doctor can help you decide your lung cancer risk. What are the risks of screening? CT screening for lung cancer isn't perfect. It can show an abnormal result when it turns out there wasn't any cancer. This is called a false-positive result. This means you may need more tests to make sure you don't have cancer. These tests can be harmful and cause a lot of worry. These tests may include more CT scans and invasive testing like a lung biopsy. In a biopsy, the doctor takes a sample of tissue from inside your lung so it can be looked at under a microscope. A biopsy is the only way to tell if you have lung cancer. If the biopsy finds cancer, you and your doctor will have to decide how or whether to treat it. Some lung cancers found on CT scans are harmless and would not have caused a problem if they had not been found through screening. But because doctors can't tell which ones will turn out to be harmless, most will be treated. This means that you may get treatment--including surgery, radiation, or chemotherapy--that you don't need.   There is a risk of damage to cells or tissue from being exposed to radiation, including the small amounts used in CTs, X-rays, and other medical tests. Over time, exposure to radiation may cause cancer and other health problems. But in most cases, the risk of getting cancer from being exposed to small amounts of radiation is low. It's not a reason to avoid these tests for most people. What are the benefits of screening? Your scan may be normal (negative). For some people who are at higher risk, screening lowers the chance of dying of lung cancer. How much and how long you smoked helps to determine your risk level. Screening can find some cancers early, when treatment may be more likely to work. What happens after screening? The results of your CT scan will be sent to your doctor. Someone from your care team will explain the results of your scan and answer any questions you may have. If you need any follow-up, he or she will help you understand what to do next. After a lung cancer screening, you can go back to your usual activities right away. A lung cancer screening test can't tell if you have lung cancer. If your results are positive, your doctor can't tell whether an abnormal finding is a harmless nodule, cancer, or something else without doing more tests. What can you do to help prevent lung cancer? Some lung cancers can't be prevented. But if you smoke, quitting smoking is the best step you can take to prevent lung cancer. If you want to quit, your doctor can recommend medicines or other ways to help. Follow-up care is a key part of your treatment and safety. Be sure to make and go to all appointments, and call your doctor if you are having problems. It's also a good idea to know your test results and keep a list of the medicines you take. Where can you learn more? Go to http://www.gray.com/  Enter Q940 in the search box to learn more about \"Learning About Lung Cancer Screening. \"  Current as of:  May 4, 2022               Content Version: 13.4  © 5841-5055 [employer], [employer]. Care instructions adapted under license by [employer] (which disclaims liability or warranty for this information). If you have questions about a medical condition or this instruction, always ask your healthcare professional. [address] any warranty or liability for your use of this information.

## 2023-02-17 NOTE — PROGRESS NOTES
[facility]    History of Present Illness:   Omar Maldonado is a 81 y.o. male with history of HTN, Paroxysmal Afib, RACHEL, Asthma, DDD, Osteoarthritis  CC: Follow up  History provided by patient and Records    HPI:  Hypertension Follow up:  Currently Taking:   Key CAD CHF Meds               apixaban (ELIQUIS) 5 mg tablet (Taking) Take 1 Tablet by mouth two (2) times a day. rosuvastatin (CRESTOR) 10 mg tablet (Taking) Take 1 tablet by mouth nightly    hydroCHLOROthiazide (HYDRODIURIL) 12.5 mg tablet (Taking) Take 1 tablet by mouth once daily    ramipriL (ALTACE) 10 mg capsule (Taking) Take 1 capsule by mouth twice daily    metoprolol succinate (TOPROL-XL) 100 mg tablet (Taking) Take 1 Tablet by mouth daily. The patient reports:  taking medications as instructed, no medication side effects noted, no TIA's, no chest pain on exertion, no dyspnea on exertion, no swelling of ankles, no orthostatic dizziness or lightheadedness, no orthopnea or paroxysmal nocturnal dyspnea. BP Readings from Last 3 Encounters:   02/17/23 97/62   02/16/23 125/80   02/08/23 115/72      Atrial Fibrillation Review: Patient is overall doing well. Denies chest pain, edema, dyspnea or dizziness. Patient is not aware of irregular heartbeats. Patient denies neurologic sx. Currently he avoids caffeine. Risk Factor Modification:  Patient avoids excessive caffeine, Alcohol use, Tobacco products, and OTC cough suppressants. Stays hydrated  Current Medications: B-blocker  Currently anticoagulated: Yes    Gastroesophageal Reflux:  Current control of Symptoms: Stable  Primary symptoms: heartburn  Hiatal Hernia: No  Current Medications: Prilosec  The patient has no history melena or bright red blood in the stools. The patient avoids high dose aspirin and NSAID therapy. The patient is aware of diet changes needed, elevating the head of the bed and appropriate use of antacids.        Asthma: Well controlled at this time    Hypertriglyceridemia Follow up:   Cardiovascular risks for him are: existing CAD  hypertension  hyperlipidemia. Current Medications:  Key Antihyperlipidemia Meds               rosuvastatin (CRESTOR) 10 mg tablet (Taking) Take 1 tablet by mouth nightly            Compliance: YES   Myalgias: NO   Fatigue: NO   Other side effects: NO     Wt Readings from Last 3 Encounters:   02/17/23 243 lb (110.2 kg)   02/16/23 244 lb (110.7 kg)   02/08/23 244 lb (110.7 kg)       Lab Results   Component Value Date/Time    Cholesterol, total 116 11/10/2022 09:15 AM    HDL Cholesterol 35 11/10/2022 09:15 AM    LDL, calculated 60 11/10/2022 09:15 AM    VLDL, calculated 21 11/10/2022 09:15 AM    Triglyceride 105 11/10/2022 09:15 AM    CHOL/HDL Ratio 3.3 11/10/2022 09:15 AM      Lab Results   Component Value Date/Time    ALT (SGPT) 55 11/10/2022 09:15 AM    AST (SGOT) 27 11/10/2022 09:15 AM    Alk. phosphatase 68 11/10/2022 09:15 AM    Bilirubin, total 0.5 11/10/2022 09:15 AM           Health Maintenance  Health Maintenance Due   Topic Date Due    COVID-19 Vaccine (3 - Booster for Kip Dieter series) 07/28/2021       Past Medical, Family, and Social History:     Current Outpatient Medications on File Prior to Visit   Medication Sig Dispense Refill    apixaban (ELIQUIS) 5 mg tablet Take 1 Tablet by mouth two (2) times a day. 180 Tablet 3    rosuvastatin (CRESTOR) 10 mg tablet Take 1 tablet by mouth nightly 90 Tablet 1    omeprazole (PRILOSEC) 20 mg capsule Take 1 capsule by mouth once daily 90 Capsule 1    diclofenac (VOLTAREN) 1 % gel Apply 1 g to affected area daily as needed for Pain. 200 g 1    tiZANidine (ZANAFLEX) 4 mg tablet Take 1 Tablet by mouth three (3) times daily as needed for Muscle Spasm(s). 60 Tablet 1    cyanocobalamin 1,000 mcg tablet Take 1,000 mcg by mouth daily. allopurinoL (ZYLOPRIM) 100 mg tablet Take 1 Tablet by mouth daily.  90 Tablet 1    hydroCHLOROthiazide (HYDRODIURIL) 12.5 mg tablet Take 1 tablet by mouth once daily 90 Tablet 1    ramipriL (ALTACE) 10 mg capsule Take 1 capsule by mouth twice daily 180 Capsule 1    metoprolol succinate (TOPROL-XL) 100 mg tablet Take 1 Tablet by mouth daily. 90 Tablet 3    fluticasone propionate (FLONASE) 50 mcg/actuation nasal spray Use 2 spray(s) in each nostril once daily 16 g 2    albuterol (PROVENTIL HFA, VENTOLIN HFA, PROAIR HFA) 90 mcg/actuation inhaler       albuterol (PROVENTIL VENTOLIN) 2.5 mg /3 mL (0.083 %) nebu Take 3 mL by inhalation every four (4) hours as needed (cough/wheezing). 50 Nebule 4    cholecalciferol, VITAMIN D3, (VITAMIN D3) 5,000 unit tab tablet Take 1,000 Units by mouth every morning. cetirizine (ZYRTEC) 10 mg tablet Take 10 mg by mouth every morning. Current Facility-Administered Medications on File Prior to Visit   Medication Dose Route Frequency Provider Last Rate Last Admin    [DISCONTINUED] propofoL (DIPRIVAN) 10 mg/mL injection   IntraVENous PRN Collette Pinal, CRNA   20 mg at 02/16/23 1430       Patient Active Problem List   Diagnosis Code    HTN (hypertension) I10    GERD (gastroesophageal reflux disease) K21.9    Colon ulcer, aphthous K63.3    Positive PPD R76.11    Obstructive sleep apnea on CPAP G47.33, Z99.89    Vitamin D deficiency E55.9    SOB (shortness of breath) R06.02    HLD (hyperlipidemia) E78.5    Knee pain M25.569    B12 deficiency E53.8    PVC's (premature ventricular contractions) I49.3    DDD (degenerative disc disease), lumbar M51.36    Rectus diastasis M62.08    Complex tear of medial meniscus of right knee as current injury S83.231A    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 9) with comorbidity (HCC) E66.01    Inguinal lymphadenopathy R59.0    Lymphadenopathy R59.1    BMI 33.0-33.9,adult Z68.33    Paroxysmal atrial fibrillation (HCC) I48.0 Chronic myocardial ischemia I25.9       Social History     Socioeconomic History    Marital status:    Tobacco Use    Smoking status: Former     Packs/day: 3.00     Years: 35.00     Pack years: 105.00     Types: Cigarettes     Quit date:      Years since quittin.    Smokeless tobacco: Never   Vaping Use    Vaping Use: Never used   Substance and Sexual Activity    Alcohol use: Yes     Alcohol/week: 21.0 standard drinks     Types: 21 Shots of liquor per week    Drug use: No    Sexual activity: Yes     Partners: Female     Birth control/protection: None     Social Determinants of Health     Financial Resource Strain: Low Risk     Difficulty of Paying Living Expenses: Not hard at all   Food Insecurity: No Food Insecurity    Worried About Running Out of Food in the Last Year: Never true    Ran Out of Food in the Last Year: Never true        Review of Systems   Review of Systems   Constitutional:  Negative for chills, fever and weight loss. Cardiovascular:  Negative for chest pain and palpitations. Gastrointestinal:  Negative for abdominal pain, nausea and vomiting. Neurological:  Negative for dizziness. Objective:   Visit Vitals  BP 97/62 (BP 1 Location: Left arm, BP Patient Position: Sitting, BP Cuff Size: Large adult)   Pulse 64   Temp 98.7 °F (37.1 °C) (Oral)   Resp 18   Ht 5' 10\" (1.778 m)   Wt 243 lb (110.2 kg)   BMI 34.87 kg/m²        Physical Exam  Vitals and nursing note reviewed. Constitutional:       Appearance: Normal appearance. HENT:      Head: Normocephalic and atraumatic. Cardiovascular:      Rate and Rhythm: Normal rate and regular rhythm. Pulses: Normal pulses. Heart sounds: Normal heart sounds. No murmur heard. No friction rub. No gallop. Pulmonary:      Effort: Pulmonary effort is normal.      Breath sounds: Normal breath sounds. Abdominal:      General: Abdomen is flat. Bowel sounds are normal.      Palpations: Abdomen is soft.    Musculoskeletal: General: Normal range of motion. Cervical back: Normal range of motion and neck supple. Skin:     General: Skin is warm and dry. Neurological:      Mental Status: He is alert. Pertinent Labs/Studies:      Assessment and orders:       ICD-10-CM ICD-9-CM    1. Primary hypertension  I10 401.9 CBC W/O DIFF      METABOLIC PANEL, COMPREHENSIVE      2. Paroxysmal atrial fibrillation (HCC)  I48.0 427.31       3. Chronic myocardial ischemia  I25.9 414.8       4. Gastroesophageal reflux disease without esophagitis  K21.9 530.81       5. Mild intermittent asthma without complication  F74.35 694.15       6. Obstructive sleep apnea on CPAP  G47.33 327.23     Z99.89 V46.8       7. DDD (degenerative disc disease), lumbar  M51.36 722.52       8. Pure hypercholesterolemia  E78.00 272.0 LIPID PANEL      9. Personal history of tobacco use, presenting hazards to health  Z87.891 V15.82 COUNSELING VISIT TO DISCUSS NEED FOR LUNG CANCER SCREENING      CT LOW DOSE LUNG CANCER SCREENING        Diagnoses and all orders for this visit:    1. Primary hypertension: Our goal is to normalize the blood pressure to decrease the risks of strokes and heart attacks. The patient is in agreement with the plan. -     CBC W/O DIFF; Future  -     METABOLIC PANEL, COMPREHENSIVE; Future    2. Paroxysmal atrial fibrillation ([facility] [address]): Had Cardioversion yesterday. Monitoiring BP at home now. 3. Chronic myocardial ischemia    4. Gastroesophageal reflux disease without esophagitis    5. Mild intermittent asthma without complication    6. Obstructive sleep apnea on CPAP    7. DDD (degenerative disc disease), lumbar    8. Pure hypercholesterolemia: The patient is aware of our goal to reduce or eliminate the long term problems (such as strokes and heart attacks) related to poorly controlled Triglycerides, LDL, Cholesterol.   -     LIPID PANEL; Future    9.  Personal history of tobacco use, presenting hazards to health  -     COUNSELING VISIT TO DISCUSS NEED FOR LUNG CANCER SCREENING  -     CT LOW DOSE LUNG CANCER SCREENING; Future    Follow-up and Dispositions    Return in about 3 months (around 5/17/2023). I have discussed the diagnosis with the patient and the intended plan as seen in the above orders. Social history, medical history, and labs were reviewed. The patient has received an after-visit summary and questions were answered concerning future plans. I have discussed medication side effects and warnings with the patient as well. MD ARTUR Nowak & RICHARD REESE [facility]  02/17/23    Discussed with the patient the current USPSTF guidelines released March 9, 2021 for screening for lung cancer. For adults aged 48 to [de-identified] years who have a 20 pack-year smoking history and currently smoke or have quit within the past 15 years the grade B recommendation is to:  Screen for lung cancer with low-dose computed tomography (LDCT) every year. Stop screening once a person has not smoked for 15 years or has a health problem that limits life expectancy or the ability to have lung surgery. The patient  reports that he quit smoking about 23 years ago. His smoking use included cigarettes. He has a 105.00 pack-year smoking history. He has never used smokeless tobacco..  Discussed with patient the risks and benefits of screening, including over-diagnosis, false positive rate, and total radiation exposure. The patient currently exhibits no signs or symptoms suggestive of lung cancer. Discussed with patient the importance of compliance with yearly annual lung cancer screenings and willingness to undergo diagnosis and treatment if screening scan is positive. In addition, the patient was counseled regarding the importance of remaining smoke free and/or total smoking cessation.     Also reviewed the following if the patient has Medicare that as of February 10, 2022, Medicare only covers LDCT screening in patients aged 51-72 with at least a 20 pack-year smoking history who currently smoke or have quit in the last 15 years

## 2023-02-20 ENCOUNTER — PATIENT MESSAGE (OUTPATIENT)
Dept: CARDIOLOGY CLINIC | Age: 73
End: 2023-02-20

## 2023-02-20 DIAGNOSIS — I48.11 LONGSTANDING PERSISTENT ATRIAL FIBRILLATION (HCC): Primary | ICD-10-CM

## 2023-02-20 DIAGNOSIS — I48.91 NEW ONSET A-FIB (HCC): ICD-10-CM

## 2023-02-21 NOTE — TELEPHONE ENCOUNTER
Per VO by MD.    Future Appointments   Date Time Provider [facility]   3/8/2023  3:00 PM Emily Gama MD [facility] BS AMB   6/16/2023  8:20 AM Liliane Tanner MD [facility] BS AMB

## 2023-03-03 ENCOUNTER — PATIENT MESSAGE (OUTPATIENT)
Dept: FAMILY MEDICINE CLINIC | Age: 73
End: 2023-03-03

## 2023-03-08 ENCOUNTER — OFFICE VISIT (OUTPATIENT)
Dept: CARDIOLOGY CLINIC | Facility: CLINIC | Age: 73
End: 2023-03-08

## 2023-03-08 VITALS
BODY MASS INDEX: 34.65 KG/M2 | HEART RATE: 80 BPM | DIASTOLIC BLOOD PRESSURE: 69 MMHG | WEIGHT: 242 LBS | SYSTOLIC BLOOD PRESSURE: 119 MMHG | OXYGEN SATURATION: 95 % | RESPIRATION RATE: 16 BRPM | HEIGHT: 70 IN | TEMPERATURE: 97 F

## 2023-03-08 DIAGNOSIS — I42.8 NICM (NONISCHEMIC CARDIOMYOPATHY) (HCC): ICD-10-CM

## 2023-03-08 DIAGNOSIS — I49.3 PVC'S (PREMATURE VENTRICULAR CONTRACTIONS): ICD-10-CM

## 2023-03-08 DIAGNOSIS — I48.0 PAROXYSMAL ATRIAL FIBRILLATION (HCC): Primary | ICD-10-CM

## 2023-03-08 DIAGNOSIS — I10 PRIMARY HYPERTENSION: ICD-10-CM

## 2023-03-08 DIAGNOSIS — E66.9 OBESITY (BMI 30.0-34.9): ICD-10-CM

## 2023-03-08 DIAGNOSIS — R53.83 FATIGUE, UNSPECIFIED TYPE: ICD-10-CM

## 2023-03-08 DIAGNOSIS — G47.33 OSA (OBSTRUCTIVE SLEEP APNEA): ICD-10-CM

## 2023-03-08 DIAGNOSIS — E78.00 PURE HYPERCHOLESTEROLEMIA: ICD-10-CM

## 2023-03-08 NOTE — PROGRESS NOTES
Reji Interiano      1950   JAYMIE Bell  Date of Visit-3/8/2023     [facility],  PCP=Joey Walls MD     [facility] [place]. HPI:   Alta Paniagua is a 81 y.o. 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 .. Feels ok for the most part. CP and SOB better since DCCV. Weight remains high. Pt does feel tired. Will consider taking 50mg Toprol BID to try to decrease fatigue   at home. Denies chest pain, edema, syncope, shortness of breath at rest, PND or orthopnea. Has no tachycardia, palpitations or sense of arrhythmia.     06/29/22 Cath Normal    Assessment/Plans:  Patient Instructions   Consider taking 50mg Toprol twice a day to try to decrease fatigue    1. Persistent  Atrial fibrillation   Initially had shortness of breath palpitations and chest pain. On the beta-blocker  He is on NOAC with no bleeding issues. He can stop aspirin. EF is 50%. [id number]  score for CVA risk is 2 ( age , HTN) ,- [facility]   hx of GERD but no GI bleed  LIONEL/DCCV 2/16/23  Needs weight loss    2. Non-ischemic cardiomyopathy (NICM)   mild SOB, EF 50% normal cors  No edema  02/16/23    ECHO LIONEL W POSSIBLE CARDIOVERSION 02/17/2023 2/17/2023    Interpretation Summary    Left Ventricle: Normal left ventricular systolic function. Left ventricle size is normal. Normal wall thickness. Normal wall motion. Normal diastolic function.     Left Atrium: Normal sized appendage. Decreased appendage flow velocity. No left atrial appendage thrombus noted. No left atrial appendage mass noted. After obtaining informed consent and with continuous monitoring of hemodynamics, rhythm and the monitoring of oximetry and level of sedation by anesthesia , the patient underwent DC cardioversion in the cath lab. See Procedural Report  Cardioversion was successful to convert to sinus rhythm. Patient transferred to recovery room in stable condition. Biphasic energy used at: 200 J    Impression- successful cardioversion to sinus rhythm  Rajani Kline MD 2/17/2023    Signed by: Rajani Kline MD on 2/17/2023  2:49 PM    3. PVC's (premature ventricular contractions)  4000 on previous holter, but are benign with then with no structural heart dz    4. HTN,essential   Will consider taking 50mg Toprol BID to try to decrease fatigue  At goal , meds and possible side effects reviewed and patient denies  Key CAD CHF Meds               ramipriL (ALTACE) 10 mg capsule Take 1 capsule by mouth twice daily    hydroCHLOROthiazide (HYDRODIURIL) 12.5 mg tablet Take 1 tablet by mouth once daily    apixaban (ELIQUIS) 5 mg tablet Take 1 Tablet by mouth two (2) times a day. rosuvastatin (CRESTOR) 10 mg tablet Take 1 tablet by mouth nightly    metoprolol succinate (TOPROL-XL) 100 mg tablet Take 1 Tablet by mouth daily. BP Readings from Last 6 Encounters:   03/08/23 119/69   02/17/23 97/62   02/16/23 125/80   02/08/23 115/72   01/10/23 105/65   11/10/22 115/78        5 . Severe Obesity with comorbid  cardiovascular disease risk factors   Weight gain, sedentary, losing weight will help his chances to stay in SR post CV     6.  Pure hypercholesterolemia  Per PCP, labs soon  At goal , denies excess muscle aches or new liver issues  Key Antihyperlipidemia Meds               rosuvastatin (CRESTOR) 10 mg tablet Take 1 tablet by mouth nightly           Lab Results   Component Value Date/Time    LDL, calculated 60 11/10/2022 09:15 AM        7. RACHEL  Has new machine    8. Fatigue  Discussed proper sleep hygiene   Needs new eval for proper to ensure proper support. Sees Dr. Janusz Barfield  Will consider taking 50mg Toprol BID to try to decrease fatigue    FU 3 months  Patient Instructions   Consider taking 50mg Toprol twice a day to try to decrease fatigue       1. Paroxysmal atrial fibrillation (HCC)    2. NICM (nonischemic cardiomyopathy) ([address]    3. PVC's (premature ventricular contractions)    4. Primary hypertension    5. Pure hypercholesterolemia    6. Obesity (BMI 30.0-34.9)    7. RACHEL (obstructive sleep apnea)    8. Fatigue, unspecified type         06/16/22    ECHO ADULT COMPLETE 06/26/2022 6/26/2022    Interpretation Summary    Left Ventricle: Mildly reduced left ventricular systolic function. EF by 2D Simpsons Biplane is 50%. Left ventricle size is normal. Normal wall thickness. Mild global hypokinesis present. Right Ventricle: Right ventricle is dilated. Low normal systolic function. Left Atrium: Left atrium is moderately dilated. Right Atrium: Right atrium is dilated. Mitral Valve: Mild regurgitation. Tricuspid Valve: Mild regurgitation. The estimated RVSP is 22 mmHg. Aorta: Ao Ascending diameter is 4.1 cm. Ao Ascending Index is 1.80 cm/m2.     Signed by: Sharee Abdullahi MD on 6/26/2022  6:49 PM       Future Appointments   Date Time Provider Erin Corona   3/15/2023  2:00 PM [facility]   6/16/2023  8:20 AM Barbara Burton MD [facility] BS AMB      Patient Care Team:  Barbara Burton MD as PCP - General (Family Medicine)  Barbara Burton MD as PCP - [facility] Empaneled Provider  Sharee Abdullahi MD (Cardiovascular Disease Physician)  Mika Caldera MD (Dermatology Physician)  Melissa Larsen MD (Urology)  Myron Miles MD (Gastroenterology)  Shannon Montes MD (Surgery Physician)  Edward Alonso MD (Orthopedic Surgery)  Uri Morrison MD (Orthopedic Surgery)  Chantelle Casanova NP as Nurse Practitioner (Surgery Physician)  Hortensia Mujica MD (Hematology and Oncology)  Jose Kevin MD (Endocrinology Physician)  Hemant Esteves MD as Physician (Sleep Medicine Physician)       Cardiac History:   ECHO 9-16-15=WNL  NUKE exercise nuclear stress test 10-1-15= walked 6 minutes ,EF 64%, no EKG changes or ischemia, rare PVCs    Holter: 7/27/15. Average heart rate 71. Total beats 101,922. No pauses. Ventricular ectopics 3,993, averaging 167 per hour. Bigeminy was about half of those beats. Supraventricular ectopics 213. SVT the fasted at 4 beats and the longest at 11 beats. ROS:Cardiac complete  as above. Respiratory as above with no wheezing or hemoptysis. He denies  symptoms of unusual weight loss , fevers,  BRBPR, hematuria,  or recent stroke       Past Medical History:   Diagnosis Date    Anesthesia complication 8139    APNEA DURING HERNIA REPAIR, POST OPERATIVE INTUBATION AT [facility]    Arthritis     Basal cell carcinoma (BCC) in situ of skin     Chronic myocardial ischemia 7/1/2022    Colon ulcer, aphthous 4/1/2010    COPD, mild ([address]     Does not see anyone for this    Environmental and seasonal allergies     Essential tremor     Fatty liver 06/2019    Frequent episodes of bronchitis 2018    Frequent PVCs     Has been checked by cardiology    GERD (gastroesophageal reflux disease) 4/1/2010    High cholesterol 4/1/2010    HTN (hypertension), benign 04/01/2010    Hx MRSA infection 2013    CYSTS BL LEGS    Sleep apnea     CPAP      Exam and Labs:  Visit Vitals  /69   Pulse 80   Temp 97 °F (36.1 °C)   Resp 16   Ht 5' 10\" (1.778 m)   Wt 242 lb (109.8 kg)   SpO2 95%   BMI 34.72 kg/m²     Constitutional:  NAD, comfortable , moist mucous membranesHENT: Head: NC,ATEyes: No scleral icterus. Neck:  Neck supple. No JVD present.  No tracheal deviation,mass  Chest: Effort normal & normal respiratory excursion Lungs:breath sounds normal. No stridor. distress, wheezes or  Rales. Heart: regular rhythm, , normal S1, S2, no murmurs, rubs, clicks or gallops , PMI non displaced. Edema: Edema is none. Extremities:  no clubbing or cyanosis. Abdominal:  no abnormal distension. Neurological: alert, conversant and oriented . Skin: Skin is not cold. No obvious systemic rash noted. Not diaphoretic. No erythema. Psychiatric:  Grossly normal mood and affect. Behavior appears normal.     Lab Results   Component Value Date/Time    Cholesterol, total 116 11/10/2022 09:15 AM    HDL Cholesterol 35 11/10/2022 09:15 AM    LDL, calculated 60 11/10/2022 09:15 AM    Triglyceride 105 11/10/2022 09:15 AM    CHOL/HDL Ratio 3.3 11/10/2022 09:15 AM     Lab Results   Component Value Date/Time    Sodium 139 02/08/2023 12:05 PM    Potassium 4.2 02/08/2023 12:05 PM    Chloride 105 02/08/2023 12:05 PM    CO2 29 02/08/2023 12:05 PM    Anion gap 5 02/08/2023 12:05 PM    Glucose 100 02/08/2023 12:05 PM    BUN 17 02/08/2023 12:05 PM    Creatinine 1.25 02/08/2023 12:05 PM    BUN/Creatinine ratio 14 02/08/2023 12:05 PM    GFR est AA >60 06/27/2022 10:00 AM    GFR est non-AA >60 06/27/2022 10:00 AM    Calcium 9.1 02/08/2023 12:05 PM      Wt Readings from Last 3 Encounters:   03/08/23 242 lb (109.8 kg)   02/17/23 243 lb (110.2 kg)   02/16/23 244 lb (110.7 kg)      BP Readings from Last 3 Encounters:   03/08/23 119/69   02/17/23 97/62   02/16/23 125/80        Current Outpatient Medications   Medication Sig    ramipriL (ALTACE) 10 mg capsule Take 1 capsule by mouth twice daily    hydroCHLOROthiazide (HYDRODIURIL) 12.5 mg tablet Take 1 tablet by mouth once daily    apixaban (ELIQUIS) 5 mg tablet Take 1 Tablet by mouth two (2) times a day.     rosuvastatin (CRESTOR) 10 mg tablet Take 1 tablet by mouth nightly    omeprazole (PRILOSEC) 20 mg capsule Take 1 capsule by mouth once daily    diclofenac (VOLTAREN) 1 % gel Apply 1 g to affected area daily as needed for Pain. tiZANidine (ZANAFLEX) 4 mg tablet Take 1 Tablet by mouth three (3) times daily as needed for Muscle Spasm(s). cyanocobalamin 1,000 mcg tablet Take 1,000 mcg by mouth daily. allopurinoL (ZYLOPRIM) 100 mg tablet Take 1 Tablet by mouth daily. metoprolol succinate (TOPROL-XL) 100 mg tablet Take 1 Tablet by mouth daily. fluticasone propionate (FLONASE) 50 mcg/actuation nasal spray Use 2 spray(s) in each nostril once daily    albuterol (PROVENTIL HFA, VENTOLIN HFA, PROAIR HFA) 90 mcg/actuation inhaler     albuterol (PROVENTIL VENTOLIN) 2.5 mg /3 mL (0.083 %) nebu Take 3 mL by inhalation every four (4) hours as needed (cough/wheezing). cholecalciferol, VITAMIN D3, (VITAMIN D3) 5,000 unit tab tablet Take 1,000 Units by mouth every morning. cetirizine (ZYRTEC) 10 mg tablet Take 10 mg by mouth every morning. No current facility-administered medications for this visit. Past Surgical History:   Procedure Laterality Date    COLONOSCOPY N/A 4/2/2019    performed by Otoniel Garduno MD at [address] Right     2nd digit    HX APPENDECTOMY  1959    HX CATARACT REMOVAL Bilateral     HX CERVICAL FUSION N/A 1992    HX HERNIA REPAIR  2001    HX KNEE ARTHROSCOPY Left 1994    HX KNEE REPLACEMENT Bilateral 10/2017    HX MENISCECTOMY Right 01/06/2017    HX TONSIL AND ADENOIDECTOMY        Social Hx=  reports that he quit smoking about 23 years ago. His smoking use included cigarettes. He has a 105.00 pack-year smoking history. He has never used smokeless tobacco. He reports current alcohol use of about 21.0 standard drinks per week. He reports that he does not use drugs. Family Hx= family history includes Cancer in his brother and brother; Dementia in his mother; Heart Disease in his father and mother; Hypertension in his mother; Lung Disease in his father; No Known Problems in his brother. Impression see above.

## 2023-03-08 NOTE — PROGRESS NOTES
1. \"Have you been to the ER, urgent care clinic since your last visit? Hospitalized since your last visit? \" No    2. \"Have you seen or consulted any other health care providers outside of the [address] [place] [address] since your last visit? \" No     3. For patients aged 39-70: Has the patient had a colonoscopy / FIT/ Cologuard? Yes - no Care Gap present      If the patient is female:    4. For patients aged 41-77: Has the patient had a mammogram within the past 2 years? NA - based on age or sex      11. For patients aged 21-65: Has the patient had a pap smear?  NA - based on age or sex    Health Maintenance Due   Topic Date Due    COVID-19 Vaccine (3 - Booster for Moderna series) 07/28/2021

## 2023-03-08 NOTE — Clinical Note
3/31/2023    Patient: Sarah Lemon   YOB: 1950   Date of Visit: 3/8/2023     Olivia Gomez MD  [address] [address]  Via In Basket    Dear Olivia Gomez MD,      Thank you for referring Mr. Sara Bates to [address]  for evaluation. My notes for this consultation are attached. If you have questions, please do not hesitate to call me. I look forward to following your patient along with you.       Sincerely,    Kary Felix MD

## 2023-03-15 ENCOUNTER — HOSPITAL ENCOUNTER (OUTPATIENT)
Dept: CT IMAGING | Age: 73
Discharge: HOME | End: 2023-03-15
Attending: FAMILY MEDICINE
Payer: MEDICARE

## 2023-03-15 VITALS — HEIGHT: 70 IN | BODY MASS INDEX: 34.36 KG/M2 | WEIGHT: 240 LBS

## 2023-03-15 DIAGNOSIS — Z87.891 PERSONAL HISTORY OF TOBACCO USE, PRESENTING HAZARDS TO HEALTH: ICD-10-CM

## 2023-03-15 PROCEDURE — 71271 CT THORAX LUNG CANCER SCR C-: CPT

## 2023-03-30 RX ORDER — FLUTICASONE PROPIONATE 50 MCG
SPRAY, SUSPENSION (ML) NASAL
Qty: 16 G | Refills: 0 | Status: SHIPPED | OUTPATIENT
Start: 2023-03-30

## 2023-04-05 ENCOUNTER — OFFICE VISIT (OUTPATIENT)
Dept: SLEEP MEDICINE | Age: 73
End: 2023-04-05
Payer: MEDICARE

## 2023-04-05 ENCOUNTER — DOCUMENTATION ONLY (OUTPATIENT)
Dept: SLEEP MEDICINE | Age: 73
End: 2023-04-05

## 2023-04-05 PROCEDURE — G8432 DEP SCR NOT DOC, RNG: HCPCS | Performed by: SPECIALIST

## 2023-04-05 PROCEDURE — G8427 DOCREV CUR MEDS BY ELIG CLIN: HCPCS | Performed by: SPECIALIST

## 2023-04-05 PROCEDURE — G8536 NO DOC ELDER MAL SCRN: HCPCS | Performed by: SPECIALIST

## 2023-04-05 PROCEDURE — 1101F PT FALLS ASSESS-DOCD LE1/YR: CPT | Performed by: SPECIALIST

## 2023-04-05 PROCEDURE — 3017F COLORECTAL CA SCREEN DOC REV: CPT | Performed by: SPECIALIST

## 2023-04-05 PROCEDURE — 99213 OFFICE O/P EST LOW 20 MIN: CPT | Performed by: SPECIALIST

## 2023-04-05 PROCEDURE — 3074F SYST BP LT 130 MM HG: CPT | Performed by: SPECIALIST

## 2023-04-05 PROCEDURE — 1123F ACP DISCUSS/DSCN MKR DOCD: CPT | Performed by: SPECIALIST

## 2023-04-05 PROCEDURE — 3078F DIAST BP <80 MM HG: CPT | Performed by: SPECIALIST

## 2023-04-05 PROCEDURE — G8417 CALC BMI ABV UP PARAM F/U: HCPCS | Performed by: SPECIALIST

## 2023-04-05 NOTE — PROGRESS NOTES
[address] [facility]. [place], [facility]  Tel.  [phone]  Fax. [address] [facility] [address]  [place], [address] [facility]  Tel.  [phone]  Fax. [phone] [address] [facility] [address]   Tel.  [phone]  Fax. [phone]         Chief Complaint       Chief Complaint   Patient presents with    Sleep Problem     1st adh         STEVE Angeles is a 81 y.o. male seen for follow-up. He was [facility] at [facility] with a sleep study which demonstrated obstructive sleep apnea characterized by an AHI of 19.2 per hour associated with arterial desaturations to 78%. Events were more prominent in REM sleep with the REM-related AHI of 82.6 per hour. During a second study,  CPAP  of 6 cm associated with AHI 0.4 per hour and SaO2 91%. Due to a number of respiratory-related arousals, CPAP initiated at 8 cm. CPAP currently 9 cm. Compliance data downloaded and reviewed in detail with the patient today. During the past 30 days, CPAP used during 30 days with the average daily use of 8.35 hours. CMS compliance criteria 100%. AHI 2 per hour. Notes having issues with current CPAP mask. Is not experiencing excessive daytime sleepiness. May nap in the afternoon when circumstances permit. [place] Sleepiness Scale: 6        Allergies   Allergen Reactions    Chlorhexidine Towelette Rash       Current Outpatient Medications   Medication Sig Dispense Refill    fluticasone propionate (FLONASE) 50 mcg/actuation nasal spray Use 2 spray(s) in each nostril once daily 16 g 0    ramipriL (ALTACE) 10 mg capsule Take 1 capsule by mouth twice daily 180 Capsule 1    hydroCHLOROthiazide (HYDRODIURIL) 12.5 mg tablet Take 1 tablet by mouth once daily 90 Tablet 1    apixaban (ELIQUIS) 5 mg tablet Take 1 Tablet by mouth two (2) times a day.  180 Tablet 3    rosuvastatin (CRESTOR) 10 mg tablet Take 1 tablet by mouth nightly 90 Tablet 1    omeprazole (PRILOSEC) 20 mg capsule Take 1 capsule by mouth once daily 90 Capsule 1    diclofenac (VOLTAREN) 1 % gel Apply 1 g to affected area daily as needed for Pain. 200 g 1    tiZANidine (ZANAFLEX) 4 mg tablet Take 1 Tablet by mouth three (3) times daily as needed for Muscle Spasm(s). 60 Tablet 1    cyanocobalamin 1,000 mcg tablet Take 1 Tablet by mouth daily. allopurinoL (ZYLOPRIM) 100 mg tablet Take 1 Tablet by mouth daily. 90 Tablet 1    metoprolol succinate (TOPROL-XL) 100 mg tablet Take 1 Tablet by mouth daily. 90 Tablet 3    albuterol (PROVENTIL HFA, VENTOLIN HFA, PROAIR HFA) 90 mcg/actuation inhaler       albuterol (PROVENTIL VENTOLIN) 2.5 mg /3 mL (0.083 %) nebu Take 3 mL by inhalation every four (4) hours as needed (cough/wheezing). 50 Nebule 4    cholecalciferol, VITAMIN D3, (VITAMIN D3) 5,000 unit tab tablet Take 1,000 Units by mouth every morning. cetirizine (ZYRTEC) 10 mg tablet Take 1 Tablet by mouth every morning. He  has a past medical history of Anesthesia complication (0124), Arthritis, Basal cell carcinoma (BCC) in situ of skin, Chronic myocardial ischemia (7/1/2022), Colon ulcer, aphthous (4/1/2010), COPD, mild ([address] Environmental and seasonal allergies, Essential tremor, Fatty liver (06/2019), Frequent episodes of bronchitis (2018), Frequent PVCs, GERD (gastroesophageal reflux disease) (4/1/2010), High cholesterol (4/1/2010), HTN (hypertension), benign (04/01/2010), MRSA infection (2013), and Sleep apnea. He has no past medical history of Diabetes ([address] Heart failure ([address] or History of abuse. He  has a past surgical history that includes hx appendectomy (1959); hx tonsil and adenoidectomy; hx knee replacement (Bilateral, 10/2017); colonoscopy (N/A, 4/2/2019); hx knee arthroscopy (Left, 1994); hx meniscectomy (Right, 01/06/2017); hx cervical fusion (N/A, 1992); hx hernia repair (2001); hx amputation finger (Right); and hx cataract removal (Bilateral).     He family history includes Cancer in his brother and brother; Dementia in his mother; Heart Disease in his father and mother; Hypertension in his mother; Lung Disease in his father; No Known Problems in his brother. He  reports that he quit smoking about 23 years ago. His smoking use included cigarettes. He has a 105.00 pack-year smoking history. He has never used smokeless tobacco. He reports current alcohol use of about 21.0 standard drinks per week. He reports that he does not use drugs. Review of Systems:  Unchanged per patient      Objective:   Visit Vitals  /73 (BP 1 Location: Left upper arm, BP Patient Position: Sitting, BP Cuff Size: Adult)   Pulse 62   Ht 5' 10\" (1.778 m)   Wt 243 lb 11.2 oz (110.5 kg)   SpO2 96%   BMI 34.97 kg/m²     Body mass index is 34.97 kg/m². General:   Conversant, cooperative                       CVS:  Normal rate, regular rhythm        Neuro:  Speech fluent, face symmetrical             Assessment:       ICD-10-CM ICD-9-CM    1. RACHEL (obstructive sleep apnea)  G47.33 327.23           he is compliant with PAP therapy and PAP continues to benefit patient and remains necessary for control of his sleep apnea. CPAP clinic: Standard N20 headgear    Plan:   No orders of the defined types were placed in this encounter. *A copy of compliance data was provided to the patient and reviewed in detail. *CPAP will be continued at the above pressure settings. The patient is to contact the office if there are problems with either mask or pressure settings. Follow-up will be scheduled at which time compliance data will be reviewed. * Patient has a history and examination consistent with the diagnosis of sleep apnea. * He was provided information on sleep apnea including corresponding risk factors and the importance of proper treatment. * Treatment options if indicated were reviewed today.    * Potential benefit of weight reduction       Samina Davenport MD, [facility]  Electronically signed 04/05/23        This note was created using voice recognition software. Despite editing, there may be syntax errors. This note will not be viewable in [address]

## 2023-04-27 ENCOUNTER — PATIENT MESSAGE (OUTPATIENT)
Dept: FAMILY MEDICINE CLINIC | Age: 73
End: 2023-04-27

## 2023-04-27 RX ORDER — PREDNISONE 20 MG/1
20 TABLET ORAL
Qty: 5 TABLET | Refills: 0 | Status: SHIPPED | OUTPATIENT
Start: 2023-04-27 | End: 2023-05-02

## 2023-04-27 NOTE — TELEPHONE ENCOUNTER
From: Dejah Tipton  To:  Yanet Toledo MD  Sent: 4/27/2023 3:02 PM EDT  Subject: back pain    would like to get prednisone like before if possible thank you

## 2023-05-03 ENCOUNTER — OFFICE VISIT (OUTPATIENT)
Dept: CARDIOLOGY CLINIC | Facility: CLINIC | Age: 73
End: 2023-05-03

## 2023-05-03 VITALS
SYSTOLIC BLOOD PRESSURE: 110 MMHG | WEIGHT: 246 LBS | TEMPERATURE: 98 F | BODY MASS INDEX: 35.22 KG/M2 | RESPIRATION RATE: 18 BRPM | DIASTOLIC BLOOD PRESSURE: 68 MMHG | HEIGHT: 70 IN | HEART RATE: 104 BPM

## 2023-05-03 DIAGNOSIS — I48.0 PAROXYSMAL ATRIAL FIBRILLATION (HCC): ICD-10-CM

## 2023-05-03 DIAGNOSIS — R53.83 FATIGUE, UNSPECIFIED TYPE: ICD-10-CM

## 2023-05-03 DIAGNOSIS — E78.00 PURE HYPERCHOLESTEROLEMIA: ICD-10-CM

## 2023-05-03 DIAGNOSIS — E66.9 OBESITY (BMI 30.0-34.9): ICD-10-CM

## 2023-05-03 DIAGNOSIS — I10 PRIMARY HYPERTENSION: ICD-10-CM

## 2023-05-03 DIAGNOSIS — G47.33 OSA (OBSTRUCTIVE SLEEP APNEA): ICD-10-CM

## 2023-05-03 DIAGNOSIS — I42.8 NICM (NONISCHEMIC CARDIOMYOPATHY) (HCC): Primary | ICD-10-CM

## 2023-05-03 RX ORDER — AMIODARONE HYDROCHLORIDE 200 MG/1
TABLET ORAL
Qty: 104 TABLET | Refills: 3 | Status: SHIPPED | OUTPATIENT
Start: 2023-05-03

## 2023-05-09 DIAGNOSIS — I10 ESSENTIAL (PRIMARY) HYPERTENSION: ICD-10-CM

## 2023-05-09 DIAGNOSIS — I10 PRIMARY HYPERTENSION: ICD-10-CM

## 2023-05-09 DIAGNOSIS — I42.8 NICM (NONISCHEMIC CARDIOMYOPATHY) (HCC): Primary | ICD-10-CM

## 2023-05-09 DIAGNOSIS — I10 ESSENTIAL (PRIMARY) HYPERTENSION: Primary | ICD-10-CM

## 2023-05-09 DIAGNOSIS — I48.0 PAROXYSMAL ATRIAL FIBRILLATION (HCC): ICD-10-CM

## 2023-05-09 DIAGNOSIS — I48.0 PAROXYSMAL ATRIAL FIBRILLATION (HCC): Primary | ICD-10-CM

## 2023-06-16 PROBLEM — Z79.899 LONG TERM CURRENT USE OF AMIODARONE: Status: ACTIVE | Noted: 2023-06-16

## 2023-06-26 RX ORDER — ROSUVASTATIN CALCIUM 10 MG/1
TABLET, COATED ORAL
Qty: 90 TABLET | Refills: 1 | Status: SHIPPED | OUTPATIENT
Start: 2023-06-26

## 2023-06-26 RX ORDER — OMEPRAZOLE 20 MG/1
CAPSULE, DELAYED RELEASE ORAL
Qty: 90 CAPSULE | Refills: 1 | Status: SHIPPED | OUTPATIENT
Start: 2023-06-26

## 2023-06-30 RX ORDER — METOPROLOL SUCCINATE 100 MG/1
TABLET, EXTENDED RELEASE ORAL
Qty: 90 TABLET | Refills: 3 | Status: SHIPPED | OUTPATIENT
Start: 2023-06-30

## 2023-07-08 PROBLEM — I42.8 NICM (NONISCHEMIC CARDIOMYOPATHY) (HCC): Status: ACTIVE | Noted: 2023-07-08

## 2023-07-12 ENCOUNTER — OFFICE VISIT (OUTPATIENT)
Age: 73
End: 2023-07-12
Payer: MEDICARE

## 2023-07-12 VITALS
DIASTOLIC BLOOD PRESSURE: 68 MMHG | WEIGHT: 250 LBS | HEART RATE: 52 BPM | BODY MASS INDEX: 35.79 KG/M2 | OXYGEN SATURATION: 95 % | SYSTOLIC BLOOD PRESSURE: 119 MMHG | HEIGHT: 70 IN | RESPIRATION RATE: 20 BRPM | TEMPERATURE: 98.4 F

## 2023-07-12 DIAGNOSIS — E66.01 SEVERE OBESITY (BMI 35.0-39.9) WITH COMORBIDITY (HCC): ICD-10-CM

## 2023-07-12 DIAGNOSIS — Z99.89 OBSTRUCTIVE SLEEP APNEA ON CPAP: ICD-10-CM

## 2023-07-12 DIAGNOSIS — R53.83 OTHER FATIGUE: ICD-10-CM

## 2023-07-12 DIAGNOSIS — I10 PRIMARY HYPERTENSION: ICD-10-CM

## 2023-07-12 DIAGNOSIS — I42.8 NICM (NONISCHEMIC CARDIOMYOPATHY) (HCC): ICD-10-CM

## 2023-07-12 DIAGNOSIS — I48.19 PERSISTENT ATRIAL FIBRILLATION (HCC): Primary | ICD-10-CM

## 2023-07-12 DIAGNOSIS — G47.33 OBSTRUCTIVE SLEEP APNEA ON CPAP: ICD-10-CM

## 2023-07-12 DIAGNOSIS — E78.2 MIXED HYPERLIPIDEMIA: ICD-10-CM

## 2023-07-12 PROCEDURE — 93000 ELECTROCARDIOGRAM COMPLETE: CPT

## 2023-07-12 PROCEDURE — 1123F ACP DISCUSS/DSCN MKR DOCD: CPT

## 2023-07-12 PROCEDURE — 99214 OFFICE O/P EST MOD 30 MIN: CPT

## 2023-07-12 PROCEDURE — 3017F COLORECTAL CA SCREEN DOC REV: CPT

## 2023-07-12 PROCEDURE — G8417 CALC BMI ABV UP PARAM F/U: HCPCS

## 2023-07-12 PROCEDURE — 3078F DIAST BP <80 MM HG: CPT

## 2023-07-12 PROCEDURE — G8427 DOCREV CUR MEDS BY ELIG CLIN: HCPCS

## 2023-07-12 PROCEDURE — 1036F TOBACCO NON-USER: CPT

## 2023-07-12 PROCEDURE — 3074F SYST BP LT 130 MM HG: CPT

## 2023-07-12 NOTE — PROGRESS NOTES
Chief Complaint   Patient presents with    Follow-up     1. \"Have you been to the ER, urgent care clinic since your last visit? Hospitalized since your last visit? \" NO    2. \"Have you seen or consulted any other health care providers outside of the [address] [place] [address] since your last visit? \" No     3. For patients aged 43-73: Has the patient had a colonoscopy / FIT/ Cologuard? Yes      If the patient is female:    4. For patients aged 43-66: Has the patient had a mammogram within the past 2 years? N/A      5. For patients aged 21-65: Has the patient had a pap smear?  N/A    Health Maintenance Due   Topic Date Due    AAA screen  Never done    COVID-19 Vaccine (3 - Booster for Moderna series) 07/28/2021

## 2023-07-12 NOTE — PATIENT INSTRUCTIONS
Cut your metoprolol in half, 50mg, 2 x a day    Talk to PCP about your tremors    Your [employer] username is  [email or web address]  [employer]

## 2023-09-05 NOTE — PROGRESS NOTES
Dulcie Habermann      1950    Primary Cardiologist:  Corky Zelaya MD   Elba Has, APRN - NP    Date of Visit-2023         [facility],  PCP=Hay Moreno MD       [facility] [place]. HPI:   Dulcie Habermann is  a 81 y.o.  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 Took 10 days of prednisone. EKG today is AF w RVR rate 126   Weight remains high. Pt does feel tired. Will consider taking 50mg Toprol BID to try to decrease fatigue   22 Cath Normal    Remains fatigued. Has a sedentary lifestyle. Discussed need for exercise. Stable BYRNE. States no motivation since his dog  last year. Will spilt is Toprol to help with fatigue. Today. .. Stable BYRNE. Split Toprol did not decrease his fatigue. Assessment/Plans:  Patient Instructions   Decrease your Toprol to 50mg at night. 1. Persistent Atrial fibrillation    Initially had shortness of breath palpitations and chest pain. On the beta-blocker   He is still taking NOAC with no bleeding issues. He can stop aspirin. EF is 50%. [id number]  score for CVA risk is 2 ( age , HTN)   hx of GERD but no GI bleed   IVANNA/DCCV 23-back to Atrial fibrillation    Needs weight loss   Would prefer to wait on ablation at this time.   CMP and CBC ok, TSH

## 2023-09-13 ENCOUNTER — OFFICE VISIT (OUTPATIENT)
Age: 73
End: 2023-09-13
Payer: MEDICARE

## 2023-09-13 VITALS
SYSTOLIC BLOOD PRESSURE: 122 MMHG | HEIGHT: 70 IN | HEART RATE: 51 BPM | BODY MASS INDEX: 35.65 KG/M2 | DIASTOLIC BLOOD PRESSURE: 70 MMHG | TEMPERATURE: 98.4 F | RESPIRATION RATE: 19 BRPM | WEIGHT: 249 LBS | OXYGEN SATURATION: 95 %

## 2023-09-13 DIAGNOSIS — R25.1 TREMOR: ICD-10-CM

## 2023-09-13 DIAGNOSIS — E66.01 SEVERE OBESITY (BMI 35.0-39.9) WITH COMORBIDITY (HCC): ICD-10-CM

## 2023-09-13 DIAGNOSIS — R06.02 SHORTNESS OF BREATH: ICD-10-CM

## 2023-09-13 DIAGNOSIS — E78.2 MIXED HYPERLIPIDEMIA: ICD-10-CM

## 2023-09-13 DIAGNOSIS — I49.3 PVC'S (PREMATURE VENTRICULAR CONTRACTIONS): ICD-10-CM

## 2023-09-13 DIAGNOSIS — I42.8 NICM (NONISCHEMIC CARDIOMYOPATHY) (HCC): ICD-10-CM

## 2023-09-13 DIAGNOSIS — I10 ESSENTIAL (PRIMARY) HYPERTENSION: ICD-10-CM

## 2023-09-13 DIAGNOSIS — G47.33 OBSTRUCTIVE SLEEP APNEA (ADULT) (PEDIATRIC): ICD-10-CM

## 2023-09-13 DIAGNOSIS — I48.0 PAROXYSMAL ATRIAL FIBRILLATION (HCC): ICD-10-CM

## 2023-09-13 DIAGNOSIS — I48.19 PERSISTENT ATRIAL FIBRILLATION (HCC): Primary | ICD-10-CM

## 2023-09-13 DIAGNOSIS — R53.83 OTHER FATIGUE: ICD-10-CM

## 2023-09-13 PROCEDURE — G8427 DOCREV CUR MEDS BY ELIG CLIN: HCPCS

## 2023-09-13 PROCEDURE — 3078F DIAST BP <80 MM HG: CPT

## 2023-09-13 PROCEDURE — G8417 CALC BMI ABV UP PARAM F/U: HCPCS

## 2023-09-13 PROCEDURE — 1036F TOBACCO NON-USER: CPT

## 2023-09-13 PROCEDURE — 3017F COLORECTAL CA SCREEN DOC REV: CPT

## 2023-09-13 PROCEDURE — 99214 OFFICE O/P EST MOD 30 MIN: CPT

## 2023-09-13 PROCEDURE — 1123F ACP DISCUSS/DSCN MKR DOCD: CPT

## 2023-09-13 PROCEDURE — 3074F SYST BP LT 130 MM HG: CPT

## 2023-09-13 RX ORDER — AMIODARONE HYDROCHLORIDE 200 MG/1
200 TABLET ORAL DAILY
Qty: 90 TABLET | Refills: 2 | Status: SHIPPED | OUTPATIENT
Start: 2023-09-13

## 2023-09-13 RX ORDER — METOPROLOL SUCCINATE 50 MG/1
50 TABLET, EXTENDED RELEASE ORAL DAILY
Qty: 90 TABLET | Refills: 3 | Status: SHIPPED
Start: 2023-09-13

## 2023-09-25 RX ORDER — ALLOPURINOL 100 MG/1
100 TABLET ORAL DAILY
Qty: 90 TABLET | Refills: 0 | Status: SHIPPED | OUTPATIENT
Start: 2023-09-25

## 2023-09-25 RX ORDER — RAMIPRIL 10 MG/1
CAPSULE ORAL
Qty: 180 CAPSULE | Refills: 0 | Status: SHIPPED | OUTPATIENT
Start: 2023-09-25

## 2023-09-25 RX ORDER — HYDROCHLOROTHIAZIDE 12.5 MG/1
TABLET ORAL
Qty: 90 TABLET | Refills: 0 | Status: SHIPPED | OUTPATIENT
Start: 2023-09-25

## 2023-09-25 RX ORDER — FLUTICASONE PROPIONATE 50 MCG
SPRAY, SUSPENSION (ML) NASAL
Qty: 16 G | Refills: 0 | Status: SHIPPED | OUTPATIENT
Start: 2023-09-25

## 2023-10-31 ENCOUNTER — OFFICE VISIT (OUTPATIENT)
Facility: CLINIC | Age: 73
End: 2023-10-31
Payer: MEDICARE

## 2023-10-31 VITALS
SYSTOLIC BLOOD PRESSURE: 124 MMHG | HEIGHT: 70 IN | TEMPERATURE: 97.2 F | DIASTOLIC BLOOD PRESSURE: 60 MMHG | BODY MASS INDEX: 35.33 KG/M2 | HEART RATE: 59 BPM | WEIGHT: 246.8 LBS | RESPIRATION RATE: 18 BRPM

## 2023-10-31 DIAGNOSIS — J45.20 MILD INTERMITTENT ASTHMA, UNSPECIFIED WHETHER COMPLICATED: ICD-10-CM

## 2023-10-31 DIAGNOSIS — K21.9 GASTROESOPHAGEAL REFLUX DISEASE WITHOUT ESOPHAGITIS: ICD-10-CM

## 2023-10-31 DIAGNOSIS — E55.9 VITAMIN D DEFICIENCY: ICD-10-CM

## 2023-10-31 DIAGNOSIS — M51.36 DDD (DEGENERATIVE DISC DISEASE), LUMBAR: ICD-10-CM

## 2023-10-31 DIAGNOSIS — E78.2 MIXED HYPERLIPIDEMIA: ICD-10-CM

## 2023-10-31 DIAGNOSIS — I48.0 PAROXYSMAL ATRIAL FIBRILLATION (HCC): ICD-10-CM

## 2023-10-31 DIAGNOSIS — Z00.00 MEDICARE ANNUAL WELLNESS VISIT, SUBSEQUENT: Primary | ICD-10-CM

## 2023-10-31 DIAGNOSIS — R25.1 TREMOR: ICD-10-CM

## 2023-10-31 DIAGNOSIS — I10 PRIMARY HYPERTENSION: ICD-10-CM

## 2023-10-31 DIAGNOSIS — G47.33 OBSTRUCTIVE SLEEP APNEA ON CPAP: ICD-10-CM

## 2023-10-31 DIAGNOSIS — E53.8 B12 DEFICIENCY: ICD-10-CM

## 2023-10-31 PROCEDURE — 3078F DIAST BP <80 MM HG: CPT | Performed by: FAMILY MEDICINE

## 2023-10-31 PROCEDURE — 3074F SYST BP LT 130 MM HG: CPT | Performed by: FAMILY MEDICINE

## 2023-10-31 PROCEDURE — G8427 DOCREV CUR MEDS BY ELIG CLIN: HCPCS | Performed by: FAMILY MEDICINE

## 2023-10-31 PROCEDURE — G0008 ADMIN INFLUENZA VIRUS VAC: HCPCS | Performed by: FAMILY MEDICINE

## 2023-10-31 PROCEDURE — G8417 CALC BMI ABV UP PARAM F/U: HCPCS | Performed by: FAMILY MEDICINE

## 2023-10-31 PROCEDURE — 90694 VACC AIIV4 NO PRSRV 0.5ML IM: CPT | Performed by: FAMILY MEDICINE

## 2023-10-31 PROCEDURE — 1036F TOBACCO NON-USER: CPT | Performed by: FAMILY MEDICINE

## 2023-10-31 PROCEDURE — 99214 OFFICE O/P EST MOD 30 MIN: CPT | Performed by: FAMILY MEDICINE

## 2023-10-31 PROCEDURE — 1123F ACP DISCUSS/DSCN MKR DOCD: CPT | Performed by: FAMILY MEDICINE

## 2023-10-31 PROCEDURE — 3017F COLORECTAL CA SCREEN DOC REV: CPT | Performed by: FAMILY MEDICINE

## 2023-10-31 PROCEDURE — G8484 FLU IMMUNIZE NO ADMIN: HCPCS | Performed by: FAMILY MEDICINE

## 2023-10-31 PROCEDURE — G0439 PPPS, SUBSEQ VISIT: HCPCS | Performed by: FAMILY MEDICINE

## 2023-10-31 ASSESSMENT — PATIENT HEALTH QUESTIONNAIRE - PHQ9
SUM OF ALL RESPONSES TO PHQ QUESTIONS 1-9: 0
1. LITTLE INTEREST OR PLEASURE IN DOING THINGS: 0
SUM OF ALL RESPONSES TO PHQ QUESTIONS 1-9: 0
2. FEELING DOWN, DEPRESSED OR HOPELESS: 0
SUM OF ALL RESPONSES TO PHQ9 QUESTIONS 1 & 2: 0

## 2023-10-31 ASSESSMENT — LIFESTYLE VARIABLES
HOW OFTEN DURING THE LAST YEAR HAVE YOU HAD A FEELING OF GUILT OR REMORSE AFTER DRINKING: 0
HOW MANY STANDARD DRINKS CONTAINING ALCOHOL DO YOU HAVE ON A TYPICAL DAY: 1 OR 2
HOW OFTEN DO YOU HAVE A DRINK CONTAINING ALCOHOL: 4 OR MORE TIMES A WEEK
HOW OFTEN DURING THE LAST YEAR HAVE YOU FOUND THAT YOU WERE NOT ABLE TO STOP DRINKING ONCE YOU HAD STARTED: 0
HOW OFTEN DURING THE LAST YEAR HAVE YOU NEEDED AN ALCOHOLIC DRINK FIRST THING IN THE MORNING TO GET YOURSELF GOING AFTER A NIGHT OF HEAVY DRINKING: 0
HAS A RELATIVE, FRIEND, DOCTOR, OR ANOTHER HEALTH PROFESSIONAL EXPRESSED CONCERN ABOUT YOUR DRINKING OR SUGGESTED YOU CUT DOWN: 0
HAVE YOU OR SOMEONE ELSE BEEN INJURED AS A RESULT OF YOUR DRINKING: 0
HOW OFTEN DURING THE LAST YEAR HAVE YOU FAILED TO DO WHAT WAS NORMALLY EXPECTED FROM YOU BECAUSE OF DRINKING: 0
HOW OFTEN DURING THE LAST YEAR HAVE YOU BEEN UNABLE TO REMEMBER WHAT HAPPENED THE NIGHT BEFORE BECAUSE YOU HAD BEEN DRINKING: 0

## 2023-10-31 ASSESSMENT — ENCOUNTER SYMPTOMS
CHEST TIGHTNESS: 0
BACK PAIN: 0

## 2023-10-31 NOTE — PATIENT INSTRUCTIONS
of these orders (if applicable) as well as your Preventive Care list are included within your After Visit Summary for your review. Other Preventive Recommendations:    A preventive eye exam performed by an eye specialist is recommended every 1-2 years to screen for glaucoma; cataracts, macular degeneration, and other eye disorders. A preventive dental visit is recommended every 6 months. Try to get at least 150 minutes of exercise per week or 10,000 steps per day on a pedometer . Order or download the FREE \"Exercise & Physical Activity: Your Everyday Guide\" from [employer]. Call 7-875.192.8752 or search [employer] online. You need 2422-7954 mg of calcium and 3132-5216 IU of vitamin D per day. It is possible to meet your calcium requirement with diet alone, but a vitamin D supplement is usually necessary to meet this goal.  When exposed to the sun, use a sunscreen that protects against both UVA and UVB radiation with an SPF of 30 or greater. Reapply every 2 to 3 hours or after sweating, drying off with a towel, or swimming. Always wear a seat belt when traveling in a car. Always wear a helmet when riding a bicycle or motorcycle.

## 2023-11-01 LAB
25(OH)D3 SERPL-MCNC: 33.4 NG/ML (ref 30–100)
ALBUMIN SERPL-MCNC: 3.8 G/DL (ref 3.5–5)
ALBUMIN/GLOB SERPL: 1.3 (ref 1.1–2.2)
ALP SERPL-CCNC: 72 U/L (ref 45–117)
ALT SERPL-CCNC: 38 U/L (ref 12–78)
ANION GAP SERPL CALC-SCNC: 8 MMOL/L (ref 5–15)
AST SERPL-CCNC: 18 U/L (ref 15–37)
BILIRUB SERPL-MCNC: 0.5 MG/DL (ref 0.2–1)
BUN SERPL-MCNC: 15 MG/DL (ref 6–20)
BUN/CREAT SERPL: 13 (ref 12–20)
CALCIUM SERPL-MCNC: 8.9 MG/DL (ref 8.5–10.1)
CHLORIDE SERPL-SCNC: 105 MMOL/L (ref 97–108)
CHOLEST SERPL-MCNC: 108 MG/DL
CO2 SERPL-SCNC: 30 MMOL/L (ref 21–32)
CREAT SERPL-MCNC: 1.14 MG/DL (ref 0.7–1.3)
ERYTHROCYTE [DISTWIDTH] IN BLOOD BY AUTOMATED COUNT: 13.3 % (ref 11.5–14.5)
GLOBULIN SER CALC-MCNC: 3 G/DL (ref 2–4)
GLUCOSE SERPL-MCNC: 92 MG/DL (ref 65–100)
HCT VFR BLD AUTO: 48.3 % (ref 36.6–50.3)
HDLC SERPL-MCNC: 36 MG/DL
HDLC SERPL: 3 (ref 0–5)
HGB BLD-MCNC: 15.2 G/DL (ref 12.1–17)
LDLC SERPL CALC-MCNC: 47.4 MG/DL (ref 0–100)
MCH RBC QN AUTO: 29.6 PG (ref 26–34)
MCHC RBC AUTO-ENTMCNC: 31.5 G/DL (ref 30–36.5)
MCV RBC AUTO: 94.2 FL (ref 80–99)
NRBC # BLD: 0 K/UL (ref 0–0.01)
NRBC BLD-RTO: 0 PER 100 WBC
PLATELET # BLD AUTO: 224 K/UL (ref 150–400)
PMV BLD AUTO: 11.6 FL (ref 8.9–12.9)
POTASSIUM SERPL-SCNC: 4.5 MMOL/L (ref 3.5–5.1)
PROT SERPL-MCNC: 6.8 G/DL (ref 6.4–8.2)
RBC # BLD AUTO: 5.13 M/UL (ref 4.1–5.7)
SODIUM SERPL-SCNC: 143 MMOL/L (ref 136–145)
TRIGL SERPL-MCNC: 123 MG/DL
VIT B12 SERPL-MCNC: 500 PG/ML (ref 193–986)
VLDLC SERPL CALC-MCNC: 24.6 MG/DL
WBC # BLD AUTO: 6 K/UL (ref 4.1–11.1)

## 2023-11-15 ENCOUNTER — TELEPHONE (OUTPATIENT)
Facility: CLINIC | Age: 73
End: 2023-11-15

## 2023-11-15 DIAGNOSIS — S01.511A LIP LACERATION, INITIAL ENCOUNTER: ICD-10-CM

## 2023-11-15 DIAGNOSIS — M17.12 PRIMARY OSTEOARTHRITIS OF LEFT KNEE: ICD-10-CM

## 2023-11-15 DIAGNOSIS — M17.11 PRIMARY OSTEOARTHRITIS OF RIGHT KNEE: Primary | ICD-10-CM

## 2023-11-15 RX ORDER — DOXYCYCLINE 100 MG/1
100 CAPSULE ORAL 2 TIMES DAILY
Qty: 14 CAPSULE | Refills: 0 | Status: SHIPPED | OUTPATIENT
Start: 2023-11-15 | End: 2023-11-22

## 2023-11-15 NOTE — TELEPHONE ENCOUNTER
Pt walked in and stated a gate hit him in the mouth. Pt is up to date on his TDAP. Cut in mouth. Dr Stephenie Hatchet saw pt and is sending in an antibiotic.

## 2023-11-16 DIAGNOSIS — M17.11 PRIMARY OSTEOARTHRITIS OF RIGHT KNEE: ICD-10-CM

## 2023-11-16 DIAGNOSIS — M17.12 PRIMARY OSTEOARTHRITIS OF LEFT KNEE: ICD-10-CM

## 2023-11-16 NOTE — TELEPHONE ENCOUNTER
Antibiotic ordered for patient and refil of Medication Diclofena.     MD CESAR Cline & KATARZYNA MINAYA [facility]  11/15/23

## 2023-12-13 ENCOUNTER — OFFICE VISIT (OUTPATIENT)
Age: 73
End: 2023-12-13

## 2023-12-13 VITALS
RESPIRATION RATE: 20 BRPM | WEIGHT: 246 LBS | BODY MASS INDEX: 35.3 KG/M2 | HEART RATE: 48 BPM | TEMPERATURE: 98.3 F | DIASTOLIC BLOOD PRESSURE: 77 MMHG | OXYGEN SATURATION: 99 % | SYSTOLIC BLOOD PRESSURE: 136 MMHG

## 2023-12-13 DIAGNOSIS — E66.9 OBESITY (BMI 30-39.9): ICD-10-CM

## 2023-12-13 DIAGNOSIS — I48.0 PAROXYSMAL ATRIAL FIBRILLATION (HCC): Primary | ICD-10-CM

## 2023-12-13 DIAGNOSIS — I49.9 IRREGULAR HEART BEAT: ICD-10-CM

## 2023-12-13 DIAGNOSIS — I10 PRIMARY HYPERTENSION: ICD-10-CM

## 2023-12-13 DIAGNOSIS — I42.8 NICM (NONISCHEMIC CARDIOMYOPATHY) (HCC): ICD-10-CM

## 2023-12-13 DIAGNOSIS — E78.2 MIXED HYPERLIPIDEMIA: ICD-10-CM

## 2023-12-13 DIAGNOSIS — I49.3 PVC'S (PREMATURE VENTRICULAR CONTRACTIONS): ICD-10-CM

## 2023-12-13 RX ORDER — METOPROLOL SUCCINATE 25 MG/1
25 TABLET, EXTENDED RELEASE ORAL
Qty: 90 TABLET | Refills: 3 | Status: SHIPPED | OUTPATIENT
Start: 2023-12-13

## 2023-12-13 RX ORDER — AMIODARONE HYDROCHLORIDE 200 MG/1
200 TABLET ORAL
Qty: 60 TABLET | Refills: 3 | Status: SHIPPED | OUTPATIENT
Start: 2023-12-13

## 2023-12-13 NOTE — PROGRESS NOTES
Sabrina Pace     1950       Kennedy Peterson MD, [facility] [place] [facility]  Date of Visit-12/13/2023   PCP is Kali Joshua MD   [facility] [place]  [facility]  HPI:  Sabrina Pace is a 72 y.o. male   IVANNA/DCCV 2/16/23 5/6/23 back in afib, started amio  Mild tobar and in  afib 7/12/23 9/13/23 Toprol decrease to 50 mg  During his cardioversion in February he was feeling well. He then had to take prednisone for his back and he feels like that is what triggered his A-fib. He was back in A-fib in May. He thinks he has been in sinus rhythm for several months. He continues to take amiodarone. He still metoprolol in half and that helped ease the fatigue. He currently is now asymptomatic except for some mild fatigue  Denies chest pain, edema, syncope or shortness of breath at rest   Has no tachycardia , palpitations or sense of arrythmia     EKG- sinus bradycardia Hr 45  Marked atrial Bradycardia   P:QRS - 1:1, Abnormal P axis, H Rate 45  ABNORMAL RHYTHM        Atrial fibrillation -IVANNA/DCCV 2/16/23, started amiodarone May 2023    chest pain normal cath 6/29/2022    Hypertension    PVCs    Total knee replacement x2  Assessment/Plan:     Patient Instructions   Reduce amiodarone to 5 x week , Monday through Friday is fine. Reduce metoprolol to half tablet just at night  See Benjamín Mixon back in 4 months. See me back in May at [facility] office with an echo same day. 1. Persistent  Atrial fibrillation    Initially had shortness of breath palpitations and chest pain. On the beta-blocker, Toprol 100mg daily now decreased to 50 mg   He is still taking NOAC with no bleeding issues. Off aspirin. EF is 50%.    [id number]  score for CVA risk is 2 ( age , HTN) ,- [address]    hx of GERD but no GI bleed   IVANNA/DCCV 2/16/23-back to Atrial fibrillation in May so started amiodarone  Seems to remain in sinus rhythm sounds regular today will check EKG if so continue amiodarone but

## 2023-12-13 NOTE — PATIENT INSTRUCTIONS
Reduce amiodarone to 5 x week , Monday through Friday is fine. Reduce metoprolol to half tablet just at night  See Lolita Ashley back in 4 months. See me back in May at [facility] office with an echo same day.

## 2024-01-05 RX ORDER — HYDROCHLOROTHIAZIDE 12.5 MG/1
TABLET ORAL
Qty: 90 TABLET | Refills: 1 | Status: SHIPPED | OUTPATIENT
Start: 2024-01-05

## 2024-01-05 RX ORDER — ROSUVASTATIN CALCIUM 10 MG/1
TABLET, COATED ORAL
Qty: 90 TABLET | Refills: 1 | Status: SHIPPED | OUTPATIENT
Start: 2024-01-05

## 2024-01-05 RX ORDER — ALLOPURINOL 100 MG/1
100 TABLET ORAL DAILY
Qty: 90 TABLET | Refills: 1 | Status: SHIPPED | OUTPATIENT
Start: 2024-01-05

## 2024-01-05 RX ORDER — RAMIPRIL 10 MG/1
CAPSULE ORAL
Qty: 180 CAPSULE | Refills: 1 | Status: SHIPPED | OUTPATIENT
Start: 2024-01-05

## 2024-01-05 RX ORDER — FLUTICASONE PROPIONATE 50 MCG
SPRAY, SUSPENSION (ML) NASAL
Qty: 16 G | Refills: 1 | Status: SHIPPED | OUTPATIENT
Start: 2024-01-05

## 2024-01-05 RX ORDER — OMEPRAZOLE 20 MG/1
CAPSULE, DELAYED RELEASE ORAL
Qty: 90 CAPSULE | Refills: 1 | Status: SHIPPED | OUTPATIENT
Start: 2024-01-05

## 2024-01-08 ENCOUNTER — OFFICE VISIT (OUTPATIENT)
Facility: CLINIC | Age: 74
End: 2024-01-08
Payer: MEDICARE

## 2024-01-08 VITALS
WEIGHT: 247 LBS | HEIGHT: 70 IN | OXYGEN SATURATION: 94 % | TEMPERATURE: 98 F | BODY MASS INDEX: 35.36 KG/M2 | DIASTOLIC BLOOD PRESSURE: 82 MMHG | RESPIRATION RATE: 20 BRPM | SYSTOLIC BLOOD PRESSURE: 161 MMHG | HEART RATE: 68 BPM

## 2024-01-08 DIAGNOSIS — I48.0 PAROXYSMAL ATRIAL FIBRILLATION (HCC): ICD-10-CM

## 2024-01-08 DIAGNOSIS — I42.8 NICM (NONISCHEMIC CARDIOMYOPATHY) (HCC): ICD-10-CM

## 2024-01-08 DIAGNOSIS — E78.2 MIXED HYPERLIPIDEMIA: ICD-10-CM

## 2024-01-08 DIAGNOSIS — E66.01 SEVERE OBESITY (BMI 35.0-39.9) WITH COMORBIDITY (HCC): ICD-10-CM

## 2024-01-08 DIAGNOSIS — J40 BRONCHITIS: ICD-10-CM

## 2024-01-08 DIAGNOSIS — J45.21 MILD INTERMITTENT ASTHMA WITH ACUTE EXACERBATION: Primary | ICD-10-CM

## 2024-01-08 PROCEDURE — 1036F TOBACCO NON-USER: CPT | Performed by: FAMILY MEDICINE

## 2024-01-08 PROCEDURE — 3079F DIAST BP 80-89 MM HG: CPT | Performed by: FAMILY MEDICINE

## 2024-01-08 PROCEDURE — 3017F COLORECTAL CA SCREEN DOC REV: CPT | Performed by: FAMILY MEDICINE

## 2024-01-08 PROCEDURE — 3077F SYST BP >= 140 MM HG: CPT | Performed by: FAMILY MEDICINE

## 2024-01-08 PROCEDURE — G8427 DOCREV CUR MEDS BY ELIG CLIN: HCPCS | Performed by: FAMILY MEDICINE

## 2024-01-08 PROCEDURE — G8417 CALC BMI ABV UP PARAM F/U: HCPCS | Performed by: FAMILY MEDICINE

## 2024-01-08 PROCEDURE — 1123F ACP DISCUSS/DSCN MKR DOCD: CPT | Performed by: FAMILY MEDICINE

## 2024-01-08 PROCEDURE — 99214 OFFICE O/P EST MOD 30 MIN: CPT | Performed by: FAMILY MEDICINE

## 2024-01-08 PROCEDURE — G8484 FLU IMMUNIZE NO ADMIN: HCPCS | Performed by: FAMILY MEDICINE

## 2024-01-08 RX ORDER — FLUTICASONE PROPIONATE 44 UG/1
2 AEROSOL, METERED RESPIRATORY (INHALATION) 2 TIMES DAILY
Qty: 10.6 G | Refills: 0 | Status: SHIPPED | OUTPATIENT
Start: 2024-01-08

## 2024-01-08 RX ORDER — GUAIFENESIN AND DEXTROMETHORPHAN HYDROBROMIDE 600; 30 MG/1; MG/1
1 TABLET, EXTENDED RELEASE ORAL 2 TIMES DAILY
Qty: 30 TABLET | Refills: 0 | Status: SHIPPED | OUTPATIENT
Start: 2024-01-08

## 2024-01-08 RX ORDER — ALBUTEROL SULFATE 90 UG/1
2 AEROSOL, METERED RESPIRATORY (INHALATION) EVERY 6 HOURS PRN
Qty: 18 G | Refills: 1 | Status: SHIPPED | OUTPATIENT
Start: 2024-01-08

## 2024-01-08 RX ORDER — ALBUTEROL SULFATE 2.5 MG/3ML
2.5 SOLUTION RESPIRATORY (INHALATION) EVERY 4 HOURS PRN
Qty: 120 EACH | Refills: 1 | Status: SHIPPED | OUTPATIENT
Start: 2024-01-08

## 2024-01-08 ASSESSMENT — PATIENT HEALTH QUESTIONNAIRE - PHQ9
1. LITTLE INTEREST OR PLEASURE IN DOING THINGS: 0
SUM OF ALL RESPONSES TO PHQ QUESTIONS 1-9: 0
2. FEELING DOWN, DEPRESSED OR HOPELESS: 0
SUM OF ALL RESPONSES TO PHQ9 QUESTIONS 1 & 2: 0
SUM OF ALL RESPONSES TO PHQ QUESTIONS 1-9: 0

## 2024-01-08 ASSESSMENT — ENCOUNTER SYMPTOMS
CHEST TIGHTNESS: 0
COUGH: 1
WHEEZING: 1

## 2024-01-31 ENCOUNTER — OFFICE VISIT (OUTPATIENT)
Facility: CLINIC | Age: 74
End: 2024-01-31
Payer: MEDICARE

## 2024-01-31 VITALS
HEART RATE: 58 BPM | HEIGHT: 70 IN | RESPIRATION RATE: 18 BRPM | TEMPERATURE: 98.3 F | DIASTOLIC BLOOD PRESSURE: 73 MMHG | BODY MASS INDEX: 35.79 KG/M2 | OXYGEN SATURATION: 98 % | WEIGHT: 250 LBS | SYSTOLIC BLOOD PRESSURE: 137 MMHG

## 2024-01-31 DIAGNOSIS — G47.33 OBSTRUCTIVE SLEEP APNEA ON CPAP: ICD-10-CM

## 2024-01-31 DIAGNOSIS — K21.9 GASTROESOPHAGEAL REFLUX DISEASE WITHOUT ESOPHAGITIS: ICD-10-CM

## 2024-01-31 DIAGNOSIS — E66.01 SEVERE OBESITY (BMI 35.0-39.9) WITH COMORBIDITY (HCC): ICD-10-CM

## 2024-01-31 DIAGNOSIS — E55.9 VITAMIN D DEFICIENCY: ICD-10-CM

## 2024-01-31 DIAGNOSIS — I48.0 PAROXYSMAL ATRIAL FIBRILLATION (HCC): Primary | ICD-10-CM

## 2024-01-31 DIAGNOSIS — I42.8 NICM (NONISCHEMIC CARDIOMYOPATHY) (HCC): ICD-10-CM

## 2024-01-31 DIAGNOSIS — J45.20 MILD INTERMITTENT ASTHMA, UNSPECIFIED WHETHER COMPLICATED: ICD-10-CM

## 2024-01-31 DIAGNOSIS — M17.11 PRIMARY OSTEOARTHRITIS OF RIGHT KNEE: ICD-10-CM

## 2024-01-31 DIAGNOSIS — M17.12 PRIMARY OSTEOARTHRITIS OF LEFT KNEE: ICD-10-CM

## 2024-01-31 DIAGNOSIS — E53.8 B12 DEFICIENCY: ICD-10-CM

## 2024-01-31 DIAGNOSIS — I25.9 CHRONIC MYOCARDIAL ISCHEMIA: ICD-10-CM

## 2024-01-31 DIAGNOSIS — E78.2 MIXED HYPERLIPIDEMIA: ICD-10-CM

## 2024-01-31 DIAGNOSIS — M51.36 DDD (DEGENERATIVE DISC DISEASE), LUMBAR: ICD-10-CM

## 2024-01-31 PROCEDURE — G8427 DOCREV CUR MEDS BY ELIG CLIN: HCPCS | Performed by: FAMILY MEDICINE

## 2024-01-31 PROCEDURE — 1036F TOBACCO NON-USER: CPT | Performed by: FAMILY MEDICINE

## 2024-01-31 PROCEDURE — G8484 FLU IMMUNIZE NO ADMIN: HCPCS | Performed by: FAMILY MEDICINE

## 2024-01-31 PROCEDURE — 3017F COLORECTAL CA SCREEN DOC REV: CPT | Performed by: FAMILY MEDICINE

## 2024-01-31 PROCEDURE — 3078F DIAST BP <80 MM HG: CPT | Performed by: FAMILY MEDICINE

## 2024-01-31 PROCEDURE — 3075F SYST BP GE 130 - 139MM HG: CPT | Performed by: FAMILY MEDICINE

## 2024-01-31 PROCEDURE — G8417 CALC BMI ABV UP PARAM F/U: HCPCS | Performed by: FAMILY MEDICINE

## 2024-01-31 PROCEDURE — 1123F ACP DISCUSS/DSCN MKR DOCD: CPT | Performed by: FAMILY MEDICINE

## 2024-01-31 PROCEDURE — 99214 OFFICE O/P EST MOD 30 MIN: CPT | Performed by: FAMILY MEDICINE

## 2024-01-31 ASSESSMENT — ENCOUNTER SYMPTOMS
COUGH: 1
CHEST TIGHTNESS: 0
ABDOMINAL PAIN: 0

## 2024-01-31 NOTE — PROGRESS NOTES
[facility]    History of Present Illness:   Torito Ivy is a 73 y.o. male with history of HTN, Paroxysmal Afib, SPENCER, Asthma, DDD, Osteoarthritis    CC: Follow up   History provided by patient and Records    HPI:  Asthma: Overall well controlled, got through bronchitis flare recently.  Still having persistent mild cough, using Albuterol as needed.    Hypertension Follow up:  The patient reports:  taking medications as instructed, no medication side effects noted, no TIA's, no chest pain on exertion, no dyspnea on exertion, no swelling of ankles, no orthostatic dizziness or lightheadedness, no orthopnea or paroxysmal nocturnal dyspnea.     BP Readings from Last 3 Encounters:   01/31/24 137/73   01/08/24 (!) 161/82   12/13/23 136/77      Atrial Fibrillation Review: Patient is overall doing well.  Denies chest pain, edema, dyspnea or dizziness. Patient is not aware of irregular heartbeats. Patient denies neurologic sx. Currently he avoids caffeine.     Risk Factor Modification:  Patient avoids excessive caffeine, Alcohol use, Tobacco products, and OTC cough suppressants.  Stays hydrated  Current Medications: amiodarone  Currently anticoagulated: Yes  Most recent Echocardiogram: 2/16/23  Current Cardiologist: Torrie     Gastroesophageal Reflux:  Current control of Symptoms: Stable  Primary symptoms: heartburn  Hiatal Hernia: No  Current Medications: Prilosec  The patient has no history melena or bright red blood in the stools.  The patient avoids high dose aspirin and NSAID therapy.  The patient is aware of diet changes needed, elevating the head of the bed and appropriate use of antacids.    Hypertriglyceridemia Follow up:   Cardiovascular risks for him are: hypertension  hyperlipidemia.   Current Medications:  rosuvastatin - 10 MG    Compliance: Yes   Myalgias: No   Fatigue: No   Other side effects: No     Wt Readings from Last 3 Encounters:   01/31/24 113.4 kg (250 lb)   01/08/24 112 kg

## 2024-01-31 NOTE — PROGRESS NOTES
Chief Complaint   Patient presents with    Follow-up Chronic Condition       \"Have you been to the ER, urgent care clinic since your last visit?  Hospitalized since your last visit?\"    NO    “Have you seen or consulted any other health care providers outside of [facility] since your last visit?”    NO               Health Maintenance Due   Topic Date Due    Respiratory Syncytial Virus (RSV) Pregnant or age 60 yrs+ (1 - 1-dose 60+ series) Never done    AAA screen  Never done    COVID-19 Vaccine (3 - 2023-24 season) 09/01/2023

## 2024-02-01 LAB
ALBUMIN SERPL-MCNC: 3.8 G/DL (ref 3.5–5)
ALBUMIN/GLOB SERPL: 1.3 (ref 1.1–2.2)
ALP SERPL-CCNC: 73 U/L (ref 45–117)
ALT SERPL-CCNC: 52 U/L (ref 12–78)
ANION GAP SERPL CALC-SCNC: 4 MMOL/L (ref 5–15)
AST SERPL-CCNC: 25 U/L (ref 15–37)
BILIRUB SERPL-MCNC: 0.5 MG/DL (ref 0.2–1)
BUN SERPL-MCNC: 14 MG/DL (ref 6–20)
BUN/CREAT SERPL: 13 (ref 12–20)
CALCIUM SERPL-MCNC: 8.6 MG/DL (ref 8.5–10.1)
CHLORIDE SERPL-SCNC: 105 MMOL/L (ref 97–108)
CHOLEST SERPL-MCNC: 126 MG/DL
CO2 SERPL-SCNC: 30 MMOL/L (ref 21–32)
CREAT SERPL-MCNC: 1.08 MG/DL (ref 0.7–1.3)
ERYTHROCYTE [DISTWIDTH] IN BLOOD BY AUTOMATED COUNT: 14.1 % (ref 11.5–14.5)
GLOBULIN SER CALC-MCNC: 3 G/DL (ref 2–4)
GLUCOSE SERPL-MCNC: 87 MG/DL (ref 65–100)
HCT VFR BLD AUTO: 47.6 % (ref 36.6–50.3)
HDLC SERPL-MCNC: 45 MG/DL
HDLC SERPL: 2.8 (ref 0–5)
HGB BLD-MCNC: 15.5 G/DL (ref 12.1–17)
LDLC SERPL CALC-MCNC: 49.4 MG/DL (ref 0–100)
MCH RBC QN AUTO: 30.7 PG (ref 26–34)
MCHC RBC AUTO-ENTMCNC: 32.6 G/DL (ref 30–36.5)
MCV RBC AUTO: 94.3 FL (ref 80–99)
NRBC # BLD: 0 K/UL (ref 0–0.01)
NRBC BLD-RTO: 0 PER 100 WBC
PLATELET # BLD AUTO: 208 K/UL (ref 150–400)
PMV BLD AUTO: 11.5 FL (ref 8.9–12.9)
POTASSIUM SERPL-SCNC: 4.6 MMOL/L (ref 3.5–5.1)
PROT SERPL-MCNC: 6.8 G/DL (ref 6.4–8.2)
RBC # BLD AUTO: 5.05 M/UL (ref 4.1–5.7)
SODIUM SERPL-SCNC: 139 MMOL/L (ref 136–145)
TRIGL SERPL-MCNC: 158 MG/DL
VIT B12 SERPL-MCNC: 441 PG/ML (ref 193–986)
VLDLC SERPL CALC-MCNC: 31.6 MG/DL
WBC # BLD AUTO: 6.2 K/UL (ref 4.1–11.1)

## 2024-02-02 LAB — 25(OH)D3 SERPL-MCNC: 37.8 NG/ML (ref 30–100)

## 2024-02-08 ENCOUNTER — TELEPHONE (OUTPATIENT)
Age: 74
End: 2024-02-08

## 2024-02-08 DIAGNOSIS — I48.0 PAROXYSMAL ATRIAL FIBRILLATION (HCC): ICD-10-CM

## 2024-02-14 ENCOUNTER — TELEPHONE (OUTPATIENT)
Age: 74
End: 2024-02-14

## 2024-02-14 DIAGNOSIS — I48.0 PAROXYSMAL ATRIAL FIBRILLATION (HCC): ICD-10-CM

## 2024-02-14 NOTE — TELEPHONE ENCOUNTER
Cameron Abel is calling because the rx application for Eiquis is still in the process of being approve and Cameron Abel would like to know should the medicine be shipped to the patient or to the doctor because it was not marked on the application.    Representative will fax over the page that needs to be signed to [phone].    [phone]

## 2024-02-20 NOTE — TELEPHONE ENCOUNTER
Checked application that [employer] sent to me with Mr. Ivy's information already completed and there is NO area to yojana for medication to be shipped to patient.     Sent Rx to [facility] per VO by MD as it is indicated on application.

## 2024-02-21 NOTE — TELEPHONE ENCOUNTER
Verified patient with two types of identifiers. Spoke to [employer] today Michelle and gave her a VO to ship medication to patient's home. She will reach out to Mr. Ivy for further information.     Updated Mr. Ivy. He was appreciative.

## 2024-03-04 ENCOUNTER — PATIENT MESSAGE (OUTPATIENT)
Age: 74
End: 2024-03-04

## 2024-03-04 ENCOUNTER — PATIENT MESSAGE (OUTPATIENT)
Facility: CLINIC | Age: 74
End: 2024-03-04

## 2024-03-04 DIAGNOSIS — Z87.891 FORMER TOBACCO USE: ICD-10-CM

## 2024-03-04 DIAGNOSIS — Z12.2 SCREENING FOR LUNG CANCER: ICD-10-CM

## 2024-03-04 DIAGNOSIS — I48.0 PAROXYSMAL ATRIAL FIBRILLATION (HCC): ICD-10-CM

## 2024-03-04 DIAGNOSIS — N28.1 CYST OF LEFT KIDNEY: Primary | ICD-10-CM

## 2024-03-04 NOTE — TELEPHONE ENCOUNTER
Per VO by MD.    Future Appointments   Date Time Provider Department Center   3/13/2024 10:00 AM Dipika Brownlee APRN - NP [facility] BS AMB   4/4/2024 11:40 AM Gil Cheney MD [facility] BS AMB   4/24/2024 10:40 AM Dipika Brownlee APRN - NP [facility] BS AMB   5/10/2024 10:00 AM [facility] ECHO 1 [facility] BS AMB   5/10/2024 11:00 AM Kennedy Arellano MD [facility] BS AMB   6/7/2024  9:00 AM [facility]

## 2024-03-05 NOTE — TELEPHONE ENCOUNTER
From: Torito Ivy  To: Dr. Jabari Moreno  Sent: 3/4/2024 8:17 AM EST  Subject: received letter from [facility] ct    that it is time to make appointment for annual lung cancer screening chest ct that is due on approximately 3/14/24. would like to have look cist on kidney check at same time an order and schedule this examination at your convenience thank you

## 2024-04-04 ENCOUNTER — CLINICAL DOCUMENTATION (OUTPATIENT)
Age: 74
End: 2024-04-04

## 2024-04-04 ENCOUNTER — OFFICE VISIT (OUTPATIENT)
Age: 74
End: 2024-04-04
Payer: MEDICARE

## 2024-04-04 VITALS
OXYGEN SATURATION: 94 % | DIASTOLIC BLOOD PRESSURE: 84 MMHG | BODY MASS INDEX: 36.08 KG/M2 | HEART RATE: 55 BPM | WEIGHT: 252 LBS | SYSTOLIC BLOOD PRESSURE: 154 MMHG | HEIGHT: 70 IN

## 2024-04-04 DIAGNOSIS — G47.33 OSA (OBSTRUCTIVE SLEEP APNEA): Primary | ICD-10-CM

## 2024-04-04 DIAGNOSIS — I10 PRIMARY HYPERTENSION: ICD-10-CM

## 2024-04-04 PROCEDURE — 3077F SYST BP >= 140 MM HG: CPT | Performed by: SPECIALIST

## 2024-04-04 PROCEDURE — G8417 CALC BMI ABV UP PARAM F/U: HCPCS | Performed by: SPECIALIST

## 2024-04-04 PROCEDURE — 3079F DIAST BP 80-89 MM HG: CPT | Performed by: SPECIALIST

## 2024-04-04 PROCEDURE — G8427 DOCREV CUR MEDS BY ELIG CLIN: HCPCS | Performed by: SPECIALIST

## 2024-04-04 PROCEDURE — 99213 OFFICE O/P EST LOW 20 MIN: CPT | Performed by: SPECIALIST

## 2024-04-04 PROCEDURE — 1036F TOBACCO NON-USER: CPT | Performed by: SPECIALIST

## 2024-04-04 PROCEDURE — 3017F COLORECTAL CA SCREEN DOC REV: CPT | Performed by: SPECIALIST

## 2024-04-04 PROCEDURE — 1123F ACP DISCUSS/DSCN MKR DOCD: CPT | Performed by: SPECIALIST

## 2024-04-04 ASSESSMENT — SLEEP AND FATIGUE QUESTIONNAIRES
ESS TOTAL SCORE: 6
HOW LIKELY ARE YOU TO NOD OFF OR FALL ASLEEP WHILE SITTING AND TALKING TO SOMEONE: WOULD NEVER DOZE
HOW LIKELY ARE YOU TO NOD OFF OR FALL ASLEEP WHILE SITTING QUIETLY AFTER LUNCH WITHOUT ALCOHOL: SLIGHT CHANCE OF DOZING
HOW LIKELY ARE YOU TO NOD OFF OR FALL ASLEEP IN A CAR, WHILE STOPPED FOR A FEW MINUTES IN TRAFFIC: WOULD NEVER DOZE
HOW LIKELY ARE YOU TO NOD OFF OR FALL ASLEEP WHEN YOU ARE A PASSENGER IN A CAR FOR AN HOUR WITHOUT A BREAK: SLIGHT CHANCE OF DOZING
HOW LIKELY ARE YOU TO NOD OFF OR FALL ASLEEP WHILE SITTING INACTIVE IN A PUBLIC PLACE: WOULD NEVER DOZE
HOW LIKELY ARE YOU TO NOD OFF OR FALL ASLEEP WHILE SITTING AND READING: SLIGHT CHANCE OF DOZING
HOW LIKELY ARE YOU TO NOD OFF OR FALL ASLEEP WHILE LYING DOWN TO REST IN THE AFTERNOON WHEN CIRCUMSTANCES PERMIT: MODERATE CHANCE OF DOZING
HOW LIKELY ARE YOU TO NOD OFF OR FALL ASLEEP WHILE WATCHING TV: SLIGHT CHANCE OF DOZING

## 2024-04-04 NOTE — PROGRESS NOTES
[id number]  [facility] [place]  [address] [address] [address]  Dept: [phone]              [address]  [place] [address]  Tel.  [phone]  Fax. [phone] [address]  [place] [address]  Tel.  [phone]  Fax. [phone] [address] [place] [address]  Tel.  [phone]  Fax. [phone]         Chief Complaint       Chief Complaint   Patient presents with    Sleep Problem     Yearly follow up         HPI        Torito Ivy is a 73 y.o. male seen for follow-up. He was evaluated at [facility] with a sleep study which demonstrated obstructive sleep apnea characterized by an AHI of 19.2  per hour associated with arterial desaturations to 78%. Events were more prominent in REM sleep with the REM-related AHI of 82.6 per hour. During a second study,  CPAP  of 6  cm associated with AHI 0.4 per hour and SaO2 91%. Due to a number of respiratory-related arousals, CPAP initiated at 8 cm. CPAP currently 9 cm.      Compliance data downloaded and reviewed in detail with the patient today. During the past 30 days, CPAP used during 30 days with the average daily use of 8.8 hours. CMS compliance criteria 100%. AHI 1.5 per hour.     Having ongoing leak related issues.    Does not fall asleep inappropriately during the day.  [place] Sleepiness Scale: 6    Allergies   Allergen Reactions    Chlorhexidine Rash       No current facility-administered medications for this visit.     He  has a past medical history of Anesthesia complication, Arthritis, Basal cell carcinoma (BCC) in situ of skin, Chronic myocardial ischemia, Colon ulcer, aphthous, COPD, mild (HCC), Environmental and seasonal allergies, Essential tremor, Fatty liver, Frequent episodes of bronchitis, Frequent PVCs, GERD (gastroesophageal reflux disease), High cholesterol, HTN (hypertension), benign, Hx MRSA infection, and Sleep

## 2024-04-15 NOTE — PROGRESS NOTES
Torito Ivy     1950        Primary Cardiologist:   Kennedy Arellano MD, [facility]  Dipika HARIS Brownlee, APRN - NP   Date of Visit-4/24/2024   PCP is Jabari Moreno MD   [facility] [place]  [facility]  HPI:  Torito Ivy is a 73 y.o. male   IVANNA/DCCV 2/16/23 5/6/23 back in afib, started amio  Mild tobar and in  afib 7/12/23 9/13/23 Toprol decrease to 50 mg  During his cardioversion in February he was feeling well.  He then had to take prednisone for his back and he feels like that is what triggered his A-fib.  He was back in A-fib in May.  He thinks he has been in sinus rhythm for several months.  He continues to take amiodarone.  He still metoprolol in half and that helped ease the fatigue.  He currently is now asymptomatic except for some mild fatigue  Denies chest pain, edema, syncope or shortness of breath at rest   Has no tachycardia , palpitations or sense of arrythmia     EKG- sinus bradycardia Hr 45  Marked atrial Bradycardia   P:QRS - 1:1, Abnormal P axis, H Rate 45  ABNORMAL RHYTHM    Atrial fibrillation -IVANNA/DCCV 2/16/23, started amiodarone May 2023    chest pain normal cath 6/29/2022    Hypertension    PVCs    Total knee replacement x2    Today...  Now with Pt assistance for Eliquis that is mailed to him. Denies SOB. 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 Arthritis.    Assessment/Plan:     There are no Patient Instructions on file for this visit.      1. Persistent  Atrial fibrillation    Initially had shortness of breath palpitations and chest pain.     On the beta-blocker, decreased due to fatigue and bradycardia   [id number]  score for CVA risk is 2 ( age , HTN)    hx of GERD but no GI bleed  He is still taking NOAC with no bleeding issues.    Off aspirin.    EF is 50%.  IVANNA/DCCV 2/16/23-back to Atrial fibrillation in May so started amiodarone  Remains in sinus rhythm  -Cont amiodarone 5 days a week, BB, Eliquis       2.

## 2024-04-18 ENCOUNTER — PROCEDURE VISIT (OUTPATIENT)
Age: 74
End: 2024-04-18

## 2024-04-18 DIAGNOSIS — G47.33 OSA (OBSTRUCTIVE SLEEP APNEA): Primary | ICD-10-CM

## 2024-04-24 ENCOUNTER — OFFICE VISIT (OUTPATIENT)
Age: 74
End: 2024-04-24
Payer: MEDICARE

## 2024-04-24 VITALS
TEMPERATURE: 98.2 F | RESPIRATION RATE: 20 BRPM | HEIGHT: 70 IN | BODY MASS INDEX: 35.93 KG/M2 | WEIGHT: 251 LBS | HEART RATE: 64 BPM | SYSTOLIC BLOOD PRESSURE: 127 MMHG | OXYGEN SATURATION: 95 % | DIASTOLIC BLOOD PRESSURE: 67 MMHG

## 2024-04-24 DIAGNOSIS — I48.19 PERSISTENT ATRIAL FIBRILLATION (HCC): Primary | ICD-10-CM

## 2024-04-24 DIAGNOSIS — E78.2 MIXED HYPERLIPIDEMIA: ICD-10-CM

## 2024-04-24 DIAGNOSIS — E66.9 OBESITY (BMI 30-39.9): ICD-10-CM

## 2024-04-24 DIAGNOSIS — I42.8 NICM (NONISCHEMIC CARDIOMYOPATHY) (HCC): ICD-10-CM

## 2024-04-24 DIAGNOSIS — I49.3 PVC'S (PREMATURE VENTRICULAR CONTRACTIONS): ICD-10-CM

## 2024-04-24 DIAGNOSIS — I10 PRIMARY HYPERTENSION: ICD-10-CM

## 2024-04-24 PROCEDURE — G8427 DOCREV CUR MEDS BY ELIG CLIN: HCPCS

## 2024-04-24 PROCEDURE — 3017F COLORECTAL CA SCREEN DOC REV: CPT

## 2024-04-24 PROCEDURE — 1123F ACP DISCUSS/DSCN MKR DOCD: CPT

## 2024-04-24 PROCEDURE — 3074F SYST BP LT 130 MM HG: CPT

## 2024-04-24 PROCEDURE — G8417 CALC BMI ABV UP PARAM F/U: HCPCS

## 2024-04-24 PROCEDURE — 99214 OFFICE O/P EST MOD 30 MIN: CPT

## 2024-04-24 PROCEDURE — 3078F DIAST BP <80 MM HG: CPT

## 2024-04-24 PROCEDURE — 1036F TOBACCO NON-USER: CPT

## 2024-04-24 NOTE — PROGRESS NOTES
Chief Complaint   Patient presents with    Follow-up       Blood pressure 127/67, pulse 64, temperature 98.2 °F (36.8 °C), temperature source Oral, resp. rate 20, height 1.778 m (5' 10\"), weight 113.9 kg (251 lb), SpO2 95 %.         Chest pain: Denies     Shortness of breath:  Denies     Edema: Denies     Palpitations, skipped beats, rapid heartbeat:  Denies     Dizziness: Denies     Fatigue: Denies      \"Have you been to the ER, urgent care clinic since your last visit?  Hospitalized since your last visit?\"    NO    “Have you seen or consulted any other health care providers outside of [facility] since your last visit?”    NO

## 2024-05-10 ENCOUNTER — OFFICE VISIT (OUTPATIENT)
Age: 74
End: 2024-05-10
Payer: MEDICARE

## 2024-05-10 ENCOUNTER — ANCILLARY PROCEDURE (OUTPATIENT)
Age: 74
End: 2024-05-10
Payer: MEDICARE

## 2024-05-10 VITALS
HEART RATE: 60 BPM | OXYGEN SATURATION: 96 % | DIASTOLIC BLOOD PRESSURE: 70 MMHG | SYSTOLIC BLOOD PRESSURE: 112 MMHG | BODY MASS INDEX: 35.5 KG/M2 | WEIGHT: 248 LBS | HEIGHT: 70 IN

## 2024-05-10 VITALS
WEIGHT: 248 LBS | HEIGHT: 70 IN | SYSTOLIC BLOOD PRESSURE: 112 MMHG | DIASTOLIC BLOOD PRESSURE: 70 MMHG | HEART RATE: 65 BPM | BODY MASS INDEX: 35.5 KG/M2

## 2024-05-10 DIAGNOSIS — E78.2 MIXED HYPERLIPIDEMIA: ICD-10-CM

## 2024-05-10 DIAGNOSIS — I48.0 PAROXYSMAL ATRIAL FIBRILLATION (HCC): ICD-10-CM

## 2024-05-10 DIAGNOSIS — I42.8 NICM (NONISCHEMIC CARDIOMYOPATHY) (HCC): ICD-10-CM

## 2024-05-10 DIAGNOSIS — I49.3 PVC'S (PREMATURE VENTRICULAR CONTRACTIONS): ICD-10-CM

## 2024-05-10 DIAGNOSIS — I48.19 PERSISTENT ATRIAL FIBRILLATION (HCC): Primary | ICD-10-CM

## 2024-05-10 DIAGNOSIS — I10 PRIMARY HYPERTENSION: ICD-10-CM

## 2024-05-10 PROBLEM — I25.9 CHRONIC MYOCARDIAL ISCHEMIA: Status: RESOLVED | Noted: 2022-07-01 | Resolved: 2024-05-10

## 2024-05-10 PROCEDURE — C8929 TTE W OR WO FOL WCON,DOPPLER: HCPCS | Performed by: SPECIALIST

## 2024-05-10 PROCEDURE — 93005 ELECTROCARDIOGRAM TRACING: CPT | Performed by: SPECIALIST

## 2024-05-10 PROCEDURE — 99214 OFFICE O/P EST MOD 30 MIN: CPT | Performed by: SPECIALIST

## 2024-05-10 RX ADMIN — PERFLUTREN 10 ML: 6.52 INJECTION, SUSPENSION INTRAVENOUS at 10:27

## 2024-05-10 NOTE — PATIENT INSTRUCTIONS
We will see you back in 6 months with Dipika BEARD NP in [place] and then for an annual follow up with Dr. Arellano at [facility] with an echo.

## 2024-05-10 NOTE — PROGRESS NOTES
Chief Complaint   Patient presents with    Atrial Fibrillation    Cardiomyopathy    Hypertension    Other     PVC's     Vitals:    05/10/24 1048   BP: 112/70   Site: Left Upper Arm   Position: Sitting   Pulse: 60   SpO2: 96%   Weight: 112.5 kg (248 lb)   Height: 1.778 m (5' 10\")         Chest pain: DENIED     Recent hospital stays: DENIED     Refills: DENIED   Chief Complaint   Patient presents with    Atrial Fibrillation    Cardiomyopathy    Hypertension    Other     PVC's     Vitals:    05/10/24 1048   BP: 112/70   Site: Left Upper Arm   Position: Sitting   Pulse: 60   SpO2: 96%   Weight: 112.5 kg (248 lb)   Height: 1.778 m (5' 10\")         Chest pain: DENIED     Recent hospital stays: DENIED     Refills: DENIED   
kg/m²    @Constitutional:  NAD, comfortable  Head: NC,AT. Eyes: No scleral icterus. Neck:  Neck supple. No JVD present.Throat: moist mucous membranes.  Chest: Effort normal & normal respiratory excursion .Neurological: alert, conversant and oriented . Skin: Skin is not cold. No obvious systemic rash noted. Not diaphoretic. No erythema.   Psychiatric:  Grossly normal mood and affect.  Behavior appears normal.   Extremities:  no clubbing or cyanosis. Abdomen: non distended    Lungs:breath sounds normal. No stridor. distress, wheezes or  Rales.  Heart:    normal rate, regular rhythm, normal S1, S2, no murmurs, rubs, clicks or gallops, S1 and S2 normal, no murmurs noted, no gallops noted, etta regular , PMI non displaced.    Edema: Edema is none.      Lab Results   Component Value Date    CHOL 126 01/31/2024    CHOL 108 10/31/2023    CHOL 114 03/10/2023     Lab Results   Component Value Date    TRIG 158 (H) 01/31/2024    TRIG 123 10/31/2023    TRIG 135 03/10/2023     Lab Results   Component Value Date    HDL 45 01/31/2024    HDL 36 10/31/2023    HDL 41 03/10/2023     No components found for: \"LDLCHOLESTEROL\", \"LDLCALC\"    Lab Results   Component Value Date    VLDL 31.6 01/31/2024    VLDL 24.6 10/31/2023    VLDL 27 03/10/2023     Lab Results   Component Value Date    CHOLHDLRATIO 2.8 01/31/2024    CHOLHDLRATIO 3.0 10/31/2023    CHOLHDLRATIO 2.8 03/10/2023      Lab Results   Component Value Date/Time     01/31/2024 09:15 AM    K 4.6 01/31/2024 09:15 AM     01/31/2024 09:15 AM    CO2 30 01/31/2024 09:15 AM    BUN 14 01/31/2024 09:15 AM    CREATININE 1.08 01/31/2024 09:15 AM    GLUCOSE 87 01/31/2024 09:15 AM    CALCIUM 8.6 01/31/2024 09:15 AM    LABGLOM >60 01/31/2024 09:15 AM    LABGLOM >60 03/10/2023 08:40 AM       Wt Readings from Last 3 Encounters:   05/10/24 112.5 kg (248 lb)   05/10/24 112.5 kg (248 lb)   04/24/24 113.9 kg (251 lb)      BP Readings from Last 3 Encounters:   05/10/24 112/70   05/10/24

## 2024-05-17 LAB
ECHO AO ASC DIAM: 4.1 CM
ECHO AO ASCENDING AORTA INDEX: 1.79 CM/M2
ECHO AO ROOT DIAM: 4 CM
ECHO AO ROOT INDEX: 1.75 CM/M2
ECHO AV MEAN GRADIENT: 1 MMHG
ECHO AV MEAN VELOCITY: 0.6 M/S
ECHO AV PEAK GRADIENT: 2 MMHG
ECHO AV PEAK VELOCITY: 0.8 M/S
ECHO AV VTI: 18.2 CM
ECHO BSA: 2.36 M2
ECHO LA DIAMETER INDEX: 1.83 CM/M2
ECHO LA DIAMETER: 4.2 CM
ECHO LA TO AORTIC ROOT RATIO: 1.05
ECHO LV E' LATERAL VELOCITY: 11 CM/S
ECHO LV E' SEPTAL VELOCITY: 7 CM/S
ECHO LV EDV A4C: 65 ML
ECHO LV EDV INDEX A4C: 28 ML/M2
ECHO LV EJECTION FRACTION A4C: 56 %
ECHO LV ESV A4C: 29 ML
ECHO LV ESV INDEX A4C: 13 ML/M2
ECHO LV FRACTIONAL SHORTENING: 30 % (ref 28–44)
ECHO LV INTERNAL DIMENSION DIASTOLE INDEX: 2.18 CM/M2
ECHO LV INTERNAL DIMENSION DIASTOLIC: 5 CM (ref 4.2–5.9)
ECHO LV INTERNAL DIMENSION SYSTOLIC INDEX: 1.53 CM/M2
ECHO LV INTERNAL DIMENSION SYSTOLIC: 3.5 CM
ECHO LV IVSD: 1.4 CM (ref 0.6–1)
ECHO LV MASS 2D: 276.4 G (ref 88–224)
ECHO LV MASS INDEX 2D: 120.7 G/M2 (ref 49–115)
ECHO LV POSTERIOR WALL DIASTOLIC: 1.3 CM (ref 0.6–1)
ECHO LV RELATIVE WALL THICKNESS RATIO: 0.52
ECHO MV A VELOCITY: 0.61 M/S
ECHO MV AREA PHT: 3.4 CM2
ECHO MV E DECELERATION TIME (DT): 226.3 MS
ECHO MV E VELOCITY: 0.81 M/S
ECHO MV E/A RATIO: 1.33
ECHO MV E/E' LATERAL: 7.36
ECHO MV E/E' RATIO (AVERAGED): 9.47
ECHO MV E/E' SEPTAL: 11.57
ECHO MV PRESSURE HALF TIME (PHT): 65.6 MS

## 2024-05-17 PROCEDURE — PBSHW PBB SHADOW CHARGE: Performed by: SPECIALIST

## 2024-05-17 PROCEDURE — 93306 TTE W/DOPPLER COMPLETE: CPT | Performed by: SPECIALIST

## 2024-06-07 ENCOUNTER — OFFICE VISIT (OUTPATIENT)
Facility: CLINIC | Age: 74
End: 2024-06-07

## 2024-06-07 VITALS
RESPIRATION RATE: 20 BRPM | WEIGHT: 252 LBS | OXYGEN SATURATION: 99 % | HEIGHT: 70 IN | HEART RATE: 56 BPM | BODY MASS INDEX: 36.08 KG/M2 | SYSTOLIC BLOOD PRESSURE: 128 MMHG | TEMPERATURE: 97.7 F | DIASTOLIC BLOOD PRESSURE: 67 MMHG

## 2024-06-07 DIAGNOSIS — E53.8 B12 DEFICIENCY: ICD-10-CM

## 2024-06-07 DIAGNOSIS — D68.69 SECONDARY HYPERCOAGULABLE STATE (HCC): ICD-10-CM

## 2024-06-07 DIAGNOSIS — Z87.891 HX OF SMOKING: ICD-10-CM

## 2024-06-07 DIAGNOSIS — J45.20 MILD INTERMITTENT ASTHMA WITHOUT COMPLICATION: ICD-10-CM

## 2024-06-07 DIAGNOSIS — E78.2 MIXED HYPERLIPIDEMIA: ICD-10-CM

## 2024-06-07 DIAGNOSIS — I10 PRIMARY HYPERTENSION: Primary | ICD-10-CM

## 2024-06-07 DIAGNOSIS — I48.19 PERSISTENT ATRIAL FIBRILLATION (HCC): ICD-10-CM

## 2024-06-07 DIAGNOSIS — I42.8 NICM (NONISCHEMIC CARDIOMYOPATHY) (HCC): ICD-10-CM

## 2024-06-07 DIAGNOSIS — E55.9 VITAMIN D DEFICIENCY: ICD-10-CM

## 2024-06-07 DIAGNOSIS — G47.33 OBSTRUCTIVE SLEEP APNEA ON CPAP: ICD-10-CM

## 2024-06-07 ASSESSMENT — ENCOUNTER SYMPTOMS
ABDOMINAL PAIN: 0
CHEST TIGHTNESS: 0

## 2024-06-07 NOTE — PATIENT INSTRUCTIONS
[facility]:  [address] [address].   General Hours: Mon - Fri 8:30am - 5:00pm.   Phone [phone]

## 2024-06-07 NOTE — PROGRESS NOTES
Sexual Activity    Alcohol use: Yes     Alcohol/week: 21.0 standard drinks of alcohol    Drug use: No    Sexual activity: Defer     Birth control/protection: None     Social Determinants of Health     Financial Resource Strain: Low Risk  (6/16/2023)    Overall Financial Resource Strain (CARDIA)     Difficulty of Paying Living Expenses: Not very hard   Transportation Needs: Unknown (6/16/2023)    PRAPARE - Transportation     Lack of Transportation (Non-Medical): No   Physical Activity: Sufficiently Active (10/31/2023)    Exercise Vital Sign     Days of Exercise per Week: 7 days     Minutes of Exercise per Session: 40 min   Housing Stability: Unknown (6/16/2023)    Housing Stability Vital Sign     Unstable Housing in the Last Year: No        Review of Systems   Review of Systems   Constitutional:  Negative for activity change and appetite change.   Respiratory:  Negative for chest tightness.    Cardiovascular:  Negative for leg swelling.   Gastrointestinal:  Negative for abdominal pain.   Musculoskeletal:  Positive for arthralgias.         Objective:   /67   Pulse 56   Temp 97.7 °F (36.5 °C)   Resp 20   Ht 1.778 m (5' 10\")   Wt 114.3 kg (252 lb)   SpO2 99%   BMI 36.16 kg/m²      Physical Exam  Vitals and nursing note reviewed.   Constitutional:       Appearance: Normal appearance.   HENT:      Head: Normocephalic and atraumatic.   Cardiovascular:      Rate and Rhythm: Normal rate and regular rhythm.      Pulses: Normal pulses.      Heart sounds: Normal heart sounds. No murmur heard.     No friction rub. No gallop.   Pulmonary:      Effort: Pulmonary effort is normal.      Breath sounds: Normal breath sounds.   Abdominal:      General: Abdomen is flat. Bowel sounds are normal.      Palpations: Abdomen is soft.   Musculoskeletal:      Cervical back: Normal range of motion and neck supple.   Skin:     General: Skin is warm and dry.   Neurological:      Mental Status: He is alert.          Pertinent

## 2024-06-08 LAB
25(OH)D3 SERPL-MCNC: 40.5 NG/ML (ref 30–100)
ALBUMIN SERPL-MCNC: 3.8 G/DL (ref 3.5–5)
ALBUMIN/GLOB SERPL: 1.3 (ref 1.1–2.2)
ALP SERPL-CCNC: 70 U/L (ref 45–117)
AST SERPL-CCNC: 24 U/L (ref 15–37)
BUN SERPL-MCNC: 17 MG/DL (ref 6–20)
CALCIUM SERPL-MCNC: 9.3 MG/DL (ref 8.5–10.1)
CHLORIDE SERPL-SCNC: 106 MMOL/L (ref 97–108)
CO2 SERPL-SCNC: 31 MMOL/L (ref 21–32)
CREAT SERPL-MCNC: 1.06 MG/DL (ref 0.7–1.3)
ERYTHROCYTE [DISTWIDTH] IN BLOOD BY AUTOMATED COUNT: 14 % (ref 11.5–14.5)
GLOBULIN SER CALC-MCNC: 3 G/DL (ref 2–4)
GLUCOSE SERPL-MCNC: 98 MG/DL (ref 65–100)
HCT VFR BLD AUTO: 49.1 % (ref 36.6–50.3)
HDLC SERPL-MCNC: 41 MG/DL
HDLC SERPL: 2.7 (ref 0–5)
HGB BLD-MCNC: 15.3 G/DL (ref 12.1–17)
LDLC SERPL CALC-MCNC: 39 MG/DL (ref 0–100)
MCH RBC QN AUTO: 30.1 PG (ref 26–34)
MCHC RBC AUTO-ENTMCNC: 31.2 G/DL (ref 30–36.5)
MCV RBC AUTO: 96.7 FL (ref 80–99)
NRBC BLD-RTO: 0 PER 100 WBC
PLATELET # BLD AUTO: 192 K/UL (ref 150–400)
PMV BLD AUTO: 11.2 FL (ref 8.9–12.9)
POTASSIUM SERPL-SCNC: 4.4 MMOL/L (ref 3.5–5.1)
PROT SERPL-MCNC: 6.8 G/DL (ref 6.4–8.2)
RBC # BLD AUTO: 5.08 M/UL (ref 4.1–5.7)
SODIUM SERPL-SCNC: 141 MMOL/L (ref 136–145)
TRIGL SERPL-MCNC: 155 MG/DL
VIT B12 SERPL-MCNC: 391 PG/ML (ref 193–986)
VLDLC SERPL CALC-MCNC: 31 MG/DL

## 2024-06-27 DIAGNOSIS — E78.2 MIXED HYPERLIPIDEMIA: ICD-10-CM

## 2024-06-27 DIAGNOSIS — K21.9 GASTROESOPHAGEAL REFLUX DISEASE WITHOUT ESOPHAGITIS: ICD-10-CM

## 2024-06-27 DIAGNOSIS — I10 PRIMARY HYPERTENSION: Primary | ICD-10-CM

## 2024-06-27 RX ORDER — HYDROCHLOROTHIAZIDE 12.5 MG/1
TABLET ORAL
Qty: 90 TABLET | Refills: 1 | Status: SHIPPED | OUTPATIENT
Start: 2024-06-27

## 2024-06-27 RX ORDER — RAMIPRIL 10 MG/1
CAPSULE ORAL
Qty: 180 CAPSULE | Refills: 1 | Status: SHIPPED | OUTPATIENT
Start: 2024-06-27

## 2024-06-27 RX ORDER — ALLOPURINOL 100 MG/1
100 TABLET ORAL DAILY
Qty: 90 TABLET | Refills: 1 | Status: SHIPPED | OUTPATIENT
Start: 2024-06-27

## 2024-06-27 RX ORDER — ROSUVASTATIN CALCIUM 10 MG/1
TABLET, COATED ORAL
Qty: 90 TABLET | Refills: 1 | Status: SHIPPED | OUTPATIENT
Start: 2024-06-27

## 2024-06-27 RX ORDER — OMEPRAZOLE 20 MG/1
CAPSULE, DELAYED RELEASE ORAL
Qty: 90 CAPSULE | Refills: 1 | Status: SHIPPED | OUTPATIENT
Start: 2024-06-27

## 2024-07-03 ENCOUNTER — ANESTHESIA EVENT (OUTPATIENT)
Facility: HOSPITAL | Age: 74
End: 2024-07-03
Payer: MEDICARE

## 2024-07-03 ENCOUNTER — ANESTHESIA (OUTPATIENT)
Facility: HOSPITAL | Age: 74
End: 2024-07-03
Payer: MEDICARE

## 2024-07-03 ENCOUNTER — HOSPITAL ENCOUNTER (OUTPATIENT)
Facility: HOSPITAL | Age: 74
Setting detail: OUTPATIENT SURGERY
Discharge: HOME | End: 2024-07-03
Attending: SPECIALIST | Admitting: SPECIALIST
Payer: MEDICARE

## 2024-07-03 VITALS
TEMPERATURE: 97.8 F | OXYGEN SATURATION: 99 % | RESPIRATION RATE: 15 BRPM | WEIGHT: 246.25 LBS | SYSTOLIC BLOOD PRESSURE: 145 MMHG | DIASTOLIC BLOOD PRESSURE: 84 MMHG | BODY MASS INDEX: 35.25 KG/M2 | HEIGHT: 70 IN | HEART RATE: 57 BPM

## 2024-07-03 PROCEDURE — 6360000002 HC RX W HCPCS: Performed by: NURSE ANESTHETIST, CERTIFIED REGISTERED

## 2024-07-03 PROCEDURE — 3600007503: Performed by: SPECIALIST

## 2024-07-03 PROCEDURE — 2500000003 HC RX 250 WO HCPCS: Performed by: NURSE ANESTHETIST, CERTIFIED REGISTERED

## 2024-07-03 PROCEDURE — 7100000010 HC PHASE II RECOVERY - FIRST 15 MIN: Performed by: SPECIALIST

## 2024-07-03 PROCEDURE — 3700000001 HC ADD 15 MINUTES (ANESTHESIA): Performed by: SPECIALIST

## 2024-07-03 PROCEDURE — 2709999900 HC NON-CHARGEABLE SUPPLY: Performed by: SPECIALIST

## 2024-07-03 PROCEDURE — 7100000011 HC PHASE II RECOVERY - ADDTL 15 MIN: Performed by: SPECIALIST

## 2024-07-03 PROCEDURE — 3700000000 HC ANESTHESIA ATTENDED CARE: Performed by: SPECIALIST

## 2024-07-03 PROCEDURE — 2580000003 HC RX 258: Performed by: NURSE ANESTHETIST, CERTIFIED REGISTERED

## 2024-07-03 PROCEDURE — 3600007513: Performed by: SPECIALIST

## 2024-07-03 RX ORDER — SODIUM CHLORIDE 9 MG/ML
INJECTION, SOLUTION INTRAVENOUS CONTINUOUS
Status: DISCONTINUED | OUTPATIENT
Start: 2024-07-03 | End: 2024-07-03 | Stop reason: HOSPADM

## 2024-07-03 RX ORDER — SIMETHICONE 40MG/0.6ML
40 SUSPENSION, DROPS(FINAL DOSAGE FORM)(ML) ORAL AS NEEDED
Status: DISCONTINUED | OUTPATIENT
Start: 2024-07-03 | End: 2024-07-03 | Stop reason: HOSPADM

## 2024-07-03 RX ORDER — PROPOFOL 10 MG/ML
INJECTION, EMULSION INTRAVENOUS PRN
Status: DISCONTINUED | OUTPATIENT
Start: 2024-07-03 | End: 2024-07-03 | Stop reason: SDUPTHER

## 2024-07-03 RX ORDER — SODIUM CHLORIDE 0.9 % (FLUSH) 0.9 %
5-40 SYRINGE (ML) INJECTION PRN
Status: DISCONTINUED | OUTPATIENT
Start: 2024-07-03 | End: 2024-07-03 | Stop reason: HOSPADM

## 2024-07-03 RX ORDER — SODIUM CHLORIDE 9 MG/ML
INJECTION, SOLUTION INTRAVENOUS CONTINUOUS PRN
Status: DISCONTINUED | OUTPATIENT
Start: 2024-07-03 | End: 2024-07-03 | Stop reason: SDUPTHER

## 2024-07-03 RX ADMIN — SODIUM CHLORIDE: 9 INJECTION, SOLUTION INTRAVENOUS at 09:18

## 2024-07-03 RX ADMIN — PROPOFOL 180 MCG/KG/MIN: 10 INJECTION, EMULSION INTRAVENOUS at 09:49

## 2024-07-03 RX ADMIN — PROPOFOL 50 MG: 10 INJECTION, EMULSION INTRAVENOUS at 09:48

## 2024-07-03 RX ADMIN — LIDOCAINE HYDROCHLORIDE 80 MG: 20 INJECTION, SOLUTION INFILTRATION; PERINEURAL at 09:48

## 2024-07-03 ASSESSMENT — ENCOUNTER SYMPTOMS: SHORTNESS OF BREATH: 1

## 2024-07-03 ASSESSMENT — PAIN - FUNCTIONAL ASSESSMENT: PAIN_FUNCTIONAL_ASSESSMENT: 0-10

## 2024-07-03 NOTE — H&P
Pre-Endoscopy H&P Update  Chief complaint/HPI/ROS:  The indication for the procedure, the patient's history and the patient's current medications are reviewed prior to the procedure and that data is reported on the H&P to which this document is attached.  Any significant complaints with regard to organ systems will be noted, and if not mentioned then a review of systems is not contributory.  Past Medical History:   Diagnosis Date    Anesthesia complication     APNEA DURING HERNIA REPAIR, POST OPERATIVE INTUBATION AT [facility]    Arthritis     Atrial fibrillation (HCC)     Basal cell carcinoma (BCC) in situ of skin     Chronic myocardial ischemia 2022    Colon ulcer, aphthous 2010    COPD, mild (HCC)     Does not see anyone for this    Environmental and seasonal allergies     Essential tremor     Fatty liver 2019    Frequent episodes of bronchitis     Frequent PVCs     Has been checked by cardiology    GERD (gastroesophageal reflux disease) 2010    High cholesterol 2010    HTN (hypertension), benign 2010    Hx MRSA infection     CYSTS BL LEGS    Skin cancer     Sleep apnea     CPAP      Past Surgical History:   Procedure Laterality Date    APPENDECTOMY      BACK SURGERY      CATARACT REMOVAL Bilateral     CERVICAL FUSION N/A     COLONOSCOPY N/A 2019    performed by Rico Landis MD at [facility] ENDOSCOPY    FINGER AMPUTATION Right     2nd digit    HERNIA REPAIR  2001    KNEE ARTHROSCOPY Left     MENISCECTOMY Right 2017    TONSILLECTOMY AND ADENOIDECTOMY      TOTAL KNEE ARTHROPLASTY Bilateral 10/2017     Social   Social History     Tobacco Use    Smoking status: Former     Current packs/day: 0.00     Average packs/day: 3.0 packs/day for 35.0 years (105.0 ttl pk-yrs)     Types: Cigarettes     Start date:      Quit date: 2000     Years since quittin.5     Passive exposure: Never    Smokeless tobacco: Never   Substance Use Topics    Alcohol use: Yes

## 2024-07-03 NOTE — ANESTHESIA PRE PROCEDURE
History     Tobacco Use   • Smoking status: Former     Current packs/day: 0.00     Average packs/day: 3.0 packs/day for 35.0 years (105.0 ttl pk-yrs)     Types: Cigarettes     Start date:      Quit date: 2000     Years since quittin.5     Passive exposure: Never   • Smokeless tobacco: Never   Substance Use Topics   • Alcohol use: Yes     Alcohol/week: 21.0 standard drinks of alcohol     Types: 21 Shots of liquor per week                                Counseling given: Not Answered      Vital Signs (Current):   Vitals:    24 0902   BP: (!) 150/52   Pulse: 64   Resp: 14   Temp: 97.8 °F (36.6 °C)   TempSrc: Oral   SpO2: 97%   Weight: 111.7 kg (246 lb 4.1 oz)   Height: 1.778 m (5' 10\")                                              BP Readings from Last 3 Encounters:   24 (!) 150/52   24 128/67   05/10/24 112/70       NPO Status: Time of last liquid consumption:                         Time of last solid consumption:                         Date of last liquid consumption: 24                        Date of last solid food consumption: 24    BMI:   Wt Readings from Last 3 Encounters:   24 111.7 kg (246 lb 4.1 oz)   24 114.3 kg (252 lb)   05/10/24 112.5 kg (248 lb)     Body mass index is 35.33 kg/m².    CBC:   Lab Results   Component Value Date/Time    WBC 5.9 2024 10:00 AM    WBC Comment 2019 08:43 AM    RBC 5.08 2024 10:00 AM    RBC Comment 2019 08:43 AM    HGB 15.3 2024 10:00 AM    HCT 49.1 2024 10:00 AM    MCV 96.7 2024 10:00 AM    RDW 14.0 2024 10:00 AM     2024 10:00 AM       CMP:   Lab Results   Component Value Date/Time     2024 10:00 AM    K 4.4 2024 10:00 AM     2024 10:00 AM    CO2 31 2024 10:00 AM    BUN 17 2024 10:00 AM    CREATININE 1.06 2024 10:00 AM    GFRAA >60 2022 10:00 AM    AGRATIO 1.3 03/10/2023 08:40 AM    LABGLOM 75

## 2024-07-03 NOTE — PROGRESS NOTES

## 2024-07-03 NOTE — DISCHARGE INSTRUCTIONS
[facility]  [facility]  Rico Bowers MD  [phone]      July 3, 2024    Torito Ivy  YOB: 1950    COLONOSCOPY DISCHARGE INSTRUCTIONS    If there is redness at IV site you should apply warm compress to area.  If redness or soreness persist contact Dr. Bowers' or your primary care doctor.    There may be a slight amount of blood passed from the rectum.  Gaseous discomfort may develop, but walking, belching will help relieve this.  You may not operate a vehicle for 12 hours  You may not operate machinery or dangerous appliances for rest of today  You may not drink alcoholic beverages for 12 hours  Avoid making any critical decisions for 24 hours    DIET:  You may resume your normal diet, but some patients find that heavy or large meals may lead to indigestion or vomiting.  I suggest a light meal as first food intake.    MEDICATIONS:  The use of some over-the-counter pain medication may lead to bleeding after colon biopsies or polyp removal.  Tylenol (also called acetaminophen) is safe to take even if you have had colonoscopy with polyp removal.  Based on the procedure you had today you may safely take aspirin or aspirin-like products for the next ten (10) days.  Remember that Tylenol (also called acetaminophen) is safe to take after colonoscopy even if you have had biopsies or polyps removed.    ACTIVITY:  You may resume your normal household activities, but it is recommended that you spend the remainder of the day resting -  avoid any strenuous activity.    CALL DR. BOWERS' OFFICE IF:  Increasing pain, nausea, vomiting  Abdominal distension (swelling)  Significant new or increased bleeding (oral or rectal)  Fever/Chills  Chest pain/shortness of breath                       Additional instructions:   Resume all usual medications as today's examination is normal.  Great news.     It was an honor to be your doctor today.   Please let me or my office staff know if you have any feedback about today's procedure.    Ashlyn Landis MD    Colonoscopy saves lives, and can prevent colon cancer.  Everyone aged 50 or older needs colonoscopy.  Tell your family and friends: get the test!

## 2024-07-03 NOTE — OP NOTE
[facility]  [facility]  Rico Landis MD  [phone]      July 3, 2024    Esophagogastroduodenoscopy & Colonoscopy Procedure Note  Torito Ivy  : 1950  [facility] Medical Record Number: [id number]      Indications:   GERD Personal history of colon polyp  Referring Physician:  Jabari Moreno MD  Anesthesia/Sedation: Conscious Sedation/Moderate Sedation/MAC  Endoscopist:  Dr. Rico Landis  Complications:  None  Estimated Blood Loss:  None    Permit:  The indications, risks, benefits and alternatives were reviewed with the patient or their decision maker who was provided an opportunity to ask questions and all questions were answered.  The specific risks of esophagogastroduodenoscopy with conscious sedation were reviewed, including but not limited to anesthetic complication, bleeding, adverse drug reaction, missed lesion, infection, IV site reactions, and intestinal perforation which would lead to the need for surgical repair.  Alternatives to EGD and colonoscopy including radiographic imaging, observation without testing, or laboratory testing were reviewed as well as the limitations of those alternatives discussed.  After considering the options and having all their questions answered, the patient or their decision maker provided both verbal and written consent to proceed.      -----------EGD------------   Procedure in Detail:  After obtaining informed consent, positioning of the patient in the left lateral decubitus position, and conduction of a pre-procedure pause or \"time out\" the endoscope was introduced into the mouth and advanced to the duodenum.  A careful inspection was made, and findings or interventions are described below.    Findings:   Esophagus:normal  Stomach: normal   Duodenum/jejunum: normal        ----------Colonoscopy-----------    Procedure in Detail:  After obtaining informed consent,

## 2024-07-03 NOTE — H&P
Date: 2024 2:15 PM   Patient Name: Torito Ivy   Account #: [id number]    Gender: Male    (age): 1950 (73)      Provider: Rico Landis MD      Referring Physician: Self Referred      Chief Complaint: GERD and time for my colonsocopy.           History of Present Illness:   Due for polyp surveillance colonsocopy and he requests EGD at same setting.  AF leads to chronic anticoagulation, eliquis will be held 2 days prior.  Notes no bleeding or melena, no dysphagia or weight loss.  We negotiated bidirectional endoscopy.       Past Medical History      Medical Conditions: Atrial Fibrillation  Bronchitis  Colon polyps  High blood pressure  High cholesterol  Sleep apnea  Sleep apnea uses cpap  TB exposure tested positive   Surgical Procedures: Tonsillectomy  hand  left knee  Cervical Surgery  Hernia Repair   Dx Studies: Abdominal U/S, 2019  Colonoscopy, 2015  Colonoscopy  EGD, 2015  Pre-Procedure Call, 2015   Medications: allopurinol 100 mg Take 1 tablet by mouth once a day  amiodarone 200 mg Take 1 tablet by mouth once a day  Eliquis 5 mg Take 1 tablet by mouth twice a day  fluticasone propionate 44 mcg/actuation as needed  hydrochlorothiazide 12.5 mg Take 1 tablet by mouth once a day  metoprolol succinate 25 mg Take 1 tablet by mouth once a day  omeprazole 20 mg Take 1 capsule by mouth once a day  ramipril 10 mg Take 1 capsule by mouth once a day  rosuvastatin 10 mg Take 1 tablet by mouth once a day  Ventolin HFA 90 mcg/actuation as needed   Allergies: Patient has no known allergies or drug allergies   Immunizations: Flu vaccine, 2015  Pneumococcal conjugate PCV 13, 2015  zoster, 12/10/2013      Social History      Alcohol: Hard Liquor 2-4 oz a day.Alcohol consumption: Daily.   Tobacco: Former smoker  Cigarettes, quit 12 years ago.   Drugs: None   Exercise: Exercise less than 3 times a week.   Caffeine: Daily.Coffee or Tea # of cups per day:.4.   Marital Status:

## 2024-07-03 NOTE — PROGRESS NOTES
Endoscopy discharge instructions have been reviewed and given to patient.  The patient verbalized understanding and acceptance of instructions.      Dr. Landis discussed with brother procedure findings and next steps.

## 2024-07-03 NOTE — ANESTHESIA POSTPROCEDURE EVALUATION
Department of Anesthesiology  Postprocedure Note    Patient: Torito Ivy  MRN: [id number]  YOB: 1950  Date of evaluation: 7/3/2024    Procedure Summary       Date: 07/03/24 Room / Location: [place] [address] / [facility] ENDOSCOPY    Anesthesia Start: 0943 Anesthesia Stop: 1006    Procedures:       ESOPHAGOGASTRODUODENOSCOPY (Upper GI Region)      COLONOSCOPY (Lower GI Region) Diagnosis:       Gastroesophageal reflux disease, unspecified whether esophagitis present      Personal history of colonic polyps      (Gastroesophageal reflux disease, unspecified whether esophagitis present [K21.9])      (Personal history of colonic polyps [Z86.010])    Surgeons: Rico Landis MD Responsible Provider: Joe Sanchez MD    Anesthesia Type: MAC ASA Status: 3            Anesthesia Type: MAC    Danny Phase I: Danny Score: 10    Danny Phase II: Danny Score: 10    Anesthesia Post Evaluation    No notable events documented.

## 2024-07-05 ENCOUNTER — HOSPITAL ENCOUNTER (OUTPATIENT)
Facility: HOSPITAL | Age: 74
End: 2024-07-05
Attending: FAMILY MEDICINE
Payer: MEDICARE

## 2024-07-05 DIAGNOSIS — Z12.2 SCREENING FOR LUNG CANCER: ICD-10-CM

## 2024-07-05 DIAGNOSIS — Z87.891 FORMER TOBACCO USE: ICD-10-CM

## 2024-07-05 PROCEDURE — 71271 CT THORAX LUNG CANCER SCR C-: CPT

## 2024-08-22 DIAGNOSIS — J45.20 MILD INTERMITTENT ASTHMA WITHOUT COMPLICATION: Primary | ICD-10-CM

## 2024-08-22 RX ORDER — FLUTICASONE PROPIONATE 50 MCG
SPRAY, SUSPENSION (ML) NASAL
Qty: 16 G | Refills: 1 | Status: SHIPPED | OUTPATIENT
Start: 2024-08-22

## 2024-09-10 ENCOUNTER — OFFICE VISIT (OUTPATIENT)
Facility: CLINIC | Age: 74
End: 2024-09-10
Payer: MEDICARE

## 2024-09-10 VITALS
RESPIRATION RATE: 18 BRPM | HEIGHT: 70 IN | OXYGEN SATURATION: 95 % | HEART RATE: 56 BPM | DIASTOLIC BLOOD PRESSURE: 66 MMHG | WEIGHT: 248 LBS | SYSTOLIC BLOOD PRESSURE: 111 MMHG | TEMPERATURE: 97.7 F | BODY MASS INDEX: 35.5 KG/M2

## 2024-09-10 DIAGNOSIS — Z79.899 LONG TERM CURRENT USE OF AMIODARONE: ICD-10-CM

## 2024-09-10 DIAGNOSIS — E53.8 B12 DEFICIENCY: ICD-10-CM

## 2024-09-10 DIAGNOSIS — I48.19 PERSISTENT ATRIAL FIBRILLATION (HCC): ICD-10-CM

## 2024-09-10 DIAGNOSIS — G47.33 OBSTRUCTIVE SLEEP APNEA ON CPAP: ICD-10-CM

## 2024-09-10 DIAGNOSIS — E55.9 VITAMIN D DEFICIENCY: ICD-10-CM

## 2024-09-10 DIAGNOSIS — G25.0 ESSENTIAL TREMOR: ICD-10-CM

## 2024-09-10 DIAGNOSIS — E66.01 SEVERE OBESITY (BMI 35.0-39.9) WITH COMORBIDITY (HCC): ICD-10-CM

## 2024-09-10 DIAGNOSIS — E78.2 MIXED HYPERLIPIDEMIA: ICD-10-CM

## 2024-09-10 DIAGNOSIS — K21.9 GASTROESOPHAGEAL REFLUX DISEASE WITHOUT ESOPHAGITIS: ICD-10-CM

## 2024-09-10 DIAGNOSIS — I10 PRIMARY HYPERTENSION: Primary | ICD-10-CM

## 2024-09-10 PROBLEM — D68.69 SECONDARY HYPERCOAGULABLE STATE (HCC): Status: RESOLVED | Noted: 2024-06-07 | Resolved: 2024-09-10

## 2024-09-10 PROCEDURE — 3074F SYST BP LT 130 MM HG: CPT | Performed by: FAMILY MEDICINE

## 2024-09-10 PROCEDURE — G8427 DOCREV CUR MEDS BY ELIG CLIN: HCPCS | Performed by: FAMILY MEDICINE

## 2024-09-10 PROCEDURE — 99214 OFFICE O/P EST MOD 30 MIN: CPT | Performed by: FAMILY MEDICINE

## 2024-09-10 PROCEDURE — G8417 CALC BMI ABV UP PARAM F/U: HCPCS | Performed by: FAMILY MEDICINE

## 2024-09-10 PROCEDURE — 3078F DIAST BP <80 MM HG: CPT | Performed by: FAMILY MEDICINE

## 2024-09-10 PROCEDURE — 1036F TOBACCO NON-USER: CPT | Performed by: FAMILY MEDICINE

## 2024-09-10 PROCEDURE — 1123F ACP DISCUSS/DSCN MKR DOCD: CPT | Performed by: FAMILY MEDICINE

## 2024-09-10 PROCEDURE — 3017F COLORECTAL CA SCREEN DOC REV: CPT | Performed by: FAMILY MEDICINE

## 2024-09-10 RX ORDER — LANOLIN ALCOHOL/MO/W.PET/CERES
1000 CREAM (GRAM) TOPICAL DAILY
COMMUNITY

## 2024-09-10 ASSESSMENT — ENCOUNTER SYMPTOMS
APNEA: 0
CHEST TIGHTNESS: 0

## 2024-09-11 LAB
25(OH)D3 SERPL-MCNC: 33.4 NG/ML (ref 30–100)
ALBUMIN SERPL-MCNC: 3.5 G/DL (ref 3.5–5)
ALBUMIN/GLOB SERPL: 1 (ref 1.1–2.2)
ALP SERPL-CCNC: 74 U/L (ref 45–117)
ALT SERPL-CCNC: 42 U/L (ref 12–78)
ANION GAP SERPL CALC-SCNC: 5 MMOL/L (ref 2–12)
AST SERPL-CCNC: 20 U/L (ref 15–37)
BILIRUB SERPL-MCNC: 0.5 MG/DL (ref 0.2–1)
BUN SERPL-MCNC: 18 MG/DL (ref 6–20)
BUN/CREAT SERPL: 15 (ref 12–20)
CALCIUM SERPL-MCNC: 9.3 MG/DL (ref 8.5–10.1)
CHLORIDE SERPL-SCNC: 104 MMOL/L (ref 97–108)
CHOLEST SERPL-MCNC: 128 MG/DL
CO2 SERPL-SCNC: 30 MMOL/L (ref 21–32)
CREAT SERPL-MCNC: 1.23 MG/DL (ref 0.7–1.3)
ERYTHROCYTE [DISTWIDTH] IN BLOOD BY AUTOMATED COUNT: 14.2 % (ref 11.5–14.5)
GLOBULIN SER CALC-MCNC: 3.4 G/DL (ref 2–4)
GLUCOSE SERPL-MCNC: 93 MG/DL (ref 65–100)
HCT VFR BLD AUTO: 49.2 % (ref 36.6–50.3)
HDLC SERPL-MCNC: 39 MG/DL
HDLC SERPL: 3.3 (ref 0–5)
HGB BLD-MCNC: 15.4 G/DL (ref 12.1–17)
LDLC SERPL CALC-MCNC: 48 MG/DL (ref 0–100)
MCH RBC QN AUTO: 30.6 PG (ref 26–34)
MCHC RBC AUTO-ENTMCNC: 31.3 G/DL (ref 30–36.5)
MCV RBC AUTO: 97.6 FL (ref 80–99)
NRBC # BLD: 0 K/UL (ref 0–0.01)
NRBC BLD-RTO: 0 PER 100 WBC
PLATELET # BLD AUTO: 207 K/UL (ref 150–400)
PMV BLD AUTO: 11.4 FL (ref 8.9–12.9)
POTASSIUM SERPL-SCNC: 4.8 MMOL/L (ref 3.5–5.1)
PROT SERPL-MCNC: 6.9 G/DL (ref 6.4–8.2)
RBC # BLD AUTO: 5.04 M/UL (ref 4.1–5.7)
SODIUM SERPL-SCNC: 139 MMOL/L (ref 136–145)
T4 FREE SERPL-MCNC: 1.3 NG/DL (ref 0.8–1.5)
TRIGL SERPL-MCNC: 205 MG/DL
TSH SERPL DL<=0.05 MIU/L-ACNC: 1.95 UIU/ML (ref 0.36–3.74)
VIT B12 SERPL-MCNC: 730 PG/ML (ref 193–986)
VLDLC SERPL CALC-MCNC: 41 MG/DL
WBC # BLD AUTO: 6.4 K/UL (ref 4.1–11.1)

## 2024-11-13 ENCOUNTER — OFFICE VISIT (OUTPATIENT)
Age: 74
End: 2024-11-13

## 2024-11-13 VITALS
HEIGHT: 70 IN | RESPIRATION RATE: 18 BRPM | HEART RATE: 62 BPM | WEIGHT: 247 LBS | TEMPERATURE: 98.5 F | DIASTOLIC BLOOD PRESSURE: 70 MMHG | SYSTOLIC BLOOD PRESSURE: 125 MMHG | OXYGEN SATURATION: 96 % | BODY MASS INDEX: 35.36 KG/M2

## 2024-11-13 DIAGNOSIS — I48.19 PERSISTENT ATRIAL FIBRILLATION (HCC): Primary | ICD-10-CM

## 2024-11-13 DIAGNOSIS — I77.810 DILATED AORTIC ROOT (HCC): ICD-10-CM

## 2024-11-13 DIAGNOSIS — I10 PRIMARY HYPERTENSION: ICD-10-CM

## 2024-11-13 DIAGNOSIS — I48.0 PAROXYSMAL ATRIAL FIBRILLATION (HCC): ICD-10-CM

## 2024-11-13 DIAGNOSIS — I49.3 PVC'S (PREMATURE VENTRICULAR CONTRACTIONS): ICD-10-CM

## 2024-11-13 DIAGNOSIS — I42.8 NICM (NONISCHEMIC CARDIOMYOPATHY) (HCC): ICD-10-CM

## 2024-11-13 DIAGNOSIS — E78.2 MIXED HYPERLIPIDEMIA: ICD-10-CM

## 2024-11-13 DIAGNOSIS — E66.9 OBESITY (BMI 30-39.9): ICD-10-CM

## 2024-11-13 RX ORDER — AMIODARONE HYDROCHLORIDE 200 MG/1
200 TABLET ORAL
Qty: 40 TABLET | Refills: 3 | Status: SHIPPED | OUTPATIENT
Start: 2024-11-13

## 2024-11-13 NOTE — PROGRESS NOTES
Chief Complaint   Patient presents with    6 Month Follow-Up       Blood pressure 125/70, pulse 62, temperature 98.5 °F (36.9 °C), temperature source Oral, resp. rate 18, height 1.778 m (5' 10\"), weight 112 kg (247 lb), SpO2 96%.         Chest pain: Denies     Shortness of breath:  Denies     Edema: Denies     Palpitations, skipped beats, rapid heartbeat:  Denies     Dizziness: Denies     Fatigue: Denies      \"Have you been to the ER, urgent care clinic since your last visit?  Hospitalized since your last visit?\"    NO    “Have you seen or consulted any other health care providers outside of [facility] since your last visit?”    NO              
(Site: Right Upper Arm, Position: Sitting, Cuff Size: Medium Adult)   Pulse 62   Temp 98.5 °F (36.9 °C) (Oral)   Resp 18   Ht 1.778 m (5' 10\")   Wt 112 kg (247 lb)   SpO2 96%   BMI 35.44 kg/m²    @Constitutional:  NAD, comfortable  Head: NC,AT. Eyes: No scleral icterus. Neck:  Neck supple. No JVD present.Throat: moist mucous membranes.  Chest: Effort normal & normal respiratory excursion .Neurological: alert, conversant and oriented . Skin: Skin is not cold. No obvious systemic rash noted. Not diaphoretic. No erythema.   Psychiatric:  Grossly normal mood and affect.  Behavior appears normal.   Extremities:  no clubbing or cyanosis. Abdomen: non distended    Lungs:breath sounds normal. No stridor. distress, wheezes or  Rales.  Heart:    normal rate, regular rhythm, normal S1, S2, no murmurs, rubs, clicks or gallops, S1 and S2 normal, no murmurs noted, no gallops noted, etta regular , PMI non displaced.    Edema: Edema is none.      Lab Results   Component Value Date    CHOL 128 09/10/2024    CHOL 111 06/07/2024    CHOL 126 01/31/2024     Lab Results   Component Value Date    TRIG 205 (H) 09/10/2024    TRIG 155 (H) 06/07/2024    TRIG 158 (H) 01/31/2024     Lab Results   Component Value Date    HDL 39 09/10/2024    HDL 41 06/07/2024    HDL 45 01/31/2024     No components found for: \"LDLCHOLESTEROL\", \"LDLCALC\"    Lab Results   Component Value Date    VLDL 41 09/10/2024    VLDL 31 06/07/2024    VLDL 31.6 01/31/2024     Lab Results   Component Value Date    CHOLHDLRATIO 3.3 09/10/2024    CHOLHDLRATIO 2.7 06/07/2024    CHOLHDLRATIO 2.8 01/31/2024      Lab Results   Component Value Date/Time     09/10/2024 10:00 AM    K 4.8 09/10/2024 10:00 AM     09/10/2024 10:00 AM    CO2 30 09/10/2024 10:00 AM    BUN 18 09/10/2024 10:00 AM    CREATININE 1.23 09/10/2024 10:00 AM    GLUCOSE 93 09/10/2024 10:00 AM    CALCIUM 9.3 09/10/2024 10:00 AM    LABGLOM 63 09/10/2024 10:00 AM    LABGLOM >60 01/31/2024 09:15 AM

## 2024-12-18 ENCOUNTER — PATIENT MESSAGE (OUTPATIENT)
Facility: CLINIC | Age: 74
End: 2024-12-18

## 2024-12-18 DIAGNOSIS — I10 PRIMARY HYPERTENSION: Primary | ICD-10-CM

## 2024-12-18 DIAGNOSIS — G25.0 BENIGN ESSENTIAL TREMOR: ICD-10-CM

## 2024-12-20 RX ORDER — PROPRANOLOL HYDROCHLORIDE 10 MG/1
10 TABLET ORAL 2 TIMES DAILY
Qty: 60 TABLET | Refills: 2 | Status: SHIPPED | OUTPATIENT
Start: 2024-12-20

## 2024-12-20 NOTE — TELEPHONE ENCOUNTER
Discussed with patient, will trial Propranolol 10 mg BID now nad see how he is doing on follow up.    Jabari Moreno MD  [facility]  12/20/24

## 2025-01-05 DIAGNOSIS — K21.9 GASTROESOPHAGEAL REFLUX DISEASE WITHOUT ESOPHAGITIS: ICD-10-CM

## 2025-01-05 DIAGNOSIS — E78.2 MIXED HYPERLIPIDEMIA: ICD-10-CM

## 2025-01-05 DIAGNOSIS — I10 PRIMARY HYPERTENSION: ICD-10-CM

## 2025-01-06 RX ORDER — ROSUVASTATIN CALCIUM 10 MG/1
TABLET, COATED ORAL
Qty: 90 TABLET | Refills: 1 | Status: SHIPPED | OUTPATIENT
Start: 2025-01-06

## 2025-01-06 RX ORDER — ALLOPURINOL 100 MG/1
100 TABLET ORAL DAILY
Qty: 90 TABLET | Refills: 1 | Status: SHIPPED | OUTPATIENT
Start: 2025-01-06

## 2025-01-06 RX ORDER — RAMIPRIL 10 MG/1
CAPSULE ORAL
Qty: 180 CAPSULE | Refills: 1 | Status: SHIPPED | OUTPATIENT
Start: 2025-01-06

## 2025-01-06 RX ORDER — HYDROCHLOROTHIAZIDE 12.5 MG/1
TABLET ORAL
Qty: 90 TABLET | Refills: 1 | Status: SHIPPED | OUTPATIENT
Start: 2025-01-06

## 2025-01-07 SDOH — ECONOMIC STABILITY: FOOD INSECURITY: WITHIN THE PAST 12 MONTHS, YOU WORRIED THAT YOUR FOOD WOULD RUN OUT BEFORE YOU GOT MONEY TO BUY MORE.: NEVER TRUE

## 2025-01-07 SDOH — ECONOMIC STABILITY: FOOD INSECURITY: WITHIN THE PAST 12 MONTHS, THE FOOD YOU BOUGHT JUST DIDN'T LAST AND YOU DIDN'T HAVE MONEY TO GET MORE.: NEVER TRUE

## 2025-01-07 SDOH — ECONOMIC STABILITY: INCOME INSECURITY: HOW HARD IS IT FOR YOU TO PAY FOR THE VERY BASICS LIKE FOOD, HOUSING, MEDICAL CARE, AND HEATING?: NOT HARD AT ALL

## 2025-01-07 SDOH — ECONOMIC STABILITY: TRANSPORTATION INSECURITY
IN THE PAST 12 MONTHS, HAS LACK OF TRANSPORTATION KEPT YOU FROM MEETINGS, WORK, OR FROM GETTING THINGS NEEDED FOR DAILY LIVING?: NO

## 2025-01-10 ENCOUNTER — OFFICE VISIT (OUTPATIENT)
Facility: CLINIC | Age: 75
End: 2025-01-10

## 2025-01-10 VITALS
SYSTOLIC BLOOD PRESSURE: 121 MMHG | BODY MASS INDEX: 35.22 KG/M2 | WEIGHT: 246 LBS | HEIGHT: 70 IN | HEART RATE: 60 BPM | RESPIRATION RATE: 20 BRPM | DIASTOLIC BLOOD PRESSURE: 69 MMHG | OXYGEN SATURATION: 95 % | TEMPERATURE: 99 F

## 2025-01-10 DIAGNOSIS — E55.9 VITAMIN D DEFICIENCY: ICD-10-CM

## 2025-01-10 DIAGNOSIS — I42.8 NICM (NONISCHEMIC CARDIOMYOPATHY) (HCC): ICD-10-CM

## 2025-01-10 DIAGNOSIS — E66.01 SEVERE OBESITY (BMI 35.0-39.9) WITH COMORBIDITY: ICD-10-CM

## 2025-01-10 DIAGNOSIS — E53.8 B12 DEFICIENCY: ICD-10-CM

## 2025-01-10 DIAGNOSIS — E78.2 MIXED HYPERLIPIDEMIA: ICD-10-CM

## 2025-01-10 DIAGNOSIS — I10 PRIMARY HYPERTENSION: Primary | ICD-10-CM

## 2025-01-10 DIAGNOSIS — G25.0 BENIGN ESSENTIAL TREMOR: ICD-10-CM

## 2025-01-10 DIAGNOSIS — I48.0 PAROXYSMAL ATRIAL FIBRILLATION (HCC): ICD-10-CM

## 2025-01-10 DIAGNOSIS — G47.33 OBSTRUCTIVE SLEEP APNEA ON CPAP: ICD-10-CM

## 2025-01-10 DIAGNOSIS — K21.9 GASTROESOPHAGEAL REFLUX DISEASE WITHOUT ESOPHAGITIS: ICD-10-CM

## 2025-01-10 DIAGNOSIS — J45.20 MILD INTERMITTENT ASTHMA WITHOUT COMPLICATION: ICD-10-CM

## 2025-01-10 RX ORDER — PROPRANOLOL HYDROCHLORIDE 10 MG/1
10 TABLET ORAL 2 TIMES DAILY
Qty: 180 TABLET | Refills: 2 | Status: SHIPPED | OUTPATIENT
Start: 2025-01-10

## 2025-01-10 ASSESSMENT — PATIENT HEALTH QUESTIONNAIRE - PHQ9
SUM OF ALL RESPONSES TO PHQ QUESTIONS 1-9: 0
SUM OF ALL RESPONSES TO PHQ QUESTIONS 1-9: 0
2. FEELING DOWN, DEPRESSED OR HOPELESS: NOT AT ALL
SUM OF ALL RESPONSES TO PHQ9 QUESTIONS 1 & 2: 0
SUM OF ALL RESPONSES TO PHQ QUESTIONS 1-9: 0
SUM OF ALL RESPONSES TO PHQ QUESTIONS 1-9: 0
1. LITTLE INTEREST OR PLEASURE IN DOING THINGS: NOT AT ALL

## 2025-01-10 ASSESSMENT — ENCOUNTER SYMPTOMS
CHEST TIGHTNESS: 0
APNEA: 0
BACK PAIN: 1

## 2025-01-10 NOTE — PROGRESS NOTES
[facility]    History of Present Illness:   Torito Ivy is a 74 y.o. male with history of HTN, Paroxysmal Afib, SPENCER, Asthma, DDD, Osteoarthritis   CC: Follow up  History provided by patient and Records    HPI:  Essential Tremor: Noting propanol may be helping at times, has been on for less than a week.    Back pain: Persistent issue, is tolerating overall.     Hypertension Follow up:  The patient reports:  taking medications as instructed, no medication side effects noted, no TIA's, no chest pain on exertion, no dyspnea on exertion, no swelling of ankles, no orthostatic dizziness or lightheadedness, no orthopnea or paroxysmal nocturnal dyspnea.     BP Readings from Last 3 Encounters:   01/10/25 121/69   11/13/24 125/70   09/10/24 111/66      Gastroesophageal Reflux:  Current control of Symptoms: Stable  Primary symptoms: heartburn  Hiatal Hernia: No  Current Medications: Prilosec  The patient has no history melena or bright red blood in the stools.  The patient avoids high dose aspirin and NSAID therapy.  The patient is aware of diet changes needed, elevating the head of the bed and appropriate use of antacids.    SPENCER On C-Pap: Patient uses C-pap. Overall good, had a bad night recently. Machine 2-3 years old.    Atrial Fibrillation Review: Patient is overall doing well.  Denies chest pain, edema, dyspnea or dizziness. Patient is not aware of irregular heartbeats. Patient denies neurologic sx. Currently he avoids caffeine.     Risk Factor Modification:  Patient avoids excessive caffeine, Alcohol use, Tobacco products, and OTC cough suppressants.  Stays hydrated  Current Medications: amiodarone  Currently anticoagulated: Yes  Most recent Echocardiogram: 5/17/24  Current Cardiologist: Torrie/Eileen    Hypertriglyceridemia Follow up:   Cardiovascular risks for him are: hypertension  hyperlipidemia.   Current Medications:  rosuvastatin - 10 MG               Compliance: Yes

## 2025-03-10 ENCOUNTER — TELEPHONE (OUTPATIENT)
Age: 75
End: 2025-03-10

## 2025-03-10 DIAGNOSIS — I48.0 PAROXYSMAL ATRIAL FIBRILLATION (HCC): ICD-10-CM

## 2025-03-10 NOTE — TELEPHONE ENCOUNTER
[place] [facility] Received confirmation.

## 2025-04-01 ASSESSMENT — SLEEP AND FATIGUE QUESTIONNAIRES
HOW LIKELY ARE YOU TO NOD OFF OR FALL ASLEEP WHILE SITTING INACTIVE IN A PUBLIC PLACE: WOULD NEVER DOZE
HOW LIKELY ARE YOU TO NOD OFF OR FALL ASLEEP WHILE LYING DOWN TO REST IN THE AFTERNOON WHEN CIRCUMSTANCES PERMIT: MODERATE CHANCE OF DOZING
HOW LIKELY ARE YOU TO NOD OFF OR FALL ASLEEP WHILE SITTING AND TALKING TO SOMEONE: WOULD NEVER DOZE
HOW LIKELY ARE YOU TO NOD OFF OR FALL ASLEEP WHILE SITTING QUIETLY AFTER LUNCH WITHOUT ALCOHOL: SLIGHT CHANCE OF DOZING
HOW LIKELY ARE YOU TO NOD OFF OR FALL ASLEEP WHILE LYING DOWN TO REST IN THE AFTERNOON WHEN CIRCUMSTANCES PERMIT: MODERATE CHANCE OF DOZING
HOW LIKELY ARE YOU TO NOD OFF OR FALL ASLEEP IN A CAR, WHILE STOPPED FOR A FEW MINUTES IN TRAFFIC: WOULD NEVER DOZE
HOW LIKELY ARE YOU TO NOD OFF OR FALL ASLEEP IN A CAR, WHILE STOPPED FOR A FEW MINUTES IN TRAFFIC: WOULD NEVER DOZE
FOSQ SCORE: 19
HOW LIKELY ARE YOU TO NOD OFF OR FALL ASLEEP WHILE SITTING AND READING: SLIGHT CHANCE OF DOZING
HOW LIKELY ARE YOU TO NOD OFF OR FALL ASLEEP WHILE WATCHING TV: SLIGHT CHANCE OF DOZING
DO YOU HAVE DIFFICULTY VISITING YOUR FAMILY OR FRIENDS IN THEIR HOME BECAUSE YOU BECOME SLEEPY OR TIRED: NO
DO YOU HAVE DIFFICULTY WATCHING A MOVIE OR VIDEO BECAUSE YOU BECOME SLEEPY OR TIRED: NO
DO YOU HAVE DIFFICULTY OPERATING A MOTOR VEHICLE FOR LONG DISTANCES (GREATER THAN 100 MILES) BECAUSE YOU BECOME SLEEPY: NO
DO YOU HAVE DIFFICULTY BEING AS ACTIVE AS YOU WANT TO BE IN THE EVENING BECAUSE YOU ARE SLEEPY OR TIRED: YES, LITTLE
HOW LIKELY ARE YOU TO NOD OFF OR FALL ASLEEP WHILE SITTING INACTIVE IN A PUBLIC PLACE: WOULD NEVER DOZE
DO YOU HAVE DIFFICULTY OPERATING A MOTOR VEHICLE FOR SHORT DISTANCES (LESS THAN 100 MILES) BECAUSE YOU BECOME SLEEPY: NO
HOW LIKELY ARE YOU TO NOD OFF OR FALL ASLEEP WHEN YOU ARE A PASSENGER IN A CAR FOR AN HOUR WITHOUT A BREAK: WOULD NEVER DOZE
HOW LIKELY ARE YOU TO NOD OFF OR FALL ASLEEP WHILE SITTING QUIETLY AFTER LUNCH WITHOUT ALCOHOL: SLIGHT CHANCE OF DOZING
HOW LIKELY ARE YOU TO NOD OFF OR FALL ASLEEP WHILE SITTING AND READING: SLIGHT CHANCE OF DOZING
DO YOU GENERALLY HAVE DIFFICULTY REMEMBERING THINGS BECAUSE YOU ARE SLEEPY OR TIRED: NO
HAS YOUR RELATIONSHIP WITH FAMILY, FRIENDS OR WORK COLLEAGUES BEEN AFFECTED BECAUSE YOU ARE SLEEPY OR TIRED: NO
DO YOU HAVE DIFFICULTY BEING AS ACTIVE AS YOU WANT TO BE IN THE MORNING BECAUSE YOU ARE SLEEPY OR TIRED: YES, LITTLE
HAS YOUR MOOD BEEN AFFECTED BECAUSE YOU ARE SLEEPY OR TIRED: NO
HOW LIKELY ARE YOU TO NOD OFF OR FALL ASLEEP WHILE WATCHING TV: SLIGHT CHANCE OF DOZING
HOW LIKELY ARE YOU TO NOD OFF OR FALL ASLEEP WHILE SITTING AND TALKING TO SOMEONE: WOULD NEVER DOZE
DO YOU HAVE DIFFICULTY CONCENTRATING ON THE THINGS YOU DO BECAUSE YOU ARE SLEEPY OR TIRED: NO
ESS TOTAL SCORE: 5
HOW LIKELY ARE YOU TO NOD OFF OR FALL ASLEEP WHEN YOU ARE A PASSENGER IN A CAR FOR AN HOUR WITHOUT A BREAK: WOULD NEVER DOZE

## 2025-04-04 ENCOUNTER — CLINICAL DOCUMENTATION (OUTPATIENT)
Age: 75
End: 2025-04-04

## 2025-04-04 ENCOUNTER — OFFICE VISIT (OUTPATIENT)
Age: 75
End: 2025-04-04
Payer: MEDICARE

## 2025-04-04 VITALS
DIASTOLIC BLOOD PRESSURE: 83 MMHG | HEART RATE: 60 BPM | BODY MASS INDEX: 35.33 KG/M2 | TEMPERATURE: 97 F | HEIGHT: 70 IN | WEIGHT: 246.8 LBS | SYSTOLIC BLOOD PRESSURE: 134 MMHG | OXYGEN SATURATION: 96 %

## 2025-04-04 DIAGNOSIS — G47.33 OSA (OBSTRUCTIVE SLEEP APNEA): Primary | ICD-10-CM

## 2025-04-04 PROCEDURE — G8417 CALC BMI ABV UP PARAM F/U: HCPCS | Performed by: SPECIALIST

## 2025-04-04 PROCEDURE — 1159F MED LIST DOCD IN RCRD: CPT | Performed by: SPECIALIST

## 2025-04-04 PROCEDURE — 99213 OFFICE O/P EST LOW 20 MIN: CPT | Performed by: SPECIALIST

## 2025-04-04 PROCEDURE — 3017F COLORECTAL CA SCREEN DOC REV: CPT | Performed by: SPECIALIST

## 2025-04-04 PROCEDURE — G8427 DOCREV CUR MEDS BY ELIG CLIN: HCPCS | Performed by: SPECIALIST

## 2025-04-04 PROCEDURE — 1036F TOBACCO NON-USER: CPT | Performed by: SPECIALIST

## 2025-04-04 PROCEDURE — 1123F ACP DISCUSS/DSCN MKR DOCD: CPT | Performed by: SPECIALIST

## 2025-04-04 PROCEDURE — 3075F SYST BP GE 130 - 139MM HG: CPT | Performed by: SPECIALIST

## 2025-04-04 PROCEDURE — 3079F DIAST BP 80-89 MM HG: CPT | Performed by: SPECIALIST

## 2025-04-04 NOTE — PROGRESS NOTES
[facility] - [address]  [facility] - [place]  [address] [facility] [address]  Dept: [phone]              [address]  [place] [address]  Tel.  [phone]  Fax. [phone] [address]  [place] [address]  Tel.  [phone]  Fax. [phone] [address] [place] [address]  Tel.  [phone]  Fax. [phone]         Chief Complaint       Chief Complaint   Patient presents with    Sleep Problem     Yearly follow up         HPI        Torito Ivy is a 74 y.o. male seen for follow-up. [facility] was [facility] at [facility] with a sleep study which demonstrated obstructive sleep apnea characterized by an AHI of 19.2  per hour associated with arterial desaturations to 78%. Events were more prominent in REM sleep with the REM-related AHI of 82.6 per hour. During a second study,  CPAP  of 6  cm associated with AHI 0.4 per hour and SaO2 91%. Due to a number of respiratory-related arousals, CPAP initiated at 8 cm. CPAP currently 9 cm.      Device updated.  Set up date: 2/20/2023    Compliance data downloaded and reviewed in detail with the patient today. During the past 30 days, CPAP 9 cm used during 30 days with the average daily use of 8.35 hours. CMS compliance criteria 100%. AHI 1 per hour.     Does not fall asleep inappropriately during the day.  Benge Sleepiness Scale: 5    No Known Allergies    No current facility-administered medications for this visit.     He  has a past medical history of Anesthesia complication, Arthritis, Atrial fibrillation (HCC), Basal cell carcinoma (BCC) in situ of skin, Chronic myocardial ischemia, Colon ulcer, aphthous, COPD, mild (HCC), Environmental and seasonal allergies, Essential tremor, Fatty liver, Frequent episodes of bronchitis, Frequent PVCs, GERD (gastroesophageal reflux disease), High cholesterol, HTN (hypertension), benign, Hx MRSA infection, Skin cancer,

## 2025-04-08 SDOH — HEALTH STABILITY: PHYSICAL HEALTH: ON AVERAGE, HOW MANY MINUTES DO YOU ENGAGE IN EXERCISE AT THIS LEVEL?: 30 MIN

## 2025-04-08 SDOH — HEALTH STABILITY: PHYSICAL HEALTH: ON AVERAGE, HOW MANY DAYS PER WEEK DO YOU ENGAGE IN MODERATE TO STRENUOUS EXERCISE (LIKE A BRISK WALK)?: 2 DAYS

## 2025-04-08 ASSESSMENT — LIFESTYLE VARIABLES
HOW OFTEN DO YOU HAVE A DRINK CONTAINING ALCOHOL: 4 OR MORE TIMES A WEEK
HOW OFTEN DURING THE LAST YEAR HAVE YOU FAILED TO DO WHAT WAS NORMALLY EXPECTED FROM YOU BECAUSE OF DRINKING: NEVER
HOW OFTEN DURING THE LAST YEAR HAVE YOU HAD A FEELING OF GUILT OR REMORSE AFTER DRINKING: NEVER
HOW OFTEN DURING THE LAST YEAR HAVE YOU FAILED TO DO WHAT WAS NORMALLY EXPECTED FROM YOU BECAUSE OF DRINKING: NEVER
HOW OFTEN DURING THE LAST YEAR HAVE YOU NEEDED AN ALCOHOLIC DRINK FIRST THING IN THE MORNING TO GET YOURSELF GOING AFTER A NIGHT OF HEAVY DRINKING: NEVER
HOW OFTEN DURING THE LAST YEAR HAVE YOU BEEN UNABLE TO REMEMBER WHAT HAPPENED THE NIGHT BEFORE BECAUSE YOU HAD BEEN DRINKING: NEVER
HOW OFTEN DURING THE LAST YEAR HAVE YOU HAD A FEELING OF GUILT OR REMORSE AFTER DRINKING: NEVER
HOW OFTEN DO YOU HAVE SIX OR MORE DRINKS ON ONE OCCASION: 2
HOW OFTEN DURING THE LAST YEAR HAVE YOU FOUND THAT YOU WERE NOT ABLE TO STOP DRINKING ONCE YOU HAD STARTED: NEVER
HOW OFTEN DURING THE LAST YEAR HAVE YOU BEEN UNABLE TO REMEMBER WHAT HAPPENED THE NIGHT BEFORE BECAUSE YOU HAD BEEN DRINKING: NEVER
HOW MANY STANDARD DRINKS CONTAINING ALCOHOL DO YOU HAVE ON A TYPICAL DAY: 2
HAVE YOU OR SOMEONE ELSE BEEN INJURED AS A RESULT OF YOUR DRINKING: NO
HAS A RELATIVE, FRIEND, DOCTOR, OR ANOTHER HEALTH PROFESSIONAL EXPRESSED CONCERN ABOUT YOUR DRINKING OR SUGGESTED YOU CUT DOWN: NO
HAVE YOU OR SOMEONE ELSE BEEN INJURED AS A RESULT OF YOUR DRINKING: NO
HOW OFTEN DURING THE LAST YEAR HAVE YOU NEEDED AN ALCOHOLIC DRINK FIRST THING IN THE MORNING TO GET YOURSELF GOING AFTER A NIGHT OF HEAVY DRINKING: NEVER
HAS A RELATIVE, FRIEND, DOCTOR, OR ANOTHER HEALTH PROFESSIONAL EXPRESSED CONCERN ABOUT YOUR DRINKING OR SUGGESTED YOU CUT DOWN: NO
HOW OFTEN DO YOU HAVE A DRINK CONTAINING ALCOHOL: 5
HOW OFTEN DURING THE LAST YEAR HAVE YOU FOUND THAT YOU WERE NOT ABLE TO STOP DRINKING ONCE YOU HAD STARTED: NEVER
HOW MANY STANDARD DRINKS CONTAINING ALCOHOL DO YOU HAVE ON A TYPICAL DAY: 3 OR 4

## 2025-04-08 ASSESSMENT — PATIENT HEALTH QUESTIONNAIRE - PHQ9
SUM OF ALL RESPONSES TO PHQ QUESTIONS 1-9: 0
2. FEELING DOWN, DEPRESSED OR HOPELESS: NOT AT ALL
SUM OF ALL RESPONSES TO PHQ QUESTIONS 1-9: 0
1. LITTLE INTEREST OR PLEASURE IN DOING THINGS: NOT AT ALL

## 2025-04-10 ENCOUNTER — OFFICE VISIT (OUTPATIENT)
Facility: CLINIC | Age: 75
End: 2025-04-10

## 2025-04-10 VITALS
HEART RATE: 99 BPM | TEMPERATURE: 97.5 F | DIASTOLIC BLOOD PRESSURE: 66 MMHG | OXYGEN SATURATION: 95 % | RESPIRATION RATE: 18 BRPM | BODY MASS INDEX: 35.22 KG/M2 | HEIGHT: 70 IN | WEIGHT: 246 LBS | SYSTOLIC BLOOD PRESSURE: 114 MMHG

## 2025-04-10 DIAGNOSIS — E55.9 VITAMIN D DEFICIENCY: ICD-10-CM

## 2025-04-10 DIAGNOSIS — J45.20 MILD INTERMITTENT ASTHMA WITHOUT COMPLICATION: ICD-10-CM

## 2025-04-10 DIAGNOSIS — E66.01 SEVERE OBESITY (BMI 35.0-39.9) WITH COMORBIDITY: ICD-10-CM

## 2025-04-10 DIAGNOSIS — I48.19 PERSISTENT ATRIAL FIBRILLATION (HCC): ICD-10-CM

## 2025-04-10 DIAGNOSIS — I10 PRIMARY HYPERTENSION: ICD-10-CM

## 2025-04-10 DIAGNOSIS — M17.11 PRIMARY OSTEOARTHRITIS OF RIGHT KNEE: ICD-10-CM

## 2025-04-10 DIAGNOSIS — L23.7 POISON OAK DERMATITIS: ICD-10-CM

## 2025-04-10 DIAGNOSIS — G47.33 OBSTRUCTIVE SLEEP APNEA ON CPAP: ICD-10-CM

## 2025-04-10 DIAGNOSIS — Z00.00 MEDICARE ANNUAL WELLNESS VISIT, SUBSEQUENT: Primary | ICD-10-CM

## 2025-04-10 DIAGNOSIS — M17.12 PRIMARY OSTEOARTHRITIS OF LEFT KNEE: ICD-10-CM

## 2025-04-10 DIAGNOSIS — K21.9 GASTROESOPHAGEAL REFLUX DISEASE WITHOUT ESOPHAGITIS: ICD-10-CM

## 2025-04-10 DIAGNOSIS — E78.2 MIXED HYPERLIPIDEMIA: ICD-10-CM

## 2025-04-10 DIAGNOSIS — E53.8 B12 DEFICIENCY: ICD-10-CM

## 2025-04-10 RX ORDER — TRIAMCINOLONE ACETONIDE 1 MG/G
1 CREAM TOPICAL 3 TIMES DAILY
Qty: 453.6 G | Refills: 0 | Status: SHIPPED | OUTPATIENT
Start: 2025-04-10

## 2025-04-10 ASSESSMENT — PATIENT HEALTH QUESTIONNAIRE - PHQ9
1. LITTLE INTEREST OR PLEASURE IN DOING THINGS: NOT AT ALL
SUM OF ALL RESPONSES TO PHQ QUESTIONS 1-9: 0
2. FEELING DOWN, DEPRESSED OR HOPELESS: NOT AT ALL
SUM OF ALL RESPONSES TO PHQ QUESTIONS 1-9: 0

## 2025-04-10 ASSESSMENT — ENCOUNTER SYMPTOMS
APNEA: 0
CHEST TIGHTNESS: 0
BACK PAIN: 0

## 2025-04-10 NOTE — PATIENT INSTRUCTIONS
dementia may need this extra help.  How can you help with dental care?  Normal dental care  To keep the teeth and gums healthy:  Brush the teeth with fluoride toothpaste twice a day--in the morning and at night--and floss at least once a day. Plaque can quickly build up on the teeth of older adults.  Watch for the signs of gum disease. These signs include gums that bleed after brushing or after eating hard foods, such as apples.  See a dentist regularly. Many experts recommend checkups every 6 months.  Keep the dentist up to date on any new medications the person is taking.  Encourage a balanced diet that includes whole grains, vegetables, and fruits, and that is low in saturated fat and sodium.  Encourage the person you're caring for not to use tobacco products. They can affect dental and general health.  Many older adults have a fixed income and feel that they can't afford dental care. But most [facility] and [facility] have programs in which dentists help older adults by lowering fees. Contact your area's public health offices or  for information about dental care in your area.  Using a toothbrush  Older adults with arthritis sometimes have trouble brushing their teeth because they can't easily hold the toothbrush. Their hands and fingers may be stiff, painful, or weak. If this is the case, you can:  Offer an electric toothbrush.  Enlarge the handle of a non-electric toothbrush by wrapping a sponge, an elastic bandage, or adhesive tape around it.  Push the toothbrush handle through a ball made of rubber or soft foam.  Make the handle longer and thicker by taping Popsicle sticks or tongue depressors to it.  You may also be able to buy special toothbrushes, toothpaste dispensers, and floss holders.  Your doctor may recommend a soft-bristle toothbrush if the person you care for bleeds easily. Bleeding can happen because of a health problem or from certain medicines.  A toothpaste for sensitive teeth may help

## 2025-04-10 NOTE — PROGRESS NOTES
[facility]    History of Present Illness:   Torito Ivy is a 74 y.o. male with history of HTN, Paroxysmal Afib, SPENCER, Asthma, DDD, Osteoarthritis   CC: Follow up  History provided by patient and Records    HPI:  Essential Tremor: Noting propanol may be helping at times, has been on for less than a week.     Back pain: Persistent issue, is tolerating overall.     Hypertension Follow up:  The patient reports:  taking medications as instructed, no medication side effects noted, no TIA's, no chest pain on exertion, no dyspnea on exertion, no swelling of ankles, no orthostatic dizziness or lightheadedness, no orthopnea or paroxysmal nocturnal dyspnea.     BP Readings from Last 3 Encounters:   04/10/25 114/66   04/04/25 134/83   01/10/25 121/69      Gastroesophageal Reflux:  Current control of Symptoms: Stable  Primary symptoms: heartburn  Hiatal Hernia: No  Current Medications: Prilosec  The patient has no history melena or bright red blood in the stools.  The patient avoids high dose aspirin and NSAID therapy.  The patient is aware of diet changes needed, elevating the head of the bed and appropriate use of antacids.    SPENCER On C-Pap: Patient uses C-pap. Overall good, had a bad night recently.      Atrial Fibrillation Review: Patient is overall doing well.  Denies chest pain, edema, dyspnea or dizziness. Patient is not aware of irregular heartbeats. Patient denies neurologic sx. Currently he avoids caffeine.     Risk Factor Modification:  Patient avoids excessive caffeine, Alcohol use, Tobacco products, and OTC cough suppressants.  Stays hydrated  Current Medications: amiodarone and propranolol  Currently anticoagulated: Yes  Most recent Echocardiogram: 5/17/24  Current Cardiologist: Torrie/Eileen     Hypertriglyceridemia Follow up:   Cardiovascular risks for him are: hypertension  hyperlipidemia.   Current Medications:  rosuvastatin - 10 MG               Compliance: Yes

## 2025-04-10 NOTE — PROGRESS NOTES
\"Have you been to the ER, urgent care clinic since your last visit?  Hospitalized since your last visit?\"    NO    “Have you seen or consulted any other health care providers outside of [facility] since your last visit?”    NO          Click Here for Release of Records Request

## 2025-04-10 NOTE — PROGRESS NOTES
Medicare Annual Wellness Visit    Torito Ivy is here for Medicare AWV    Assessment & Plan   Medicare annual wellness visit, subsequent  -     triamcinolone (KENALOG) 0.1 % cream; Apply 1 g topically 3 times daily Apply to arms for poison Ivy, Topical, 3 TIMES DAILY Starting Thu 4/10/2025, Disp-453.6 g, R-0, Normal  Primary hypertension  -     Comprehensive Metabolic Panel; Future  -     CBC; Future  Persistent atrial fibrillation (HCC)  Mild intermittent asthma without complication  Obstructive sleep apnea on CPAP  Gastroesophageal reflux disease without esophagitis  Primary osteoarthritis of right knee  Primary osteoarthritis of left knee  Mixed hyperlipidemia  -     Lipid Panel; Future  Vitamin D deficiency  -     Vitamin D 25 Hydroxy; Future  B12 deficiency  -     Vitamin B12; Future  Severe obesity (BMI 35.0-39.9) with comorbidity  Poison oak dermatitis       Return in about 3 months (around 7/10/2025).     Subjective     Patient's complete Health Risk Assessment and screening values have been reviewed and are found in Flowsheets. The following problems were reviewed today and where indicated follow up appointments were made and/or referrals ordered.    Positive Risk Factor Screenings with Interventions:       Alcohol Screening:  AUDIT-C Score: (Patient-Rptd) 6  AUDIT Total Score: (Patient-Rptd) 6  Total Score Interpretation: 0-7 suggests low risk alcohol consumption but assess individual risks   Interventions:  Patient declined any further intervention or treatment           General HRA Questions:  Select all that apply: (!) (Patient-Rptd) Stress  Interventions - Stress:  Mother       Inactivity:  On average, how many days per week do you engage in moderate to strenuous exercise (like a brisk walk)?: (Patient-Rptd) 2 days (!) Abnormal  On average, how many minutes do you engage in exercise at this level?: (Patient-Rptd) 30 min  Interventions:  Recommendations: Increase activity    Poor Eating

## 2025-04-11 LAB
25(OH)D3 SERPL-MCNC: 44.3 NG/ML (ref 30–100)
ALBUMIN SERPL-MCNC: 3.9 G/DL (ref 3.5–5)
ALBUMIN/GLOB SERPL: 1.3 (ref 1.1–2.2)
ALP SERPL-CCNC: 72 U/L (ref 45–117)
ALT SERPL-CCNC: 39 U/L (ref 12–78)
ANION GAP SERPL CALC-SCNC: 4 MMOL/L (ref 2–12)
AST SERPL-CCNC: 27 U/L (ref 15–37)
BILIRUB SERPL-MCNC: 0.5 MG/DL (ref 0.2–1)
BUN SERPL-MCNC: 16 MG/DL (ref 6–20)
BUN/CREAT SERPL: 15 (ref 12–20)
CALCIUM SERPL-MCNC: 9 MG/DL (ref 8.5–10.1)
CHLORIDE SERPL-SCNC: 106 MMOL/L (ref 97–108)
CHOLEST SERPL-MCNC: 116 MG/DL
CO2 SERPL-SCNC: 30 MMOL/L (ref 21–32)
CREAT SERPL-MCNC: 1.1 MG/DL (ref 0.7–1.3)
ERYTHROCYTE [DISTWIDTH] IN BLOOD BY AUTOMATED COUNT: 13.5 % (ref 11.5–14.5)
GLOBULIN SER CALC-MCNC: 3.1 G/DL (ref 2–4)
GLUCOSE SERPL-MCNC: 98 MG/DL (ref 65–100)
HCT VFR BLD AUTO: 49.8 % (ref 36.6–50.3)
HDLC SERPL-MCNC: 42 MG/DL
HDLC SERPL: 2.8 (ref 0–5)
HGB BLD-MCNC: 15.5 G/DL (ref 12.1–17)
LDLC SERPL CALC-MCNC: 40.6 MG/DL (ref 0–100)
MCH RBC QN AUTO: 30.3 PG (ref 26–34)
MCHC RBC AUTO-ENTMCNC: 31.1 G/DL (ref 30–36.5)
MCV RBC AUTO: 97.5 FL (ref 80–99)
NRBC # BLD: 0 K/UL (ref 0–0.01)
NRBC BLD-RTO: 0 PER 100 WBC
PLATELET # BLD AUTO: 227 K/UL (ref 150–400)
PMV BLD AUTO: 11.7 FL (ref 8.9–12.9)
POTASSIUM SERPL-SCNC: 4.6 MMOL/L (ref 3.5–5.1)
PROT SERPL-MCNC: 7 G/DL (ref 6.4–8.2)
RBC # BLD AUTO: 5.11 M/UL (ref 4.1–5.7)
SODIUM SERPL-SCNC: 140 MMOL/L (ref 136–145)
TRIGL SERPL-MCNC: 167 MG/DL
VIT B12 SERPL-MCNC: 1117 PG/ML (ref 193–986)
VLDLC SERPL CALC-MCNC: 33.4 MG/DL
WBC # BLD AUTO: 7.6 K/UL (ref 4.1–11.1)

## 2025-04-14 ENCOUNTER — RESULTS FOLLOW-UP (OUTPATIENT)
Facility: CLINIC | Age: 75
End: 2025-04-14

## 2025-05-23 ENCOUNTER — ANCILLARY PROCEDURE (OUTPATIENT)
Age: 75
End: 2025-05-23
Payer: MEDICARE

## 2025-05-23 ENCOUNTER — OFFICE VISIT (OUTPATIENT)
Age: 75
End: 2025-05-23
Payer: MEDICARE

## 2025-05-23 VITALS
OXYGEN SATURATION: 95 % | DIASTOLIC BLOOD PRESSURE: 80 MMHG | HEART RATE: 65 BPM | BODY MASS INDEX: 34.65 KG/M2 | HEIGHT: 70 IN | WEIGHT: 242 LBS | SYSTOLIC BLOOD PRESSURE: 126 MMHG

## 2025-05-23 VITALS
DIASTOLIC BLOOD PRESSURE: 62 MMHG | SYSTOLIC BLOOD PRESSURE: 122 MMHG | BODY MASS INDEX: 34.65 KG/M2 | WEIGHT: 242 LBS | HEIGHT: 70 IN | HEART RATE: 60 BPM

## 2025-05-23 DIAGNOSIS — I77.810 DILATED AORTIC ROOT: ICD-10-CM

## 2025-05-23 DIAGNOSIS — I10 PRIMARY HYPERTENSION: ICD-10-CM

## 2025-05-23 DIAGNOSIS — I48.0 PAROXYSMAL ATRIAL FIBRILLATION (HCC): ICD-10-CM

## 2025-05-23 DIAGNOSIS — I48.19 PERSISTENT ATRIAL FIBRILLATION (HCC): ICD-10-CM

## 2025-05-23 DIAGNOSIS — I42.8 NICM (NONISCHEMIC CARDIOMYOPATHY) (HCC): ICD-10-CM

## 2025-05-23 DIAGNOSIS — I49.3 PVC'S (PREMATURE VENTRICULAR CONTRACTIONS): ICD-10-CM

## 2025-05-23 DIAGNOSIS — I48.19 PERSISTENT ATRIAL FIBRILLATION (HCC): Primary | ICD-10-CM

## 2025-05-23 DIAGNOSIS — E78.2 MIXED HYPERLIPIDEMIA: ICD-10-CM

## 2025-05-23 PROCEDURE — 99214 OFFICE O/P EST MOD 30 MIN: CPT | Performed by: SPECIALIST

## 2025-05-23 PROCEDURE — C8929 TTE W OR WO FOL WCON,DOPPLER: HCPCS | Performed by: SPECIALIST

## 2025-05-23 PROCEDURE — 93005 ELECTROCARDIOGRAM TRACING: CPT | Performed by: SPECIALIST

## 2025-05-23 RX ORDER — AMIODARONE HYDROCHLORIDE 200 MG/1
200 TABLET ORAL
Qty: 24 TABLET | Refills: 3 | Status: SHIPPED | OUTPATIENT
Start: 2025-05-26

## 2025-05-23 RX ADMIN — PERFLUTREN 10 ML: 6.52 INJECTION, SUSPENSION INTRAVENOUS at 11:19

## 2025-05-23 NOTE — PROGRESS NOTES
Chief Complaint   Patient presents with    Follow-up    Other     ECHO RESULTS     Vitals:    05/23/25 1101   BP: 126/80   BP Site: Left Upper Arm   Patient Position: Sitting   BP Cuff Size: Medium Adult   Pulse: 65   SpO2: 95%   Weight: 109.8 kg (242 lb)   Height: 1.778 m (5' 10\")      /80 (BP Site: Left Upper Arm, Patient Position: Sitting, BP Cuff Size: Medium Adult)   Pulse 65   Ht 1.778 m (5' 10\")   Wt 109.8 kg (242 lb)   SpO2 95%   BMI 34.72 kg/m²               
ago. His smoking use included cigarettes. He started smoking about 45 years ago. He has a 105 pack-year smoking history. He has never been exposed to tobacco smoke. He has never used smokeless tobacco. He reports current alcohol use of about 21.0 standard drinks of alcohol per week. He reports that he does not use drugs.   Family Hx- family history includes Cancer in his brother and brother; Dementia in his mother; Heart Disease in his father and mother; Hypertension in his mother; Lung Disease in his father; No Known Problems in his brother.   No Known Allergies       Exam and Labs:  /80 (BP Site: Left Upper Arm, Patient Position: Sitting, BP Cuff Size: Medium Adult)   Pulse 65   Ht 1.778 m (5' 10\")   Wt 109.8 kg (242 lb)   SpO2 95%   BMI 34.72 kg/m²    @Constitutional:  NAD, comfortable  Head: NC,AT. Eyes: No scleral icterus. Neck:  Neck supple. No JVD present.Throat: moist mucous membranes.  Chest: Effort normal & normal respiratory excursion .Neurological: alert, conversant and oriented . Skin: Skin is not cold. No obvious systemic rash noted. Not diaphoretic. No erythema.   Psychiatric:  Grossly normal mood and affect.  Behavior appears normal.   Extremities:  no clubbing or cyanosis. Abdomen: non distended    Lungs:breath sounds normal. No stridor. distress, wheezes or  Rales.  Heart:    normal rate, regular rhythm, normal S1, S2, no murmurs, rubs, clicks or gallops, S1 and S2 normal, no murmurs noted, no gallops noted, etta regular , PMI non displaced.    Edema: Edema is none.      Lab Results   Component Value Date    CHOL 116 04/10/2025    CHOL 128 09/10/2024    CHOL 111 06/07/2024     Lab Results   Component Value Date    TRIG 167 (H) 04/10/2025    TRIG 205 (H) 09/10/2024    TRIG 155 (H) 06/07/2024     Lab Results   Component Value Date    HDL 42 04/10/2025    HDL 39 09/10/2024    HDL 41 06/07/2024     No components found for: \"LDLCHOLESTEROL\", \"LDLCALC\"    Lab Results   Component Value Date

## 2025-05-24 LAB
ECHO AO ASC DIAM: 4.1 CM
ECHO AO ASCENDING AORTA INDEX: 1.81 CM/M2
ECHO AO ROOT DIAM: 4.1 CM
ECHO AO ROOT INDEX: 1.81 CM/M2
ECHO AV AREA PEAK VELOCITY: 4 CM2
ECHO AV AREA VTI: 3.8 CM2
ECHO AV AREA/BSA PEAK VELOCITY: 1.8 CM2/M2
ECHO AV AREA/BSA VTI: 1.7 CM2/M2
ECHO AV MEAN GRADIENT: 3 MMHG
ECHO AV MEAN VELOCITY: 0.8 M/S
ECHO AV PEAK GRADIENT: 6 MMHG
ECHO AV PEAK VELOCITY: 1.2 M/S
ECHO AV VELOCITY RATIO: 1
ECHO AV VTI: 24.2 CM
ECHO BSA: 2.33 M2
ECHO EST RA PRESSURE: 3 MMHG
ECHO LA DIAMETER INDEX: 2.08 CM/M2
ECHO LA DIAMETER: 4.7 CM
ECHO LA TO AORTIC ROOT RATIO: 1.15
ECHO LA VOL A-L A2C: 91 ML (ref 18–58)
ECHO LA VOL A-L A4C: 102 ML (ref 18–58)
ECHO LA VOL MOD A2C: 85 ML (ref 18–58)
ECHO LA VOL MOD A4C: 92 ML (ref 18–58)
ECHO LA VOLUME AREA LENGTH: 97 ML
ECHO LA VOLUME INDEX A-L A2C: 40 ML/M2 (ref 16–34)
ECHO LA VOLUME INDEX A-L A4C: 45 ML/M2 (ref 16–34)
ECHO LA VOLUME INDEX AREA LENGTH: 43 ML/M2 (ref 16–34)
ECHO LA VOLUME INDEX MOD A2C: 38 ML/M2 (ref 16–34)
ECHO LA VOLUME INDEX MOD A4C: 41 ML/M2 (ref 16–34)
ECHO LV E' LATERAL VELOCITY: 9.9 CM/S
ECHO LV E' SEPTAL VELOCITY: 6.45 CM/S
ECHO LV EDV A2C: 130 ML
ECHO LV EDV A4C: 142 ML
ECHO LV EDV BP: 137 ML (ref 67–155)
ECHO LV EDV INDEX A4C: 63 ML/M2
ECHO LV EDV INDEX BP: 61 ML/M2
ECHO LV EDV NDEX A2C: 58 ML/M2
ECHO LV EF PHYSICIAN: 65 %
ECHO LV EJECTION FRACTION A2C: 69 %
ECHO LV EJECTION FRACTION A4C: 60 %
ECHO LV EJECTION FRACTION BIPLANE: 65 % (ref 55–100)
ECHO LV ESV A2C: 40 ML
ECHO LV ESV A4C: 57 ML
ECHO LV ESV BP: 48 ML (ref 22–58)
ECHO LV ESV INDEX A2C: 18 ML/M2
ECHO LV ESV INDEX A4C: 25 ML/M2
ECHO LV ESV INDEX BP: 21 ML/M2
ECHO LV FRACTIONAL SHORTENING: 34 % (ref 28–44)
ECHO LV INTERNAL DIMENSION DIASTOLE INDEX: 2.08 CM/M2
ECHO LV INTERNAL DIMENSION DIASTOLIC: 4.7 CM (ref 4.2–5.9)
ECHO LV INTERNAL DIMENSION SYSTOLIC INDEX: 1.37 CM/M2
ECHO LV INTERNAL DIMENSION SYSTOLIC: 3.1 CM
ECHO LV IVSD: 1 CM (ref 0.6–1)
ECHO LV MASS 2D: 164.5 G (ref 88–224)
ECHO LV MASS INDEX 2D: 72.8 G/M2 (ref 49–115)
ECHO LV POSTERIOR WALL DIASTOLIC: 1 CM (ref 0.6–1)
ECHO LV RELATIVE WALL THICKNESS RATIO: 0.43
ECHO LVOT AREA: 4.2 CM2
ECHO LVOT AV VTI INDEX: 0.9
ECHO LVOT DIAM: 2.3 CM
ECHO LVOT MEAN GRADIENT: 3 MMHG
ECHO LVOT PEAK GRADIENT: 5 MMHG
ECHO LVOT PEAK VELOCITY: 1.2 M/S
ECHO LVOT STROKE VOLUME INDEX: 40.2 ML/M2
ECHO LVOT SV: 90.9 ML
ECHO LVOT VTI: 21.9 CM
ECHO MV A VELOCITY: 0.46 M/S
ECHO MV AREA PHT: 3.4 CM2
ECHO MV AREA VTI: 3.7 CM2
ECHO MV E DECELERATION TIME (DT): 220.2 MS
ECHO MV E VELOCITY: 0.66 M/S
ECHO MV E/A RATIO: 1.43
ECHO MV E/E' LATERAL: 6.67
ECHO MV E/E' RATIO (AVERAGED): 8.45
ECHO MV E/E' SEPTAL: 10.23
ECHO MV LVOT VTI INDEX: 1.12
ECHO MV MAX VELOCITY: 0.7 M/S
ECHO MV MEAN GRADIENT: 1 MMHG
ECHO MV MEAN VELOCITY: 0.4 M/S
ECHO MV PEAK GRADIENT: 2 MMHG
ECHO MV PRESSURE HALF TIME (PHT): 63.9 MS
ECHO MV VTI: 24.5 CM
ECHO RA AREA 4C: 24.1 CM2
ECHO RA END SYSTOLIC VOLUME APICAL 4 CHAMBER INDEX BSA: 35 ML/M2
ECHO RA VOLUME: 78 ML
ECHO RIGHT VENTRICULAR SYSTOLIC PRESSURE (RVSP): 26 MMHG
ECHO RV INTERNAL DIMENSION: 4.5 CM
ECHO RV TAPSE: 2.2 CM (ref 1.7–?)
ECHO TV REGURGITANT MAX VELOCITY: 2.39 M/S
ECHO TV REGURGITANT PEAK GRADIENT: 23 MMHG

## 2025-05-24 PROCEDURE — 93306 TTE W/DOPPLER COMPLETE: CPT | Performed by: SPECIALIST

## 2025-05-24 PROCEDURE — PBSHW PBB SHADOW CHARGE: Performed by: SPECIALIST

## 2025-06-09 DIAGNOSIS — I10 PRIMARY HYPERTENSION: ICD-10-CM

## 2025-06-09 DIAGNOSIS — E78.2 MIXED HYPERLIPIDEMIA: ICD-10-CM

## 2025-06-09 DIAGNOSIS — M10.9 GOUT, UNSPECIFIED CAUSE, UNSPECIFIED CHRONICITY, UNSPECIFIED SITE: Primary | ICD-10-CM

## 2025-06-09 DIAGNOSIS — K21.9 GASTROESOPHAGEAL REFLUX DISEASE WITHOUT ESOPHAGITIS: ICD-10-CM

## 2025-06-09 RX ORDER — ROSUVASTATIN CALCIUM 10 MG/1
10 TABLET, COATED ORAL NIGHTLY
Qty: 90 TABLET | Refills: 1 | Status: SHIPPED | OUTPATIENT
Start: 2025-06-09

## 2025-06-09 RX ORDER — ALLOPURINOL 100 MG/1
100 TABLET ORAL DAILY
Qty: 90 TABLET | Refills: 1 | Status: SHIPPED | OUTPATIENT
Start: 2025-06-09

## 2025-06-09 RX ORDER — RAMIPRIL 10 MG/1
10 CAPSULE ORAL 2 TIMES DAILY
Qty: 180 CAPSULE | Refills: 1 | Status: SHIPPED | OUTPATIENT
Start: 2025-06-09

## 2025-06-09 RX ORDER — OMEPRAZOLE 20 MG/1
20 CAPSULE, DELAYED RELEASE ORAL DAILY
Qty: 90 CAPSULE | Refills: 1 | Status: SHIPPED | OUTPATIENT
Start: 2025-06-09

## 2025-06-09 RX ORDER — HYDROCHLOROTHIAZIDE 12.5 MG/1
12.5 TABLET ORAL DAILY
Qty: 90 TABLET | Refills: 1 | Status: SHIPPED | OUTPATIENT
Start: 2025-06-09

## 2025-07-10 ENCOUNTER — OFFICE VISIT (OUTPATIENT)
Facility: CLINIC | Age: 75
End: 2025-07-10

## 2025-07-10 VITALS
RESPIRATION RATE: 18 BRPM | DIASTOLIC BLOOD PRESSURE: 66 MMHG | HEART RATE: 61 BPM | TEMPERATURE: 97.8 F | SYSTOLIC BLOOD PRESSURE: 115 MMHG | HEIGHT: 70 IN | WEIGHT: 243 LBS | BODY MASS INDEX: 34.79 KG/M2 | OXYGEN SATURATION: 98 %

## 2025-07-10 DIAGNOSIS — K21.9 GASTROESOPHAGEAL REFLUX DISEASE WITHOUT ESOPHAGITIS: ICD-10-CM

## 2025-07-10 DIAGNOSIS — J45.20 MILD INTERMITTENT ASTHMA WITHOUT COMPLICATION: ICD-10-CM

## 2025-07-10 DIAGNOSIS — Z79.899 LONG TERM CURRENT USE OF AMIODARONE: ICD-10-CM

## 2025-07-10 DIAGNOSIS — I10 PRIMARY HYPERTENSION: ICD-10-CM

## 2025-07-10 DIAGNOSIS — G47.33 OBSTRUCTIVE SLEEP APNEA ON CPAP: ICD-10-CM

## 2025-07-10 DIAGNOSIS — E55.9 VITAMIN D DEFICIENCY: ICD-10-CM

## 2025-07-10 DIAGNOSIS — E53.8 B12 DEFICIENCY: ICD-10-CM

## 2025-07-10 DIAGNOSIS — E78.2 MIXED HYPERLIPIDEMIA: ICD-10-CM

## 2025-07-10 DIAGNOSIS — I48.19 PERSISTENT ATRIAL FIBRILLATION (HCC): Primary | ICD-10-CM

## 2025-07-10 DIAGNOSIS — E66.01 SEVERE OBESITY (BMI 35.0-39.9) WITH COMORBIDITY (HCC): ICD-10-CM

## 2025-07-10 RX ORDER — RAMIPRIL 10 MG/1
10 CAPSULE ORAL 2 TIMES DAILY
Qty: 180 CAPSULE | Refills: 1 | Status: SHIPPED | OUTPATIENT
Start: 2025-07-10

## 2025-07-10 ASSESSMENT — ENCOUNTER SYMPTOMS
CHEST TIGHTNESS: 0
APNEA: 0

## 2025-07-10 NOTE — PROGRESS NOTES
\"Have you been to the ER, urgent care clinic since your last visit?  Hospitalized since your last visit?\"    NO    “Have you seen or consulted any other health care providers outside of [facility] since your last visit?”    NO          Click Here for Release of Records Request

## 2025-07-10 NOTE — PROGRESS NOTES
[facility]    History of Present Illness:   Torito Ivy is a 74 y.o. male with history of HTN, Paroxysmal Afib, SPENCER, Asthma, DDD, Osteoarthritis   CC: Follow up  History provided by patient and Records    HPI:  Essential Tremor: Noting propanol may be helping at times, has been on for less than a week.     Back pain: Persistent issue, is tolerating overall.    Hypertension Follow up:  The patient reports:  taking medications as instructed, no medication side effects noted, no TIA's, no chest pain on exertion, no dyspnea on exertion, no swelling of ankles, no orthostatic dizziness or lightheadedness, no orthopnea or paroxysmal nocturnal dyspnea.     BP Readings from Last 3 Encounters:   07/10/25 115/66   05/23/25 126/80   05/23/25 122/62      Gastroesophageal Reflux:  Current control of Symptoms: Stable  Primary symptoms: heartburn  Hiatal Hernia: No  Current Medications: Prilosec  The patient has no history melena or bright red blood in the stools.  The patient avoids high dose aspirin and NSAID therapy.  The patient is aware of diet changes needed, elevating the head of the bed and appropriate use of antacids.    SPENCER On C-Pap: Patient uses C-pap. Overall good    Atrial Fibrillation Review: Patient is overall doing well.  Denies chest pain, edema, dyspnea or dizziness. Patient is not aware of irregular heartbeats. Patient denies neurologic sx. Currently he avoids caffeine.     Risk Factor Modification:  Patient avoids excessive caffeine, Alcohol use, Tobacco products, and OTC cough suppressants.  Stays hydrated  Current Medications: amiodarone and propranolol  Currently anticoagulated: Yes  Most recent Echocardiogram: 5/17/24  Current Cardiologist: Torrie/Eileen    Hypertriglyceridemia Follow up:   Cardiovascular risks for him are: hypertension  hyperlipidemia.   Current Medications:  rosuvastatin - 10 MG               Compliance: Yes              Myalgias: No              Fatigue:

## 2025-07-11 LAB
25(OH)D3 SERPL-MCNC: 35.3 NG/ML (ref 30–100)
ALBUMIN SERPL-MCNC: 4.3 G/DL (ref 3.5–5)
ALBUMIN/GLOB SERPL: 1.4 (ref 1.1–2.2)
ALP SERPL-CCNC: 68 U/L (ref 45–117)
ALT SERPL-CCNC: 43 U/L (ref 12–78)
ANION GAP SERPL CALC-SCNC: 6 MMOL/L (ref 2–12)
AST SERPL-CCNC: 25 U/L (ref 15–37)
BILIRUB SERPL-MCNC: 0.6 MG/DL (ref 0.2–1)
BUN SERPL-MCNC: 14 MG/DL (ref 6–20)
BUN/CREAT SERPL: 12 (ref 12–20)
CALCIUM SERPL-MCNC: 9.8 MG/DL (ref 8.5–10.1)
CHLORIDE SERPL-SCNC: 99 MMOL/L (ref 97–108)
CHOLEST SERPL-MCNC: 119 MG/DL
CO2 SERPL-SCNC: 31 MMOL/L (ref 21–32)
CREAT SERPL-MCNC: 1.18 MG/DL (ref 0.7–1.3)
ERYTHROCYTE [DISTWIDTH] IN BLOOD BY AUTOMATED COUNT: 13.4 % (ref 11.5–14.5)
GLOBULIN SER CALC-MCNC: 3.1 G/DL (ref 2–4)
GLUCOSE SERPL-MCNC: 102 MG/DL (ref 65–100)
HCT VFR BLD AUTO: 51.3 % (ref 36.6–50.3)
HDLC SERPL-MCNC: 43 MG/DL
HDLC SERPL: 2.8 (ref 0–5)
HGB BLD-MCNC: 16.4 G/DL (ref 12.1–17)
LDLC SERPL CALC-MCNC: 48.2 MG/DL (ref 0–100)
MCH RBC QN AUTO: 30.8 PG (ref 26–34)
MCHC RBC AUTO-ENTMCNC: 32 G/DL (ref 30–36.5)
MCV RBC AUTO: 96.2 FL (ref 80–99)
NRBC # BLD: 0 K/UL (ref 0–0.01)
NRBC BLD-RTO: 0 PER 100 WBC
PLATELET # BLD AUTO: 265 K/UL (ref 150–400)
PMV BLD AUTO: 11.7 FL (ref 8.9–12.9)
POTASSIUM SERPL-SCNC: 5 MMOL/L (ref 3.5–5.1)
PROT SERPL-MCNC: 7.4 G/DL (ref 6.4–8.2)
RBC # BLD AUTO: 5.33 M/UL (ref 4.1–5.7)
SODIUM SERPL-SCNC: 136 MMOL/L (ref 136–145)
T4 FREE SERPL-MCNC: 1.3 NG/DL (ref 0.8–1.5)
TRIGL SERPL-MCNC: 139 MG/DL
TSH SERPL DL<=0.05 MIU/L-ACNC: 1.2 UIU/ML (ref 0.36–3.74)
VIT B12 SERPL-MCNC: 1058 PG/ML (ref 193–986)
VLDLC SERPL CALC-MCNC: 27.8 MG/DL
WBC # BLD AUTO: 7.9 K/UL (ref 4.1–11.1)

## 2025-07-14 ENCOUNTER — RESULTS FOLLOW-UP (OUTPATIENT)
Facility: CLINIC | Age: 75
End: 2025-07-14

## 2025-08-04 DIAGNOSIS — J45.20 MILD INTERMITTENT ASTHMA WITHOUT COMPLICATION: ICD-10-CM

## 2025-08-04 RX ORDER — FLUTICASONE PROPIONATE 50 MCG
2 SPRAY, SUSPENSION (ML) NASAL DAILY
Qty: 16 G | Refills: 1 | Status: SHIPPED | OUTPATIENT
Start: 2025-08-04

## 2025-08-13 ENCOUNTER — OFFICE VISIT (OUTPATIENT)
Age: 75
End: 2025-08-13
Payer: MEDICARE

## 2025-08-13 VITALS
WEIGHT: 246 LBS | HEIGHT: 70 IN | RESPIRATION RATE: 19 BRPM | OXYGEN SATURATION: 93 % | DIASTOLIC BLOOD PRESSURE: 67 MMHG | HEART RATE: 59 BPM | BODY MASS INDEX: 35.22 KG/M2 | SYSTOLIC BLOOD PRESSURE: 134 MMHG | TEMPERATURE: 98.3 F

## 2025-08-13 DIAGNOSIS — R25.1 TREMOR: ICD-10-CM

## 2025-08-13 DIAGNOSIS — I10 PRIMARY HYPERTENSION: ICD-10-CM

## 2025-08-13 DIAGNOSIS — F10.29 ALCOHOL DEPENDENCE WITH UNSPECIFIED ALCOHOL-INDUCED DISORDER (HCC): ICD-10-CM

## 2025-08-13 DIAGNOSIS — I49.3 PVC'S (PREMATURE VENTRICULAR CONTRACTIONS): ICD-10-CM

## 2025-08-13 DIAGNOSIS — I42.8 NICM (NONISCHEMIC CARDIOMYOPATHY) (HCC): ICD-10-CM

## 2025-08-13 DIAGNOSIS — E66.9 OBESITY (BMI 30-39.9): ICD-10-CM

## 2025-08-13 DIAGNOSIS — I77.810 DILATED AORTIC ROOT: ICD-10-CM

## 2025-08-13 DIAGNOSIS — E78.2 MIXED HYPERLIPIDEMIA: ICD-10-CM

## 2025-08-13 DIAGNOSIS — I48.19 PERSISTENT ATRIAL FIBRILLATION (HCC): Primary | ICD-10-CM

## 2025-08-13 PROCEDURE — 1160F RVW MEDS BY RX/DR IN RCRD: CPT

## 2025-08-13 PROCEDURE — 1123F ACP DISCUSS/DSCN MKR DOCD: CPT

## 2025-08-13 PROCEDURE — 1036F TOBACCO NON-USER: CPT

## 2025-08-13 PROCEDURE — 1159F MED LIST DOCD IN RCRD: CPT

## 2025-08-13 PROCEDURE — G8427 DOCREV CUR MEDS BY ELIG CLIN: HCPCS

## 2025-08-13 PROCEDURE — 3078F DIAST BP <80 MM HG: CPT

## 2025-08-13 PROCEDURE — 99214 OFFICE O/P EST MOD 30 MIN: CPT

## 2025-08-13 PROCEDURE — 3075F SYST BP GE 130 - 139MM HG: CPT

## 2025-08-13 PROCEDURE — G8417 CALC BMI ABV UP PARAM F/U: HCPCS

## 2025-08-13 PROCEDURE — 3017F COLORECTAL CA SCREEN DOC REV: CPT

## (undated) DEVICE — SET ADMIN 16ML TBNG L100IN 2 Y INJ SITE IV PIGGY BK DISP (ORDER IN MULIPLES OF 48)

## (undated) DEVICE — TIP CLEANER: Brand: VALLEYLAB

## (undated) DEVICE — Device

## (undated) DEVICE — SOLUTION IRRIG 1000ML H2O STRL BLT

## (undated) DEVICE — STERILE POLYISOPRENE POWDER-FREE SURGICAL GLOVES WITH EMOLLIENT COATING: Brand: PROTEXIS

## (undated) DEVICE — STRYKER PERFORMANCE SERIES SAGITTAL BLADE: Brand: STRYKER PERFORMANCE SERIES

## (undated) DEVICE — (D)PREP SKN CHLRAPRP APPL 26ML -- CONVERT TO ITEM 371833

## (undated) DEVICE — NEEDLE HYPO 18GA L1.5IN PNK S STL HUB POLYPR SHLD REG BVL

## (undated) DEVICE — SOLUTION IRRIG 3000ML 0.9% SOD CHL FLX CONT 0797208] ICU MEDICAL INC]

## (undated) DEVICE — NEEDLE HYPO 22GA L1.5IN BLK S STL HUB POLYPR SHLD REG BVL

## (undated) DEVICE — 1010 S-DRAPE TOWEL DRAPE 10/BX: Brand: STERI-DRAPE™

## (undated) DEVICE — SUTURE STRATAFIX SYMMETRIC PDS + SZ 1 L18IN ABSRB VLT L48MM SXPP1A400

## (undated) DEVICE — ELECTRODE BLDE L4IN NONINSULATED EDGE

## (undated) DEVICE — COVER,MAYO STAND,XL,STERILE: Brand: MEDLINE

## (undated) DEVICE — CATH IV AUTOGRD BC BLU 22GA 25 -- INSYTE

## (undated) DEVICE — T4 HOOD

## (undated) DEVICE — APPLICATOR BNDG 1MM ADH PREMIERPRO EXOFIN

## (undated) DEVICE — STERILE POLYISOPRENE POWDER-FREE SURGICAL GLOVES: Brand: PROTEXIS

## (undated) DEVICE — SYRINGE MED 5ML STD CLR PLAS LUERLOCK TIP N CTRL DISP

## (undated) DEVICE — 1200 GUARD II KIT W/5MM TUBE W/O VAC TUBE: Brand: GUARDIAN

## (undated) DEVICE — SUTURE VCRL SZ 1 L36IN ABSRB UD L36MM CT-1 1/2 CIR J947H

## (undated) DEVICE — SLIM BODY SKIN STAPLER: Brand: APPOSE ULC

## (undated) DEVICE — DRAPE,EXTREMITY,89X128,STERILE: Brand: MEDLINE

## (undated) DEVICE — TR BAND RADIAL ARTERY COMPRESSION DEVICE: Brand: TR BAND

## (undated) DEVICE — Z DISCONTINUED USE 2744636  DRESSING AQUACEL 14 IN ALG W3.5XL14IN POLYUR FLM CVR W/ HYDRCOLL

## (undated) DEVICE — HEART CATH-SFMC: Brand: MEDLINE INDUSTRIES, INC.

## (undated) DEVICE — KIT COLON W/ 1.1OZ LUB AND 2 END

## (undated) DEVICE — CANNULA CUSH AD W/ 14FT TBG

## (undated) DEVICE — SCRUB DRY SURG EZ SCRUB BRUSH PREOPERATIVE GRN

## (undated) DEVICE — NDL FLTR TIP 5 MIC 18GX1.5IN --

## (undated) DEVICE — SUTURE VCRL SZ 0 L27IN ABSRB UD L26MM CT-2 1/2 CIR J270H

## (undated) DEVICE — BITEBLOCK ENDOSCP 60FR MAXI WHT POLYETH STURDY W/ VELC WVN

## (undated) DEVICE — INFECTION CONTROL KIT SYS

## (undated) DEVICE — HANDLE LT SNAP ON ULT DURABLE LENS FOR TRUMPF ALC DISPOSABLE

## (undated) DEVICE — HOOK LOCK LATEX FREE ELASTIC BANDAGE D/L 6INX10YD

## (undated) DEVICE — KENDALL SCD EXPRESS SLEEVES, KNEE LENGTH, MEDIUM: Brand: KENDALL SCD

## (undated) DEVICE — SYR 5ML 1/5 GRAD LL NSAF LF --

## (undated) DEVICE — KENDALL RADIOLUCENT FOAM MONITORING ELECTRODE -RECTANGULAR SHAPE: Brand: KENDALL

## (undated) DEVICE — SNARE ENDOSCP M L240CM W27MM SHTH DIA2.4MM CHN 2.8MM OVL

## (undated) DEVICE — NDL PRT INJ NSAF BLNT 18GX1.5 --

## (undated) DEVICE — CATHETER IV 14GA L1.25IN TEF STR HUB INTROCAN SFTY

## (undated) DEVICE — POLYP TRAP: Brand: TRAPEASE®

## (undated) DEVICE — DRAPE,REIN 53X77,STERILE: Brand: MEDLINE

## (undated) DEVICE — CONTAINER SPEC 20 ML LID NEUT BUFF FORMALIN 10 % POLYPR STS

## (undated) DEVICE — NDL SPNE QNCKE 18GX3.5IN LF --

## (undated) DEVICE — CUSTOM CAST PD STR

## (undated) DEVICE — SUTURE VCRL SZ 2-0 L36IN ABSRB UD L40MM CT 1/2 CIR J957H

## (undated) DEVICE — FLOSEAL HEMOSTATIC MATRIX, 10 ML: Brand: FLOSEAL

## (undated) DEVICE — 3M™ IOBAN™ 2 ANTIMICROBIAL INCISE DRAPE 6651EZ: Brand: IOBAN™ 2

## (undated) DEVICE — SYR LR LCK 1ML GRAD NSAF 30ML --

## (undated) DEVICE — CODMAN® SURGICAL PATTIES 3/4" X 3/4" (1.91CM X 1.91CM): Brand: CODMAN®

## (undated) DEVICE — HANDPIECE SET WITH BONE CLEANING TIP AND SUCTION TUBE: Brand: INTERPULSE

## (undated) DEVICE — REM POLYHESIVE ADULT PATIENT RETURN ELECTRODE: Brand: VALLEYLAB

## (undated) DEVICE — ROSEN CURVED WIRE GUIDE: Brand: ROSEN

## (undated) DEVICE — CATHETER IV 22GA L1IN OD0.8382-0.9144MM ID0.6096-0.6858MM 382523

## (undated) DEVICE — TOOL 14MH30 LEGEND 14CM 3MM: Brand: MIDAS REX ™

## (undated) DEVICE — HANDPIECE SET WITH COAXIAL HIGH FLOW TIP AND SUCTION TUBE: Brand: INTERPULSE

## (undated) DEVICE — TRAY CATH 16F URIN MTR LTX -- CONVERT TO ITEM 363111

## (undated) DEVICE — Z DISCONTINUEDSOLUTION PREP 2OZ 10% POVIDONE IOD SCR CAP BTL

## (undated) DEVICE — DRAPE,UTILTY,TAPE,15X26, 4EA/PK: Brand: MEDLINE

## (undated) DEVICE — DEVON™ KNEE AND BODY STRAP 60" X 3" (1.5 M X 7.6 CM): Brand: DEVON

## (undated) DEVICE — STOPCOCK IV 3W --

## (undated) DEVICE — CARTRIDGE BNE CEM MIX UNIV TWR VAC ROTOR BRK OFF NOZ W/O

## (undated) DEVICE — CONTAINER,SPECIMEN,3OZ,OR STRL: Brand: MEDLINE

## (undated) DEVICE — SUTURE MCRYL SZ 3-0 L27IN ABSRB UD L19MM PS-2 3/8 CIR PRIM Y427H

## (undated) DEVICE — SYRINGE MED 20ML STD CLR PLAS LUERLOCK TIP N CTRL DISP

## (undated) DEVICE — ACCY PA100-A LEGEND LUB/DIFFUSER 4 PACK: Brand: MIDAS REX®

## (undated) DEVICE — DRAPE XR C ARM 41X74IN LF --

## (undated) DEVICE — 3000CC GUARDIAN II: Brand: GUARDIAN

## (undated) DEVICE — Z CONVERTED USE 2271043 CONTAINER SPEC COLL 4OZ SCR ON LID PEEL PCH

## (undated) DEVICE — SUPPORT WRST AD W3.5XL9IN DIA14.5IN ART SFT ADJ HK AND LOOP

## (undated) DEVICE — ZIMMER® STERILE DISPOSABLE TOURNIQUET CUFF WITH PLC, DUAL PORT, SINGLE BLADDER, 34 IN. (86 CM)

## (undated) DEVICE — MEDI-VAC NON-CONDUCTIVE SUCTION TUBING: Brand: CARDINAL HEALTH

## (undated) DEVICE — CATHETER KIT JL4 JR4 5FR 100CM 145 PGTL DXTERITY

## (undated) DEVICE — DRSG AQUACEL SURG 3.5X6IN -- CONVERT TO ITEM 369227

## (undated) DEVICE — LAMINECTOMY RICHMOND-LF: Brand: MEDLINE INDUSTRIES, INC.

## (undated) DEVICE — MAGNETIC INSTR DRAPE 20X16: Brand: MEDLINE INDUSTRIES, INC.

## (undated) DEVICE — BAG SPEC BIOHZRD 10 X 10 IN --

## (undated) DEVICE — COVER,TABLE,60X90,STERILE: Brand: MEDLINE

## (undated) DEVICE — SOLIDIFIER FLD 2OZ 1500CC N DISINF IN BTL DISP SAFESORB

## (undated) DEVICE — STRAP,POSITIONING,KNEE/BODY,FOAM,4X60": Brand: MEDLINE

## (undated) DEVICE — FORCEPS BX L240CM JAW DIA2.8MM L CAP W/ NDL MIC MESH TOOTH

## (undated) DEVICE — SYR 3ML LL TIP 1/10ML GRAD --

## (undated) DEVICE — COVER LT HNDL PLAS RIG 1 PER PK

## (undated) DEVICE — GLIDESHEATH SLENDER ACCESS KIT: Brand: GLIDESHEATH SLENDER

## (undated) DEVICE — BIPOLAR FORCEPS CORD: Brand: VALLEYLAB

## (undated) DEVICE — SUTURE MCRYL SZ 3-0 L27IN ABSRB UD L24MM PS-1 3/8 CIR PRIM Y936H

## (undated) DEVICE — IV STRT KT 3282] LSL INDUSTRIES INC]

## (undated) DEVICE — 4-PORT MANIFOLD: Brand: NEPTUNE 2

## (undated) DEVICE — BASIN EMSIS 16OZ GRAPHITE PLAS KID SHP MOLD GRAD FOR ORAL

## (undated) DEVICE — SYRINGE MED 10ML RED POLYCARB BRL FIX M LUER CONN FLAT GRP

## (undated) DEVICE — INTENDED FOR TISSUE SEPARATION, AND OTHER PROCEDURES THAT REQUIRE A SHARP SURGICAL BLADE TO PUNCTURE OR CUT.: Brand: BARD-PARKER ® CARBON RIB-BACK BLADES

## (undated) DEVICE — SET ADMIN 16ML TBNG L100IN 2 Y INJ SITE IV PIGGY BK DISP

## (undated) DEVICE — BLUNTFILL WITH FILTER: Brand: MONOJECT

## (undated) DEVICE — SYR 10ML LUER LOK 1/5ML GRAD --

## (undated) DEVICE — SYRINGE MEDICAL 3ML CLEAR PLASTIC STANDARD NON CONTROL LUERLOCK TIP DISPOSABLE

## (undated) DEVICE — SOLIDIFIER MEDC 1200ML -- CONVERT TO 356117

## (undated) DEVICE — BAG BELONG PT PERS CLEAR HANDL

## (undated) DEVICE — ADULT SPO2 SENSOR: Brand: NELLCOR

## (undated) DEVICE — SUTURE VCRL SZ 2-0 L27IN ABSRB UD L26MM CT-2 1/2 CIR J269H

## (undated) DEVICE — ELECTRODE,RADIOTRANSLUCENT,FOAM,3PK: Brand: MEDLINE

## (undated) DEVICE — BLUNTFILL: Brand: MONOJECT